# Patient Record
Sex: FEMALE | Race: WHITE | Employment: OTHER | URBAN - METROPOLITAN AREA
[De-identification: names, ages, dates, MRNs, and addresses within clinical notes are randomized per-mention and may not be internally consistent; named-entity substitution may affect disease eponyms.]

---

## 2020-07-16 ENCOUNTER — ANESTHESIA EVENT (OUTPATIENT)
Dept: SURGERY | Age: 70
DRG: 683 | End: 2020-07-16
Payer: MEDICARE

## 2020-07-16 ENCOUNTER — APPOINTMENT (OUTPATIENT)
Dept: ULTRASOUND IMAGING | Age: 70
DRG: 683 | End: 2020-07-16
Attending: INTERNAL MEDICINE
Payer: MEDICARE

## 2020-07-16 ENCOUNTER — APPOINTMENT (OUTPATIENT)
Dept: ULTRASOUND IMAGING | Age: 70
DRG: 683 | End: 2020-07-16
Attending: UROLOGY
Payer: MEDICARE

## 2020-07-16 ENCOUNTER — HOSPITAL ENCOUNTER (INPATIENT)
Age: 70
LOS: 7 days | Discharge: HOME | DRG: 683 | End: 2020-07-23
Attending: EMERGENCY MEDICINE | Admitting: INTERNAL MEDICINE
Payer: MEDICARE

## 2020-07-16 ENCOUNTER — ANESTHESIA (OUTPATIENT)
Dept: SURGERY | Age: 70
DRG: 683 | End: 2020-07-16
Payer: MEDICARE

## 2020-07-16 DIAGNOSIS — N17.9 ACUTE RENAL FAILURE, UNSPECIFIED ACUTE RENAL FAILURE TYPE (HCC): Primary | ICD-10-CM

## 2020-07-16 DIAGNOSIS — D64.9 ANEMIA, UNSPECIFIED TYPE: ICD-10-CM

## 2020-07-16 DIAGNOSIS — R33.9 URINARY RETENTION: ICD-10-CM

## 2020-07-16 PROBLEM — N13.9 LOWER OBSTRUCTIVE UROPATHY: Status: ACTIVE | Noted: 2020-07-16

## 2020-07-16 PROBLEM — E03.9 ACQUIRED HYPOTHYROIDISM: Status: ACTIVE | Noted: 2020-07-16

## 2020-07-16 LAB
ALBUMIN SERPL-MCNC: 3.8 G/DL (ref 3.2–4.6)
ALBUMIN/GLOB SERPL: 1 {RATIO} (ref 1.2–3.5)
ALP SERPL-CCNC: 73 U/L (ref 50–136)
ALT SERPL-CCNC: 17 U/L (ref 12–65)
ANION GAP SERPL CALC-SCNC: 17 MMOL/L (ref 7–16)
AST SERPL-CCNC: 15 U/L (ref 15–37)
ATRIAL RATE: 77 BPM
BASOPHILS # BLD: 0 K/UL (ref 0–0.2)
BASOPHILS NFR BLD: 1 % (ref 0–2)
BILIRUB SERPL-MCNC: 0.3 MG/DL (ref 0.2–1.1)
BUN SERPL-MCNC: 140 MG/DL (ref 8–23)
CALCIUM SERPL-MCNC: 8.9 MG/DL (ref 8.3–10.4)
CALCULATED P AXIS, ECG09: 74 DEGREES
CALCULATED R AXIS, ECG10: 41 DEGREES
CALCULATED T AXIS, ECG11: 78 DEGREES
CHLORIDE SERPL-SCNC: 105 MMOL/L (ref 98–107)
CO2 SERPL-SCNC: 13 MMOL/L (ref 21–32)
CREAT SERPL-MCNC: 17.4 MG/DL (ref 0.6–1)
DIAGNOSIS, 93000: NORMAL
DIFFERENTIAL METHOD BLD: ABNORMAL
EOSINOPHIL # BLD: 0.1 K/UL (ref 0–0.8)
EOSINOPHIL NFR BLD: 3 % (ref 0.5–7.8)
ERYTHROCYTE [DISTWIDTH] IN BLOOD BY AUTOMATED COUNT: 14.4 % (ref 11.9–14.6)
FERRITIN SERPL-MCNC: 118 NG/ML (ref 8–388)
FOLATE SERPL-MCNC: 19.3 NG/ML (ref 3.1–17.5)
GLOBULIN SER CALC-MCNC: 3.8 G/DL (ref 2.3–3.5)
GLUCOSE SERPL-MCNC: 110 MG/DL (ref 65–100)
HAPTOGLOB SERPL-MCNC: 51 MG/DL (ref 30–200)
HCT VFR BLD AUTO: 18.8 % (ref 35.8–46.3)
HGB BLD-MCNC: 10 G/DL (ref 11.7–15.4)
HGB BLD-MCNC: 6.3 G/DL (ref 11.7–15.4)
HGB RETIC QN AUTO: 34 PG (ref 29–35)
IMM GRANULOCYTES # BLD AUTO: 0 K/UL (ref 0–0.5)
IMM GRANULOCYTES NFR BLD AUTO: 1 % (ref 0–5)
IMM RETICS NFR: 11.2 % (ref 3–15.9)
IRON SATN MFR SERPL: 26 %
IRON SERPL-MCNC: 85 UG/DL (ref 35–150)
LYMPHOCYTES # BLD: 0.8 K/UL (ref 0.5–4.6)
LYMPHOCYTES NFR BLD: 18 % (ref 13–44)
MCH RBC QN AUTO: 29.7 PG (ref 26.1–32.9)
MCHC RBC AUTO-ENTMCNC: 33.5 G/DL (ref 31.4–35)
MCV RBC AUTO: 88.7 FL (ref 79.6–97.8)
MONOCYTES # BLD: 0.3 K/UL (ref 0.1–1.3)
MONOCYTES NFR BLD: 8 % (ref 4–12)
NEUTS SEG # BLD: 3.1 K/UL (ref 1.7–8.2)
NEUTS SEG NFR BLD: 70 % (ref 43–78)
NRBC # BLD: 0 K/UL (ref 0–0.2)
P-R INTERVAL, ECG05: 224 MS
PLATELET # BLD AUTO: 154 K/UL (ref 150–450)
PMV BLD AUTO: 9.7 FL (ref 9.4–12.3)
POTASSIUM SERPL-SCNC: 4.5 MMOL/L (ref 3.5–5.1)
PROT SERPL-MCNC: 7.6 G/DL (ref 6.3–8.2)
Q-T INTERVAL, ECG07: 404 MS
QRS DURATION, ECG06: 114 MS
QTC CALCULATION (BEZET), ECG08: 457 MS
RBC # BLD AUTO: 2.12 M/UL (ref 4.05–5.2)
RETICS # AUTO: 0.07 M/UL (ref 0.03–0.1)
RETICS/RBC NFR AUTO: 3.4 % (ref 0.3–2)
SODIUM SERPL-SCNC: 135 MMOL/L (ref 136–145)
TIBC SERPL-MCNC: 323 UG/DL (ref 250–450)
TRANSFERRIN SERPL-MCNC: 245 MG/DL (ref 202–364)
VENTRICULAR RATE, ECG03: 77 BPM
VIT B12 SERPL-MCNC: 955 PG/ML (ref 193–986)
WBC # BLD AUTO: 4.4 K/UL (ref 4.3–11.1)

## 2020-07-16 PROCEDURE — 82607 VITAMIN B-12: CPT

## 2020-07-16 PROCEDURE — 30233N1 TRANSFUSION OF NONAUTOLOGOUS RED BLOOD CELLS INTO PERIPHERAL VEIN, PERCUTANEOUS APPROACH: ICD-10-PCS | Performed by: EMERGENCY MEDICINE

## 2020-07-16 PROCEDURE — 87086 URINE CULTURE/COLONY COUNT: CPT

## 2020-07-16 PROCEDURE — 77030039425 HC BLD LARYNG TRULITE DISP TELE -A: Performed by: ANESTHESIOLOGY

## 2020-07-16 PROCEDURE — 85025 COMPLETE CBC W/AUTO DIFF WBC: CPT

## 2020-07-16 PROCEDURE — 74011250636 HC RX REV CODE- 250/636: Performed by: UROLOGY

## 2020-07-16 PROCEDURE — 85018 HEMOGLOBIN: CPT

## 2020-07-16 PROCEDURE — P9016 RBC LEUKOCYTES REDUCED: HCPCS

## 2020-07-16 PROCEDURE — 83540 ASSAY OF IRON: CPT

## 2020-07-16 PROCEDURE — 74011250636 HC RX REV CODE- 250/636: Performed by: ANESTHESIOLOGY

## 2020-07-16 PROCEDURE — 76060000032 HC ANESTHESIA 0.5 TO 1 HR: Performed by: UROLOGY

## 2020-07-16 PROCEDURE — 77010033678 HC OXYGEN DAILY

## 2020-07-16 PROCEDURE — 84466 ASSAY OF TRANSFERRIN: CPT

## 2020-07-16 PROCEDURE — 74011250637 HC RX REV CODE- 250/637: Performed by: INTERNAL MEDICINE

## 2020-07-16 PROCEDURE — 85046 RETICYTE/HGB CONCENTRATE: CPT

## 2020-07-16 PROCEDURE — 74011250636 HC RX REV CODE- 250/636: Performed by: NURSE ANESTHETIST, CERTIFIED REGISTERED

## 2020-07-16 PROCEDURE — 80053 COMPREHEN METABOLIC PANEL: CPT

## 2020-07-16 PROCEDURE — 77030005518 HC CATH URETH FOL 2W BARD -B: Performed by: UROLOGY

## 2020-07-16 PROCEDURE — 82746 ASSAY OF FOLIC ACID SERUM: CPT

## 2020-07-16 PROCEDURE — 76770 US EXAM ABDO BACK WALL COMP: CPT

## 2020-07-16 PROCEDURE — C1769 GUIDE WIRE: HCPCS | Performed by: UROLOGY

## 2020-07-16 PROCEDURE — C1894 INTRO/SHEATH, NON-LASER: HCPCS | Performed by: UROLOGY

## 2020-07-16 PROCEDURE — 77030040361 HC SLV COMPR DVT MDII -B: Performed by: UROLOGY

## 2020-07-16 PROCEDURE — 82728 ASSAY OF FERRITIN: CPT

## 2020-07-16 PROCEDURE — 77030019927 HC TBNG IRR CYSTO BAXT -A: Performed by: UROLOGY

## 2020-07-16 PROCEDURE — 74011000250 HC RX REV CODE- 250: Performed by: NURSE ANESTHETIST, CERTIFIED REGISTERED

## 2020-07-16 PROCEDURE — 76010000160 HC OR TIME 0.5 TO 1 HR INTENSV-TIER 1: Performed by: UROLOGY

## 2020-07-16 PROCEDURE — 77030018832 HC SOL IRR H20 ICUM -A: Performed by: UROLOGY

## 2020-07-16 PROCEDURE — 65270000029 HC RM PRIVATE

## 2020-07-16 PROCEDURE — 86900 BLOOD TYPING SEROLOGIC ABO: CPT

## 2020-07-16 PROCEDURE — 0T7D8ZZ DILATION OF URETHRA, VIA NATURAL OR ARTIFICIAL OPENING ENDOSCOPIC: ICD-10-PCS | Performed by: UROLOGY

## 2020-07-16 PROCEDURE — 96374 THER/PROPH/DIAG INJ IV PUSH: CPT

## 2020-07-16 PROCEDURE — 36415 COLL VENOUS BLD VENIPUNCTURE: CPT

## 2020-07-16 PROCEDURE — 94760 N-INVAS EAR/PLS OXIMETRY 1: CPT

## 2020-07-16 PROCEDURE — 99284 EMERGENCY DEPT VISIT MOD MDM: CPT

## 2020-07-16 PROCEDURE — 77030040922 HC BLNKT HYPOTHRM STRY -A: Performed by: ANESTHESIOLOGY

## 2020-07-16 PROCEDURE — 76210000063 HC OR PH I REC FIRST 0.5 HR: Performed by: UROLOGY

## 2020-07-16 PROCEDURE — 86923 COMPATIBILITY TEST ELECTRIC: CPT

## 2020-07-16 PROCEDURE — 36430 TRANSFUSION BLD/BLD COMPNT: CPT

## 2020-07-16 PROCEDURE — 77030037088 HC TUBE ENDOTRACH ORAL NSL COVD-A: Performed by: ANESTHESIOLOGY

## 2020-07-16 PROCEDURE — 83010 ASSAY OF HAPTOGLOBIN QUANT: CPT

## 2020-07-16 PROCEDURE — 74011250636 HC RX REV CODE- 250/636

## 2020-07-16 RX ORDER — METOPROLOL TARTRATE 50 MG/1
50 TABLET ORAL 2 TIMES DAILY
Status: DISCONTINUED | OUTPATIENT
Start: 2020-07-16 | End: 2020-07-23 | Stop reason: HOSPADM

## 2020-07-16 RX ORDER — PANTOPRAZOLE SODIUM 40 MG/1
40 TABLET, DELAYED RELEASE ORAL DAILY
COMMUNITY
End: 2020-08-19 | Stop reason: SDUPTHER

## 2020-07-16 RX ORDER — SODIUM CHLORIDE, SODIUM LACTATE, POTASSIUM CHLORIDE, CALCIUM CHLORIDE 600; 310; 30; 20 MG/100ML; MG/100ML; MG/100ML; MG/100ML
75 INJECTION, SOLUTION INTRAVENOUS CONTINUOUS
Status: DISCONTINUED | OUTPATIENT
Start: 2020-07-16 | End: 2020-07-16

## 2020-07-16 RX ORDER — CEFAZOLIN SODIUM/WATER 2 G/20 ML
2 SYRINGE (ML) INTRAVENOUS ONCE
Status: COMPLETED | OUTPATIENT
Start: 2020-07-16 | End: 2020-07-16

## 2020-07-16 RX ORDER — ROCURONIUM BROMIDE 10 MG/ML
INJECTION, SOLUTION INTRAVENOUS AS NEEDED
Status: DISCONTINUED | OUTPATIENT
Start: 2020-07-16 | End: 2020-07-16 | Stop reason: HOSPADM

## 2020-07-16 RX ORDER — DULOXETIN HYDROCHLORIDE 30 MG/1
CAPSULE, DELAYED RELEASE ORAL
COMMUNITY
Start: 2020-07-15 | End: 2020-08-13 | Stop reason: CLARIF

## 2020-07-16 RX ORDER — HYDROMORPHONE HYDROCHLORIDE 1 MG/ML
0.5 INJECTION, SOLUTION INTRAMUSCULAR; INTRAVENOUS; SUBCUTANEOUS
Status: DISCONTINUED | OUTPATIENT
Start: 2020-07-16 | End: 2020-07-23 | Stop reason: HOSPADM

## 2020-07-16 RX ORDER — LEVOTHYROXINE SODIUM 150 UG/1
150 TABLET ORAL DAILY
COMMUNITY
End: 2020-07-23

## 2020-07-16 RX ORDER — HYDRALAZINE HYDROCHLORIDE 25 MG/1
25 TABLET, FILM COATED ORAL
Status: DISCONTINUED | OUTPATIENT
Start: 2020-07-16 | End: 2020-07-23 | Stop reason: HOSPADM

## 2020-07-16 RX ORDER — EPHEDRINE SULFATE/0.9% NACL/PF 50 MG/5 ML
SYRINGE (ML) INTRAVENOUS AS NEEDED
Status: DISCONTINUED | OUTPATIENT
Start: 2020-07-16 | End: 2020-07-16 | Stop reason: HOSPADM

## 2020-07-16 RX ORDER — LORAZEPAM 2 MG/ML
INJECTION INTRAMUSCULAR
Status: COMPLETED
Start: 2020-07-16 | End: 2020-07-16

## 2020-07-16 RX ORDER — SODIUM CHLORIDE 0.9 % (FLUSH) 0.9 %
5-40 SYRINGE (ML) INJECTION EVERY 8 HOURS
Status: DISCONTINUED | OUTPATIENT
Start: 2020-07-16 | End: 2020-07-23 | Stop reason: HOSPADM

## 2020-07-16 RX ORDER — LISINOPRIL 40 MG/1
40 TABLET ORAL DAILY
COMMUNITY
End: 2020-07-23

## 2020-07-16 RX ORDER — BUPROPION HYDROCHLORIDE 300 MG/1
300 TABLET ORAL DAILY
COMMUNITY
End: 2020-08-13 | Stop reason: CLARIF

## 2020-07-16 RX ORDER — ACETAMINOPHEN 325 MG/1
650 TABLET ORAL
Status: DISCONTINUED | OUTPATIENT
Start: 2020-07-16 | End: 2020-07-23 | Stop reason: HOSPADM

## 2020-07-16 RX ORDER — LEVOTHYROXINE SODIUM 75 UG/1
150 TABLET ORAL
Status: DISCONTINUED | OUTPATIENT
Start: 2020-07-17 | End: 2020-07-23 | Stop reason: HOSPADM

## 2020-07-16 RX ORDER — HYDRALAZINE HYDROCHLORIDE 25 MG/1
25 TABLET, FILM COATED ORAL 3 TIMES DAILY
Status: DISCONTINUED | OUTPATIENT
Start: 2020-07-16 | End: 2020-07-23 | Stop reason: HOSPADM

## 2020-07-16 RX ORDER — SODIUM CHLORIDE 9 MG/ML
250 INJECTION, SOLUTION INTRAVENOUS AS NEEDED
Status: DISCONTINUED | OUTPATIENT
Start: 2020-07-16 | End: 2020-07-23 | Stop reason: HOSPADM

## 2020-07-16 RX ORDER — SUCCINYLCHOLINE CHLORIDE 20 MG/ML
INJECTION INTRAMUSCULAR; INTRAVENOUS AS NEEDED
Status: DISCONTINUED | OUTPATIENT
Start: 2020-07-16 | End: 2020-07-16 | Stop reason: HOSPADM

## 2020-07-16 RX ORDER — ONDANSETRON 2 MG/ML
INJECTION INTRAMUSCULAR; INTRAVENOUS AS NEEDED
Status: DISCONTINUED | OUTPATIENT
Start: 2020-07-16 | End: 2020-07-16 | Stop reason: HOSPADM

## 2020-07-16 RX ORDER — HEPARIN SODIUM 5000 [USP'U]/ML
5000 INJECTION, SOLUTION INTRAVENOUS; SUBCUTANEOUS EVERY 8 HOURS
Status: DISCONTINUED | OUTPATIENT
Start: 2020-07-17 | End: 2020-07-23 | Stop reason: HOSPADM

## 2020-07-16 RX ORDER — LIDOCAINE HYDROCHLORIDE 20 MG/ML
INJECTION, SOLUTION EPIDURAL; INFILTRATION; INTRACAUDAL; PERINEURAL AS NEEDED
Status: DISCONTINUED | OUTPATIENT
Start: 2020-07-16 | End: 2020-07-16 | Stop reason: HOSPADM

## 2020-07-16 RX ORDER — DEXAMETHASONE SODIUM PHOSPHATE 4 MG/ML
INJECTION, SOLUTION INTRA-ARTICULAR; INTRALESIONAL; INTRAMUSCULAR; INTRAVENOUS; SOFT TISSUE AS NEEDED
Status: DISCONTINUED | OUTPATIENT
Start: 2020-07-16 | End: 2020-07-16 | Stop reason: HOSPADM

## 2020-07-16 RX ORDER — LOSARTAN POTASSIUM 100 MG/1
TABLET ORAL
COMMUNITY
Start: 2020-07-15 | End: 2020-07-23

## 2020-07-16 RX ORDER — PROPOFOL 10 MG/ML
INJECTION, EMULSION INTRAVENOUS AS NEEDED
Status: DISCONTINUED | OUTPATIENT
Start: 2020-07-16 | End: 2020-07-16 | Stop reason: HOSPADM

## 2020-07-16 RX ORDER — FENTANYL CITRATE 50 UG/ML
INJECTION, SOLUTION INTRAMUSCULAR; INTRAVENOUS AS NEEDED
Status: DISCONTINUED | OUTPATIENT
Start: 2020-07-16 | End: 2020-07-16 | Stop reason: HOSPADM

## 2020-07-16 RX ORDER — SODIUM CHLORIDE 0.9 % (FLUSH) 0.9 %
5-40 SYRINGE (ML) INJECTION AS NEEDED
Status: DISCONTINUED | OUTPATIENT
Start: 2020-07-16 | End: 2020-07-23 | Stop reason: HOSPADM

## 2020-07-16 RX ORDER — LORAZEPAM 2 MG/ML
2 INJECTION INTRAMUSCULAR ONCE
Status: COMPLETED | OUTPATIENT
Start: 2020-07-16 | End: 2020-07-16

## 2020-07-16 RX ADMIN — LIDOCAINE HYDROCHLORIDE 60 MG: 20 INJECTION, SOLUTION EPIDURAL; INFILTRATION; INTRACAUDAL; PERINEURAL at 15:15

## 2020-07-16 RX ADMIN — DEXAMETHASONE SODIUM PHOSPHATE 4 MG: 4 INJECTION, SOLUTION INTRAMUSCULAR; INTRAVENOUS at 15:40

## 2020-07-16 RX ADMIN — Medication 10 ML: at 22:21

## 2020-07-16 RX ADMIN — ROCURONIUM BROMIDE 10 MG: 10 INJECTION, SOLUTION INTRAVENOUS at 15:15

## 2020-07-16 RX ADMIN — SUCCINYLCHOLINE CHLORIDE 100 MG: 20 INJECTION, SOLUTION INTRAMUSCULAR; INTRAVENOUS at 15:16

## 2020-07-16 RX ADMIN — Medication 10 ML: at 18:09

## 2020-07-16 RX ADMIN — Medication 10 MG: at 15:38

## 2020-07-16 RX ADMIN — SODIUM CHLORIDE, SODIUM LACTATE, POTASSIUM CHLORIDE, AND CALCIUM CHLORIDE: 600; 310; 30; 20 INJECTION, SOLUTION INTRAVENOUS at 15:07

## 2020-07-16 RX ADMIN — ONDANSETRON 4 MG: 2 INJECTION INTRAMUSCULAR; INTRAVENOUS at 15:40

## 2020-07-16 RX ADMIN — LORAZEPAM 2 MG: 2 INJECTION INTRAMUSCULAR; INTRAVENOUS at 13:09

## 2020-07-16 RX ADMIN — FENTANYL CITRATE 50 MCG: 50 INJECTION INTRAMUSCULAR; INTRAVENOUS at 15:15

## 2020-07-16 RX ADMIN — HYDRALAZINE HYDROCHLORIDE 25 MG: 25 TABLET, FILM COATED ORAL at 19:38

## 2020-07-16 RX ADMIN — METOPROLOL TARTRATE 50 MG: 50 TABLET, FILM COATED ORAL at 18:08

## 2020-07-16 RX ADMIN — PROPOFOL 150 MG: 10 INJECTION, EMULSION INTRAVENOUS at 15:15

## 2020-07-16 RX ADMIN — HYDRALAZINE HYDROCHLORIDE 25 MG: 25 TABLET, FILM COATED ORAL at 22:21

## 2020-07-16 RX ADMIN — LORAZEPAM 2 MG: 2 INJECTION INTRAMUSCULAR at 13:09

## 2020-07-16 RX ADMIN — Medication 2 G: at 15:20

## 2020-07-16 NOTE — BRIEF OP NOTE
Brief Postoperative Note    Patient: Enzo Greene  YOB: 1950  MRN: [id number]    Date of Procedure: 7/16/2020     Pre-Op Diagnosis: URINARY STRICTURE    Post-Op Diagnosis: Same as preoperative diagnosis.       Procedure(s):  CYSTOSCOPY, INSERTION OF CHAUDHRY CATHETER    Surgeon(s):  Kortney Salazar MD    Surgical Assistant: None    Anesthesia: General     Estimated Blood Loss (mL): Minimal    Complications: None    Specimens:   ID Type Source Tests Collected by Time Destination   1 : Urine Culture Urine Bladder CULTURE, URINE Kortney Salazar MD 7/16/2020 1536 Microbiology        Implants: * No implants in log *    Drains:   Orogastric Tube 07/16/20 (Active)       Findings: see op note    Electronically Signed by Kane Gonzalez MD on 7/16/2020 at 3:55 PM

## 2020-07-16 NOTE — PROGRESS NOTES
07/16/20 1705   Dual Skin Pressure Injury Assessment   Dual Skin Pressure Injury Assessment WDL   Second Care Provider (Based on [address] [place] [address]) [address], RN   pt has no noted skin breakdown on the sacrum or heels. Zamora in place.

## 2020-07-16 NOTE — H&P
Hospitalist H&P Note     Admit Date:  2020 10:47 AM   Name:  Lauren Gutierrez   Age:  81 y.o.  :  1950   MRN:  [id number]   PCP:  Fernanda Buchanan MD  Treatment Team: Attending Provider: Maria Teresa Wilson MD; Consulting Provider: Tiffany Land MD    HPI/Subjective:   Lauren Gutierrez is a 81 y.o. female with past medical history significant for hypertension, hypothyroidism, urethral stricture presents for generalized malaise, abdominal distention and pain. Patient reports she went to her PCP and got labs drawn. Labs came back abnormal and PCP recommended patient come to the ER for further evaluation. Upon ER arrival, patient found to have a creatinine of 17 and a distended bladder. Patient also found to have a hemoglobin of 6.3. Multiple attempts were made by urology to place Zamora catheter but patient has significant stricture that is cannot be passed. Patient to be taken to the OR for Zamora placement at this time. Patient denies any recent fevers, chills, cough, shortness of breath, chest pain. She has been having worsening nausea and several episodes of vomiting over the past week. She reports following social desisting measures and wearing a mask. Patient reports that she has had urinary problems for the past 35 years. 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 She does not drink alcohol. Patient to be admitted to hospital service for management of anemia, acute renal failure. 10 systems reviewed and negative except as noted in HPI.       Past Medical History:   Diagnosis Date    Arthritis     GERD (gastroesophageal reflux disease)     no meds    High cholesterol     Hypertension     Kidney disease     Liver disease     hep C, ~, treated interferon/ribo    Melanoma ([facility] [address]     RLE, ~     PUD (peptic ulcer disease)     Rheumatic fever     as a child    Thyroid disease     hypo Past Surgical History:   Procedure Laterality Date    HX APPENDECTOMY      HX CHOLECYSTECTOMY      HX ORTHOPAEDIC Left     KNEE COPE    NEUROLOGICAL PROCEDURE UNLISTED      CERVICAL AND LUMBAR SX      No Known Allergies   Social History     Tobacco Use    Smoking status: Current Every Day Smoker     Packs/day: 0.50     Years: 20.00     Pack years: 10.00    Smokeless tobacco: Never Used   Substance Use Topics    Alcohol use: No      Family History   Family history unknown: Yes   Problem Relation Age of Onset    Family history unknown: Yes    No Known Problems Mother     No Known Problems Father         There is no immunization history on file for this patient. PTA Medications:  Prior to Admission Medications   Prescriptions Last Dose Informant Patient Reported? Taking? ASPIRIN/CAFFEINE (ANACIN PO)   Yes No   Sig: Take  by mouth as needed. atorvastatin (LIPITOR) 20 mg tablet   Yes No   Sig: Take 20 mg by mouth every other day. cholecalciferol, VITAMIN D3, (VITAMIN D3) 5,000 unit tab tablet   Yes No   Sig: Take 5,000 Units by mouth daily. fentaNYL (DURAGESIC) 50 mcg/hr PATCH   Yes No   Si Patch by TransDERmal route every fourty-eight (48) hours. levothyroxine (SYNTHROID) 100 mcg tablet   Yes No   Sig: Take 150 mcg by mouth Daily (before breakfast). metoprolol tartrate (LOPRESSOR) 50 mg tablet   Yes No   Sig: Take 50 mg by mouth two (2) times a day.       Facility-Administered Medications: None       Objective:     Patient Vitals for the past 24 hrs:   Temp Pulse Resp BP SpO2   20 1410 98.1 °F (36.7 °C) 72 16 (!) 192/92 99 %   20 1405 98.1 °F (36.7 °C) 72 16 (!) 189/93 100 %   20 1400 98.1 °F (36.7 °C) 70 16 (!) 178/103 100 %   20 1355 98.2 °F (36.8 °C) 72 16 (!) 179/102 99 %   20 1348 98.2 °F (36.8 °C) 71 16 (!) 184/104 100 %   20 1332  73   95 %   20 1327    161/77 (!) 78 %   20 1131  85  (!) 171/91 97 %   20 1123  76  158/85 100 %   07/16/20 1042 98.2 °F (36.8 °C) (!) 59 16 (!) 171/92 99 %     Oxygen Therapy  O2 Sat (%): 99 % (07/16/20 1410)  Pulse via Oximetry: 73 beats per minute (07/16/20 1332)  O2 Device: Room air (07/16/20 1348)    Estimated body mass index is 23.72 kg/m² as calculated from the following:    Height as of this encounter: 5' 10\" (1.778 m). Weight as of this encounter: 75 kg (165 lb 5.5 oz). No intake or output data in the 24 hours ending 07/16/20 1421    *Note that automatically entered I/Os may not be accurate; dependent on patient compliance with collection and accurate  by assistants. Physical Exam:  General:    Pleasant white female, appears to be in mild distress. Nervous  HEENT: Conjunctival pallor noted, patient's face is very white and pale. Normocephalic, atraumatic, oromucosa is moist  CV:   RRR, no murmurs  Lungs:   Clear to auscultation bilaterally  Abdomen:   Soft but tender to palpation distended. Patient has distended globus left level of epigastric area filled with fluid. No guarding or rigidity noted  Extremities: No visible skeletal deformities  Skin:     Minor bruising around arms but no rash or jaundice  Neuro:  AO x4, CN II through XII grossly intact  Psych:  Calm, cooperative      Data Review:   Recent Results (from the past 24 hour(s))   CBC WITH AUTOMATED DIFF    Collection Time: 07/16/20 10:45 AM   Result Value Ref Range    WBC 4.4 4.3 - 11.1 K/uL    RBC 2.12 (L) 4.05 - 5.2 M/uL    HGB 6.3 (LL) 11.7 - 15.4 g/dL    HCT 18.8 (LL) 35.8 - 46.3 %    MCV 88.7 79.6 - 97.8 FL    MCH 29.7 26.1 - 32.9 PG    MCHC 33.5 31.4 - 35.0 g/dL    RDW 14.4 11.9 - 14.6 %    PLATELET 078 922 - 242 K/uL    MPV 9.7 9.4 - 12.3 FL    ABSOLUTE NRBC 0.00 0.0 - 0.2 K/uL    DF AUTOMATED      NEUTROPHILS 70 43 - 78 %    LYMPHOCYTES 18 13 - 44 %    MONOCYTES 8 4.0 - 12.0 %    EOSINOPHILS 3 0.5 - 7.8 %    BASOPHILS 1 0.0 - 2.0 %    IMMATURE GRANULOCYTES 1 0.0 - 5.0 %    ABS.  NEUTROPHILS 3.1 1.7 - 8.2 K/UL    ABS. LYMPHOCYTES 0.8 0.5 - 4.6 K/UL    ABS. MONOCYTES 0.3 0.1 - 1.3 K/UL    ABS. EOSINOPHILS 0.1 0.0 - 0.8 K/UL    ABS. BASOPHILS 0.0 0.0 - 0.2 K/UL    ABS. IMM. GRANS. 0.0 0.0 - 0.5 K/UL   METABOLIC PANEL, COMPREHENSIVE    Collection Time: 07/16/20 10:45 AM   Result Value Ref Range    Sodium 135 (L) 136 - 145 mmol/L    Potassium 4.5 3.5 - 5.1 mmol/L    Chloride 105 98 - 107 mmol/L    CO2 13 (L) 21 - 32 mmol/L    Anion gap 17 (H) 7 - 16 mmol/L    Glucose 110 (H) 65 - 100 mg/dL     (H) 8 - 23 MG/DL    Creatinine 17.40 (H) 0.6 - 1.0 MG/DL    GFR est AA 3 (L) >60 ml/min/1.73m2    GFR est non-AA 2 (L) >60 ml/min/1.73m2    Calcium 8.9 8.3 - 10.4 MG/DL    Bilirubin, total 0.3 0.2 - 1.1 MG/DL    ALT (SGPT) 17 12 - 65 U/L    AST (SGOT) 15 15 - 37 U/L    Alk.  phosphatase 73 50 - 136 U/L    Protein, total 7.6 6.3 - 8.2 g/dL    Albumin 3.8 3.2 - 4.6 g/dL    Globulin 3.8 (H) 2.3 - 3.5 g/dL    A-G Ratio 1.0 (L) 1.2 - 3.5     HAPTOGLOBIN    Collection Time: 07/16/20 10:45 AM   Result Value Ref Range    Haptoglobin PENDING mg/dL   FOLATE    Collection Time: 07/16/20 10:45 AM   Result Value Ref Range    Folate 19.3 (H) 3.1 - 17.5 ng/mL   VITAMIN B12    Collection Time: 07/16/20 10:45 AM   Result Value Ref Range    Vitamin B12 955 193 - 986 pg/mL   TRANSFERRIN    Collection Time: 07/16/20 10:45 AM   Result Value Ref Range    Transferrin 245 202 - 364 mg/dL   FERRITIN    Collection Time: 07/16/20 10:45 AM   Result Value Ref Range    Ferritin 118 8 - 388 NG/ML   RETICULOCYTE COUNT    Collection Time: 07/16/20 10:45 AM   Result Value Ref Range    Reticulocyte count 3.4 (H) 0.3 - 2.0 %    Absolute Retic Cnt. 0.0725 0.026 - 0.095 M/ul    Immature Retic Fraction 11.2 3.0 - 15.9 %    Retic Hgb Conc. 34 29 - 35 pg   EKG, 12 LEAD, INITIAL    Collection Time: 07/16/20 10:55 AM   Result Value Ref Range    Ventricular Rate 77 BPM    Atrial Rate 77 BPM    P-R Interval 224 ms    QRS Duration 114 ms    Q-T Interval 404 ms    QTC Calculation (Bezet) 457 ms    Calculated P Axis 74 degrees    Calculated R Axis 41 degrees    Calculated T Axis 78 degrees    Diagnosis       Sinus rhythm with 1st degree A-V block  Otherwise normal ECG  No previous ECGs available     TYPE + CROSSMATCH    Collection Time: 07/16/20 11:43 AM   Result Value Ref Range    Crossmatch Expiration 07/19/2020     ABO/Rh(D) Suresh Pyo POSITIVE     Antibody screen NEG     Unit number [id number]     Blood component type RC LR     Unit division 00     Status of unit ISSUED     Crossmatch result Compatible        All Micro Results     None          Current Facility-Administered Medications   Medication Dose Route Frequency    0.9% sodium chloride infusion 250 mL  250 mL IntraVENous PRN    acetaminophen (TYLENOL) tablet 650 mg  650 mg Oral Q4H PRN    HYDROmorphone (PF) (DILAUDID) injection 0.5 mg  0.5 mg IntraVENous Q6H PRN    ceFAZolin (ANCEF) 2 g/20 mL in sterile water IV syringe  2 g IntraVENous ONCE       Other Studies:  No results found. Assessment and Plan: Active Hospital Problems    Diagnosis Date Noted    Acute renal failure (ARF) ([facility] [address] 07/16/2020    Anemia 07/16/2020    Lower obstructive uropathy 07/16/2020    Acquired hypothyroidism 07/16/2020       Plan:    Acute renal failure secondary to obstructive uropathy from urethral stricture  Multiple attempts to place Zamora 1 accessible as well as dilation in the ER by urology team.  Patient has known urethral strictures and has had dilations in the past.  This is likely cause of her obstructive uropathy causing renal failure. Urology team to take patient to the OR for urethral dilation and Zamora placement  Follow-up urology recommendations as well as nephrology recommendations  Obtain ultrasound kidneys   Monitor electrolyte abnormalities given renal failure   Zamora placement and care  Follow-up UA    Symptomatic anemia, unclear etiology  May be due to patient's renal dysfunction.   Patient denies any dark stools and is currently not on any blood thinners. Patient does not take NSAIDs. Patient reports stool guaiac was negative in PCP office  Transfuse 1 unit   Maintain active type and screen   Follow-up iron studies, ferritin     Hypertension  Patient currently hypertensive but is uncomfortable and in pain. Will monitor for now and treat pain first.  Pain control PRN   Continue with metoprolol 50 mg XL daily    Hypothyroidism  Continue with Synthroid as per home dose    Fluids: Hold off on fluids for now  Electrolytes: Replete as needed  Nutrition: Renal diet  CODE STATUS: Full    DVT prophylaxis: Heparin subcu  Estimated LOS:  Greater than 2 midnights    Medical Decision Making:    Labs/Imaging reviewed. Additional information obtained from ED provider. Patient is high risk due to medical condition and comorbidities. In addition, requires monitoring due to receiving medications. Plan discussed with nursing staff. Plan discussed with patient/family. All questions/concerns were addressed. Pt/family agrees with the plan.      Signed:  Jan Pino MD

## 2020-07-16 NOTE — CONSULTS
Urology Consult    Subjective:     Date of Consultation:  July 16, 2020    Referring Physician: Pierre Sutherland MD    Reason for Consultation:  Inability to place marquez catheter    History of Present Illness:   Ms. Zuly Gaviria is a 81 y.o.  female with hx of urethral stricture disease and incomplete bladder emptying who is being seen for acute urinary retention and inability to place marquez catheter. She presented to ER per PCP advice based on abnormal blood work with a Cn of 15.5. In the ER she reports difficulty voiding and has long hx of this. She is only able to dribble urine. She said it has gotten progressively worse over the last 2 days. Abdomen distended. RNs attempted to place marquez but unable to locate urethra. We are consulted. Cn today is 17.4. Hx of urethral stricture, last dilations in 1/2016 and 12/2016. Unable to visualize the urethral meatus. Attempted to place 14F coude catheter by touch with no success. Dr. Pennie Bishop attempted placing 16F coude catheter with catheter guide followed by dilating her with VB sounds and these were both unsuccessful d/t urethral stenosis. Decision was made to take her to OR for cystoscopy under anesthesia with marquez placement and possible SP tube if unable to place marquez. Past Medical History:   Diagnosis Date    Arthritis     GERD (gastroesophageal reflux disease)     no meds    High cholesterol     Hypertension     Kidney disease     Liver disease     hep C, ~2006, treated interferon/ribo    Melanoma ([facility] [address]     RLE, ~ 2006    PUD (peptic ulcer disease)     Rheumatic fever     as a child    Thyroid disease     hypo      Past Surgical History:   Procedure Laterality Date    HX APPENDECTOMY      HX CHOLECYSTECTOMY      HX ORTHOPAEDIC Left     KNEE COPE    NEUROLOGICAL PROCEDURE UNLISTED      CERVICAL AND LUMBAR SX      Family History   Family history unknown: Yes   Problem Relation Age of Onset    Family history unknown:  Yes    No Known Problems Mother     No Known Problems Father       Social History     Tobacco Use    Smoking status: Current Every Day Smoker     Packs/day: 0.50     Years: 20.00     Pack years: 10.00    Smokeless tobacco: Never Used   Substance Use Topics    Alcohol use: No     No Known Allergies   Prior to Admission medications    Medication Sig Start Date End Date Taking? Authorizing Provider   metoprolol tartrate (LOPRESSOR) 50 mg tablet Take 50 mg by mouth two (2) times a day. Provider, Historical   atorvastatin (LIPITOR) 20 mg tablet Take 20 mg by mouth every other day. Provider, Historical   ASPIRIN/CAFFEINE (ANACIN PO) Take  by mouth as needed. Provider, Historical   cholecalciferol, VITAMIN D3, (VITAMIN D3) 5,000 unit tab tablet Take 5,000 Units by mouth daily. Provider, Historical   predniSONE (DELTASONE) 5 mg tablet Take 5 mg by mouth nightly. 16  Provider, Historical   fentaNYL (DURAGESIC) 50 mcg/hr PATCH 1 Patch by TransDERmal route every fourty-eight (48) hours. 10/4/16   Hi Rodriguez MD   levothyroxine (SYNTHROID) 100 mcg tablet Take 150 mcg by mouth Daily (before breakfast). OtherHi MD         Review of Systems:  A comprehensive review of systems was negative except for that written in the HPI. Objective:     Patient Vitals for the past 8 hrs:   BP Temp Pulse Resp SpO2 Height Weight   20 1348 (!) 184/104 98.2 °F (36.8 °C) 71 16 100 %     20 1332   73  95 %     20 1327 161/77    (!) 78 %     20 1131 (!) 171/91  85  97 %     20 1123 158/85  76  100 %     20 1042 (!) 171/92 98.2 °F (36.8 °C) (!) 59 16 99 % 5' 10\" (1.778 m) 165 lb 5.5 oz (75 kg)     Temp (24hrs), Av.2 °F (36.8 °C), Min:98.2 °F (36.8 °C), Max:98.2 °F (36.8 °C)      Intake and Output:   No intake/output data recorded.     Physical Exam:            General:    alert, cooperative, no distress                     Skin:  no rash or abnormalities HEENT:  PERRLA        Throat/Neck:  neck supple                     Lungs:  clear to auscultation bilaterally      Cardiovascular:  RRR, S1 S2             Abdomen[de-identified]  soft, non-tender             :  urethral meatus not visible on exam          Extremities:  peripheral pulses 2+ and symmetric       Assessment:     Principal Problem:    Acute renal failure (ARF) ([address] (7/16/2020)    Active Problems:    Anemia (7/16/2020)      Urinary retention. Urethral stenosis. Plan: To OR today for cystoscopy under anesthesia with marquez placement and possible SP tube if unable to place marquez. NPO. Verify consent has been obtained. Thank you for the opportunity to assist in the care of this patient. Exam showed stenotic vagina and hypospadiac meatus. Tried to pass 16 fr marquez with catheter guide, but it met resistance and wouldn't pass. Attempted to pass 12 fr sound carefully , but met resistance. Bladder is distended by palpation, dome is cephalad to umbilicus. Need to go to OR for cysto, urethral dilation and marquez placement. I have reviewed the above note and examined the patient. I agree with the exam, assessment and plan.     Esteban Arboleda MD

## 2020-07-16 NOTE — ED TRIAGE NOTES
Pt to triage with mask and gloves on. States she went to PCP yesterday and had labs drawn. Pt states she was called this am and told to come to ER because she has \"no blood and kidney problems. \" Pt states she feels very weak and tired all the time.

## 2020-07-16 NOTE — ED NOTES
TRANSFER - OUT REPORT:    Verbal report given to [facility] [place], RN(name) on Dandre Post  being transferred to Preop(unit) for routine progression of care       Report consisted of patients Situation, Background, Assessment and   Recommendations(SBAR). Information from the following report(s) ED Summary was reviewed with the receiving nurse. Lines:       Opportunity for questions and clarification was provided.       Patient transported with:   [employer]

## 2020-07-16 NOTE — ANESTHESIA POSTPROCEDURE EVALUATION
Procedure(s):  CYSTOSCOPY, INSERTION OF CHAUDHRY CATHETER. general    Anesthesia Post Evaluation      Multimodal analgesia: multimodal analgesia used between 6 hours prior to anesthesia start to PACU discharge  Patient location during evaluation: bedside  Patient participation: complete - patient participated  Level of consciousness: awake  Pain management: adequate  Airway patency: patent  Anesthetic complications: no  Cardiovascular status: acceptable and stable  Respiratory status: acceptable and room air  Hydration status: acceptable  Comments: Will transfuse 1 unit more, hgb 6.7 after preop 1 unit. Post anesthesia nausea and vomiting:  none      INITIAL Post-op Vital signs:   Vitals Value Taken Time   /89 7/16/2020  4:07 PM   Temp 36.6 °C (97.8 °F) 7/16/2020  4:03 PM   Pulse 75 7/16/2020  4:09 PM   Resp 15 7/16/2020  4:03 PM   SpO2 100 % 7/16/2020  4:09 PM   Vitals shown include unvalidated device data.

## 2020-07-16 NOTE — ED PROVIDER NOTES
Patient is a 26-year-old female presenting emerge department today after her family doctor told her she needed to come here because of abnormal blood work. She says that she has had trouble with her kidneys for a long time however review of her medical record shows that her creatinine is been in the 1.3 range. She was noted to have anemia and a creatinine of 15.5 yesterday in the office. Patient says that she has difficulty urinating and has had this trouble for the last 35 years. At one point in time she would do straight cath how ever because of her  anatomy it is difficult. The patient says she is had a lot of fullness in her bladder for a long time now and this is not a new thing. The patient says that she has not been having any fevers or chills. She does feel a little more weak than normal and is noted to be significantly pale. The patient does have dark black stools but says she takes Pepto-Bismol every day. She does have a history of previous GI bleed. The patient seems to be downplaying all of her chronic and acute symptoms. Past Medical History:   Diagnosis Date    Arthritis     GERD (gastroesophageal reflux disease)     no meds    High cholesterol     Hypertension     Kidney disease     Liver disease     hep C, ~2006, treated interferon/ribo    Melanoma ([facility] [address]     RLE, ~ 2006    PUD (peptic ulcer disease)     Rheumatic fever     as a child    Thyroid disease     hypo       Past Surgical History:   Procedure Laterality Date    HX APPENDECTOMY      HX CHOLECYSTECTOMY      HX ORTHOPAEDIC Left     KNEE COPE    NEUROLOGICAL PROCEDURE UNLISTED      CERVICAL AND LUMBAR SX         Family History:   Family history unknown: Yes   Problem Relation Age of Onset    Family history unknown:  Yes    No Known Problems Mother     No Known Problems Father        Social History     Socioeconomic History    Marital status:      Spouse name: Not on file    Number of children: Not on file    Years of education: Not on file    Highest education level: Not on file   Occupational History    Not on file   Social Needs    Financial resource strain: Not on file    Food insecurity     Worry: Not on file     Inability: Not on file    Transportation needs     Medical: Not on file     Non-medical: Not on file   Tobacco Use    Smoking status: Current Every Day Smoker     Packs/day: 0.50     Years: 20.00     Pack years: 10.00    Smokeless tobacco: Never Used   Substance and Sexual Activity    Alcohol use: No    Drug use: Not on file    Sexual activity: Not on file   Lifestyle    Physical activity     Days per week: Not on file     Minutes per session: Not on file    Stress: Not on file   Relationships    Social connections     Talks on phone: Not on file     Gets together: Not on file     Attends Synagogue service: Not on file     Active member of club or organization: Not on file     Attends meetings of clubs or organizations: Not on file     Relationship status: Not on file    Intimate partner violence     Fear of current or ex partner: Not on file     Emotionally abused: Not on file     Physically abused: Not on file     Forced sexual activity: Not on file   Other Topics Concern    Not on file   Social History Narrative    Not on file         ALLERGIES: Patient has no known allergies. Review of Systems   Genitourinary: Positive for difficulty urinating and urgency. Skin: Positive for pallor. All other systems reviewed and are negative. Vitals:    07/16/20 1042   BP: (!) 171/92   Pulse: (!) 59   Resp: 16   Temp: 98.2 °F (36.8 °C)   SpO2: 99%   Weight: 75 kg (165 lb 5.5 oz)   Height: 5' 10\" (1.778 m)            Physical Exam     GENERAL:The patient is well nourished, and well-hydrated. No acute distress  VITAL SIGNS: Heart rate, blood pressure, respiratory rate reviewed as recorded in  nurse's notes  EYES: Pupils reactive. Extraocular motion intact.  No conjunctival redness or drainage. EARS: No external masses or lesions. NOSE: No nasal drainage or epistaxis. MOUTH/THROAT: Pharynx clear; airway patent. NECK: Supple, no meningeal signs. Trachea midline. No masses or thyromegaly. LUNGS: Breath sounds clear and equal bilaterally no accessory muscle use  CARDIOVASCULAR: Regular rate and rhythm  ABDOMEN: Soft without tenderness. Palpable organomegaly with the patient's bladder palpable at the umbilicus. No peritoneal signs. No rigidity. RECTAL: Exam performed with female nursing staff present. The patient has a firm stool in the rectal vault which is black however noted to be fecal occult negative  EXTREMITIES: No clubbing or cyanosis. No joint swelling. Normal muscle tone. No  restricted range of motion appreciated. NEUROLOGIC: Sensation is grossly intact. Cranial nerve exam reveals face is  symmetrical, tongue is midline speech is clear. SKIN: No rash or petechiae. Good skin turgor palpated. Patient is pale  PSYCHIATRIC: Alert and oriented. Appropriate behavior and judgment. MDM  Number of Diagnoses or Management Options  Acute renal failure, unspecified acute renal failure type [facility]):    Anemia, unspecified type:   Urinary retention:   Diagnosis management comments: Abdominal wall pain, postobstructive uropathy, renal failure,    Constipation, fecal impaction, small bowel obstruction, partial small bowel obstruction,  Ileus    UTI, pyelonephritis, renal colic, ureteral stone     Peptic ulcer disease, esophagitis, GERD    Pancreatitis, pancreatic pseudocyst,    hepatic cirrhosis, GI bleed, esophageal varices, poisoning,    gallbladder disease, cholecystitis, diverticulitis, appendicitis, appendicitis with rupture,    ingestion of foreign material         Amount and/or Complexity of Data Reviewed  Clinical lab tests: reviewed and ordered  Tests in the radiology section of CPT®: ordered and reviewed  Tests in the medicine section of CPT®: ordered and reviewed  Decide to obtain previous medical records or to obtain history from someone other than the patient: yes      ED Course as of Jul 16 1237   Thu Jul 16, 2020   1124 Discussed with Dr. Marivel Guo with nephrology and they will see the patient secondary to her renal failure    [KH]   1215 Case was discussed with Yessica Austin PA with urology and she is going to review the patient's record and come and see her in the emergency department. [II]   3113 Was discussed with the hospitalist who    [KH]      ED Course User Index  [KH] Shun Ferrari DO       CRITICAL CARE (ASAP ONLY)  Performed by: Shun Ferrari DO  Authorized by: Shun Ferrari DO     Comments:      Critical care time: 80 minutes of critical care time was performed in the emergency department. This was separate from any other procedures listed during the patients emergency department course. The failure to initiate these interventions on an urgent basis would likely have resulted in sudden, clinically significant or life-threatening deterioration in the patients condition.

## 2020-07-16 NOTE — PERIOP NOTES
TRANSFER - OUT REPORT:    Verbal report given to FARSHAD Funez on [facility]  being transferred to [id number] for routine post - op       Report consisted of patients Situation, Background, Assessment and   Recommendations(SBAR). Information from the following report(s) OR Summary, Procedure Summary, Intake/Output and MAR was reviewed with the receiving nurse. Lines:   Peripheral IV 07/16/20 Left Antecubital (Active)        Opportunity for questions and clarification was provided. Patient transported with:   O2 @ 2 liters    VTE prophylaxis orders have been written for [facility]. Patient and family given floor number and nurses name. Family updated re: pt status after security code verified.

## 2020-07-16 NOTE — CONSULTS
[facility] Nephrology Consultation    Admission Date:  7/16/2020    Admission Diagnosis:  Acute renal failure (ARF) ([facility] [address] [N17.9]    We were asked by the ED to evaluate ELIJAH    History of Present Illness:  Pt is a 82 yo WF, last known Cr was about 1.3 in Aug '19 based on labs faxed over by PCP office. She was recently seen in PCP office yesterday and sent to ED after labs revealed a Cr > 15. On repeat here, Cr is 17.4. She has h/o urethral stricture, last saw Dr. Titi Monk in 2017. She reports difficulty urinating, abdominal pain due to distention. ED unable to place Zamora and Urology consulted. Past Medical History:   Diagnosis Date    Arthritis     GERD (gastroesophageal reflux disease)     no meds    High cholesterol     Hypertension     Kidney disease     Liver disease     hep C, ~2006, treated interferon/ribo    Melanoma ([facility] [address]     RLE, ~ 2006    PUD (peptic ulcer disease)     Rheumatic fever     as a child    Thyroid disease     hypo      Past Surgical History:   Procedure Laterality Date    HX APPENDECTOMY      HX CHOLECYSTECTOMY      HX ORTHOPAEDIC Left     KNEE COPE    NEUROLOGICAL PROCEDURE UNLISTED      CERVICAL AND LUMBAR SX      Current Facility-Administered Medications   Medication Dose Route Frequency    0.9% sodium chloride infusion 250 mL  250 mL IntraVENous PRN    acetaminophen (TYLENOL) tablet 650 mg  650 mg Oral Q4H PRN    HYDROmorphone (PF) (DILAUDID) injection 0.5 mg  0.5 mg IntraVENous Q6H PRN     Current Outpatient Medications   Medication Sig    metoprolol tartrate (LOPRESSOR) 50 mg tablet Take 50 mg by mouth two (2) times a day.  atorvastatin (LIPITOR) 20 mg tablet Take 20 mg by mouth every other day.  ASPIRIN/CAFFEINE (ANACIN PO) Take  by mouth as needed.  cholecalciferol, VITAMIN D3, (VITAMIN D3) 5,000 unit tab tablet Take 5,000 Units by mouth daily.  fentaNYL (DURAGESIC) 50 mcg/hr PATCH 1 Patch by TransDERmal route every fourty-eight (48) hours.     levothyroxine (SYNTHROID) 100 mcg tablet Take 150 mcg by mouth Daily (before breakfast). No Known Allergies   Social History     Tobacco Use    Smoking status: Current Every Day Smoker     Packs/day: 0.50     Years: 20.00     Pack years: 10.00    Smokeless tobacco: Never Used   Substance Use Topics    Alcohol use: No      Family History   Family history unknown: Yes   Problem Relation Age of Onset    Family history unknown:  Yes    No Known Problems Mother     No Known Problems Father         Review of Systems:  Gen - no fever, appetite unchanged  CV - no chest pain, no palpitation  Lung - no shortness of breath, no cough  Abd - + tenderness, no nausea/vomiting, no diarrhea  Ext - no edema  Musculoskeletal - no joint pain  Neurologic - no headaches, no dizziness  Skin - no rashes, no purpura  Genitourinary - low UOP, dribbles    Objective:  Vitals:    07/16/20 1123 07/16/20 1131 07/16/20 1327 07/16/20 1332   BP: 158/85 (!) 171/91 161/77    Pulse: 76 85  73   Resp:       Temp:       SpO2: 100% 97% (!) 78% 95%   Weight:       Height:         No intake or output data in the 24 hours ending 07/16/20 1345  Wt Readings from Last 3 Encounters:   07/16/20 75 kg (165 lb 5.5 oz)   01/18/17 63.2 kg (139 lb 6.4 oz)   10/10/16 62.2 kg (137 lb 2 oz)       GEN - in no distress, alert and oriented  Neck - no JVD  CV - regular, no murmur, no rub  Lung - clear bilaterally, lungs expand symmetrically  Chest wall - normal appearance  Abd - soft, nontender, bowel sounds present  Ext - no edema  Neurologic - nonfocal  Genitourinary - bladder very distended  Skin - no rashes, no purpura      Data Review:     Recent Labs     07/16/20  1045   WBC 4.4   HGB 6.3*   HCT 18.8*           Recent Labs     07/16/20  1045   *   K 4.5      CO2 13*   *   CREA 17.40*   CA 8.9   *         Assessment:     Principal Problem:    Acute renal failure (ARF) ([facility]) (7/16/2020)    Active Problems:    Anemia (7/16/2020)        Plan:     1. ELIJAH - last known Cr about 1.3 in Aug '19 from labs faxed from PCP office  - Admission Cr 17.4, likely obstructive nephropathy  - Pt going to [facility] for cystoscopy - hopefully to relieve obstruction  2.   H/o urethral stricture - Presumed obstruction, will be confirmed on renal US but ED unable to place [facility]ora with very distended bladder on exam    Unclear whether patient will need dialysis, will follow-up on labs tomorrow

## 2020-07-16 NOTE — ANESTHESIA PREPROCEDURE EVALUATION
Relevant Problems   No relevant active problems       Anesthetic History   No history of anesthetic complications            Review of Systems / Medical History  Patient summary reviewed and pertinent labs reviewed    Pulmonary  Within defined limits                 Neuro/Psych   Within defined limits           Cardiovascular    Hypertension: well controlled          Hyperlipidemia    Exercise tolerance: >4 METS     GI/Hepatic/Renal     GERD  Hepatitis: type C  Renal disease: ARF  PUD     Endo/Other      Hypothyroidism  Arthritis, cancer and anemia     Other Findings   Comments: Melanoma  hgb 6.5--receiving blood           Physical Exam    Airway  Mallampati: II  TM Distance: 4 - 6 cm  Neck ROM: normal range of motion   Mouth opening: Normal     Cardiovascular  Regular rate and rhythm,  S1 and S2 normal,  no murmur, click, rub, or gallop  Rhythm: regular  Rate: normal         Dental  No notable dental hx       Pulmonary  Breath sounds clear to auscultation               Abdominal  Abdominal exam normal       Other Findings            Anesthetic Plan    ASA: 2, emergent  Anesthesia type: general          Induction: Intravenous and RSI  Anesthetic plan and risks discussed with: Patient

## 2020-07-17 LAB
ABO + RH BLD: NORMAL
ALBUMIN SERPL-MCNC: 3.2 G/DL (ref 3.2–4.6)
ALBUMIN/GLOB SERPL: 0.9 {RATIO} (ref 1.2–3.5)
ALP SERPL-CCNC: 69 U/L (ref 50–136)
ALT SERPL-CCNC: 13 U/L (ref 12–65)
ANION GAP SERPL CALC-SCNC: 19 MMOL/L (ref 7–16)
AST SERPL-CCNC: 15 U/L (ref 15–37)
BILIRUB SERPL-MCNC: 0.3 MG/DL (ref 0.2–1.1)
BLD PROD TYP BPU: NORMAL
BLD PROD TYP BPU: NORMAL
BLOOD GROUP ANTIBODIES SERPL: NORMAL
BPU ID: NORMAL
BPU ID: NORMAL
BUN SERPL-MCNC: 133 MG/DL (ref 8–23)
CALCIUM SERPL-MCNC: 8.3 MG/DL (ref 8.3–10.4)
CHLORIDE SERPL-SCNC: 107 MMOL/L (ref 98–107)
CO2 SERPL-SCNC: 12 MMOL/L (ref 21–32)
CREAT SERPL-MCNC: 16.9 MG/DL (ref 0.6–1)
CROSSMATCH RESULT,%XM: NORMAL
CROSSMATCH RESULT,%XM: NORMAL
ERYTHROCYTE [DISTWIDTH] IN BLOOD BY AUTOMATED COUNT: 14.4 % (ref 11.9–14.6)
GLOBULIN SER CALC-MCNC: 3.5 G/DL (ref 2.3–3.5)
GLUCOSE SERPL-MCNC: 98 MG/DL (ref 65–100)
HCT VFR BLD AUTO: 29.9 % (ref 35.8–46.3)
HGB BLD-MCNC: 9.7 G/DL (ref 11.7–15.4)
MCH RBC QN AUTO: 29.2 PG (ref 26.1–32.9)
MCHC RBC AUTO-ENTMCNC: 32.4 G/DL (ref 31.4–35)
MCV RBC AUTO: 90.1 FL (ref 79.6–97.8)
NRBC # BLD: 0 K/UL (ref 0–0.2)
PLATELET # BLD AUTO: 161 K/UL (ref 150–450)
PMV BLD AUTO: 10 FL (ref 9.4–12.3)
POTASSIUM SERPL-SCNC: 4.4 MMOL/L (ref 3.5–5.1)
PROT SERPL-MCNC: 6.7 G/DL (ref 6.3–8.2)
RBC # BLD AUTO: 3.32 M/UL (ref 4.05–5.2)
SODIUM SERPL-SCNC: 138 MMOL/L (ref 136–145)
SPECIMEN EXP DATE BLD: NORMAL
STATUS OF UNIT,%ST: NORMAL
STATUS OF UNIT,%ST: NORMAL
UNIT DIVISION, %UDIV: 0
UNIT DIVISION, %UDIV: 0
WBC # BLD AUTO: 5.9 K/UL (ref 4.3–11.1)

## 2020-07-17 PROCEDURE — 36415 COLL VENOUS BLD VENIPUNCTURE: CPT

## 2020-07-17 PROCEDURE — 74011250636 HC RX REV CODE- 250/636: Performed by: INTERNAL MEDICINE

## 2020-07-17 PROCEDURE — 65270000029 HC RM PRIVATE

## 2020-07-17 PROCEDURE — 74011250636 HC RX REV CODE- 250/636: Performed by: HOSPITALIST

## 2020-07-17 PROCEDURE — 74011250637 HC RX REV CODE- 250/637: Performed by: INTERNAL MEDICINE

## 2020-07-17 PROCEDURE — 85027 COMPLETE CBC AUTOMATED: CPT

## 2020-07-17 PROCEDURE — 80053 COMPREHEN METABOLIC PANEL: CPT

## 2020-07-17 RX ORDER — SODIUM BICARBONATE 650 MG/1
1300 TABLET ORAL 3 TIMES DAILY
Status: DISCONTINUED | OUTPATIENT
Start: 2020-07-17 | End: 2020-07-23 | Stop reason: HOSPADM

## 2020-07-17 RX ORDER — SODIUM CHLORIDE 9 MG/ML
125 INJECTION, SOLUTION INTRAVENOUS CONTINUOUS
Status: DISCONTINUED | OUTPATIENT
Start: 2020-07-17 | End: 2020-07-17

## 2020-07-17 RX ORDER — ONDANSETRON 2 MG/ML
4 INJECTION INTRAMUSCULAR; INTRAVENOUS
Status: DISCONTINUED | OUTPATIENT
Start: 2020-07-17 | End: 2020-07-17

## 2020-07-17 RX ORDER — SODIUM CHLORIDE, SODIUM LACTATE, POTASSIUM CHLORIDE, CALCIUM CHLORIDE 600; 310; 30; 20 MG/100ML; MG/100ML; MG/100ML; MG/100ML
75 INJECTION, SOLUTION INTRAVENOUS CONTINUOUS
Status: DISCONTINUED | OUTPATIENT
Start: 2020-07-17 | End: 2020-07-20

## 2020-07-17 RX ORDER — ONDANSETRON 2 MG/ML
8 INJECTION INTRAMUSCULAR; INTRAVENOUS
Status: DISCONTINUED | OUTPATIENT
Start: 2020-07-17 | End: 2020-07-23 | Stop reason: HOSPADM

## 2020-07-17 RX ADMIN — Medication 10 ML: at 13:48

## 2020-07-17 RX ADMIN — Medication 10 ML: at 05:31

## 2020-07-17 RX ADMIN — HEPARIN SODIUM 5000 UNITS: 5000 INJECTION INTRAVENOUS; SUBCUTANEOUS at 22:34

## 2020-07-17 RX ADMIN — LEVOTHYROXINE SODIUM 150 MCG: 0.07 TABLET ORAL at 05:33

## 2020-07-17 RX ADMIN — SODIUM CHLORIDE 125 ML/HR: 9 INJECTION, SOLUTION INTRAVENOUS at 09:25

## 2020-07-17 RX ADMIN — SODIUM BICARBONATE 650 MG TABLET 1300 MG: at 16:57

## 2020-07-17 RX ADMIN — HEPARIN SODIUM 5000 UNITS: 5000 INJECTION INTRAVENOUS; SUBCUTANEOUS at 05:30

## 2020-07-17 RX ADMIN — SODIUM CHLORIDE, SODIUM LACTATE, POTASSIUM CHLORIDE, AND CALCIUM CHLORIDE 125 ML/HR: 600; 310; 30; 20 INJECTION, SOLUTION INTRAVENOUS at 19:51

## 2020-07-17 RX ADMIN — METOPROLOL TARTRATE 50 MG: 50 TABLET, FILM COATED ORAL at 17:00

## 2020-07-17 RX ADMIN — HYDRALAZINE HYDROCHLORIDE 25 MG: 25 TABLET, FILM COATED ORAL at 09:07

## 2020-07-17 RX ADMIN — HYDRALAZINE HYDROCHLORIDE 25 MG: 25 TABLET, FILM COATED ORAL at 16:57

## 2020-07-17 RX ADMIN — HYDRALAZINE HYDROCHLORIDE 25 MG: 25 TABLET, FILM COATED ORAL at 22:34

## 2020-07-17 RX ADMIN — ONDANSETRON 4 MG: 2 INJECTION INTRAMUSCULAR; INTRAVENOUS at 09:23

## 2020-07-17 RX ADMIN — SODIUM BICARBONATE 650 MG TABLET 1300 MG: at 22:34

## 2020-07-17 RX ADMIN — SODIUM CHLORIDE, SODIUM LACTATE, POTASSIUM CHLORIDE, AND CALCIUM CHLORIDE 125 ML/HR: 600; 310; 30; 20 INJECTION, SOLUTION INTRAVENOUS at 10:53

## 2020-07-17 RX ADMIN — HEPARIN SODIUM 5000 UNITS: 5000 INJECTION INTRAVENOUS; SUBCUTANEOUS at 13:54

## 2020-07-17 RX ADMIN — METOPROLOL TARTRATE 50 MG: 50 TABLET, FILM COATED ORAL at 09:07

## 2020-07-17 NOTE — PROGRESS NOTES
Hospitalist Progress Note    Admission Date: 2020 10:47 AM  Reason for Admission/Hospital Course: Acute renal failure (ARF) (HCC) [N17.9]      24 Hour Events:  Pt seen and examined at bedside this morning. Patient reports that she is feeling better but somewhat still nauseous. She requesting something for nausea. She denies any pain, fever, chills, shortness of breath. She still having abdominal tenderness as well. All questions answered at bedside. ROS:  10 point review of systems is otherwise negative with the exception of the elements mentioned above.      No Known Allergies    OBJECTIVE:  Patient Vitals for the past 8 hrs:   BP Temp Pulse Resp SpO2   20 0737 (!) 148/93 98.2 °F (36.8 °C) 68 15 100 %   20 0437 125/71 98.1 °F (36.7 °C) 67 18 100 %     Temp (24hrs), Av °F (36.7 °C), Min:97.5 °F (36.4 °C), Max:98.2 °F (36.8 °C)     07 -  1900  In: 120 [P.O.:120]  Out: -     PHYSICAL EXAM:  General:  Pleasant white female, no acute distress, resting comfortably in bed    HEENT: Normocephalic, atraumatic, conjunctival pallor still present  CV:   RRR, no murmurs  Lungs:   Clear to auscultation bilaterally  Abdomen:   Tender to palpation of the lower quadrants but significantly improved in suprapubic tenderness and swelling  MSK:   No skeletal deformity seen  Skin:   No jaundice or rash    Neuro:  CN II through XII grossly intact   Psych: Calm, cooperative, AO x4       Labs:      Recent Labs     20  0630 20  2138 20  1045   WBC 5.9  --  4.4   RBC 3.32*  --  2.12*   HGB 9.7* 10.0* 6.3*   HCT 29.9*  --  18.8*   MCV 90.1  --  88.7   MCH 29.2  --  29.7   MCHC 32.4  --  33.5   RDW 14.4  --  14.4     --  154   GRANS  --   --  70   LYMPH  --   --  18   MONOS  --   --  8   EOS  --   --  3   BASOS  --   --  1   IG  --   --  1   DF  --   --  AUTOMATED   ANEU  --   --  3.1   ABL  --   --  0.8   ABM  --   --  0.3   JAY  --   --  0.1   ABB  --   --  0.0   AIG  --   -- 0.0        Recent Labs     07/17/20  0630 07/16/20  1045    135*   K 4.4 4.5    105   CO2 12* 13*   AGAP 19* 17*   GLU 98 110*   * 140*   CREA 16.90* 17.40*   GFRAA 3* 3*   GFRNA 2* 2*   CA 8.3 8.9   AP 69 73   TP 6.7 7.6   ALB 3.2 3.8   GLOB 3.5 3.8*   AGRAT 0.9* 1.0*       Recent Results (from the past 24 hour(s))   CBC WITH AUTOMATED DIFF    Collection Time: 07/16/20 10:45 AM   Result Value Ref Range    WBC 4.4 4.3 - 11.1 K/uL    RBC 2.12 (L) 4.05 - 5.2 M/uL    HGB 6.3 (LL) 11.7 - 15.4 g/dL    HCT 18.8 (LL) 35.8 - 46.3 %    MCV 88.7 79.6 - 97.8 FL    MCH 29.7 26.1 - 32.9 PG    MCHC 33.5 31.4 - 35.0 g/dL    RDW 14.4 11.9 - 14.6 %    PLATELET 695 807 - 375 K/uL    MPV 9.7 9.4 - 12.3 FL    ABSOLUTE NRBC 0.00 0.0 - 0.2 K/uL    DF AUTOMATED      NEUTROPHILS 70 43 - 78 %    LYMPHOCYTES 18 13 - 44 %    MONOCYTES 8 4.0 - 12.0 %    EOSINOPHILS 3 0.5 - 7.8 %    BASOPHILS 1 0.0 - 2.0 %    IMMATURE GRANULOCYTES 1 0.0 - 5.0 %    ABS. NEUTROPHILS 3.1 1.7 - 8.2 K/UL    ABS. LYMPHOCYTES 0.8 0.5 - 4.6 K/UL    ABS. MONOCYTES 0.3 0.1 - 1.3 K/UL    ABS. EOSINOPHILS 0.1 0.0 - 0.8 K/UL    ABS. BASOPHILS 0.0 0.0 - 0.2 K/UL    ABS. IMM. GRANS. 0.0 0.0 - 0.5 K/UL   METABOLIC PANEL, COMPREHENSIVE    Collection Time: 07/16/20 10:45 AM   Result Value Ref Range    Sodium 135 (L) 136 - 145 mmol/L    Potassium 4.5 3.5 - 5.1 mmol/L    Chloride 105 98 - 107 mmol/L    CO2 13 (L) 21 - 32 mmol/L    Anion gap 17 (H) 7 - 16 mmol/L    Glucose 110 (H) 65 - 100 mg/dL     (H) 8 - 23 MG/DL    Creatinine 17.40 (H) 0.6 - 1.0 MG/DL    GFR est AA 3 (L) >60 ml/min/1.73m2    GFR est non-AA 2 (L) >60 ml/min/1.73m2    Calcium 8.9 8.3 - 10.4 MG/DL    Bilirubin, total 0.3 0.2 - 1.1 MG/DL    ALT (SGPT) 17 12 - 65 U/L    AST (SGOT) 15 15 - 37 U/L    Alk.  phosphatase 73 50 - 136 U/L    Protein, total 7.6 6.3 - 8.2 g/dL    Albumin 3.8 3.2 - 4.6 g/dL    Globulin 3.8 (H) 2.3 - 3.5 g/dL    A-G Ratio 1.0 (L) 1.2 - 3.5     HAPTOGLOBIN Collection Time: 07/16/20 10:45 AM   Result Value Ref Range    Haptoglobin 51 30 - 200 mg/dL   FOLATE    Collection Time: 07/16/20 10:45 AM   Result Value Ref Range    Folate 19.3 (H) 3.1 - 17.5 ng/mL   VITAMIN B12    Collection Time: 07/16/20 10:45 AM   Result Value Ref Range    Vitamin B12 955 193 - 986 pg/mL   TRANSFERRIN    Collection Time: 07/16/20 10:45 AM   Result Value Ref Range    Transferrin 245 202 - 364 mg/dL   FERRITIN    Collection Time: 07/16/20 10:45 AM   Result Value Ref Range    Ferritin 118 8 - 388 NG/ML   RETICULOCYTE COUNT    Collection Time: 07/16/20 10:45 AM   Result Value Ref Range    Reticulocyte count 3.4 (H) 0.3 - 2.0 %    Absolute Retic Cnt. 0.0725 0.026 - 0.095 M/ul    Immature Retic Fraction 11.2 3.0 - 15.9 %    Retic Hgb Conc. 34 29 - 35 pg   TRANSFERRIN SATURATION    Collection Time: 07/16/20 10:45 AM   Result Value Ref Range    Iron 85 35 - 150 ug/dL    TIBC 323 250 - 450 ug/dL    Transferrin Saturation 26 >20 %   EKG, 12 LEAD, INITIAL    Collection Time: 07/16/20 10:55 AM   Result Value Ref Range    Ventricular Rate 77 BPM    Atrial Rate 77 BPM    P-R Interval 224 ms    QRS Duration 114 ms    Q-T Interval 404 ms    QTC Calculation (Bezet) 457 ms    Calculated P Axis 74 degrees    Calculated R Axis 41 degrees    Calculated T Axis 78 degrees    Diagnosis       Sinus rhythm with 1st degree A-V block  Otherwise normal ECG  No previous ECGs available  Confirmed by RAHUL CERON (), Guillermina Garrett ([id number]) on 7/16/2020 4:34:04 PM     TYPE + CROSSMATCH    Collection Time: 07/16/20 11:43 AM   Result Value Ref Range    Crossmatch Expiration 07/19/2020     ABO/Rh(D) O POSITIVE     Antibody screen NEG     Unit number [id number]     Blood component type  LR     Unit division 00     Status of unit TRANSFUSED     Crossmatch result Compatible     Unit number [id number]     Blood component type  LR     Unit division 00     Status of unit TRANSFUSED     Crossmatch result Compatible    CULTURE, URINE    Collection Time: 07/16/20  3:36 PM    Specimen: Bladder   Result Value Ref Range    Special Requests: NO SPECIAL REQUESTS      Culture result:        NO GROWTH AFTER SHORT PERIOD OF INCUBATION. FURTHER RESULTS TO FOLLOW AFTER OVERNIGHT INCUBATION. HEMOGLOBIN    Collection Time: 07/16/20  9:38 PM   Result Value Ref Range    HGB 10.0 (L) 11.7 - 41.0 g/dL   METABOLIC PANEL, COMPREHENSIVE    Collection Time: 07/17/20  6:30 AM   Result Value Ref Range    Sodium 138 136 - 145 mmol/L    Potassium 4.4 3.5 - 5.1 mmol/L    Chloride 107 98 - 107 mmol/L    CO2 12 (L) 21 - 32 mmol/L    Anion gap 19 (H) 7 - 16 mmol/L    Glucose 98 65 - 100 mg/dL     (H) 8 - 23 MG/DL    Creatinine 16.90 (H) 0.6 - 1.0 MG/DL    GFR est AA 3 (L) >60 ml/min/1.73m2    GFR est non-AA 2 (L) >60 ml/min/1.73m2    Calcium 8.3 8.3 - 10.4 MG/DL    Bilirubin, total 0.3 0.2 - 1.1 MG/DL    ALT (SGPT) 13 12 - 65 U/L    AST (SGOT) 15 15 - 37 U/L    Alk.  phosphatase 69 50 - 136 U/L    Protein, total 6.7 6.3 - 8.2 g/dL    Albumin 3.2 3.2 - 4.6 g/dL    Globulin 3.5 2.3 - 3.5 g/dL    A-G Ratio 0.9 (L) 1.2 - 3.5     CBC W/O DIFF    Collection Time: 07/17/20  6:30 AM   Result Value Ref Range    WBC 5.9 4.3 - 11.1 K/uL    RBC 3.32 (L) 4.05 - 5.2 M/uL    HGB 9.7 (L) 11.7 - 15.4 g/dL    HCT 29.9 (L) 35.8 - 46.3 %    MCV 90.1 79.6 - 97.8 FL    MCH 29.2 26.1 - 32.9 PG    MCHC 32.4 31.4 - 35.0 g/dL    RDW 14.4 11.9 - 14.6 %    PLATELET 224 132 - 418 K/uL    MPV 10.0 9.4 - 12.3 FL    ABSOLUTE NRBC 0.00 0.0 - 0.2 K/uL       Current Facility-Administered Medications   Medication Dose Route Frequency    ondansetron (ZOFRAN) injection 4 mg  4 mg IntraVENous Q4H PRN    0.9% sodium chloride infusion  125 mL/hr IntraVENous CONTINUOUS    ondansetron (ZOFRAN) injection 8 mg  8 mg IntraVENous Q4H PRN    0.9% sodium chloride infusion 250 mL  250 mL IntraVENous PRN    levothyroxine (SYNTHROID) tablet 150 mcg  150 mcg Oral ACB    metoprolol tartrate (LOPRESSOR) tablet 50 mg  50 mg Oral BID    sodium chloride (NS) flush 5-40 mL  5-40 mL IntraVENous Q8H    sodium chloride (NS) flush 5-40 mL  5-40 mL IntraVENous PRN    heparin (porcine) injection 5,000 Units  5,000 Units SubCUTAneous Q8H    acetaminophen (TYLENOL) tablet 650 mg  650 mg Oral Q4H PRN    HYDROmorphone (PF) (DILAUDID) injection 0.5 mg  0.5 mg IntraVENous Q6H PRN    0.9% sodium chloride infusion 250 mL  250 mL IntraVENous PRN    hydrALAZINE (APRESOLINE) tablet 25 mg  25 mg Oral TID    hydrALAZINE (APRESOLINE) tablet 25 mg  25 mg Oral TID PRN          Imaging:    Us Retroperitoneum Comp    Result Date: 7/16/2020  EXAM: Ultrasound of the kidneys. INDICATION: Acute renal failure. COMPARISON: Prior CT abdomen on January 12, 2016. TECHNIQUE: A standard protocol kidney ultrasound was performed. FINDINGS: The right kidney measures 10.8 x 5.8 cm and the left kidney measures 11.6 x 5.8 cm. There is moderate bilateral hydronephrosis, not significantly changed from the prior CT scan, allowing for differences in technique. There are also solitary bilateral simple cysts, measuring 3.7 x 4.2 x 4.0 cm on the right and 1.2 x 0.6 x 0.7 cm on the left. No shadowing kidney stone is identified. The urinary bladder is decompressed around a Zamora catheter. IMPRESSION: 1. Moderate bilateral hydronephrosis, not significantly changed from the prior CT abdomen January, 2016. 2. Solitary bilateral kidney simple cysts. 3. Zamora catheter in the urinary bladder.           ASSESSMENT:    Problem List  Date Reviewed: 1/18/2017          Codes Class Noted    * (Principal) Acute renal failure (ARF) ([facility] ICD-10-CM: N17.9  ICD-9-CM: 584.9  7/16/2020        Anemia ICD-10-CM: D64.9  ICD-9-CM: 285.9  7/16/2020        Lower obstructive uropathy ICD-10-CM: N13.9  ICD-9-CM: 599.60  7/16/2020        Acquired hypothyroidism ICD-10-CM: E03.9  ICD-9-CM: 244.9  7/16/2020                PLAN:    Acute renal failure secondary to obstructive uropathy from urethral stricture s/p dilation in OR  Patient underwent successful dilation in the OR. Currently has Zamora in place which is draining red urine. Labs this morning show minimal improvement of BUN and creatinine. Ultrasound kidneys reveal some moderate hydronephrosis.  Follow-up urology recommendations as well as nephrology recommendations   Monitor electrolyte abnormalities given renal failure   Zamora placement and care   We will confer with nephrology if hemodialysis indicated    Nausea, vomiting  Patient likely symptomatic from uremia. Will offer Zofran for symptomatic relief     Anemia on labs, likely lab error  Patient was transfused 1 unit yesterday. Hemoglobin went up to 10, repeat this morning is in the nines. Likely initial reading was false. Iron panel appears to be normal.  Patient's lethargy and fatigue likely due to renal failure. Hypertension  Patient currently hypertensive but is uncomfortable and in pain. Will monitor for now and treat pain first.  Patient still noted to be hypertensive. She was on an ACE inhibitor and arm as outpatient.   Will transition to try hydralazine 3 times daily  Pain control PRN   Continue with metoprolol 50 mg XL daily   Hydralazine 25 mg 3 times daily, PRN hydralazine for systolic BP greater than 524     Hypothyroidism  Continue with Synthroid as per home dose     Fluids:  Start normal saline at 125 cc/h   electrolytes: Replete as needed  Nutrition: Renal diet  CODE STATUS: Full     DVT prophylaxis: Heparin subcu  Estimated LOS:  Greater than 2 midnights

## 2020-07-17 NOTE — OP NOTES
[address] [place] [address]  OPERATIVE REPORT    Name:  Patrick Medel  MR#:  [id number]  :  1950  ACCOUNT #:  [de-identified]  DATE OF SERVICE:  2020    PREOPERATIVE DIAGNOSES:  1. Urethral stricture. 2.  Urinary retention. POSTOPERATIVE DIAGNOSES:  1. Urethral stricture. 2.  Urinary retention. PROCEDURE PERFORMED:  Cystoscopy, dilation of urethral stricture with Amplatz dilators and complicated Zamora catheterization. SURGEON:  Gildardo Franklin MD    ASSISTANT:  None. ANESTHESIA:  General.    COMPLICATIONS:  None. SPECIMENS REMOVED:  Urine culture. IMPLANTS:  None. ESTIMATED BLOOD LOSS:  None. INDICATION:  The patient has a history of recurrent urethral stricture, comes in with urinary retention and a distended bladder with elevated creatinine of 17. Not able to place Zamora catheter in the ER. FINDINGS:  Tight urethral stricture of the mid to distal urethra with an anteriorly placed urethral meatus and a massively distended bladder. DESCRIPTION OF PROCEDURE:  The patient was given general anesthetic, placed in the dorsal lithotomy position. She was prepped and draped in sterile fashion. Examination of the abdomen shows an obviously distended bladder, extending cephalad to the umbilicus. Vaginal exam shows an atrophic vagina status post hysterectomy. I do not palpate any anterior vaginal wall masses. I was not able to initially appreciate the urethral meatus. A 17-[place]wana cystoscope was passed using 30-degree lens and video camera. The what appeared to be the meatus was visualized. I could not pass the scope. I passed a 0.03 floppy-tip guidewire through the side port of the scope. When I positioned the scope in an exaggerated anterior position, I was able to pass the wire into what appeared to be the bladder.   The bladder could be appreciated fluoroscopically by appreciating a large density corresponding to the distended bladder which extended cephalad to the umbilicus. The wire was seen coiled within this. I then used Amplatz dilators and dilated the urethra over the wire from 8-[place] up to 24-[place]. I used the 10-[place] catheter to pass the Amplatz dilators over this which was passed over the wire. At this point, I removed the Amplatz dilators leaving the wire in place. The 17-[place] scope was passed. The urethra was well dilated. There were no masses present. The bladder was very dilated. It showed 3+ trabeculation. There were no stones or tumors noted. The ureteral orifices were noted to be in normal position. The wire was noted to be within the bladder. There was no bladder trauma. I then drained the bladder via the scope. Some of the urine was sent for culture although it appeared to be clear. At this point, a 20-[place] catheter was passed over the wire and the balloon was inflated with 10 mL of water and was left indwelling. She will be transferred back to the floor.       MD BRIA [id number]  D:  07/16/2020 16:06  T:  07/17/2020 2:55  JOB #:  [id number]

## 2020-07-17 NOTE — PROGRESS NOTES
Problem: Falls - Risk of  Goal: *Absence of Falls  Description: Document [facility] Fall Risk and appropriate interventions in the flowsheet.   Outcome: Progressing Towards Goal  Note: Fall Risk Interventions:  Mobility Interventions: Assess mobility with egress test         Medication Interventions: Assess postural VS orthostatic hypotension    Elimination Interventions: Call light in reach

## 2020-07-17 NOTE — PROGRESS NOTES
CAT NEPHROLOGY PROGRESS NOTE    Follow up for: ELIJAH    Subjective:   Patient resting in bed. No complaints this morning. Zamora in place.     ROS:  Gen - no fever, no chills, appetite okay  CV - no chest pain, no orthopnea  Lung - no shortness of breath, no cough  Abd - no tenderness, no nausea, no vomiting  Ext - no edema    Objective:   Exam:  Vitals:    07/16/20 1934 07/16/20 2356 07/17/20 0437 07/17/20 0737   BP: (!) 176/99 147/88 125/71 (!) 148/93   Pulse: 60 66 67 68   Resp: 18 18 18 15   Temp: 97.5 °F (36.4 °C) 97.7 °F (36.5 °C) 98.1 °F (36.7 °C) 98.2 °F (36.8 °C)   SpO2: 94% 94% 100% 100%   Weight:       Height:             Intake/Output Summary (Last 24 hours) at 7/17/2020 1038  Last data filed at 7/17/2020 0836  Gross per 24 hour   Intake 540 ml   Output 2130 ml   Net -1590 ml       Current Facility-Administered Medications   Medication Dose Route Frequency    0.9% sodium chloride infusion  125 mL/hr IntraVENous CONTINUOUS    ondansetron (ZOFRAN) injection 8 mg  8 mg IntraVENous Q4H PRN    0.9% sodium chloride infusion 250 mL  250 mL IntraVENous PRN    levothyroxine (SYNTHROID) tablet 150 mcg  150 mcg Oral ACB    metoprolol tartrate (LOPRESSOR) tablet 50 mg  50 mg Oral BID    sodium chloride (NS) flush 5-40 mL  5-40 mL IntraVENous Q8H    sodium chloride (NS) flush 5-40 mL  5-40 mL IntraVENous PRN    heparin (porcine) injection 5,000 Units  5,000 Units SubCUTAneous Q8H    acetaminophen (TYLENOL) tablet 650 mg  650 mg Oral Q4H PRN    HYDROmorphone (PF) (DILAUDID) injection 0.5 mg  0.5 mg IntraVENous Q6H PRN    0.9% sodium chloride infusion 250 mL  250 mL IntraVENous PRN    hydrALAZINE (APRESOLINE) tablet 25 mg  25 mg Oral TID    hydrALAZINE (APRESOLINE) tablet 25 mg  25 mg Oral TID PRN       EXAM  GEN - Alert, oriented, in no distress  HEENT - NC/AT, non icteric sclera  CV - no audible murmur, regular rate  Lung - equal chest rise, no audible wheezing  Abd - non distended  Ext - no edema, no cyanosis  Access: none    Recent Labs     07/17/20  0630 07/16/20  2138 07/16/20  1045   WBC 5.9  --  4.4   HGB 9.7* 10.0* 6.3*   HCT 29.9*  --  18.8*     --  154        Recent Labs     07/17/20  0630 07/16/20  1045    135*   K 4.4 4.5    105   CO2 12* 13*   * 140*   CREA 16.90* 17.40*   CA 8.3 8.9   GLU 98 110*       Assessment and Plan:   ELIJAH 2/2 obstructive uropathy  - last known Cr about 1.3 in Aug '19 from labs faxed from PCP office  - s/p dilation in OR on 7/16. Renal function with slight improvement. Expect further improvement of renal indices. No indication for HD at this time. Metabolic acidosis - start sodium bicarbonate PO and switch to LR infusion. Ordered. Anemia - will follow.     Jose Jaramillo MD  [facility]

## 2020-07-17 NOTE — PROGRESS NOTES
Admit Date: 7/16/2020    Subjective:     Sara Fine is awake, no complaints. Zamora catheter in place. Urine clear/red with no clots with 2125 ml OP. Abdomen soft. Objective:     Patient Vitals for the past 8 hrs:   BP Temp Pulse Resp SpO2   07/17/20 0737 (!) 148/93 98.2 °F (36.8 °C) 68 15 100 %   07/17/20 0437 125/71 98.1 °F (36.7 °C) 67 18 100 %     07/17 0701 - 07/17 1900  In: 120 [P.O.:120]  Out: -   07/15 1901 - 07/17 0700  In: 420 [I.V.:300]  Out: 2130 [Urine:2125]    Physical Exam:  GENERAL: alert, cooperative, no distress  LUNG: clear to auscultation bilaterally  HEART: regular rate and rhythm, S1, S2  ABDOMEN: soft, non-tender  NEUROLOGIC: AOx3     Data Review   Recent Results (from the past 24 hour(s))   CBC WITH AUTOMATED DIFF    Collection Time: 07/16/20 10:45 AM   Result Value Ref Range    WBC 4.4 4.3 - 11.1 K/uL    RBC 2.12 (L) 4.05 - 5.2 M/uL    HGB 6.3 (LL) 11.7 - 15.4 g/dL    HCT 18.8 (LL) 35.8 - 46.3 %    MCV 88.7 79.6 - 97.8 FL    MCH 29.7 26.1 - 32.9 PG    MCHC 33.5 31.4 - 35.0 g/dL    RDW 14.4 11.9 - 14.6 %    PLATELET 575 454 - 903 K/uL    MPV 9.7 9.4 - 12.3 FL    ABSOLUTE NRBC 0.00 0.0 - 0.2 K/uL    DF AUTOMATED      NEUTROPHILS 70 43 - 78 %    LYMPHOCYTES 18 13 - 44 %    MONOCYTES 8 4.0 - 12.0 %    EOSINOPHILS 3 0.5 - 7.8 %    BASOPHILS 1 0.0 - 2.0 %    IMMATURE GRANULOCYTES 1 0.0 - 5.0 %    ABS. NEUTROPHILS 3.1 1.7 - 8.2 K/UL    ABS. LYMPHOCYTES 0.8 0.5 - 4.6 K/UL    ABS. MONOCYTES 0.3 0.1 - 1.3 K/UL    ABS. EOSINOPHILS 0.1 0.0 - 0.8 K/UL    ABS. BASOPHILS 0.0 0.0 - 0.2 K/UL    ABS. IMM.  GRANS. 0.0 0.0 - 0.5 K/UL   METABOLIC PANEL, COMPREHENSIVE    Collection Time: 07/16/20 10:45 AM   Result Value Ref Range    Sodium 135 (L) 136 - 145 mmol/L    Potassium 4.5 3.5 - 5.1 mmol/L    Chloride 105 98 - 107 mmol/L    CO2 13 (L) 21 - 32 mmol/L    Anion gap 17 (H) 7 - 16 mmol/L    Glucose 110 (H) 65 - 100 mg/dL     (H) 8 - 23 MG/DL    Creatinine 17.40 (H) 0.6 - 1.0 MG/DL    GFR est AA 3 (L) >60 ml/min/1.73m2    GFR est non-AA 2 (L) >60 ml/min/1.73m2    Calcium 8.9 8.3 - 10.4 MG/DL    Bilirubin, total 0.3 0.2 - 1.1 MG/DL    ALT (SGPT) 17 12 - 65 U/L    AST (SGOT) 15 15 - 37 U/L    Alk.  phosphatase 73 50 - 136 U/L    Protein, total 7.6 6.3 - 8.2 g/dL    Albumin 3.8 3.2 - 4.6 g/dL    Globulin 3.8 (H) 2.3 - 3.5 g/dL    A-G Ratio 1.0 (L) 1.2 - 3.5     HAPTOGLOBIN    Collection Time: 07/16/20 10:45 AM   Result Value Ref Range    Haptoglobin 51 30 - 200 mg/dL   FOLATE    Collection Time: 07/16/20 10:45 AM   Result Value Ref Range    Folate 19.3 (H) 3.1 - 17.5 ng/mL   VITAMIN B12    Collection Time: 07/16/20 10:45 AM   Result Value Ref Range    Vitamin B12 955 193 - 986 pg/mL   TRANSFERRIN    Collection Time: 07/16/20 10:45 AM   Result Value Ref Range    Transferrin 245 202 - 364 mg/dL   FERRITIN    Collection Time: 07/16/20 10:45 AM   Result Value Ref Range    Ferritin 118 8 - 388 NG/ML   RETICULOCYTE COUNT    Collection Time: 07/16/20 10:45 AM   Result Value Ref Range    Reticulocyte count 3.4 (H) 0.3 - 2.0 %    Absolute Retic Cnt. 0.0725 0.026 - 0.095 M/ul    Immature Retic Fraction 11.2 3.0 - 15.9 %    Retic Hgb Conc. 34 29 - 35 pg   TRANSFERRIN SATURATION    Collection Time: 07/16/20 10:45 AM   Result Value Ref Range    Iron 85 35 - 150 ug/dL    TIBC 323 250 - 450 ug/dL    Transferrin Saturation 26 >20 %   EKG, 12 LEAD, INITIAL    Collection Time: 07/16/20 10:55 AM   Result Value Ref Range    Ventricular Rate 77 BPM    Atrial Rate 77 BPM    P-R Interval 224 ms    QRS Duration 114 ms    Q-T Interval 404 ms    QTC Calculation (Bezet) 457 ms    Calculated P Axis 74 degrees    Calculated R Axis 41 degrees    Calculated T Axis 78 degrees    Diagnosis       Sinus rhythm with 1st degree A-V block  Otherwise normal ECG  No previous ECGs available  Confirmed by RAHUL CERON (), ANT REES ([id number]) on 7/16/2020 4:34:04 PM     TYPE + CROSSMATCH    Collection Time: 07/16/20 11:43 AM   Result Value Ref Range    Crossmatch Expiration 07/19/2020     ABO/Rh(D) O POSITIVE     Antibody screen NEG     Unit number [id number]     Blood component type [facility]     Unit division 00     Status of unit TRANSFUSED     Crossmatch result Compatible     Unit number [id number]     Blood component type [facility]     Unit division 00     Status of unit TRANSFUSED     Crossmatch result Compatible    CULTURE, URINE    Collection Time: 07/16/20  3:36 PM    Specimen: Bladder   Result Value Ref Range    Special Requests: NO SPECIAL REQUESTS      Culture result:        NO GROWTH AFTER SHORT PERIOD OF INCUBATION. FURTHER RESULTS TO FOLLOW AFTER OVERNIGHT INCUBATION. HEMOGLOBIN    Collection Time: 07/16/20  9:38 PM   Result Value Ref Range    HGB 10.0 (L) 11.7 - 75.1 g/dL   METABOLIC PANEL, COMPREHENSIVE    Collection Time: 07/17/20  6:30 AM   Result Value Ref Range    Sodium 138 136 - 145 mmol/L    Potassium 4.4 3.5 - 5.1 mmol/L    Chloride 107 98 - 107 mmol/L    CO2 12 (L) 21 - 32 mmol/L    Anion gap 19 (H) 7 - 16 mmol/L    Glucose 98 65 - 100 mg/dL     (H) 8 - 23 MG/DL    Creatinine 16.90 (H) 0.6 - 1.0 MG/DL    GFR est AA 3 (L) >60 ml/min/1.73m2    GFR est non-AA 2 (L) >60 ml/min/1.73m2    Calcium 8.3 8.3 - 10.4 MG/DL    Bilirubin, total 0.3 0.2 - 1.1 MG/DL    ALT (SGPT) 13 12 - 65 U/L    AST (SGOT) 15 15 - 37 U/L    Alk.  phosphatase 69 50 - 136 U/L    Protein, total 6.7 6.3 - 8.2 g/dL    Albumin 3.2 3.2 - 4.6 g/dL    Globulin 3.5 2.3 - 3.5 g/dL    A-G Ratio 0.9 (L) 1.2 - 3.5     CBC W/O DIFF    Collection Time: 07/17/20  6:30 AM   Result Value Ref Range    WBC 5.9 4.3 - 11.1 K/uL    RBC 3.32 (L) 4.05 - 5.2 M/uL    HGB 9.7 (L) 11.7 - 15.4 g/dL    HCT 29.9 (L) 35.8 - 46.3 %    MCV 90.1 79.6 - 97.8 FL    MCH 29.2 26.1 - 32.9 PG    MCHC 32.4 31.4 - 35.0 g/dL    RDW 14.4 11.9 - 14.6 %    PLATELET 400 966 - 688 K/uL    MPV 10.0 9.4 - 12.3 FL    ABSOLUTE NRBC 0.00 0.0 - 0.2 K/uL       Assessment:     Principal Problem:    Acute renal failure (ARF) ([facility] (7/16/2020)    Active Problems:    Anemia (7/16/2020)      Lower obstructive uropathy (7/16/2020)      Acquired hypothyroidism (7/16/2020)      POD 1:    POSTOPERATIVE DIAGNOSES:  1. Urethral stricture. 2.  Urinary retention.     PROCEDURE PERFORMED:  Cystoscopy, dilation of urethral stricture with Amplatz dilators and complicated Marquez catheterization. Marquez OP- 2125 ml    Cn 16.90 (from 17.40)    Renal US reports moderate bilateral hydronephrosis. Plan:     Continue marquez catheter. Cn down slightly today. Would expect it to continue to improve. Follow labs.       Rod Courser, NP  [facility]

## 2020-07-17 NOTE — PROGRESS NOTES
Hourly rounding was performed on pt. No complaints of pain. Bed is low, locked, call bell within reach. 1 unit PRBC finished transfusing at beginning of this shift. Followup HGB was 10.0. C/o some nausea, but later stated it had gotten better. All needs met this shift. Will continue to monitor until next RN comes on.

## 2020-07-17 NOTE — PROGRESS NOTES
CM met with patient to complete assessment. Patient presented alert and oriented. Demographic information confirmed. The patient lives with her spouse in a one level home with 2 steps at the entrance. Patient states she is typically independent with completing ADL's, however, lately she has felt  fatigue. Patient obtains her medications from [employer] in [place]. Patient voiced no challenges obtaining medications in the community. Discharge planning: PT/OT have not been consulted. Patient has no history of HH services or STR. Patient states [facility] services are not needed at this time. No needs voiced. Please consult or notify CM if any needs shall arise. Patient anticipates to discharge home when medically stable. CM continues to follow. Care Management Interventions  PCP Verified by CM: Yes  Mode of Transport at Discharge: Other (see comment)(TBD: based upon need. )  Transition of Care Consult (CM Consult): Discharge Planning  Discharge Durable Medical Equipment: No(Patient confirmed DME, such as a rollator walker. )  Physical Therapy Consult: No  Occupational Therapy Consult: No  Speech Therapy Consult: No  Current Support Network: Lives with Spouse, Own Home(Patient lives with her spouse Keisha Man [phone]. )  Confirm Follow Up Transport: Self  The Plan for Transition of Care is Related to the Following Treatment Goals : Patient to obtain care to become medically stable and to return home with a safe transition.     Resource Information Provided?: No  Discharge Location  Discharge Placement: Home

## 2020-07-17 NOTE — PROGRESS NOTES
END OF SHIFT NOTE:    INTAKE/OUTPUT  07/16 0701 - 07/17 0700  In: 420 [I.V.:300]  Out: 2130 [Urine:2125]  Voiding: NO  Catheter: YES  Color: red to monik  Drain:              DIET  cardiac regular    Flatus: Patient does have flatus present. Stool:  0 occurrences. Characteristics:       Ambulating  Yes    Emesis: 0 occurrences.     Characteristics:          VITAL SIGNS  Patient Vitals for the past 12 hrs:   Temp Pulse Resp BP SpO2   07/17/20 1431 98.2 °F (36.8 °C) 66 16 (!) 150/97 96 %   07/17/20 1138 98.5 °F (36.9 °C) 64 15 135/77 96 %   07/17/20 0737 98.2 °F (36.8 °C) 68 15 (!) 148/93 100 %       Pain Assessment  Pain Intensity 1: 0 (07/17/20 1500)        Patient Stated Pain Goal: 0            David Kingston RN

## 2020-07-18 LAB
ALBUMIN SERPL-MCNC: 3 G/DL (ref 3.2–4.6)
ALBUMIN/GLOB SERPL: 0.9 {RATIO} (ref 1.2–3.5)
ALP SERPL-CCNC: 64 U/L (ref 50–136)
ALT SERPL-CCNC: 8 U/L (ref 12–65)
ANION GAP SERPL CALC-SCNC: 17 MMOL/L (ref 7–16)
AST SERPL-CCNC: 12 U/L (ref 15–37)
BILIRUB SERPL-MCNC: 0.3 MG/DL (ref 0.2–1.1)
BUN SERPL-MCNC: 119 MG/DL (ref 8–23)
CALCIUM SERPL-MCNC: 7.8 MG/DL (ref 8.3–10.4)
CHLORIDE SERPL-SCNC: 108 MMOL/L (ref 98–107)
CO2 SERPL-SCNC: 14 MMOL/L (ref 21–32)
CREAT SERPL-MCNC: 15 MG/DL (ref 0.6–1)
ERYTHROCYTE [DISTWIDTH] IN BLOOD BY AUTOMATED COUNT: 14.5 % (ref 11.9–14.6)
FERRITIN SERPL-MCNC: 136 NG/ML (ref 8–388)
GLOBULIN SER CALC-MCNC: 3.2 G/DL (ref 2.3–3.5)
GLUCOSE SERPL-MCNC: 87 MG/DL (ref 65–100)
HCT VFR BLD AUTO: 24.9 % (ref 35.8–46.3)
HGB BLD-MCNC: 8.5 G/DL (ref 11.7–15.4)
IRON SATN MFR SERPL: 28 %
IRON SERPL-MCNC: 69 UG/DL (ref 35–150)
MCH RBC QN AUTO: 29.6 PG (ref 26.1–32.9)
MCHC RBC AUTO-ENTMCNC: 34.1 G/DL (ref 31.4–35)
MCV RBC AUTO: 86.8 FL (ref 79.6–97.8)
NRBC # BLD: 0 K/UL (ref 0–0.2)
PLATELET # BLD AUTO: 141 K/UL (ref 150–450)
PMV BLD AUTO: 10 FL (ref 9.4–12.3)
POTASSIUM SERPL-SCNC: 3.4 MMOL/L (ref 3.5–5.1)
PROT SERPL-MCNC: 6.2 G/DL (ref 6.3–8.2)
RBC # BLD AUTO: 2.87 M/UL (ref 4.05–5.2)
SODIUM SERPL-SCNC: 139 MMOL/L (ref 136–145)
TIBC SERPL-MCNC: 249 UG/DL (ref 250–450)
WBC # BLD AUTO: 5.4 K/UL (ref 4.3–11.1)

## 2020-07-18 PROCEDURE — 36415 COLL VENOUS BLD VENIPUNCTURE: CPT

## 2020-07-18 PROCEDURE — 74011250637 HC RX REV CODE- 250/637: Performed by: INTERNAL MEDICINE

## 2020-07-18 PROCEDURE — 82728 ASSAY OF FERRITIN: CPT

## 2020-07-18 PROCEDURE — 83540 ASSAY OF IRON: CPT

## 2020-07-18 PROCEDURE — 65270000029 HC RM PRIVATE

## 2020-07-18 PROCEDURE — 80053 COMPREHEN METABOLIC PANEL: CPT

## 2020-07-18 PROCEDURE — 85027 COMPLETE CBC AUTOMATED: CPT

## 2020-07-18 PROCEDURE — 74011250636 HC RX REV CODE- 250/636: Performed by: INTERNAL MEDICINE

## 2020-07-18 RX ORDER — HYDROCORTISONE 1 %
CREAM (GRAM) TOPICAL 3 TIMES DAILY
Status: DISCONTINUED | OUTPATIENT
Start: 2020-07-18 | End: 2020-07-23 | Stop reason: HOSPADM

## 2020-07-18 RX ADMIN — METOPROLOL TARTRATE 50 MG: 50 TABLET, FILM COATED ORAL at 18:00

## 2020-07-18 RX ADMIN — SODIUM CHLORIDE, SODIUM LACTATE, POTASSIUM CHLORIDE, AND CALCIUM CHLORIDE 125 ML/HR: 600; 310; 30; 20 INJECTION, SOLUTION INTRAVENOUS at 13:42

## 2020-07-18 RX ADMIN — METOPROLOL TARTRATE 50 MG: 50 TABLET, FILM COATED ORAL at 09:26

## 2020-07-18 RX ADMIN — SODIUM BICARBONATE 650 MG TABLET 1300 MG: at 17:59

## 2020-07-18 RX ADMIN — HEPARIN SODIUM 5000 UNITS: 5000 INJECTION INTRAVENOUS; SUBCUTANEOUS at 05:36

## 2020-07-18 RX ADMIN — HYDRALAZINE HYDROCHLORIDE 25 MG: 25 TABLET, FILM COATED ORAL at 18:00

## 2020-07-18 RX ADMIN — HYDRALAZINE HYDROCHLORIDE 25 MG: 25 TABLET, FILM COATED ORAL at 09:26

## 2020-07-18 RX ADMIN — Medication 10 ML: at 14:00

## 2020-07-18 RX ADMIN — HEPARIN SODIUM 5000 UNITS: 5000 INJECTION INTRAVENOUS; SUBCUTANEOUS at 21:28

## 2020-07-18 RX ADMIN — LEVOTHYROXINE SODIUM 150 MCG: 0.07 TABLET ORAL at 05:35

## 2020-07-18 RX ADMIN — ANTI-PRURITIC: 1 CREAM TOPICAL at 19:42

## 2020-07-18 RX ADMIN — SODIUM BICARBONATE 650 MG TABLET 1300 MG: at 09:26

## 2020-07-18 RX ADMIN — SODIUM CHLORIDE, SODIUM LACTATE, POTASSIUM CHLORIDE, AND CALCIUM CHLORIDE 125 ML/HR: 600; 310; 30; 20 INJECTION, SOLUTION INTRAVENOUS at 21:29

## 2020-07-18 RX ADMIN — ANTI-PRURITIC: 1 CREAM TOPICAL at 21:33

## 2020-07-18 RX ADMIN — HYDRALAZINE HYDROCHLORIDE 25 MG: 25 TABLET, FILM COATED ORAL at 21:28

## 2020-07-18 RX ADMIN — SODIUM CHLORIDE, SODIUM LACTATE, POTASSIUM CHLORIDE, AND CALCIUM CHLORIDE 125 ML/HR: 600; 310; 30; 20 INJECTION, SOLUTION INTRAVENOUS at 05:09

## 2020-07-18 RX ADMIN — SODIUM BICARBONATE 650 MG TABLET 1300 MG: at 21:28

## 2020-07-18 NOTE — PROGRESS NOTES
She just needs to keep marquez for now. Post obstructive diuresis and need to keep up with fluid balance or she may get hypotensive. Creatinine today = 15.00,  Urine culture no growth    Female urethral stricture and acute on chronic renal failure.     Keep marquez for now    Yumiko Hernandez MD

## 2020-07-18 NOTE — PROGRESS NOTES
CAT NEPHROLOGY PROGRESS NOTE    Follow up for: ELIJAH    Subjective:   Patient resting in bed. No complaints this morning. Zamora in place.     ROS:  Gen - no fever, no chills, appetite okay  CV - no chest pain, no orthopnea  Lung - no shortness of breath, no cough  Abd - no tenderness, no nausea, no vomiting  Ext - no edema    Objective:   Exam:  Vitals:    07/17/20 2348 07/18/20 0434 07/18/20 0801 07/18/20 1153   BP: 118/87 139/79 (!) 142/93 144/88   Pulse: 68 67 72 64   Resp: 18 18 18 17   Temp: 98.3 °F (36.8 °C) 98.3 °F (36.8 °C) 98.1 °F (36.7 °C) 98.2 °F (36.8 °C)   SpO2: 98% 98% 99% 98%   Weight:       Height:             Intake/Output Summary (Last 24 hours) at 7/18/2020 1202  Last data filed at 7/18/2020 0929  Gross per 24 hour   Intake 1134 ml   Output 3351 ml   Net -2217 ml       Current Facility-Administered Medications   Medication Dose Route Frequency    ondansetron (ZOFRAN) injection 8 mg  8 mg IntraVENous Q4H PRN    lactated Ringers infusion  125 mL/hr IntraVENous CONTINUOUS    sodium bicarbonate tablet 1,300 mg  1,300 mg Oral TID    0.9% sodium chloride infusion 250 mL  250 mL IntraVENous PRN    levothyroxine (SYNTHROID) tablet 150 mcg  150 mcg Oral ACB    metoprolol tartrate (LOPRESSOR) tablet 50 mg  50 mg Oral BID    sodium chloride (NS) flush 5-40 mL  5-40 mL IntraVENous Q8H    sodium chloride (NS) flush 5-40 mL  5-40 mL IntraVENous PRN    heparin (porcine) injection 5,000 Units  5,000 Units SubCUTAneous Q8H    acetaminophen (TYLENOL) tablet 650 mg  650 mg Oral Q4H PRN    HYDROmorphone (PF) (DILAUDID) injection 0.5 mg  0.5 mg IntraVENous Q6H PRN    0.9% sodium chloride infusion 250 mL  250 mL IntraVENous PRN    hydrALAZINE (APRESOLINE) tablet 25 mg  25 mg Oral TID    hydrALAZINE (APRESOLINE) tablet 25 mg  25 mg Oral TID PRN       EXAM  GEN - Alert, oriented, in no distress  HEENT - NC/AT, non icteric sclera  CV - no audible murmur, regular rate  Lung - equal chest rise, no audible wheezing  Abd - non distended  Ext - no edema, no cyanosis  Access: none    Recent Labs     07/18/20  0613 07/17/20  0630 07/16/20  2138 07/16/20  1045   WBC 5.4 5.9  --  4.4   HGB 8.5* 9.7* 10.0* 6.3*   HCT 24.9* 29.9*  --  18.8*   * 161  --  154        Recent Labs     07/18/20  0613 07/17/20  0630 07/16/20  1045    138 135*   K 3.4* 4.4 4.5   * 107 105   CO2 14* 12* 13*   * 133* 140*   CREA 15.00* 16.90* 17.40*   CA 7.8* 8.3 8.9   GLU 87 98 110*       Assessment and Plan:   ELIJAH 2/2 obstructive uropathy  - last known Cr about 1.3 in Aug '19 from labs faxed from PCP office  - s/p dilation in OR on 7/16. Renal function with continued slight improvement. Expect further improvement of renal indices. No indication for HD at this time. Metabolic acidosis - start sodium bicarbonate PO and continue LR infusion. Postobstructive diuresis. Anemia - will follow. Check iron stores.      Selena Rod MD  [facility]

## 2020-07-18 NOTE — PROGRESS NOTES
Hospitalist Progress Note    Admission Date: 2020 10:47 AM  Reason for Admission/Hospital Course: Acute renal failure (ARF) (HCC) [N17.9]      24 Hour Events:  Pt seen and examined at bedside this morning. Patient states that she is feeling better and nausea is improved. She also feels that her skin has cleared up. Currently complaining of rash on her chest and asking for hydrocortisone cream.  Otherwise, patient reports no abdominal pain, nausea, vomiting, fevers, chills. ROS:  10 point review of systems is otherwise negative with the exception of the elements mentioned above.      No Known Allergies    OBJECTIVE:  Patient Vitals for the past 8 hrs:   BP Temp Pulse Resp SpO2   20 0801 (!) 142/93 98.1 °F (36.7 °C) 72 18 99 %   20 0434 139/79 98.3 °F (36.8 °C) 67 18 98 %     Temp (24hrs), Av.2 °F (36.8 °C), Min:98 °F (36.7 °C), Max:98.5 °F (36.9 °C)     07 -  1900  In: 360 [P.O.:360]  Out: 1     PHYSICAL EXAM:  General:  Pleasant white female, no acute distress, resting comfortably in bed   HEENT: Normocephalic, atraumatic, conjunctival pallor still present  CV:   RRR, no murmurs  Lungs:   Clear to auscultation bilaterally  Abdomen:   Mild tenderness to palpation of lower quadrants, Zamora catheter draining pink urine   MSK:   No skeletal deformity seen  Skin:   No jaundice or rash    Neuro:  CN II through XII grossly intact   Psych: Calm, cooperative, AO x4       Labs:      Recent Labs     20  0613 20  0630 20  2138 20  1045   WBC 5.4 5.9  --  4.4   RBC 2.87* 3.32*  --  2.12*   HGB 8.5* 9.7* 10.0* 6.3*   HCT 24.9* 29.9*  --  18.8*   MCV 86.8 90.1  --  88.7   MCH 29.6 29.2  --  29.7   MCHC 34.1 32.4  --  33.5   RDW 14.5 14.4  --  14.4   * 161  --  154   GRANS  --   --   --  70   LYMPH  --   --   --  18   MONOS  --   --   --  8   EOS  --   --   --  3   BASOS  --   --   --  1   IG  --   --   --  1   DF  --   --   --  AUTOMATED   ANEU  --   --   --  3.1 ABL  --   --   --  0.8   ABM  --   --   --  0.3   JAY  --   --   --  0.1   ABB  --   --   --  0.0   AIG  --   --   --  0.0        Recent Labs     07/18/20  0613 07/17/20  0630 07/16/20  1045    138 135*   K 3.4* 4.4 4.5   * 107 105   CO2 14* 12* 13*   AGAP 17* 19* 17*   GLU 87 98 110*   * 133* 140*   CREA 15.00* 16.90* 17.40*   GFRAA 3* 3* 3*   GFRNA 3* 2* 2*   CA 7.8* 8.3 8.9   AP 64 69 73   TP 6.2* 6.7 7.6   ALB 3.0* 3.2 3.8   GLOB 3.2 3.5 3.8*   AGRAT 0.9* 0.9* 1.0*       Recent Results (from the past 24 hour(s))   METABOLIC PANEL, COMPREHENSIVE    Collection Time: 07/18/20  6:13 AM   Result Value Ref Range    Sodium 139 136 - 145 mmol/L    Potassium 3.4 (L) 3.5 - 5.1 mmol/L    Chloride 108 (H) 98 - 107 mmol/L    CO2 14 (L) 21 - 32 mmol/L    Anion gap 17 (H) 7 - 16 mmol/L    Glucose 87 65 - 100 mg/dL     (H) 8 - 23 MG/DL    Creatinine 15.00 (H) 0.6 - 1.0 MG/DL    GFR est AA 3 (L) >60 ml/min/1.73m2    GFR est non-AA 3 (L) >60 ml/min/1.73m2    Calcium 7.8 (L) 8.3 - 10.4 MG/DL    Bilirubin, total 0.3 0.2 - 1.1 MG/DL    ALT (SGPT) 8 (L) 12 - 65 U/L    AST (SGOT) 12 (L) 15 - 37 U/L    Alk.  phosphatase 64 50 - 136 U/L    Protein, total 6.2 (L) 6.3 - 8.2 g/dL    Albumin 3.0 (L) 3.2 - 4.6 g/dL    Globulin 3.2 2.3 - 3.5 g/dL    A-G Ratio 0.9 (L) 1.2 - 3.5     CBC W/O DIFF    Collection Time: 07/18/20  6:13 AM   Result Value Ref Range    WBC 5.4 4.3 - 11.1 K/uL    RBC 2.87 (L) 4.05 - 5.2 M/uL    HGB 8.5 (L) 11.7 - 15.4 g/dL    HCT 24.9 (L) 35.8 - 46.3 %    MCV 86.8 79.6 - 97.8 FL    MCH 29.6 26.1 - 32.9 PG    MCHC 34.1 31.4 - 35.0 g/dL    RDW 14.5 11.9 - 14.6 %    PLATELET 124 (L) 057 - 450 K/uL    MPV 10.0 9.4 - 12.3 FL    ABSOLUTE NRBC 0.00 0.0 - 0.2 K/uL       Current Facility-Administered Medications   Medication Dose Route Frequency    ondansetron (ZOFRAN) injection 8 mg  8 mg IntraVENous Q4H PRN    lactated Ringers infusion  125 mL/hr IntraVENous CONTINUOUS    sodium bicarbonate tablet 1,300 mg  1,300 mg Oral TID    0.9% sodium chloride infusion 250 mL  250 mL IntraVENous PRN    levothyroxine (SYNTHROID) tablet 150 mcg  150 mcg Oral ACB    metoprolol tartrate (LOPRESSOR) tablet 50 mg  50 mg Oral BID    sodium chloride (NS) flush 5-40 mL  5-40 mL IntraVENous Q8H    sodium chloride (NS) flush 5-40 mL  5-40 mL IntraVENous PRN    heparin (porcine) injection 5,000 Units  5,000 Units SubCUTAneous Q8H    acetaminophen (TYLENOL) tablet 650 mg  650 mg Oral Q4H PRN    HYDROmorphone (PF) (DILAUDID) injection 0.5 mg  0.5 mg IntraVENous Q6H PRN    0.9% sodium chloride infusion 250 mL  250 mL IntraVENous PRN    hydrALAZINE (APRESOLINE) tablet 25 mg  25 mg Oral TID    hydrALAZINE (APRESOLINE) tablet 25 mg  25 mg Oral TID PRN          Imaging:    Us Retroperitoneum Comp    Result Date: 7/16/2020  EXAM: Ultrasound of the kidneys. INDICATION: Acute renal failure. COMPARISON: Prior CT abdomen on January 12, 2016. TECHNIQUE: A standard protocol kidney ultrasound was performed. FINDINGS: The right kidney measures 10.8 x 5.8 cm and the left kidney measures 11.6 x 5.8 cm. There is moderate bilateral hydronephrosis, not significantly changed from the prior CT scan, allowing for differences in technique. There are also solitary bilateral simple cysts, measuring 3.7 x 4.2 x 4.0 cm on the right and 1.2 x 0.6 x 0.7 cm on the left. No shadowing kidney stone is identified. The urinary bladder is decompressed around a Zamora catheter. IMPRESSION: 1. Moderate bilateral hydronephrosis, not significantly changed from the prior CT abdomen January, 2016. 2. Solitary bilateral kidney simple cysts. 3. Zamora catheter in the urinary bladder.           ASSESSMENT:    Problem List  Date Reviewed: 1/18/2017          Codes Class Noted    * (Principal) [facility] ICD-10-CM: N17.9  ICD-9-CM: 584.9  7/16/2020        Anemia ICD-10-CM: D64.9  ICD-9-CM: 285.9  7/16/2020        Lower obstructive uropathy ICD-10-CM: N13.9  ICD-9-CM: 599.60  7/16/2020        Acquired hypothyroidism ICD-10-CM: E03.9  ICD-9-CM: 244.9  7/16/2020                PLAN:    Acute renal failure secondary to obstructive uropathy from urethral stricture s/p dilation in OR  Patient underwent successful dilation in the OR. Currently has Zamora in place which is draining pink urine. Labs this morning show minimal improvement of BUN and creatinine. Ultrasound kidneys reveal some moderate hydronephrosis.  Follow-up urology recommendations as well as nephrology recommendations   Monitor electrolyte abnormalities given renal failure   Zamora placement and care   We will confer with nephrology if hemodialysis indicated    Metabolic acidosis secondary to acute renal failure  Bicarb tablets started yesterday. Patient currently on lactated Ringer's as per nephrology. We will continue to monitor    Nausea, vomiting -improved  Patient likely symptomatic from uremia. Will offer Zofran for symptomatic relief     Anemia on labs, likely lab error  Hemoglobin has been very labile. Unclear if this is gross lab error. We will continue to monitor hemoglobin for now. Hypertension  BP better controlled. She was on an ACE inhibitor and arb as outpatient.   Will transition to try hydralazine 3 times daily  Pain control PRN   Continue with metoprolol 50 mg XL daily   Hydralazine 25 mg 3 times daily, PRN hydralazine for systolic BP greater than 653     Hypothyroidism  Continue with Synthroid as per home dose     Fluids:  Lactated Ringer's as per nephro  electrolytes: Replete as needed  Nutrition: Renal diet  CODE STATUS: Full     DVT prophylaxis: Heparin subcu  Estimated LOS:  Greater than 2 midnights

## 2020-07-18 NOTE — PROGRESS NOTES
Problem: Falls - Risk of  Goal: *Absence of Falls  Description: Document [facility] Fall Risk and appropriate interventions in the flowsheet.   7/18/2020 1841 by Arash Jacob RN  Outcome: Progressing Towards Goal  Note: Fall Risk Interventions:  Mobility Interventions: Assess mobility with egress test         Medication Interventions: Assess postural VS orthostatic hypotension, Evaluate medications/consider consulting pharmacy    Elimination Interventions: Call light in reach           7/18/2020 1011 by Arash Jacob RN  Outcome: Progressing Towards Goal  Note: Fall Risk Interventions:  Mobility Interventions: Assess mobility with egress test         Medication Interventions: Assess postural VS orthostatic hypotension, Evaluate medications/consider consulting pharmacy    Elimination Interventions: Call light in reach              Problem: Patient Education: Go to Patient Education Activity  Goal: Patient/Family Education  7/18/2020 1841 by Arash Jacob RN  Outcome: Progressing Towards Goal  7/18/2020 1011 by Arash Jacob RN  Outcome: Progressing Towards Goal     Problem: Infection - Risk of, Urinary Catheter-Associated Urinary Tract Infection  Goal: *Absence of infection signs and symptoms  7/18/2020 1841 by Arash Jacob RN  Outcome: Progressing Towards Goal  7/18/2020 1011 by Arash Jacob RN  Outcome: Progressing Towards Goal     Problem: Patient Education: Go to Patient Education Activity  Goal: Patient/Family Education  7/18/2020 1841 by Arash Jacob RN  Outcome: Progressing Towards Goal  7/18/2020 1011 by Arash Jacob RN  Outcome: Progressing Towards Goal

## 2020-07-18 NOTE — PROGRESS NOTES
Problem: Falls - Risk of  Goal: *Absence of Falls  Description: Document [facility] Fall Risk and appropriate interventions in the flowsheet.   Outcome: Progressing Towards Goal  Note: Fall Risk Interventions:  Mobility Interventions: Assess mobility with egress test         Medication Interventions: Assess postural VS orthostatic hypotension    Elimination Interventions: Call light in reach              Problem: Infection - Risk of, Urinary Catheter-Associated Urinary Tract Infection  Goal: *Absence of infection signs and symptoms  Outcome: Progressing Towards Goal

## 2020-07-18 NOTE — PROGRESS NOTES
Problem: Falls - Risk of  Goal: *Absence of Falls  Description: Document [facility] Fall Risk and appropriate interventions in the flowsheet.   Outcome: Progressing Towards Goal  Note: Fall Risk Interventions:  Mobility Interventions: Assess mobility with egress test         Medication Interventions: Assess postural VS orthostatic hypotension, Evaluate medications/consider consulting pharmacy    Elimination Interventions: Call light in reach              Problem: Patient Education: Go to Patient Education Activity  Goal: Patient/Family Education  Outcome: Progressing Towards Goal     Problem: Infection - Risk of, Urinary Catheter-Associated Urinary Tract Infection  Goal: *Absence of infection signs and symptoms  Outcome: Progressing Towards Goal     Problem: Patient Education: Go to Patient Education Activity  Goal: Patient/Family Education  Outcome: Progressing Towards Goal

## 2020-07-19 LAB
ALBUMIN SERPL-MCNC: 3 G/DL (ref 3.2–4.6)
ALBUMIN/GLOB SERPL: 0.9 {RATIO} (ref 1.2–3.5)
ALP SERPL-CCNC: 73 U/L (ref 50–136)
ALT SERPL-CCNC: 7 U/L (ref 12–65)
ANION GAP SERPL CALC-SCNC: 14 MMOL/L (ref 7–16)
AST SERPL-CCNC: 11 U/L (ref 15–37)
BACTERIA SPEC CULT: NORMAL
BILIRUB SERPL-MCNC: 0.2 MG/DL (ref 0.2–1.1)
BUN SERPL-MCNC: 99 MG/DL (ref 8–23)
CALCIUM SERPL-MCNC: 7.7 MG/DL (ref 8.3–10.4)
CHLORIDE SERPL-SCNC: 104 MMOL/L (ref 98–107)
CO2 SERPL-SCNC: 19 MMOL/L (ref 21–32)
CREAT SERPL-MCNC: 12.5 MG/DL (ref 0.6–1)
ERYTHROCYTE [DISTWIDTH] IN BLOOD BY AUTOMATED COUNT: 14.4 % (ref 11.9–14.6)
GLOBULIN SER CALC-MCNC: 3.4 G/DL (ref 2.3–3.5)
GLUCOSE SERPL-MCNC: 102 MG/DL (ref 65–100)
HCT VFR BLD AUTO: 24.6 % (ref 35.8–46.3)
HGB BLD-MCNC: 8 G/DL (ref 11.7–15.4)
MAGNESIUM SERPL-MCNC: 1.8 MG/DL (ref 1.8–2.4)
MCH RBC QN AUTO: 28.9 PG (ref 26.1–32.9)
MCHC RBC AUTO-ENTMCNC: 32.5 G/DL (ref 31.4–35)
MCV RBC AUTO: 88.8 FL (ref 79.6–97.8)
NRBC # BLD: 0 K/UL (ref 0–0.2)
PLATELET # BLD AUTO: 156 K/UL (ref 150–450)
PMV BLD AUTO: 10.3 FL (ref 9.4–12.3)
POTASSIUM SERPL-SCNC: 3 MMOL/L (ref 3.5–5.1)
PROT SERPL-MCNC: 6.4 G/DL (ref 6.3–8.2)
RBC # BLD AUTO: 2.77 M/UL (ref 4.05–5.2)
SERVICE CMNT-IMP: NORMAL
SODIUM SERPL-SCNC: 137 MMOL/L (ref 136–145)
WBC # BLD AUTO: 5.9 K/UL (ref 4.3–11.1)

## 2020-07-19 PROCEDURE — 80053 COMPREHEN METABOLIC PANEL: CPT

## 2020-07-19 PROCEDURE — 65270000029 HC RM PRIVATE

## 2020-07-19 PROCEDURE — 74011250637 HC RX REV CODE- 250/637: Performed by: INTERNAL MEDICINE

## 2020-07-19 PROCEDURE — 74011250636 HC RX REV CODE- 250/636: Performed by: INTERNAL MEDICINE

## 2020-07-19 PROCEDURE — 83735 ASSAY OF MAGNESIUM: CPT

## 2020-07-19 PROCEDURE — 85027 COMPLETE CBC AUTOMATED: CPT

## 2020-07-19 PROCEDURE — 36415 COLL VENOUS BLD VENIPUNCTURE: CPT

## 2020-07-19 PROCEDURE — 74011000258 HC RX REV CODE- 258: Performed by: INTERNAL MEDICINE

## 2020-07-19 RX ORDER — POTASSIUM CHLORIDE 20 MEQ/1
40 TABLET, EXTENDED RELEASE ORAL 2 TIMES DAILY
Status: COMPLETED | OUTPATIENT
Start: 2020-07-19 | End: 2020-07-19

## 2020-07-19 RX ORDER — SODIUM FERRIC GLUCONATE COMPLEX IN SUCROSE 12.5 MG/ML
125 INJECTION INTRAVENOUS DAILY
Status: DISCONTINUED | OUTPATIENT
Start: 2020-07-19 | End: 2020-07-19 | Stop reason: SDUPTHER

## 2020-07-19 RX ADMIN — HEPARIN SODIUM 5000 UNITS: 5000 INJECTION INTRAVENOUS; SUBCUTANEOUS at 05:02

## 2020-07-19 RX ADMIN — Medication 10 ML: at 21:16

## 2020-07-19 RX ADMIN — POTASSIUM CHLORIDE 40 MEQ: 20 TABLET, EXTENDED RELEASE ORAL at 09:04

## 2020-07-19 RX ADMIN — ANTI-PRURITIC: 1 CREAM TOPICAL at 09:00

## 2020-07-19 RX ADMIN — LEVOTHYROXINE SODIUM 150 MCG: 0.07 TABLET ORAL at 05:02

## 2020-07-19 RX ADMIN — HYDRALAZINE HYDROCHLORIDE 25 MG: 25 TABLET, FILM COATED ORAL at 21:15

## 2020-07-19 RX ADMIN — POTASSIUM CHLORIDE 40 MEQ: 20 TABLET, EXTENDED RELEASE ORAL at 18:08

## 2020-07-19 RX ADMIN — SODIUM CHLORIDE 125 MG: 900 INJECTION, SOLUTION INTRAVENOUS at 13:50

## 2020-07-19 RX ADMIN — METOPROLOL TARTRATE 50 MG: 50 TABLET, FILM COATED ORAL at 18:08

## 2020-07-19 RX ADMIN — ANTI-PRURITIC: 1 CREAM TOPICAL at 16:00

## 2020-07-19 RX ADMIN — SODIUM BICARBONATE 650 MG TABLET 1300 MG: at 09:00

## 2020-07-19 RX ADMIN — SODIUM BICARBONATE 650 MG TABLET 1300 MG: at 21:15

## 2020-07-19 RX ADMIN — HYDRALAZINE HYDROCHLORIDE 25 MG: 25 TABLET, FILM COATED ORAL at 18:09

## 2020-07-19 RX ADMIN — HYDRALAZINE HYDROCHLORIDE 25 MG: 25 TABLET, FILM COATED ORAL at 09:04

## 2020-07-19 RX ADMIN — SODIUM BICARBONATE 650 MG TABLET 1300 MG: at 18:08

## 2020-07-19 RX ADMIN — Medication 10 ML: at 13:54

## 2020-07-19 RX ADMIN — SODIUM CHLORIDE, SODIUM LACTATE, POTASSIUM CHLORIDE, AND CALCIUM CHLORIDE 125 ML/HR: 600; 310; 30; 20 INJECTION, SOLUTION INTRAVENOUS at 07:07

## 2020-07-19 RX ADMIN — ANTI-PRURITIC: 1 CREAM TOPICAL at 21:15

## 2020-07-19 RX ADMIN — METOPROLOL TARTRATE 50 MG: 50 TABLET, FILM COATED ORAL at 09:04

## 2020-07-19 NOTE — PROGRESS NOTES
Hospitalist Progress Note    Admission Date: 2020 10:47 AM  Reason for Admission/Hospital Course: Acute renal failure (ARF) (HCC) [N17.9]      24 Hour Events:  Pt seen and examined at bedside this morning. Nausea and vomiting have much improved. Still has a rash this morning. Patient states that she is eating well and drinking plenty of fluids. Urine has cleared up overnight. Patient has no other complaints at this time. Patient has a very positive outlook at this time    ROS:  10 point review of systems is otherwise negative with the exception of the elements mentioned above.      No Known Allergies    OBJECTIVE:  Patient Vitals for the past 8 hrs:   BP Temp Pulse Resp SpO2   20 0757 (!) 155/95 98 °F (36.7 °C) 84 16 99 %   20 0420 136/85 98.5 °F (36.9 °C) 86 16 95 %     Temp (24hrs), Av.1 °F (36.7 °C), Min:97.6 °F (36.4 °C), Max:98.5 °F (36.9 °C)    701 -  1900  In: 240 [P.O.:240]  Out: 1000 [Urine:1000]    PHYSICAL EXAM:  General:  Pleasant white female, no acute distress, resting comfortably in bed   HEENT: Normocephalic, atraumatic, conjunctival pallor still present  CV:   RRR, no murmurs  Lungs:   Clear to auscultation bilaterally  Abdomen:   Mild tenderness to palpation of lower quadrants, Zamora catheter draining clear urine   MSK:   No skeletal deformity seen  Skin:   No jaundice or rash    Neuro:  CN II through XII grossly intact   Psych: Calm, cooperative, AO x4       Labs:      Recent Labs     20  0602 20  0613 20  0630  20  1045   WBC 5.9 5.4 5.9  --  4.4   RBC 2.77* 2.87* 3.32*  --  2.12*   HGB 8.0* 8.5* 9.7*   < > 6.3*   HCT 24.6* 24.9* 29.9*  --  18.8*   MCV 88.8 86.8 90.1  --  88.7   MCH 28.9 29.6 29.2  --  29.7   MCHC 32.5 34.1 32.4  --  33.5   RDW 14.4 14.5 14.4  --  14.4    141* 161  --  154   GRANS  --   --   --   --  70   LYMPH  --   --   --   --  18   MONOS  --   --   --   --  8   EOS  --   --   --   --  3   BASOS  --   --   -- --  1   IG  --   --   --   --  1   DF  --   --   --   --  AUTOMATED   ANEU  --   --   --   --  3.1   ABL  --   --   --   --  0.8   ABM  --   --   --   --  0.3   JAY  --   --   --   --  0.1   ABB  --   --   --   --  0.0   AIG  --   --   --   --  0.0    < > = values in this interval not displayed. Recent Labs     07/19/20  0602 07/18/20  0613 07/17/20  0630    139 138   K 3.0* 3.4* 4.4    108* 107   CO2 19* 14* 12*   AGAP 14 17* 19*   * 87 98   BUN 99* 119* 133*   CREA 12.50* 15.00* 16.90*   GFRAA 4* 3* 3*   GFRNA 3* 3* 2*   CA 7.7* 7.8* 8.3   AP 73 64 69   TP 6.4 6.2* 6.7   ALB 3.0* 3.0* 3.2   GLOB 3.4 3.2 3.5   AGRAT 0.9* 0.9* 0.9*       Recent Results (from the past 24 hour(s))   METABOLIC PANEL, COMPREHENSIVE    Collection Time: 07/19/20  6:02 AM   Result Value Ref Range    Sodium 137 136 - 145 mmol/L    Potassium 3.0 (L) 3.5 - 5.1 mmol/L    Chloride 104 98 - 107 mmol/L    CO2 19 (L) 21 - 32 mmol/L    Anion gap 14 7 - 16 mmol/L    Glucose 102 (H) 65 - 100 mg/dL    BUN 99 (H) 8 - 23 MG/DL    Creatinine 12.50 (H) 0.6 - 1.0 MG/DL    GFR est AA 4 (L) >60 ml/min/1.73m2    GFR est non-AA 3 (L) >60 ml/min/1.73m2    Calcium 7.7 (L) 8.3 - 10.4 MG/DL    Bilirubin, total 0.2 0.2 - 1.1 MG/DL    ALT (SGPT) 7 (L) 12 - 65 U/L    AST (SGOT) 11 (L) 15 - 37 U/L    Alk.  phosphatase 73 50 - 136 U/L    Protein, total 6.4 6.3 - 8.2 g/dL    Albumin 3.0 (L) 3.2 - 4.6 g/dL    Globulin 3.4 2.3 - 3.5 g/dL    A-G Ratio 0.9 (L) 1.2 - 3.5     CBC W/O DIFF    Collection Time: 07/19/20  6:02 AM   Result Value Ref Range    WBC 5.9 4.3 - 11.1 K/uL    RBC 2.77 (L) 4.05 - 5.2 M/uL    HGB 8.0 (L) 11.7 - 15.4 g/dL    HCT 24.6 (L) 35.8 - 46.3 %    MCV 88.8 79.6 - 97.8 FL    MCH 28.9 26.1 - 32.9 PG    MCHC 32.5 31.4 - 35.0 g/dL    RDW 14.4 11.9 - 14.6 %    PLATELET 558 480 - 196 K/uL    MPV 10.3 9.4 - 12.3 FL    ABSOLUTE NRBC 0.00 0.0 - 0.2 K/uL       Current Facility-Administered Medications   Medication Dose Route Frequency  potassium chloride (K-DUR, KLOR-CON) SR tablet 40 mEq  40 mEq Oral BID    hydrocortisone (CORTAID) 1 % cream   Topical TID    ondansetron (ZOFRAN) injection 8 mg  8 mg IntraVENous Q4H PRN    lactated Ringers infusion  125 mL/hr IntraVENous CONTINUOUS    sodium bicarbonate tablet 1,300 mg  1,300 mg Oral TID    0.9% sodium chloride infusion 250 mL  250 mL IntraVENous PRN    levothyroxine (SYNTHROID) tablet 150 mcg  150 mcg Oral ACB    metoprolol tartrate (LOPRESSOR) tablet 50 mg  50 mg Oral BID    sodium chloride (NS) flush 5-40 mL  5-40 mL IntraVENous Q8H    sodium chloride (NS) flush 5-40 mL  5-40 mL IntraVENous PRN    heparin (porcine) injection 5,000 Units  5,000 Units SubCUTAneous Q8H    acetaminophen (TYLENOL) tablet 650 mg  650 mg Oral Q4H PRN    HYDROmorphone (PF) (DILAUDID) injection 0.5 mg  0.5 mg IntraVENous Q6H PRN    0.9% sodium chloride infusion 250 mL  250 mL IntraVENous PRN    hydrALAZINE (APRESOLINE) tablet 25 mg  25 mg Oral TID    hydrALAZINE (APRESOLINE) tablet 25 mg  25 mg Oral TID PRN          Imaging:    Us Retroperitoneum Comp    Result Date: 7/16/2020  EXAM: Ultrasound of the kidneys. INDICATION: Acute renal failure. COMPARISON: Prior CT abdomen on January 12, 2016. TECHNIQUE: A standard protocol kidney ultrasound was performed. FINDINGS: The right kidney measures 10.8 x 5.8 cm and the left kidney measures 11.6 x 5.8 cm. There is moderate bilateral hydronephrosis, not significantly changed from the prior CT scan, allowing for differences in technique. There are also solitary bilateral simple cysts, measuring 3.7 x 4.2 x 4.0 cm on the right and 1.2 x 0.6 x 0.7 cm on the left. No shadowing kidney stone is identified. The urinary bladder is decompressed around a Zamora catheter. IMPRESSION: 1. Moderate bilateral hydronephrosis, not significantly changed from the prior CT abdomen January, 2016. 2. Solitary bilateral kidney simple cysts.  3. Zamora catheter in the urinary bladder. ASSESSMENT:    Problem List  Date Reviewed: 1/18/2017          Codes Class Noted    * (Principal) Acute renal failure (ARF) ([facility] [address] ICD-10-CM: N17.9  ICD-9-CM: 584.9  7/16/2020        Anemia ICD-10-CM: D64.9  ICD-9-CM: 285.9  7/16/2020        Lower obstructive uropathy ICD-10-CM: N13.9  ICD-9-CM: 599.60  7/16/2020        Acquired hypothyroidism ICD-10-CM: E03.9  ICD-9-CM: 244.9  7/16/2020                PLAN:    Acute renal failure secondary to obstructive uropathy from urethral stricture s/p dilation in OR  Patient underwent successful dilation in the OR. Zamora is now draining clear urine. Patient putting out approximately 3+ liters of urine yesterday. We will continue with lactated Ringer's to match urine output to ensure patient does not become hypotensive  Creatinine is 12.5 today which is a significant improvement. Continue to trend   Follow-up urology recommendations as well as nephrology recommendations   Monitor electrolyte abnormalities given renal failure   Zamora placement and care   Continue with lactated Ringer's to match urine output    Metabolic acidosis secondary to acute renal failure -improving  Bicarb tablets started yesterday. Patient currently on lactated Ringer's as per nephrology. We will continue to monitor    Nausea, vomiting -resolved   patient likely symptomatic from uremia. Will offer Zofran for symptomatic relief     Anemia on labs, likely lab error  Hemoglobin has been very labile. Unclear if this is gross lab error. We will continue to monitor hemoglobin for now. Hypertension  BP better controlled. She was on an ACE inhibitor and arb as outpatient.   Will transition to try hydralazine 3 times daily  Pain control PRN   Continue with metoprolol 50 mg XL daily   Hydralazine 25 mg 3 times daily, PRN hydralazine for systolic BP greater than 368     Hypothyroidism  Continue with Synthroid as per home dose     Fluids:  Lactated Ringer's as per nephro  electrolytes: Replete as needed  Nutrition: Renal diet  CODE STATUS: Full     DVT prophylaxis: Heparin subcu  Estimated LOS:  Greater than 2 midnights    Anticipate discharge in the next 48 hours as long as patient continues to clinically improve.   Will need to continue to watch urine output and ensure patient not hypotensive or has gross electrolyte abnormalities

## 2020-07-19 NOTE — PROGRESS NOTES
CAT NEPHROLOGY PROGRESS NOTE    Follow up for: ELIJAH    Subjective:   Patient resting in bed. No complaints this morning. Zamora in place.     ROS:  Gen - no fever, no chills, appetite okay  CV - no chest pain, no orthopnea  Lung - no shortness of breath, no cough  Abd - no tenderness, no nausea, no vomiting  Ext - no edema    Objective:   Exam:  Vitals:    07/18/20 2325 07/19/20 0420 07/19/20 0757 07/19/20 1149   BP: (!) 146/96 136/85 (!) 155/95 (!) 162/102   Pulse: 80 86 84 76   Resp: 16 16 16 16   Temp: 98.1 °F (36.7 °C) 98.5 °F (36.9 °C) 98 °F (36.7 °C) 98.4 °F (36.9 °C)   SpO2: 97% 95% 99% 98%   Weight:       Height:             Intake/Output Summary (Last 24 hours) at 7/19/2020 1155  Last data filed at 7/19/2020 1153  Gross per 24 hour   Intake 240 ml   Output 5300 ml   Net -5060 ml       Current Facility-Administered Medications   Medication Dose Route Frequency    potassium chloride (K-DUR, KLOR-CON) SR tablet 40 mEq  40 mEq Oral BID    hydrocortisone (CORTAID) 1 % cream   Topical TID    ondansetron (ZOFRAN) injection 8 mg  8 mg IntraVENous Q4H PRN    lactated Ringers infusion  125 mL/hr IntraVENous CONTINUOUS    sodium bicarbonate tablet 1,300 mg  1,300 mg Oral TID    0.9% sodium chloride infusion 250 mL  250 mL IntraVENous PRN    levothyroxine (SYNTHROID) tablet 150 mcg  150 mcg Oral ACB    metoprolol tartrate (LOPRESSOR) tablet 50 mg  50 mg Oral BID    sodium chloride (NS) flush 5-40 mL  5-40 mL IntraVENous Q8H    sodium chloride (NS) flush 5-40 mL  5-40 mL IntraVENous PRN    heparin (porcine) injection 5,000 Units  5,000 Units SubCUTAneous Q8H    acetaminophen (TYLENOL) tablet 650 mg  650 mg Oral Q4H PRN    HYDROmorphone (PF) (DILAUDID) injection 0.5 mg  0.5 mg IntraVENous Q6H PRN    0.9% sodium chloride infusion 250 mL  250 mL IntraVENous PRN    hydrALAZINE (APRESOLINE) tablet 25 mg  25 mg Oral TID    hydrALAZINE (APRESOLINE) tablet 25 mg  25 mg Oral TID PRN       EXAM  GEN - Alert, oriented, in no distress  HEENT - NC/AT, non icteric sclera  CV - no audible murmur, regular rate  Lung - equal chest rise, no audible wheezing  Abd - non distended  Ext - no edema, no cyanosis  Access: none    Recent Labs     07/19/20  0602 07/18/20  0613 07/17/20  0630   WBC 5.9 5.4 5.9   HGB 8.0* 8.5* 9.7*   HCT 24.6* 24.9* 29.9*    141* 161        Recent Labs     07/19/20  0602 07/18/20  0613 07/17/20  0630    139 138   K 3.0* 3.4* 4.4    108* 107   CO2 19* 14* 12*   BUN 99* 119* 133*   CREA 12.50* 15.00* 16.90*   CA 7.7* 7.8* 8.3   * 87 98   MG 1.8  --   --        Assessment and Plan:   ELIJAH 2/2 obstructive uropathy  - last known Cr about 1.3 in Aug '19 from labs faxed from PCP office  - s/p dilation in OR on 7/16.  - Continued improvement in renal function. She will likely have some residual dysfunction. Metabolic acidosis - Continue PO bicarb and LR. Postobstructive diuresis. Reduce IVF rate to 75. Anemia - will follow. Slightly iron deficient. Will give a couple of doses of IV  iron.       Zeb Martin MD  [facility]

## 2020-07-19 NOTE — PROGRESS NOTES
Problem: Falls - Risk of  Goal: *Absence of Falls  Description: Document Levy Parada Fall Risk and appropriate interventions in the flowsheet.   Outcome: Progressing Towards Goal  Note: Fall Risk Interventions:  Mobility Interventions: Assess mobility with egress test         Medication Interventions: Assess postural VS orthostatic hypotension    Elimination Interventions: Call light in reach              Problem: Infection - Risk of, Urinary Catheter-Associated Urinary Tract Infection  Goal: *Absence of infection signs and symptoms  Outcome: Progressing Towards Goal     Problem: Patient Education: Go to Patient Education Activity  Goal: Patient/Family Education  Outcome: Progressing Towards Goal

## 2020-07-19 NOTE — PROGRESS NOTES
Comprehensive Nutrition Assessment    Type and Reason for Visit: Initial(MST; 2-13lb wt loss; eating poorly)    Nutrition Assessment:  Pt with a medical history of HTN, hypothyroid, urethral strictures presented with general malaise, abdominal distension and pain. PCP recommended pt come to ED followng recent lab results. Admitted with acute renal failure and anemia. Pt is s/p dilation for urethral stricture and per progress note N/V are improved and pt eating well. Weight record in EMR indicate a weight of 155lb 3.2 oz on 1/3/20. Discussed weight loss and po intake with pt. Pt reports a UBW of 150-160lb in recent years. States she has a very good appetite when she is not sick- reports high intake of fruits, vegetables, and plant-based proteins. Eats small amounts of meat. States she began to eat less with present symptoms and relied on lentil soup until \"she finally came in.\" States that she is now eating 100% of meals and has no complaints. Advised pt to rely on nutriton supplement drinks as well if she has a poor appetite whenever she is sick until she can get in to a medical provider. Pertinent Labs: BUN 99  Creat 12.5 improving, Glucose 102     Pertinent Medications: LR @ 125ml/hr, Synthroid. Estimated Daily Nutrient Needs:  Energy (kcal):  0538-4248  Protein (g):  55-67(0.8-1 gm/kg)         Current Nutrition Therapies:   DIET CARDIAC Regular    Anthropometric Measures:  · Height:  5' 10\" (177.8 cm)  · Current Body Wt:  75 kg (165 lb 5.5 oz)(source not indicated)   · BMI: 23.72  · BMI Categories:  Normal weight (BMI 18.5-24. 9)       Nutrition Diagnosis:   No nutritional diagnosis at this time.     Nutrition Interventions:   Food and/or Nutrient Delivery: Continue current diet     Goals:  Pt will consume 75-90% of EER/EPR while admitted       Nutrition Monitoring and Evaluation:   Food/Nutrient Intake Outcomes: Food and nutrient intake    Discharge Planning:    No discharge needs at this time Electronically signed by Geetha Hall, [address] on 7/19/2020 at 3:28 PM  [phone]

## 2020-07-19 NOTE — CONSULTS
Afebrile and BP stable  Urine output still high. She looks like she feels good. Creatinine = 12.5 today    Continue marquez    May need SP tube long term. Discussed with her briefly today.     Antwon CERON

## 2020-07-20 LAB
ALBUMIN SERPL-MCNC: 2.9 G/DL (ref 3.2–4.6)
ALBUMIN/GLOB SERPL: 0.9 {RATIO} (ref 1.2–3.5)
ALP SERPL-CCNC: 61 U/L (ref 50–136)
ALT SERPL-CCNC: 6 U/L (ref 12–65)
ANION GAP SERPL CALC-SCNC: 11 MMOL/L (ref 7–16)
AST SERPL-CCNC: 10 U/L (ref 15–37)
BILIRUB SERPL-MCNC: 0.3 MG/DL (ref 0.2–1.1)
BUN SERPL-MCNC: 88 MG/DL (ref 8–23)
CALCIUM SERPL-MCNC: 8.5 MG/DL (ref 8.3–10.4)
CHLORIDE SERPL-SCNC: 111 MMOL/L (ref 98–107)
CO2 SERPL-SCNC: 20 MMOL/L (ref 21–32)
CREAT SERPL-MCNC: 11 MG/DL (ref 0.6–1)
ERYTHROCYTE [DISTWIDTH] IN BLOOD BY AUTOMATED COUNT: 14 % (ref 11.9–14.6)
GLOBULIN SER CALC-MCNC: 3.3 G/DL (ref 2.3–3.5)
GLUCOSE SERPL-MCNC: 90 MG/DL (ref 65–100)
HCT VFR BLD AUTO: 24.9 % (ref 35.8–46.3)
HGB BLD-MCNC: 8.7 G/DL (ref 11.7–15.4)
MCH RBC QN AUTO: 30.5 PG (ref 26.1–32.9)
MCHC RBC AUTO-ENTMCNC: 34.9 G/DL (ref 31.4–35)
MCV RBC AUTO: 87.4 FL (ref 79.6–97.8)
NRBC # BLD: 0 K/UL (ref 0–0.2)
PLATELET # BLD AUTO: 156 K/UL (ref 150–450)
PMV BLD AUTO: 10.4 FL (ref 9.4–12.3)
POTASSIUM SERPL-SCNC: 3.8 MMOL/L (ref 3.5–5.1)
PROT SERPL-MCNC: 6.2 G/DL (ref 6.3–8.2)
RBC # BLD AUTO: 2.85 M/UL (ref 4.05–5.2)
SODIUM SERPL-SCNC: 142 MMOL/L (ref 136–145)
WBC # BLD AUTO: 5.3 K/UL (ref 4.3–11.1)

## 2020-07-20 PROCEDURE — 74011250637 HC RX REV CODE- 250/637: Performed by: INTERNAL MEDICINE

## 2020-07-20 PROCEDURE — 65270000029 HC RM PRIVATE

## 2020-07-20 PROCEDURE — 74011250636 HC RX REV CODE- 250/636: Performed by: INTERNAL MEDICINE

## 2020-07-20 PROCEDURE — 74011000258 HC RX REV CODE- 258: Performed by: INTERNAL MEDICINE

## 2020-07-20 PROCEDURE — 36415 COLL VENOUS BLD VENIPUNCTURE: CPT

## 2020-07-20 PROCEDURE — 80053 COMPREHEN METABOLIC PANEL: CPT

## 2020-07-20 PROCEDURE — 85027 COMPLETE CBC AUTOMATED: CPT

## 2020-07-20 RX ADMIN — Medication 10 ML: at 13:08

## 2020-07-20 RX ADMIN — Medication 10 ML: at 05:14

## 2020-07-20 RX ADMIN — SODIUM BICARBONATE 650 MG TABLET 1300 MG: at 17:38

## 2020-07-20 RX ADMIN — SODIUM BICARBONATE 650 MG TABLET 1300 MG: at 08:48

## 2020-07-20 RX ADMIN — HYDRALAZINE HYDROCHLORIDE 25 MG: 25 TABLET, FILM COATED ORAL at 08:46

## 2020-07-20 RX ADMIN — LEVOTHYROXINE SODIUM 150 MCG: 0.07 TABLET ORAL at 05:13

## 2020-07-20 RX ADMIN — METOPROLOL TARTRATE 50 MG: 50 TABLET, FILM COATED ORAL at 08:46

## 2020-07-20 RX ADMIN — SODIUM CHLORIDE 125 MG: 900 INJECTION, SOLUTION INTRAVENOUS at 09:10

## 2020-07-20 RX ADMIN — Medication 10 ML: at 21:14

## 2020-07-20 RX ADMIN — ANTI-PRURITIC: 1 CREAM TOPICAL at 08:47

## 2020-07-20 RX ADMIN — HYDRALAZINE HYDROCHLORIDE 25 MG: 25 TABLET, FILM COATED ORAL at 17:41

## 2020-07-20 RX ADMIN — HYDRALAZINE HYDROCHLORIDE 25 MG: 25 TABLET, FILM COATED ORAL at 21:13

## 2020-07-20 RX ADMIN — METOPROLOL TARTRATE 50 MG: 50 TABLET, FILM COATED ORAL at 17:38

## 2020-07-20 RX ADMIN — ANTI-PRURITIC: 1 CREAM TOPICAL at 22:00

## 2020-07-20 RX ADMIN — SODIUM BICARBONATE 650 MG TABLET 1300 MG: at 21:13

## 2020-07-20 NOTE — PROGRESS NOTES
Problem: Falls - Risk of  Goal: *Absence of Falls  Description: Document Valencia De Leon Fall Risk and appropriate interventions in the flowsheet.   Outcome: Progressing Towards Goal  Note: Fall Risk Interventions:  Mobility Interventions: Patient to call before getting OOB         Medication Interventions: Evaluate medications/consider consulting pharmacy, Patient to call before getting OOB, Teach patient to arise slowly    Elimination Interventions: Call light in reach, Patient to call for help with toileting needs, Stay With Me (per policy), Toilet paper/wipes in reach, Toileting schedule/hourly rounds              Problem: Patient Education: Go to Patient Education Activity  Goal: Patient/Family Education  Outcome: Progressing Towards Goal     Problem: Infection - Risk of, Urinary Catheter-Associated Urinary Tract Infection  Goal: *Absence of infection signs and symptoms  Outcome: Progressing Towards Goal     Problem: Patient Education: Go to Patient Education Activity  Goal: Patient/Family Education  Outcome: Progressing Towards Goal     Problem: Nutrition Deficit  Goal: *Optimize nutritional status  Outcome: Progressing Towards Goal

## 2020-07-20 NOTE — PROGRESS NOTES
CAT NEPHROLOGY PROGRESS NOTE    Follow up for: ELIJAH    Subjective:   Patient sitting up in bed eating lunch. No complaints. . Zamora in place.     ROS:  Gen - no fever, no chills, appetite okay  CV - no chest pain, no orthopnea  Lung - no shortness of breath, no cough  Abd - no tenderness, no nausea, no vomiting  Ext - no edema    Objective:   Exam:  Vitals:    07/19/20 2020 07/19/20 2115 07/20/20 0804 07/20/20 1104   BP: (!) 147/102 (!) 159/96 (!) 174/99 (!) 149/91   Pulse: 75 74 82 70   Resp: 18  18 16   Temp: 98.5 °F (36.9 °C)  98.2 °F (36.8 °C) 98 °F (36.7 °C)   SpO2: 98%  96% 98%   Weight:       Height:             Intake/Output Summary (Last 24 hours) at 7/20/2020 1245  Last data filed at 7/20/2020 0804  Gross per 24 hour   Intake 600 ml   Output 2600 ml   Net -2000 ml       Current Facility-Administered Medications   Medication Dose Route Frequency    ferric gluconate (FERRLECIT) 125 mg in 0.9% sodium chloride 100 mL IVPB  125 mg IntraVENous DAILY    hydrocortisone (CORTAID) 1 % cream   Topical TID    ondansetron (ZOFRAN) injection 8 mg  8 mg IntraVENous Q4H PRN    lactated Ringers infusion  75 mL/hr IntraVENous CONTINUOUS    sodium bicarbonate tablet 1,300 mg  1,300 mg Oral TID    0.9% sodium chloride infusion 250 mL  250 mL IntraVENous PRN    levothyroxine (SYNTHROID) tablet 150 mcg  150 mcg Oral ACB    metoprolol tartrate (LOPRESSOR) tablet 50 mg  50 mg Oral BID    sodium chloride (NS) flush 5-40 mL  5-40 mL IntraVENous Q8H    sodium chloride (NS) flush 5-40 mL  5-40 mL IntraVENous PRN    heparin (porcine) injection 5,000 Units  5,000 Units SubCUTAneous Q8H    acetaminophen (TYLENOL) tablet 650 mg  650 mg Oral Q4H PRN    HYDROmorphone (PF) (DILAUDID) injection 0.5 mg  0.5 mg IntraVENous Q6H PRN    0.9% sodium chloride infusion 250 mL  250 mL IntraVENous PRN    hydrALAZINE (APRESOLINE) tablet 25 mg  25 mg Oral TID    hydrALAZINE (APRESOLINE) tablet 25 mg  25 mg Oral TID PRN       EXAM  GEN - Alert, oriented, in no distress  HEENT - NC/AT, non icteric sclera  CV - no audible murmur, regular rate  Lung - equal chest rise, no audible wheezing  Abd - non distended  Ext - no edema, no cyanosis      Recent Labs     07/20/20  0533 07/19/20  0602 07/18/20  0613   WBC 5.3 5.9 5.4   HGB 8.7* 8.0* 8.5*   HCT 24.9* 24.6* 24.9*    156 141*        Recent Labs     07/20/20  0533 07/19/20  0602 07/18/20  0613    137 139   K 3.8 3.0* 3.4*   * 104 108*   CO2 20* 19* 14*   BUN 88* 99* 119*   CREA 11.00* 12.50* 15.00*   CA 8.5 7.7* 7.8*   GLU 90 102* 87   MG  --  1.8  --        Assessment and Plan:   ELIJAH 2/2 obstructive uropathy  - last known Cr about 1.3 in Aug '19 from labs faxed from PCP office  - s/p dilation in OR on 7/16.  - stop IV fluids today and continue to monitor  - Continued improvement in renal function. She will likely have some residual dysfunction. Metabolic acidosis - Continue PO bicarb. Postobstructive diuresis. Reduce IVF rate to 75. Anemia - will follow. Slightly iron deficient. Will give a couple of doses of IV  iron.       Audrey Portillo MD  [facility]

## 2020-07-20 NOTE — PROGRESS NOTES
Hospitalist Progress Note    Admission Date: 2020 10:47 AM  Reason for Admission/Hospital Course: Acute renal failure (ARF) (HCC) [N17.9]      24 Hour Events:  Pt seen and examined at bedside this morning. Patient is rash is improved she reports that she is feeling much better today. She did feel slightly dehydrated she had copious amounts of urine output noted yesterday. Complains about not enough fresh fruit in her diet but otherwise feeling better and well. All questions regarding her condition were answered at bedside. ROS:  10 point review of systems is otherwise negative with the exception of the elements mentioned above.      No Known Allergies    OBJECTIVE:  Patient Vitals for the past 8 hrs:   BP Temp Pulse Resp SpO2   20 0804 (!) 174/99 98.2 °F (36.8 °C) 82 18 96 %     Temp (24hrs), Av.4 °F (36.9 °C), Min:98.2 °F (36.8 °C), Max:98.5 °F (36.9 °C)     07 -  1900  In: 360 [P.O.:360]  Out: -     PHYSICAL EXAM:  General:  Pleasant white female, no acute distress, resting comfortably in bed   HEENT: Normocephalic, atraumatic, conjunctival pallor still present  CV:   RRR, no murmurs  Lungs:   Clear to auscultation bilaterally  Abdomen:   Mild tenderness to palpation of lower quadrants, Zamora catheter draining clear urine   MSK:   No skeletal deformity seen  Skin:   No jaundice or rash    Neuro:  CN II through XII grossly intact   Psych: Calm, cooperative, AO x4       Labs:      Recent Labs     20  0620  0613   WBC 5.3 5.9 5.4   RBC 2.85* 2.77* 2.87*   HGB 8.7* 8.0* 8.5*   HCT 24.9* 24.6* 24.9*   MCV 87.4 88.8 86.8   MCH 30.5 28.9 29.6   MCHC 34.9 32.5 34.1   RDW 14.0 14.4 14.5    156 141*        Recent Labs     20  0533 20  0602 20  0613    137 139   K 3.8 3.0* 3.4*   * 104 108*   CO2 20* 19* 14*   AGAP 11 14 17*   GLU 90 102* 87   BUN 88* 99* 119*   CREA 11.00* 12.50* 15.00*   GFRAA 4* 4* 3*   GFRNA 4* 3* 3*   CA 8.5 7.7* 7.8*   AP 61 73 64   TP 6.2* 6.4 6.2*   ALB 2.9* 3.0* 3.0*   GLOB 3.3 3.4 3.2   AGRAT 0.9* 0.9* 0.9*   MG  --  1.8  --        Recent Results (from the past 24 hour(s))   METABOLIC PANEL, COMPREHENSIVE    Collection Time: 07/20/20  5:33 AM   Result Value Ref Range    Sodium 142 136 - 145 mmol/L    Potassium 3.8 3.5 - 5.1 mmol/L    Chloride 111 (H) 98 - 107 mmol/L    CO2 20 (L) 21 - 32 mmol/L    Anion gap 11 7 - 16 mmol/L    Glucose 90 65 - 100 mg/dL    BUN 88 (H) 8 - 23 MG/DL    Creatinine 11.00 (H) 0.6 - 1.0 MG/DL    GFR est AA 4 (L) >60 ml/min/1.73m2    GFR est non-AA 4 (L) >60 ml/min/1.73m2    Calcium 8.5 8.3 - 10.4 MG/DL    Bilirubin, total 0.3 0.2 - 1.1 MG/DL    ALT (SGPT) 6 (L) 12 - 65 U/L    AST (SGOT) 10 (L) 15 - 37 U/L    Alk.  phosphatase 61 50 - 136 U/L    Protein, total 6.2 (L) 6.3 - 8.2 g/dL    Albumin 2.9 (L) 3.2 - 4.6 g/dL    Globulin 3.3 2.3 - 3.5 g/dL    A-G Ratio 0.9 (L) 1.2 - 3.5     CBC W/O DIFF    Collection Time: 07/20/20  5:33 AM   Result Value Ref Range    WBC 5.3 4.3 - 11.1 K/uL    RBC 2.85 (L) 4.05 - 5.2 M/uL    HGB 8.7 (L) 11.7 - 15.4 g/dL    HCT 24.9 (L) 35.8 - 46.3 %    MCV 87.4 79.6 - 97.8 FL    MCH 30.5 26.1 - 32.9 PG    MCHC 34.9 31.4 - 35.0 g/dL    RDW 14.0 11.9 - 14.6 %    PLATELET 315 839 - 275 K/uL    MPV 10.4 9.4 - 12.3 FL    ABSOLUTE NRBC 0.00 0.0 - 0.2 K/uL       Current Facility-Administered Medications   Medication Dose Route Frequency    ferric gluconate (FERRLECIT) 125 mg in 0.9% sodium chloride 100 mL IVPB  125 mg IntraVENous DAILY    hydrocortisone (CORTAID) 1 % cream   Topical TID    ondansetron (ZOFRAN) injection 8 mg  8 mg IntraVENous Q4H PRN    lactated Ringers infusion  75 mL/hr IntraVENous CONTINUOUS    sodium bicarbonate tablet 1,300 mg  1,300 mg Oral TID    0.9% sodium chloride infusion 250 mL  250 mL IntraVENous PRN    levothyroxine (SYNTHROID) tablet 150 mcg  150 mcg Oral ACB    metoprolol tartrate (LOPRESSOR) tablet 50 mg  50 mg Oral BID    sodium chloride (NS) flush 5-40 mL  5-40 mL IntraVENous Q8H    sodium chloride (NS) flush 5-40 mL  5-40 mL IntraVENous PRN    heparin (porcine) injection 5,000 Units  5,000 Units SubCUTAneous Q8H    acetaminophen (TYLENOL) tablet 650 mg  650 mg Oral Q4H PRN    HYDROmorphone (PF) (DILAUDID) injection 0.5 mg  0.5 mg IntraVENous Q6H PRN    0.9% sodium chloride infusion 250 mL  250 mL IntraVENous PRN    hydrALAZINE (APRESOLINE) tablet 25 mg  25 mg Oral TID    hydrALAZINE (APRESOLINE) tablet 25 mg  25 mg Oral TID PRN          Imaging:    Us Retroperitoneum Comp    Result Date: 7/16/2020  EXAM: Ultrasound of the kidneys. INDICATION: Acute renal failure. COMPARISON: Prior CT abdomen on January 12, 2016. TECHNIQUE: A standard protocol kidney ultrasound was performed. FINDINGS: The right kidney measures 10.8 x 5.8 cm and the left kidney measures 11.6 x 5.8 cm. There is moderate bilateral hydronephrosis, not significantly changed from the prior CT scan, allowing for differences in technique. There are also solitary bilateral simple cysts, measuring 3.7 x 4.2 x 4.0 cm on the right and 1.2 x 0.6 x 0.7 cm on the left. No shadowing kidney stone is identified. The urinary bladder is decompressed around a Zamora catheter. IMPRESSION: 1. Moderate bilateral hydronephrosis, not significantly changed from the prior CT abdomen January, 2016. 2. Solitary bilateral kidney simple cysts. 3. Zamora catheter in the urinary bladder.           ASSESSMENT:    Problem List  Date Reviewed: 1/18/2017          Codes Class Noted    * (Principal) [facility] ICD-10-CM: N17.9  ICD-9-CM: 584.9  7/16/2020        Anemia ICD-10-CM: D64.9  ICD-9-CM: 285.9  7/16/2020        Lower obstructive uropathy ICD-10-CM: N13.9  ICD-9-CM: 599.60  7/16/2020        Acquired hypothyroidism ICD-10-CM: E03.9  ICD-9-CM: 244.9  7/16/2020                PLAN:    Acute renal failure secondary to obstructive uropathy from urethral stricture s/p dilation in OR  Patient underwent successful dilation in the OR. Zamora is now draining clear urine. Documented the patient was -4 L yesterday however did not take into account patient's IV fluids that were running in. Creatinine continues to improve albeit slowly. We will follow-up with nephro.  Follow-up urology recommendations as well as nephrology recommendations   Monitor electrolyte abnormalities given renal failure   Zamora placement and care   Continue with lactated Ringer's to match urine output    Metabolic acidosis secondary to acute renal failure -improving  Bicarb tablets started yesterday. Patient currently on lactated Ringer's as per nephrology. We will continue to monitor    Nausea, vomiting -resolved   patient likely symptomatic from uremia. Will offer Zofran for symptomatic relief     Anemia on labs, likely lab error  Hemoglobin seems to stabilize between 8 and 9. We will continue to monitor    Hypertension  BP better controlled when on medication. Hypertensive this morning. Pain control PRN   Continue with metoprolol 50 mg XL daily   Hydralazine 25 mg 3 times daily, PRN hydralazine for systolic BP greater than 780     Hypothyroidism  Continue with Synthroid as per home dose     Fluids:  Lactated Ringer's as per nephro  electrolytes: Replete as needed  Nutrition: Renal diet  CODE STATUS: Full     DVT prophylaxis: Heparin subcu  Estimated LOS:  Greater than 2 midnights    Anticipate discharge in the next 48 hours as long as patient continues to clinically improve.   Will need to continue to watch urine output and ensure patient not hypotensive or has gross electrolyte abnormalities

## 2020-07-20 NOTE — PROGRESS NOTES
Patient ambulating in hallway this shift. No complaints. IV fluids stopped per order. Hourly rounds performed this shift. Bed lowered and locked, side rails x2, and call light in reach. All patient needs are met at this time. Bedside shift report will be given to oncoming nurse.

## 2020-07-21 ENCOUNTER — APPOINTMENT (OUTPATIENT)
Dept: GENERAL RADIOLOGY | Age: 70
DRG: 683 | End: 2020-07-21
Attending: INTERNAL MEDICINE
Payer: MEDICARE

## 2020-07-21 LAB
ALBUMIN SERPL-MCNC: 3.1 G/DL (ref 3.2–4.6)
ALBUMIN/GLOB SERPL: 0.8 {RATIO} (ref 1.2–3.5)
ALP SERPL-CCNC: 70 U/L (ref 50–136)
ALT SERPL-CCNC: 8 U/L (ref 12–65)
ANION GAP SERPL CALC-SCNC: 13 MMOL/L (ref 7–16)
APPEARANCE UR: CLEAR
AST SERPL-CCNC: 12 U/L (ref 15–37)
BILIRUB SERPL-MCNC: 0.3 MG/DL (ref 0.2–1.1)
BILIRUB UR QL: NEGATIVE
BUN SERPL-MCNC: 82 MG/DL (ref 8–23)
CALCIUM SERPL-MCNC: 8.1 MG/DL (ref 8.3–10.4)
CHLORIDE SERPL-SCNC: 105 MMOL/L (ref 98–107)
CO2 SERPL-SCNC: 21 MMOL/L (ref 21–32)
COLOR UR: YELLOW
CREAT SERPL-MCNC: 9.9 MG/DL (ref 0.6–1)
ERYTHROCYTE [DISTWIDTH] IN BLOOD BY AUTOMATED COUNT: 14 % (ref 11.9–14.6)
GLOBULIN SER CALC-MCNC: 3.7 G/DL (ref 2.3–3.5)
GLUCOSE SERPL-MCNC: 100 MG/DL (ref 65–100)
GLUCOSE UR STRIP.AUTO-MCNC: 250 MG/DL
HCT VFR BLD AUTO: 26.7 % (ref 35.8–46.3)
HGB BLD-MCNC: 9 G/DL (ref 11.7–15.4)
HGB UR QL STRIP: ABNORMAL
KETONES UR QL STRIP.AUTO: NEGATIVE MG/DL
LEUKOCYTE ESTERASE UR QL STRIP.AUTO: NEGATIVE
MCH RBC QN AUTO: 30.3 PG (ref 26.1–32.9)
MCHC RBC AUTO-ENTMCNC: 33.7 G/DL (ref 31.4–35)
MCV RBC AUTO: 89.9 FL (ref 79.6–97.8)
NITRITE UR QL STRIP.AUTO: NEGATIVE
NRBC # BLD: 0 K/UL (ref 0–0.2)
PH UR STRIP: 7 [PH] (ref 5–9)
PLATELET # BLD AUTO: 171 K/UL (ref 150–450)
PMV BLD AUTO: 10.1 FL (ref 9.4–12.3)
POTASSIUM SERPL-SCNC: 3.4 MMOL/L (ref 3.5–5.1)
PROCALCITONIN SERPL-MCNC: 0.32 NG/ML
PROT SERPL-MCNC: 6.8 G/DL (ref 6.3–8.2)
PROT UR STRIP-MCNC: 100 MG/DL
RBC # BLD AUTO: 2.97 M/UL (ref 4.05–5.2)
SODIUM SERPL-SCNC: 139 MMOL/L (ref 136–145)
SP GR UR REFRACTOMETRY: 1.01 (ref 1–1.02)
UROBILINOGEN UR QL STRIP.AUTO: 0.2 EU/DL (ref 0.2–1)
WBC # BLD AUTO: 8.6 K/UL (ref 4.3–11.1)

## 2020-07-21 PROCEDURE — 87040 BLOOD CULTURE FOR BACTERIA: CPT

## 2020-07-21 PROCEDURE — 65270000029 HC RM PRIVATE

## 2020-07-21 PROCEDURE — 74011000258 HC RX REV CODE- 258: Performed by: INTERNAL MEDICINE

## 2020-07-21 PROCEDURE — 36415 COLL VENOUS BLD VENIPUNCTURE: CPT

## 2020-07-21 PROCEDURE — 80053 COMPREHEN METABOLIC PANEL: CPT

## 2020-07-21 PROCEDURE — 74011250637 HC RX REV CODE- 250/637: Performed by: INTERNAL MEDICINE

## 2020-07-21 PROCEDURE — 85027 COMPLETE CBC AUTOMATED: CPT

## 2020-07-21 PROCEDURE — 84145 PROCALCITONIN (PCT): CPT

## 2020-07-21 PROCEDURE — 71046 X-RAY EXAM CHEST 2 VIEWS: CPT

## 2020-07-21 PROCEDURE — 81001 URINALYSIS AUTO W/SCOPE: CPT

## 2020-07-21 PROCEDURE — 74011250637 HC RX REV CODE- 250/637: Performed by: UROLOGY

## 2020-07-21 PROCEDURE — 74011250636 HC RX REV CODE- 250/636: Performed by: INTERNAL MEDICINE

## 2020-07-21 RX ORDER — SODIUM CHLORIDE, SODIUM LACTATE, POTASSIUM CHLORIDE, CALCIUM CHLORIDE 600; 310; 30; 20 MG/100ML; MG/100ML; MG/100ML; MG/100ML
75 INJECTION, SOLUTION INTRAVENOUS CONTINUOUS
Status: DISCONTINUED | OUTPATIENT
Start: 2020-07-21 | End: 2020-07-23 | Stop reason: HOSPADM

## 2020-07-21 RX ADMIN — SODIUM CHLORIDE 125 MG: 900 INJECTION, SOLUTION INTRAVENOUS at 09:25

## 2020-07-21 RX ADMIN — SODIUM BICARBONATE 650 MG TABLET 1300 MG: at 08:03

## 2020-07-21 RX ADMIN — ACETAMINOPHEN 650 MG: 325 TABLET, FILM COATED ORAL at 08:03

## 2020-07-21 RX ADMIN — HYDRALAZINE HYDROCHLORIDE 25 MG: 25 TABLET, FILM COATED ORAL at 15:36

## 2020-07-21 RX ADMIN — Medication 10 ML: at 05:15

## 2020-07-21 RX ADMIN — METOPROLOL TARTRATE 50 MG: 50 TABLET, FILM COATED ORAL at 17:18

## 2020-07-21 RX ADMIN — SODIUM CHLORIDE, SODIUM LACTATE, POTASSIUM CHLORIDE, AND CALCIUM CHLORIDE 75 ML/HR: 600; 310; 30; 20 INJECTION, SOLUTION INTRAVENOUS at 12:30

## 2020-07-21 RX ADMIN — HEPARIN SODIUM 5000 UNITS: 5000 INJECTION INTRAVENOUS; SUBCUTANEOUS at 15:00

## 2020-07-21 RX ADMIN — METOPROLOL TARTRATE 50 MG: 50 TABLET, FILM COATED ORAL at 08:03

## 2020-07-21 RX ADMIN — Medication 10 ML: at 21:49

## 2020-07-21 RX ADMIN — HYDRALAZINE HYDROCHLORIDE 25 MG: 25 TABLET, FILM COATED ORAL at 08:03

## 2020-07-21 RX ADMIN — Medication 10 ML: at 14:20

## 2020-07-21 RX ADMIN — ACETAMINOPHEN 650 MG: 325 TABLET, FILM COATED ORAL at 15:36

## 2020-07-21 RX ADMIN — HYDRALAZINE HYDROCHLORIDE 25 MG: 25 TABLET, FILM COATED ORAL at 21:48

## 2020-07-21 RX ADMIN — LEVOTHYROXINE SODIUM 150 MCG: 0.07 TABLET ORAL at 05:15

## 2020-07-21 RX ADMIN — SODIUM BICARBONATE 650 MG TABLET 1300 MG: at 15:36

## 2020-07-21 NOTE — PROGRESS NOTES
Patient had low grade fevers on and off today. Responding to tylenol. Patient verbalized feeling very tired, more than normal. C/o abdominal cramping with 1 episode of diarrhea today. Dr. Kim Laughlin was notified. Zamora catheter care and teaching completed today. Patient verbalized understanding. Hourly rounds performed this shift. Bed lowered and locked, side rails x2, and call light in reach. All patient needs are met at this time. Bedside shift report given to oncoming nurse.

## 2020-07-21 NOTE — PROGRESS NOTES
Chart screened by  for discharge planning. No needs identified at this time. Please consult or notify  if any new issues arise. CM continues to follow the patient during this hospitalization.

## 2020-07-21 NOTE — PROGRESS NOTES
Pt ambulating in halls at beginning of shift. Expressed feeling much better than when she came in. Slept 6+ hours. Urine output remains good, clear and yellow. Hourly rounds performed through shift, pt denies needs at this time. Bed in low position and call light/ personal items within reach. Will continue to monitor and report to day shift nurse.

## 2020-07-21 NOTE — PROGRESS NOTES
RN informed me patient had 2nd fever today. UA was negative earlier for infection. Will order CXR and blood cultures. Procalcitonin also ordered to be added on. Pt reports having diarrhea multiple times over the past 24 hours. Will monitor for now and await septic workup prior to initiating abx given normal white count and lack of symptoms. Will continue to follow closely.

## 2020-07-21 NOTE — PROGRESS NOTES
CAT NEPHROLOGY PROGRESS NOTE    Follow up for: ELIJAH    Subjective:   Patient sitting up in bed eating lunch. No complaints. Zamora in place.     ROS:  Gen - no fever, no chills, appetite okay  CV - no chest pain, no orthopnea  Lung - no shortness of breath, no cough  Abd - no tenderness, no nausea, no vomiting  Ext - no edema    Objective:   Exam:  Vitals:    07/21/20 0055 07/21/20 0538 07/21/20 0754 07/21/20 0924   BP: 164/84 143/88 147/81    Pulse: 78 76 90    Resp: 16 18 20    Temp: 99.1 °F (37.3 °C) 98.8 °F (37.1 °C) (!) 101 °F (38.3 °C) 99.4 °F (37.4 °C)   SpO2: 96% 94% 97%    Weight:       Height:             Intake/Output Summary (Last 24 hours) at 7/21/2020 1119  Last data filed at 7/21/2020 3056  Gross per 24 hour   Intake 2960 ml   Output 6700 ml   Net -3740 ml       Current Facility-Administered Medications   Medication Dose Route Frequency    ferric gluconate (FERRLECIT) 125 mg in 0.9% sodium chloride 100 mL IVPB  125 mg IntraVENous DAILY    hydrocortisone (CORTAID) 1 % cream   Topical TID    ondansetron (ZOFRAN) injection 8 mg  8 mg IntraVENous Q4H PRN    sodium bicarbonate tablet 1,300 mg  1,300 mg Oral TID    0.9% sodium chloride infusion 250 mL  250 mL IntraVENous PRN    levothyroxine (SYNTHROID) tablet 150 mcg  150 mcg Oral ACB    metoprolol tartrate (LOPRESSOR) tablet 50 mg  50 mg Oral BID    sodium chloride (NS) flush 5-40 mL  5-40 mL IntraVENous Q8H    sodium chloride (NS) flush 5-40 mL  5-40 mL IntraVENous PRN    heparin (porcine) injection 5,000 Units  5,000 Units SubCUTAneous Q8H    acetaminophen (TYLENOL) tablet 650 mg  650 mg Oral Q4H PRN    HYDROmorphone (PF) (DILAUDID) injection 0.5 mg  0.5 mg IntraVENous Q6H PRN    0.9% sodium chloride infusion 250 mL  250 mL IntraVENous PRN    hydrALAZINE (APRESOLINE) tablet 25 mg  25 mg Oral TID    hydrALAZINE (APRESOLINE) tablet 25 mg  25 mg Oral TID PRN       EXAM  GEN - Alert, oriented, in no distress  HEENT - NC/AT, non icteric sclera  CV - no audible murmur, regular rate  Lung - equal chest rise, no audible wheezing  Abd - non distended  Ext - no edema, no cyanosis      Recent Labs     07/21/20  0643 07/20/20  0533 07/19/20  0602   WBC 8.6 5.3 5.9   HGB 9.0* 8.7* 8.0*   HCT 26.7* 24.9* 24.6*    156 156        Recent Labs     07/21/20  0643 07/20/20  0533 07/19/20  0602    142 137   K 3.4* 3.8 3.0*    111* 104   CO2 21 20* 19*   BUN 82* 88* 99*   CREA 9.90* 11.00* 12.50*   CA 8.1* 8.5 7.7*    90 102*   MG  --   --  1.8       Assessment and Plan:   ELIJAH 2/2 obstructive uropathy  - last known Cr about 1.3 in Aug '19 from labs faxed from PCP office  - s/p dilation in OR on 7/16.  - still with significant renal dysfunction. 7 liters of urine out and now with fever. UA not grossly infected. With all of there above, will restart fluids.  - Continued improvement in renal function. She will likely have some residual dysfunction. Metabolic acidosis - Continue PO bicarb. Postobstructive diuresis. Reduce IVF rate to 75.     Anemia - on iron    Jahaira Campo MD  [facility]

## 2020-07-21 NOTE — PROGRESS NOTES
Hospitalist Progress Note    Admission Date: 2020 10:47 AM  Reason for Admission/Hospital Course: Acute renal failure (ARF) (HCC) [N17.9]      24 Hour Events:  Pt seen and examined at bedside this morning. As per nursing staff, patient had fever overnight. Patient did not feel like she was hot at that time. Currently denies any chills, shortness of breath, chest pain, nausea, vomiting. Still has some minimal abdominal tenderness but otherwise has been walking the hallways. No other complaints at this time. ROS:  10 point review of systems is otherwise negative with the exception of the elements mentioned above. No Known Allergies    OBJECTIVE:  Patient Vitals for the past 8 hrs:   BP Temp Pulse Resp SpO2   20 0754 147/81 (!) 101 °F (38.3 °C) 90 20 97 %   20 0538 143/88 98.8 °F (37.1 °C) 76 18 94 %     Temp (24hrs), Av.8 °F (37.1 °C), Min:97.7 °F (36.5 °C), Max:101 °F (38.3 °C)    No intake/output data recorded.     PHYSICAL EXAM:  General:  Pleasant white female, no acute distress, resting comfortably in bed   HEENT: Normocephalic, atraumatic, conjunctival pallor still present  CV:   RRR, no murmurs  Lungs:   Clear to auscultation bilaterally  Abdomen:   Mild tenderness to palpation of lower quadrants, Zamora catheter draining clear urine   MSK:   No skeletal deformity seen  Skin:   No jaundice or rash    Neuro:  CN II through XII grossly intact   Psych: Calm, cooperative, AO x4       Labs:      Recent Labs     20  0643 20  0533 20  0602   WBC 8.6 5.3 5.9   RBC 2.97* 2.85* 2.77*   HGB 9.0* 8.7* 8.0*   HCT 26.7* 24.9* 24.6*   MCV 89.9 87.4 88.8   MCH 30.3 30.5 28.9   MCHC 33.7 34.9 32.5   RDW 14.0 14.0 14.4    156 156        Recent Labs     20  0643 20  0533 20  0602    142 137   K 3.4* 3.8 3.0*    111* 104   CO2 21 20* 19*   AGAP 13 11 14    90 102*   BUN 82* 88* 99*   CREA 9.90* 11.00* 12.50*   GFRAA 5* 4* 4*   GFRNA 4* 4* 3*   CA 8.1* 8.5 7.7*   AP 70 61 73   TP 6.8 6.2* 6.4   ALB 3.1* 2.9* 3.0*   GLOB 3.7* 3.3 3.4   AGRAT 0.8* 0.9* 0.9*   MG  --   --  1.8       Recent Results (from the past 24 hour(s))   CBC W/O DIFF    Collection Time: 07/21/20  6:43 AM   Result Value Ref Range    WBC 8.6 4.3 - 11.1 K/uL    RBC 2.97 (L) 4.05 - 5.2 M/uL    HGB 9.0 (L) 11.7 - 15.4 g/dL    HCT 26.7 (L) 35.8 - 46.3 %    MCV 89.9 79.6 - 97.8 FL    MCH 30.3 26.1 - 32.9 PG    MCHC 33.7 31.4 - 35.0 g/dL    RDW 14.0 11.9 - 14.6 %    PLATELET 365 333 - 065 K/uL    MPV 10.1 9.4 - 12.3 FL    ABSOLUTE NRBC 0.00 0.0 - 0.2 K/uL   METABOLIC PANEL, COMPREHENSIVE    Collection Time: 07/21/20  6:43 AM   Result Value Ref Range    Sodium 139 136 - 145 mmol/L    Potassium 3.4 (L) 3.5 - 5.1 mmol/L    Chloride 105 98 - 107 mmol/L    CO2 21 21 - 32 mmol/L    Anion gap 13 7 - 16 mmol/L    Glucose 100 65 - 100 mg/dL    BUN 82 (H) 8 - 23 MG/DL    Creatinine 9.90 (H) 0.6 - 1.0 MG/DL    GFR est AA 5 (L) >60 ml/min/1.73m2    GFR est non-AA 4 (L) >60 ml/min/1.73m2    Calcium 8.1 (L) 8.3 - 10.4 MG/DL    Bilirubin, total 0.3 0.2 - 1.1 MG/DL    ALT (SGPT) 8 (L) 12 - 65 U/L    AST (SGOT) 12 (L) 15 - 37 U/L    Alk.  phosphatase 70 50 - 136 U/L    Protein, total 6.8 6.3 - 8.2 g/dL    Albumin 3.1 (L) 3.2 - 4.6 g/dL    Globulin 3.7 (H) 2.3 - 3.5 g/dL    A-G Ratio 0.8 (L) 1.2 - 3.5         Current Facility-Administered Medications   Medication Dose Route Frequency    ferric gluconate (FERRLECIT) 125 mg in 0.9% sodium chloride 100 mL IVPB  125 mg IntraVENous DAILY    hydrocortisone (CORTAID) 1 % cream   Topical TID    ondansetron (ZOFRAN) injection 8 mg  8 mg IntraVENous Q4H PRN    sodium bicarbonate tablet 1,300 mg  1,300 mg Oral TID    0.9% sodium chloride infusion 250 mL  250 mL IntraVENous PRN    levothyroxine (SYNTHROID) tablet 150 mcg  150 mcg Oral ACB    metoprolol tartrate (LOPRESSOR) tablet 50 mg  50 mg Oral BID    sodium chloride (NS) flush 5-40 mL  5-40 mL IntraVENous Q8H  sodium chloride (NS) flush 5-40 mL  5-40 mL IntraVENous PRN    heparin (porcine) injection 5,000 Units  5,000 Units SubCUTAneous Q8H    acetaminophen (TYLENOL) tablet 650 mg  650 mg Oral Q4H PRN    HYDROmorphone (PF) (DILAUDID) injection 0.5 mg  0.5 mg IntraVENous Q6H PRN    0.9% sodium chloride infusion 250 mL  250 mL IntraVENous PRN    hydrALAZINE (APRESOLINE) tablet 25 mg  25 mg Oral TID    hydrALAZINE (APRESOLINE) tablet 25 mg  25 mg Oral TID PRN          Imaging:    Us Retroperitoneum Comp    Result Date: 7/16/2020  EXAM: Ultrasound of the kidneys. INDICATION: Acute renal failure. COMPARISON: Prior CT abdomen on January 12, 2016. TECHNIQUE: A standard protocol kidney ultrasound was performed. FINDINGS: The right kidney measures 10.8 x 5.8 cm and the left kidney measures 11.6 x 5.8 cm. There is moderate bilateral hydronephrosis, not significantly changed from the prior CT scan, allowing for differences in technique. There are also solitary bilateral simple cysts, measuring 3.7 x 4.2 x 4.0 cm on the right and 1.2 x 0.6 x 0.7 cm on the left. No shadowing kidney stone is identified. The urinary bladder is decompressed around a Zamora catheter. IMPRESSION: 1. Moderate bilateral hydronephrosis, not significantly changed from the prior CT abdomen January, 2016. 2. Solitary bilateral kidney simple cysts. 3. Zamora catheter in the urinary bladder. ASSESSMENT:    Problem List  Date Reviewed: 1/18/2017          Codes Class Noted    * (Principal) [facility] ICD-10-CM: N17.9  ICD-9-CM: 584.9  7/16/2020        Anemia ICD-10-CM: D64.9  ICD-9-CM: 285.9  7/16/2020        Lower obstructive uropathy ICD-10-CM: N13.9  ICD-9-CM: 599.60  7/16/2020        Acquired hypothyroidism ICD-10-CM: E03.9  ICD-9-CM: 244.9  7/16/2020                PLAN:    Fever  Lung sounds are clear and patient breathing well on room air. No cough.   Currently has Zamora in place and concern for possible catheter associated UTI. Will obtain UA for now. Follow-up UA, Tylenol PRN    Acute renal failure secondary to obstructive uropathy from urethral stricture s/p dilation in OR  Patient underwent successful dilation in the OR. Zamora is now draining clear urine. Creatinine is 9 this morning. Patient still net negative, postobstructive diuresis. as per nephro, holding IV fluids   Follow-up urology recommendations as well as nephrology recommendations   Monitor electrolyte abnormalities given renal failure   Zamora placement and care   DC IVF    Metabolic acidosis secondary to acute renal failure -improving  Metabolic acidosis almost resolved. Continue p.o. bicarb as per nephro for now    Nausea, vomiting -resolved   patient likely symptomatic from uremia. Will offer Zofran for symptomatic relief     Anemia on labs, likely lab error  Hemoglobin seems to stabilize between 8 and 9. We will continue to monitor    Hypertension  BP better controlled when on medication. Hypertensive this morning. Pain control PRN   Continue with metoprolol 50 mg XL daily   Hydralazine 25 mg 3 times daily, PRN hydralazine for systolic BP greater than 834     Hypothyroidism  Continue with Synthroid as per home dose     Fluids:  Lactated Ringer's as per nephro  electrolytes: Replete as needed  Nutrition: Renal diet  CODE STATUS: Full     DVT prophylaxis: Heparin subcu  Estimated LOS:  Greater than 2 midnights    Anticipate discharge in the next 48 hours as long as patient continues to clinically improve.   Will need to continue to watch urine output and ensure patient not hypotensive or has gross electrolyte abnormalities

## 2020-07-22 LAB
ALBUMIN SERPL-MCNC: 2.8 G/DL (ref 3.2–4.6)
ALBUMIN/GLOB SERPL: 0.8 {RATIO} (ref 1.2–3.5)
ALP SERPL-CCNC: 64 U/L (ref 50–136)
ALT SERPL-CCNC: 7 U/L (ref 12–65)
ANION GAP SERPL CALC-SCNC: 12 MMOL/L (ref 7–16)
AST SERPL-CCNC: 9 U/L (ref 15–37)
BILIRUB SERPL-MCNC: 0.3 MG/DL (ref 0.2–1.1)
BUN SERPL-MCNC: 82 MG/DL (ref 8–23)
CALCIUM SERPL-MCNC: 8.1 MG/DL (ref 8.3–10.4)
CHLORIDE SERPL-SCNC: 109 MMOL/L (ref 98–107)
CO2 SERPL-SCNC: 20 MMOL/L (ref 21–32)
CREAT SERPL-MCNC: 9.35 MG/DL (ref 0.6–1)
ERYTHROCYTE [DISTWIDTH] IN BLOOD BY AUTOMATED COUNT: 14.1 % (ref 11.9–14.6)
GLOBULIN SER CALC-MCNC: 3.7 G/DL (ref 2.3–3.5)
GLUCOSE SERPL-MCNC: 92 MG/DL (ref 65–100)
HCT VFR BLD AUTO: 25.3 % (ref 35.8–46.3)
HGB BLD-MCNC: 8 G/DL (ref 11.7–15.4)
MCH RBC QN AUTO: 29 PG (ref 26.1–32.9)
MCHC RBC AUTO-ENTMCNC: 31.6 G/DL (ref 31.4–35)
MCV RBC AUTO: 91.7 FL (ref 79.6–97.8)
NRBC # BLD: 0 K/UL (ref 0–0.2)
PLATELET # BLD AUTO: 194 K/UL (ref 150–450)
PMV BLD AUTO: 10 FL (ref 9.4–12.3)
POTASSIUM SERPL-SCNC: 3.8 MMOL/L (ref 3.5–5.1)
PROT SERPL-MCNC: 6.5 G/DL (ref 6.3–8.2)
RBC # BLD AUTO: 2.76 M/UL (ref 4.05–5.2)
SODIUM SERPL-SCNC: 141 MMOL/L (ref 136–145)
WBC # BLD AUTO: 10.5 K/UL (ref 4.3–11.1)

## 2020-07-22 PROCEDURE — 80053 COMPREHEN METABOLIC PANEL: CPT

## 2020-07-22 PROCEDURE — 74011000258 HC RX REV CODE- 258: Performed by: INTERNAL MEDICINE

## 2020-07-22 PROCEDURE — 74011250637 HC RX REV CODE- 250/637: Performed by: INTERNAL MEDICINE

## 2020-07-22 PROCEDURE — 74011250636 HC RX REV CODE- 250/636: Performed by: INTERNAL MEDICINE

## 2020-07-22 PROCEDURE — 65270000029 HC RM PRIVATE

## 2020-07-22 PROCEDURE — 74011250637 HC RX REV CODE- 250/637: Performed by: UROLOGY

## 2020-07-22 PROCEDURE — 85027 COMPLETE CBC AUTOMATED: CPT

## 2020-07-22 PROCEDURE — 36415 COLL VENOUS BLD VENIPUNCTURE: CPT

## 2020-07-22 RX ADMIN — LEVOTHYROXINE SODIUM 150 MCG: 0.07 TABLET ORAL at 05:13

## 2020-07-22 RX ADMIN — Medication 10 ML: at 22:26

## 2020-07-22 RX ADMIN — HYDRALAZINE HYDROCHLORIDE 25 MG: 25 TABLET, FILM COATED ORAL at 09:24

## 2020-07-22 RX ADMIN — METOPROLOL TARTRATE 50 MG: 50 TABLET, FILM COATED ORAL at 09:23

## 2020-07-22 RX ADMIN — SODIUM CHLORIDE 125 MG: 900 INJECTION, SOLUTION INTRAVENOUS at 09:22

## 2020-07-22 RX ADMIN — HYDRALAZINE HYDROCHLORIDE 25 MG: 25 TABLET, FILM COATED ORAL at 17:36

## 2020-07-22 RX ADMIN — Medication 10 ML: at 05:13

## 2020-07-22 RX ADMIN — SODIUM CHLORIDE, SODIUM LACTATE, POTASSIUM CHLORIDE, AND CALCIUM CHLORIDE 75 ML/HR: 600; 310; 30; 20 INJECTION, SOLUTION INTRAVENOUS at 21:56

## 2020-07-22 RX ADMIN — SODIUM BICARBONATE 650 MG TABLET 1300 MG: at 21:57

## 2020-07-22 RX ADMIN — METOPROLOL TARTRATE 50 MG: 50 TABLET, FILM COATED ORAL at 17:36

## 2020-07-22 RX ADMIN — SODIUM CHLORIDE, SODIUM LACTATE, POTASSIUM CHLORIDE, AND CALCIUM CHLORIDE 75 ML/HR: 600; 310; 30; 20 INJECTION, SOLUTION INTRAVENOUS at 08:20

## 2020-07-22 RX ADMIN — ACETAMINOPHEN 650 MG: 325 TABLET, FILM COATED ORAL at 21:57

## 2020-07-22 RX ADMIN — HYDRALAZINE HYDROCHLORIDE 25 MG: 25 TABLET, FILM COATED ORAL at 21:58

## 2020-07-22 RX ADMIN — Medication 5 ML: at 15:00

## 2020-07-22 RX ADMIN — Medication 1 EACH: at 02:15

## 2020-07-22 NOTE — PROGRESS NOTES
Hospitalist Progress Note    Admission Date: 2020 10:47 AM  Reason for Admission/Hospital Course: Acute renal failure (ARF) (HCC) [N17.9]      24 Hour Events:  No acute events noted overnight. Patient reports that she was afebrile and has been feeling well. She said yesterday was just an off day for her. Denies any worsening fevers, chills, shortness of breath, chest pain, abdominal pain. Patient still having some loose stools but thinks that it is due to the food. ROS:  10 point review of systems is otherwise negative with the exception of the elements mentioned above. No Known Allergies    OBJECTIVE:  Patient Vitals for the past 8 hrs:   BP Temp Pulse Resp SpO2   20 0721 125/68 98.4 °F (36.9 °C) 86 18 96 %   20 0409 142/74 98.5 °F (36.9 °C) 74 18 98 %     Temp (24hrs), Av.9 °F (37.2 °C), Min:98 °F (36.7 °C), Max:100.8 °F (38.2 °C)    No intake/output data recorded.     PHYSICAL EXAM:  General:  Pleasant white female, no acute distress, resting comfortably in bed   HEENT: Normocephalic, atraumatic, conjunctival pallor still present  CV:   RRR, no murmurs  Lungs:   Clear to auscultation bilaterally  Abdomen:   Mild tenderness to palpation of lower quadrants, Zamora catheter draining clear urine   MSK:   No skeletal deformity seen  Skin:   No jaundice or rash    Neuro:  CN II through XII grossly intact   Psych: Calm, cooperative, AO x4       Labs:      Recent Labs     20  0546 20  0643 20  0533   WBC 10.5 8.6 5.3   RBC 2.76* 2.97* 2.85*   HGB 8.0* 9.0* 8.7*   HCT 25.3* 26.7* 24.9*   MCV 91.7 89.9 87.4   MCH 29.0 30.3 30.5   MCHC 31.6 33.7 34.9   RDW 14.1 14.0 14.0    171 156        Recent Labs     20  0546 20  0643 20  0533    139 142   K 3.8 3.4* 3.8   * 105 111*   CO2 20* 21 20*   AGAP 12 13 11   GLU 92 100 90   BUN 82* 82* 88*   CREA 9.35* 9.90* 11.00*   GFRAA 5* 5* 4*   GFRNA 4* 4* 4*   CA 8.1* 8.1* 8.5   AP 64 70 61   TP 6.5 6.8 6.2*   ALB 2.8* 3.1* 2.9*   GLOB 3.7* 3.7* 3.3   AGRAT 0.8* 0.8* 0.9*       Recent Results (from the past 24 hour(s))   URINALYSIS W/ RFLX MICROSCOPIC    Collection Time: 07/21/20  9:39 AM   Result Value Ref Range    Color YELLOW      Appearance CLEAR      Specific gravity 1.011 1.001 - 1.023      pH (UA) 7.0 5.0 - 9.0      Protein 100 (A) NEG mg/dL    Glucose 250 mg/dL    Ketone Negative NEG mg/dL    Bilirubin Negative NEG      Blood MODERATE (A) NEG      Urobilinogen 0.2 0.2 - 1.0 EU/dL    Nitrites Negative NEG      Leukocyte Esterase Negative NEG     CULTURE, BLOOD    Collection Time: 07/21/20  5:32 PM    Specimen: Blood   Result Value Ref Range    Special Requests: LEFT  HAND        Culture result: NO GROWTH AFTER 9 HOURS     PROCALCITONIN    Collection Time: 07/21/20  5:33 PM   Result Value Ref Range    Procalcitonin 0.32 ng/mL   CULTURE, BLOOD    Collection Time: 07/21/20  5:36 PM    Specimen: Blood   Result Value Ref Range    Special Requests: RIGHT  HAND        Culture result: NO GROWTH AFTER 9 HOURS     CBC W/O DIFF    Collection Time: 07/22/20  5:46 AM   Result Value Ref Range    WBC 10.5 4.3 - 11.1 K/uL    RBC 2.76 (L) 4.05 - 5.2 M/uL    HGB 8.0 (L) 11.7 - 15.4 g/dL    HCT 25.3 (L) 35.8 - 46.3 %    MCV 91.7 79.6 - 97.8 FL    MCH 29.0 26.1 - 32.9 PG    MCHC 31.6 31.4 - 35.0 g/dL    RDW 14.1 11.9 - 14.6 %    PLATELET 647 306 - 934 K/uL    MPV 10.0 9.4 - 12.3 FL    ABSOLUTE NRBC 0.00 0.0 - 0.2 K/uL   METABOLIC PANEL, COMPREHENSIVE    Collection Time: 07/22/20  5:46 AM   Result Value Ref Range    Sodium 141 136 - 145 mmol/L    Potassium 3.8 3.5 - 5.1 mmol/L    Chloride 109 (H) 98 - 107 mmol/L    CO2 20 (L) 21 - 32 mmol/L    Anion gap 12 7 - 16 mmol/L    Glucose 92 65 - 100 mg/dL    BUN 82 (H) 8 - 23 MG/DL    Creatinine 9.35 (H) 0.6 - 1.0 MG/DL    GFR est AA 5 (L) >60 ml/min/1.73m2    GFR est non-AA 4 (L) >60 ml/min/1.73m2    Calcium 8.1 (L) 8.3 - 10.4 MG/DL    Bilirubin, total 0.3 0.2 - 1.1 MG/DL    ALT (SGPT) 7 (L) 12 - 65 U/L    AST (SGOT) 9 (L) 15 - 37 U/L    Alk. phosphatase 64 50 - 136 U/L    Protein, total 6.5 6.3 - 8.2 g/dL    Albumin 2.8 (L) 3.2 - 4.6 g/dL    Globulin 3.7 (H) 2.3 - 3.5 g/dL    A-G Ratio 0.8 (L) 1.2 - 3.5         Current Facility-Administered Medications   Medication Dose Route Frequency    lip protectant (BLISTEX) ointment 1 Each  1 Each Topical PRN    lactated Ringers infusion  75 mL/hr IntraVENous CONTINUOUS    ferric gluconate (FERRLECIT) 125 mg in 0.9% sodium chloride 100 mL IVPB  125 mg IntraVENous DAILY    hydrocortisone (CORTAID) 1 % cream   Topical TID    ondansetron (ZOFRAN) injection 8 mg  8 mg IntraVENous Q4H PRN    sodium bicarbonate tablet 1,300 mg  1,300 mg Oral TID    0.9% sodium chloride infusion 250 mL  250 mL IntraVENous PRN    levothyroxine (SYNTHROID) tablet 150 mcg  150 mcg Oral ACB    metoprolol tartrate (LOPRESSOR) tablet 50 mg  50 mg Oral BID    sodium chloride (NS) flush 5-40 mL  5-40 mL IntraVENous Q8H    sodium chloride (NS) flush 5-40 mL  5-40 mL IntraVENous PRN    heparin (porcine) injection 5,000 Units  5,000 Units SubCUTAneous Q8H    acetaminophen (TYLENOL) tablet 650 mg  650 mg Oral Q4H PRN    HYDROmorphone (PF) (DILAUDID) injection 0.5 mg  0.5 mg IntraVENous Q6H PRN    0.9% sodium chloride infusion 250 mL  250 mL IntraVENous PRN    hydrALAZINE (APRESOLINE) tablet 25 mg  25 mg Oral TID    hydrALAZINE (APRESOLINE) tablet 25 mg  25 mg Oral TID PRN          Imaging:    Us Retroperitoneum Comp    Result Date: 7/16/2020  EXAM: Ultrasound of the kidneys. INDICATION: Acute renal failure. COMPARISON: Prior CT abdomen on January 12, 2016. TECHNIQUE: A standard protocol kidney ultrasound was performed. FINDINGS: The right kidney measures 10.8 x 5.8 cm and the left kidney measures 11.6 x 5.8 cm. There is moderate bilateral hydronephrosis, not significantly changed from the prior CT scan, allowing for differences in technique.  There are also solitary bilateral simple cysts, measuring 3.7 x 4.2 x 4.0 cm on the right and 1.2 x 0.6 x 0.7 cm on the left. No shadowing kidney stone is identified. The urinary bladder is decompressed around a Zamora catheter. IMPRESSION: 1. Moderate bilateral hydronephrosis, not significantly changed from the prior CT abdomen January, 2016. 2. Solitary bilateral kidney simple cysts. 3. Zamora catheter in the urinary bladder. ASSESSMENT:    Problem List  Date Reviewed: 1/18/2017          Codes Class Noted    * (Principal) Acute renal failure (ARF) ([facility] [address] ICD-10-CM: N17.9  ICD-9-CM: 584.9  7/16/2020        Anemia ICD-10-CM: D64.9  ICD-9-CM: 285.9  7/16/2020        Lower obstructive uropathy ICD-10-CM: N13.9  ICD-9-CM: 599.60  7/16/2020        Acquired hypothyroidism ICD-10-CM: E03.9  ICD-9-CM: 244.9  7/16/2020                PLAN:    Fever -resolved  UA is clean with no evidence of infection. Chest x-ray is clear. Blood cultures are negative to date. No fevers since last afternoon off of antibiotic therapy. Continue to monitor. Follow-up blood cultures    Acute renal failure secondary to obstructive uropathy from urethral stricture s/p dilation in OR  Patient underwent successful dilation in the OR. Zamora is now draining clear urine. Creatinine did not improve since last night. We will continue to monitor. Currently 9   Follow-up urology recommendations as well as nephrology recommendations   Monitor electrolyte abnormalities given renal failure   Zamora placement and care   DC IVF     Metabolic acidosis secondary to acute renal failure -improving  Metabolic acidosis almost resolved. Continue p.o. bicarb as per nephro for now     Hypertension  BP better controlled when on medication.   Camryn Mort control PRN   Continue with metoprolol 50 mg XL daily   Hydralazine 25 mg 3 times daily, PRN hydralazine for systolic BP greater than 384     Hypothyroidism  Continue with Synthroid as per home dose     Fluids:  Lactated Ringer's as per nephro  electrolytes: Replete as needed  Nutrition: Renal diet  CODE STATUS: Full     DVT prophylaxis: Heparin subcu  Estimated LOS:  Greater than 2 midnights    Patient's renal function did not improve overnight. Will need to continue to monitor to ensure that her baseline renal function has not plateaued at this current level. No anticipated day of discharge at this time.

## 2020-07-22 NOTE — PROGRESS NOTES
CAT NEPHROLOGY PROGRESS NOTE    Follow up for: ELIJAH    Subjective:   Patient sitting up in bed. No complaints. . Zamora in place.     ROS:  Gen - no fever, no chills, appetite okay  CV - no chest pain, no orthopnea  Lung - no shortness of breath, no cough  Abd - no tenderness, no nausea, no vomiting  Ext - no edema    Objective:   Exam:  Vitals:    07/21/20 1935 07/21/20 2326 07/22/20 0409 07/22/20 0721   BP: 132/74 138/71 142/74 125/68   Pulse: 94 89 74 86   Resp: 18 20 18 18   Temp: 98.9 °F (37.2 °C) 98.1 °F (36.7 °C) 98.5 °F (36.9 °C) 98.4 °F (36.9 °C)   SpO2: 96% 97% 98% 96%   Weight:       Height:             Intake/Output Summary (Last 24 hours) at 7/22/2020 1218  Last data filed at 7/22/2020 0516  Gross per 24 hour   Intake 2199 ml   Output 2750 ml   Net -551 ml       Current Facility-Administered Medications   Medication Dose Route Frequency    lip protectant (BLISTEX) ointment 1 Each  1 Each Topical PRN    lactated Ringers infusion  75 mL/hr IntraVENous CONTINUOUS    hydrocortisone (CORTAID) 1 % cream   Topical TID    ondansetron (ZOFRAN) injection 8 mg  8 mg IntraVENous Q4H PRN    sodium bicarbonate tablet 1,300 mg  1,300 mg Oral TID    0.9% sodium chloride infusion 250 mL  250 mL IntraVENous PRN    levothyroxine (SYNTHROID) tablet 150 mcg  150 mcg Oral ACB    metoprolol tartrate (LOPRESSOR) tablet 50 mg  50 mg Oral BID    sodium chloride (NS) flush 5-40 mL  5-40 mL IntraVENous Q8H    sodium chloride (NS) flush 5-40 mL  5-40 mL IntraVENous PRN    heparin (porcine) injection 5,000 Units  5,000 Units SubCUTAneous Q8H    acetaminophen (TYLENOL) tablet 650 mg  650 mg Oral Q4H PRN    HYDROmorphone (PF) (DILAUDID) injection 0.5 mg  0.5 mg IntraVENous Q6H PRN    0.9% sodium chloride infusion 250 mL  250 mL IntraVENous PRN    hydrALAZINE (APRESOLINE) tablet 25 mg  25 mg Oral TID    hydrALAZINE (APRESOLINE) tablet 25 mg  25 mg Oral TID PRN       EXAM  GEN - Alert, oriented, in no distress  HEENT - NC/AT, non icteric sclera  CV - no audible murmur, regular rate  Lung - equal chest rise, no audible wheezing  Abd - non distended  Ext - no edema, no cyanosis      Recent Labs     07/22/20  0546 07/21/20  0643 07/20/20  0533   WBC 10.5 8.6 5.3   HGB 8.0* 9.0* 8.7*   HCT 25.3* 26.7* 24.9*    171 156        Recent Labs     07/22/20  0546 07/21/20  0643 07/20/20  0533    139 142   K 3.8 3.4* 3.8   * 105 111*   CO2 20* 21 20*   BUN 82* 82* 88*   CREA 9.35* 9.90* 11.00*   CA 8.1* 8.1* 8.5   GLU 92 100 90       Assessment and Plan:   ELIJAH 2/2 obstructive uropathy  - last known Cr about 1.3 in Aug '19 from labs faxed from PCP office  - s/p dilation in OR on 7/16.  - resarted IV fluids yesterday because of polyuria. UOP down some today. - Continued improvement in renal function (albeit slowly). She will likely have some residual dysfunction. Metabolic acidosis - Continue PO bicarb. Postobstructive diuresis. Anemia - will follow. Slightly iron deficient. Will give a couple of doses of IV  iron.       Misha Skinner MD  [facility]

## 2020-07-22 NOTE — PROGRESS NOTES
Problem: Falls - Risk of  Goal: *Absence of Falls  Description: Document Chucky Berry Fall Risk and appropriate interventions in the flowsheet.   Outcome: Progressing Towards Goal  Note: Fall Risk Interventions:  Mobility Interventions: Patient to call before getting OOB         Medication Interventions: Evaluate medications/consider consulting pharmacy, Patient to call before getting OOB, Teach patient to arise slowly    Elimination Interventions: Bed/chair exit alarm, Call light in reach, Elevated toilet seat, Stay With Me (per policy), Toilet paper/wipes in reach, Toileting schedule/hourly rounds, Patient to call for help with toileting needs              Problem: Patient Education: Go to Patient Education Activity  Goal: Patient/Family Education  Outcome: Progressing Towards Goal     Problem: Infection - Risk of, Urinary Catheter-Associated Urinary Tract Infection  Goal: *Absence of infection signs and symptoms  Outcome: Progressing Towards Goal     Problem: Patient Education: Go to Patient Education Activity  Goal: Patient/Family Education  Outcome: Progressing Towards Goal     Problem: Nutrition Deficit  Goal: *Optimize nutritional status  Outcome: Progressing Towards Goal

## 2020-07-22 NOTE — PROGRESS NOTES
She will need to keep marquez catheter and RTO in 10 days for f/u appt with Dr. Lexi Odonnell with BMP prior. Teach marquez care.

## 2020-07-22 NOTE — PROGRESS NOTES
Hourly rounds completed this shift. Pt c/o dry cracked lips, blistex ordered. Afebrile throughout shift with no other complaints. Zamora in place and draining yellow/clear urine. All needs met at this time. Bed low/locked. Call light within reach. Will continue to monitor and give bedside report to oncoming nurse.

## 2020-07-22 NOTE — PROGRESS NOTES
Hourly rounds performed this shift. Bed lowered and locked, side rails x2, and call light in reach. All patient needs are met at this time. Bedside shift report will be given to oncoming nurse.

## 2020-07-23 VITALS
WEIGHT: 165.34 LBS | BODY MASS INDEX: 23.67 KG/M2 | RESPIRATION RATE: 19 BRPM | HEART RATE: 77 BPM | OXYGEN SATURATION: 98 % | HEIGHT: 70 IN | SYSTOLIC BLOOD PRESSURE: 102 MMHG | DIASTOLIC BLOOD PRESSURE: 61 MMHG | TEMPERATURE: 98.4 F

## 2020-07-23 LAB
ALBUMIN SERPL-MCNC: 2.6 G/DL (ref 3.2–4.6)
ALBUMIN/GLOB SERPL: 0.7 {RATIO} (ref 1.2–3.5)
ALP SERPL-CCNC: 66 U/L (ref 50–136)
ALT SERPL-CCNC: 9 U/L (ref 12–65)
ANION GAP SERPL CALC-SCNC: 13 MMOL/L (ref 7–16)
AST SERPL-CCNC: 11 U/L (ref 15–37)
BILIRUB SERPL-MCNC: 0.2 MG/DL (ref 0.2–1.1)
BUN SERPL-MCNC: 76 MG/DL (ref 8–23)
CALCIUM SERPL-MCNC: 7.9 MG/DL (ref 8.3–10.4)
CHLORIDE SERPL-SCNC: 105 MMOL/L (ref 98–107)
CO2 SERPL-SCNC: 19 MMOL/L (ref 21–32)
CREAT SERPL-MCNC: 7.8 MG/DL (ref 0.6–1)
ERYTHROCYTE [DISTWIDTH] IN BLOOD BY AUTOMATED COUNT: 13.6 % (ref 11.9–14.6)
GLOBULIN SER CALC-MCNC: 3.5 G/DL (ref 2.3–3.5)
GLUCOSE SERPL-MCNC: 88 MG/DL (ref 65–100)
HCT VFR BLD AUTO: 23.4 % (ref 35.8–46.3)
HGB BLD-MCNC: 7.6 G/DL (ref 11.7–15.4)
MCH RBC QN AUTO: 29.8 PG (ref 26.1–32.9)
MCHC RBC AUTO-ENTMCNC: 32.5 G/DL (ref 31.4–35)
MCV RBC AUTO: 91.8 FL (ref 79.6–97.8)
NRBC # BLD: 0 K/UL (ref 0–0.2)
PLATELET # BLD AUTO: 185 K/UL (ref 150–450)
PMV BLD AUTO: 10.2 FL (ref 9.4–12.3)
POTASSIUM SERPL-SCNC: 3.3 MMOL/L (ref 3.5–5.1)
PROT SERPL-MCNC: 6.1 G/DL (ref 6.3–8.2)
RBC # BLD AUTO: 2.55 M/UL (ref 4.05–5.2)
SODIUM SERPL-SCNC: 137 MMOL/L (ref 136–145)
WBC # BLD AUTO: 8.3 K/UL (ref 4.3–11.1)

## 2020-07-23 PROCEDURE — 36415 COLL VENOUS BLD VENIPUNCTURE: CPT

## 2020-07-23 PROCEDURE — 77030012865 HC BG URIN LEG MDII -A

## 2020-07-23 PROCEDURE — 77030040831 HC BAG URINE DRNG MDII -A

## 2020-07-23 PROCEDURE — 74011250637 HC RX REV CODE- 250/637: Performed by: INTERNAL MEDICINE

## 2020-07-23 PROCEDURE — 74011000250 HC RX REV CODE- 250

## 2020-07-23 PROCEDURE — 80053 COMPREHEN METABOLIC PANEL: CPT

## 2020-07-23 PROCEDURE — 74011250637 HC RX REV CODE- 250/637: Performed by: UROLOGY

## 2020-07-23 PROCEDURE — 74011250636 HC RX REV CODE- 250/636: Performed by: INTERNAL MEDICINE

## 2020-07-23 PROCEDURE — 85027 COMPLETE CBC AUTOMATED: CPT

## 2020-07-23 RX ORDER — SODIUM BICARBONATE 650 MG/1
1300 TABLET ORAL 3 TIMES DAILY
Qty: 84 TAB | Refills: 0 | Status: SHIPPED | OUTPATIENT
Start: 2020-07-23 | End: 2020-08-06

## 2020-07-23 RX ORDER — HYDRALAZINE HYDROCHLORIDE 25 MG/1
25 TABLET, FILM COATED ORAL 3 TIMES DAILY
Qty: 90 TAB | Refills: 0 | Status: SHIPPED | OUTPATIENT
Start: 2020-07-23 | End: 2020-08-19 | Stop reason: SDUPTHER

## 2020-07-23 RX ADMIN — Medication 10 ML: at 05:15

## 2020-07-23 RX ADMIN — METOPROLOL TARTRATE 50 MG: 50 TABLET, FILM COATED ORAL at 08:27

## 2020-07-23 RX ADMIN — SODIUM BICARBONATE 650 MG TABLET 1300 MG: at 08:26

## 2020-07-23 RX ADMIN — HYDRALAZINE HYDROCHLORIDE 25 MG: 25 TABLET, FILM COATED ORAL at 08:27

## 2020-07-23 RX ADMIN — SODIUM CHLORIDE, SODIUM LACTATE, POTASSIUM CHLORIDE, AND CALCIUM CHLORIDE 75 ML/HR: 600; 310; 30; 20 INJECTION, SOLUTION INTRAVENOUS at 08:36

## 2020-07-23 RX ADMIN — ACETAMINOPHEN 650 MG: 325 TABLET, FILM COATED ORAL at 08:29

## 2020-07-23 RX ADMIN — LEVOTHYROXINE SODIUM 150 MCG: 0.07 TABLET ORAL at 05:09

## 2020-07-23 RX ADMIN — ANTI-PRURITIC: 1 CREAM TOPICAL at 08:47

## 2020-07-23 NOTE — PROGRESS NOTES
[facility] NEPHROLOGY PROGRESS NOTE    Follow up for: ELIJAH    Subjective:   Patient seen and evaluated. Anticipating d/c home today.     ROS:  Gen - no fever, no chills, appetite okay  CV - no chest pain, no orthopnea  Lung - no shortness of breath, no cough  Abd - no tenderness, no nausea, no vomiting  Ext - no edema    Objective:   Exam:  Vitals:    07/22/20 1946 07/22/20 2347 07/23/20 0448 07/23/20 0709   BP: 142/88 125/71 127/69 136/85   Pulse: 86 81 82 84   Resp: 18 18 18 19   Temp: 98.2 °F (36.8 °C) 97.8 °F (36.6 °C) 97.9 °F (36.6 °C) 98.2 °F (36.8 °C)   SpO2: 92% 95% 95% 97%   Weight:       Height:             Intake/Output Summary (Last 24 hours) at 7/23/2020 1028  Last data filed at 7/23/2020 0929  Gross per 24 hour   Intake 480 ml   Output 3000 ml   Net -2520 ml       Current Facility-Administered Medications   Medication Dose Route Frequency    lip protectant (BLISTEX) ointment 1 Each  1 Each Topical PRN    lactated Ringers infusion  75 mL/hr IntraVENous CONTINUOUS    hydrocortisone (CORTAID) 1 % cream   Topical TID    ondansetron (ZOFRAN) injection 8 mg  8 mg IntraVENous Q4H PRN    sodium bicarbonate tablet 1,300 mg  1,300 mg Oral TID    0.9% sodium chloride infusion 250 mL  250 mL IntraVENous PRN    levothyroxine (SYNTHROID) tablet 150 mcg  150 mcg Oral ACB    metoprolol tartrate (LOPRESSOR) tablet 50 mg  50 mg Oral BID    sodium chloride (NS) flush 5-40 mL  5-40 mL IntraVENous Q8H    sodium chloride (NS) flush 5-40 mL  5-40 mL IntraVENous PRN    heparin (porcine) injection 5,000 Units  5,000 Units SubCUTAneous Q8H    acetaminophen (TYLENOL) tablet 650 mg  650 mg Oral Q4H PRN    HYDROmorphone (PF) (DILAUDID) injection 0.5 mg  0.5 mg IntraVENous Q6H PRN    0.9% sodium chloride infusion 250 mL  250 mL IntraVENous PRN    hydrALAZINE (APRESOLINE) tablet 25 mg  25 mg Oral TID    hydrALAZINE (APRESOLINE) tablet 25 mg  25 mg Oral TID PRN       EXAM  GEN - Alert, oriented, in no distress  HEENT - NC/AT, non icteric sclera  CV - no audible murmur, regular rate  Lung - equal chest rise, no audible wheezing  Abd - non distended  Ext - no edema, no cyanosis      Recent Labs     07/23/20  0440 07/22/20  0546 07/21/20  0643   WBC 8.3 10.5 8.6   HGB 7.6* 8.0* 9.0*   HCT 23.4* 25.3* 26.7*    194 171        Recent Labs     07/23/20  0440 07/22/20  0546 07/21/20  0643    141 139   K 3.3* 3.8 3.4*    109* 105   CO2 19* 20* 21   BUN 76* 82* 82*   CREA 7.80* 9.35* 9.90*   CA 7.9* 8.1* 8.1*   GLU 88 92 100       Assessment and Plan:   ELIJAH 2/2 obstructive uropathy  - last known Cr about 1.3 in Aug '19 from labs faxed from PCP office  - s/p dilation in OR on 7/16.  - Continued improvement in renal function (albeit slowly). She will likely have some residual dysfunction. Metabolic acidosis - Continue PO bicarb. Postobstructive diuresis. Anemia -s/p IV Fe. Stable.      Jacqui Razo MD  [facility]

## 2020-07-23 NOTE — PROGRESS NOTES
CM met with patient to discuss discharge needs. Patient voiced no needs at this time. CM asked if [facility] services are needed. Patient declined [facility] services at this time. Patient to discharge home this day. Please consult or notify CM if any needs shall arise. CM remains available. Care Management Interventions  PCP Verified by CM: Yes  Mode of Transport at Discharge: Other (see comment)(Spouse Prince tellez [phone])  Transition of Care Consult (CM Consult): Discharge Planning  Discharge Durable Medical Equipment: No(Patient confirmed DME, such as a rollator walker. )  Physical Therapy Consult: No  Occupational Therapy Consult: No  Speech Therapy Consult: No  Current Support Network: Lives with Spouse, Own Home(Patient lives with her spouse Prince tellez [phone]. )  Confirm Follow Up Transport: Self  The Plan for Transition of Care is Related to the Following Treatment Goals : Patient to obtain care to become medically stable and to return home with a safe transition. The Patient and/or Patient Representative was Provided with a Choice of Provider and Agrees with the Discharge Plan?: Yes  Name of the Patient Representative Who was Provided with a Choice of Provider and Agrees with the Discharge Plan: Patient.    Freedom of Choice List was Provided with Basic Dialogue that Supports the Patient's Individualized Plan of Care/Goals, Treatment Preferences and Shares the Quality Data Associated with the Providers?: Yes   Resource Information Provided?: No  Discharge Location  Discharge Placement: Home

## 2020-07-23 NOTE — DISCHARGE INSTRUCTIONS
Patient Education        Acute Kidney Injury: Care Instructions  Your Care Instructions     Acute kidney injury (ELIJAH) is a sudden decrease in kidney function. This can happen over a period of hours, days or, in some cases, weeks. ELIJAH used to be called acute renal failure, but kidney failure doesn't always happen with ELIJAH. Common causes of ELIJAH are dehydration, blood loss, and medicines. When ELIJAH happens, the kidneys have trouble removing waste and excess fluids from the body. The waste and fluids build up and become harmful. Kidney function may return to normal if the cause of ELIJAH is treated quickly. Your chance of a full recovery depends on what caused the problem, how quickly the cause was treated, and what other medical problems you have. A machine may be used to help your kidneys remove waste and fluids for a short period of time. This is called dialysis. Follow-up care is a key part of your treatment and safety. Be sure to make and go to all appointments, and call your doctor if you are having problems. It's also a good idea to know your test results and keep a list of the medicines you take. How can you care for yourself at home? · Talk to your doctor about how much fluid you should drink. · Eat a balanced diet. Talk to your doctor or a dietitian about what type of diet may be best for you. You may need to limit sodium, potassium, and phosphorus. · If you need dialysis, follow the instructions and schedule for dialysis that your doctor gives you. · Do not smoke. Smoking can make your condition worse. If you need help quitting, talk to your doctor about stop-smoking programs and medicines. These can increase your chances of quitting for good. · Do not drink alcohol. · Review all of your medicines with your doctor.  Do not take any medicines, including nonsteroidal anti-inflammatory drugs (NSAIDs) such as ibuprofen (Advil, Motrin) or naproxen (Aleve), unless your doctor says it is safe for you to do so.  · Make sure that anyone treating you for any health problem knows that you have had ELIJAH. When should you call for help? [id number] anytime you think you may need emergency care. For example, call if:  · You passed out (lost consciousness). Call your doctor now or seek immediate medical care if:  · You have new or worse nausea and vomiting. · You have much less urine than normal, or you have no urine. · You are feeling confused or cannot think clearly. · You have new or more blood in your urine. · You have new swelling. · You are dizzy or lightheaded, or feel like you may faint. Watch closely for changes in your health, and be sure to contact your doctor if:  · You do not get better as expected. Where can you learn more? Go to http://polly-tiffanie.info/  Enter X166 in the search box to learn more about \"Acute Kidney Injury: Care Instructions. \"  Current as of: August 12, 2019               Content Version: 12.5  © 4645-3170 [employer]. Care instructions adapted under license by [employer] (which disclaims liability or warranty for this information). If you have questions about a medical condition or this instruction, always ask your healthcare professional. [address] any warranty or liability for your use of this information. DISCHARGE SUMMARY from Nurse    PATIENT INSTRUCTIONS:    After general anesthesia or intravenous sedation, for 24 hours or while taking prescription Narcotics:  · Limit your activities  · Do not drive and operate hazardous machinery  · Do not make important personal or business decisions  · Do  not drink alcoholic beverages  · If you have not urinated within 8 hours after discharge, please contact your surgeon on call.     Report the following to your surgeon:  · Excessive pain, swelling, redness or odor of or around the surgical area  · Temperature over 100.5  · Nausea and vomiting lasting longer than 4 hours or if unable to take medications  · Any signs of decreased circulation or nerve impairment to extremity: change in color, persistent  numbness, tingling, coldness or increase pain  · Any questions    What to do at Home:  Recommended activity: Activity as tolerated    If you experience any of the following symptoms Fever, no urine from Zamora, worsening or unrelieved pain, please follow up with Nephrology, Urology, PCP, or Emergency room. *  Please give a list of your current medications to your Primary Care Provider. *  Please update this list whenever your medications are discontinued, doses are      changed, or new medications (including over-the-counter products) are added. *  Please carry medication information at all times in case of emergency situations. These are general instructions for a healthy lifestyle:    No smoking/ No tobacco products/ Avoid exposure to second hand smoke  Surgeon General's Warning:  Quitting smoking now greatly reduces serious risk to your health. Obesity, smoking, and sedentary lifestyle greatly increases your risk for illness    A healthy diet, regular physical exercise & weight monitoring are important for maintaining a healthy lifestyle    You may be retaining fluid if you have a history of heart failure or if you experience any of the following symptoms:  Weight gain of 3 pounds or more overnight or 5 pounds in a week, increased swelling in our hands or feet or shortness of breath while lying flat in bed. Please call your doctor as soon as you notice any of these symptoms; do not wait until your next office visit. The discharge information has been reviewed with the patient. The patient verbalized understanding.   Discharge medications reviewed with the patient and appropriate educational materials and side effects teaching were provided.   ___________________________________________________________________________________________________________________________________

## 2020-07-23 NOTE — PROGRESS NOTES
CM was notified by floor RN Geovani Max, patient has requested a new PCP. CM sent referral to [facility] this day to assist with this need. CM also informed staff, they will need to follow up with the patient, as the patient will be discharged this day. CM remains available.

## 2020-07-23 NOTE — PROGRESS NOTES
Discharge instructions given. Education provided. All questions answered and verbally voiced understanding. Medication changes and follow up appointments discussed. Script provided. AVS reviewed, signed, and placed in chart. Copy provided for pt. Pt voiced wanting a new PCP so CM sent referral to [facility] at pt request. Pt given Zamora instructions and care. Pt completed return demonstration and voiced understanding. Pt  aware of discharge and is planning to come  pt. Pt aware to call desk when ready to discharge.

## 2020-07-23 NOTE — DISCHARGE SUMMARY
Discharge Summary     Patient: Geneva Ellis MRN: [id number]  SSN: [id number]    YOB: 1950  Age: 81 y.o. Sex: female       Admit Date: 7/16/2020    Discharge Date: 7/23/2020      Admission Diagnoses: Acute renal failure (ARF) ([facility] [N17.9]    Discharge Diagnoses:   Problem List as of 7/23/2020 Date Reviewed: 1/18/2017          Codes Class Noted - Resolved    * (Principal) Acute renal failure (ARF) ([facility] ICD-10-CM: N17.9  ICD-9-CM: 584.9  7/16/2020 - Present        Anemia ICD-10-CM: D64.9  ICD-9-CM: 285.9  7/16/2020 - Present        Lower obstructive uropathy ICD-10-CM: N13.9  ICD-9-CM: 599.60  7/16/2020 - Present        Acquired hypothyroidism ICD-10-CM: E03.9  ICD-9-CM: 244.9  7/16/2020 - Present               Discharge Condition: Stable    Physical Exam:  General:      HEENT:   CV:     Lungs:     Abdomen:      Extremities:    Skin:       Neuro:    Psych:      Hospital Course:   Geneva Ellis is a 81 y.o. female with past medical history significant for hypertension, hypothyroidism, urethral stricture presents for generalized malaise, abdominal distention and pain. Patient reports she went to her PCP and got labs drawn. Labs came back abnormal and PCP recommended patient come to the ER for further evaluation. Upon ER arrival, patient found to have a creatinine of 17 and a distended bladder. Patient also found to have a hemoglobin of 6.3. Multiple attempts were made by urology to place Zamora catheter but patient has significant stricture that is cannot be passed. Patient to be taken to the OR for Zamora placement at this time.       Patient denies any recent fevers, chills, cough, shortness of breath, chest pain. She has been having worsening nausea and several episodes of vomiting over the past week. She reports following social desisting measures and wearing a mask.     Patient reports that she has had urinary problems for the past 35 years.   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 She does not drink alcohol.     Patient to be admitted to hospital service for management of anemia, acute renal failure.        10 systems reviewed and negative except as noted in HPI. Patient was admitted to the medical floor for further evaluation and management. She underwent OR dilation immediately from the ER due to the urethral stricture. It was successfully dilated and Zamora was placed. Patient's labs were monitored closely for the next several days and her creatinine began to trend down which is expected. Infectious work-up was negative and patient has been doing quite well. As her creatinine continues to trend down, she is currently medically stable for discharge. She will need close follow-up. Patient has been instructed to obtain lab work within the next 3 to 5 days and then follow-up nephrology and urology. Appointments have been made for both and both teams are aware. Patient instructed to continue drinking  Water. She will be discharged on bicarbonate tablets to be continued until nephrology sees her. We will make a determination when that will finish at that time. Patient is also started on new antihypertensive medications and her home medications were adjusted due to renal function. Consults: Nephrology and Urology    Significant Diagnostic Studies: Xr Chest Pa Lat    Result Date: 7/21/2020  EXAMINATION: XR CHEST PA LAT 7/21/2020 4:18 PM ACCESSION NUMBER: [id number] COMPARISON: None available INDICATION: fevers, new onset TECHNIQUE: PA and lateral views of the chest were obtained. FINDINGS: Support devices: None Lungs: No focal airspace disease. Cardiac Silhouette: Within normal limits in size. Mediastinum: The aorta is normal. Upper Abdomen: Normal Miscellaneous: There is previous anterior fusion in the lower cervical spine. IMPRESSION: 1.  No acute abnormality     Us Retroperitoneum Comp    Result Date: 7/16/2020  EXAM: Ultrasound of the kidneys. INDICATION: Acute renal failure. COMPARISON: Prior CT abdomen on January 12, 2016. TECHNIQUE: A standard protocol kidney ultrasound was performed. FINDINGS: The right kidney measures 10.8 x 5.8 cm and the left kidney measures 11.6 x 5.8 cm. There is moderate bilateral hydronephrosis, not significantly changed from the prior CT scan, allowing for differences in technique. There are also solitary bilateral simple cysts, measuring 3.7 x 4.2 x 4.0 cm on the right and 1.2 x 0.6 x 0.7 cm on the left. No shadowing kidney stone is identified. The urinary bladder is decompressed around a Zamora catheter. IMPRESSION: 1. Moderate bilateral hydronephrosis, not significantly changed from the prior CT abdomen January, 2016. 2. Solitary bilateral kidney simple cysts. 3. Zamora catheter in the urinary bladder. Disposition: home    Discharge Medications:   Current Discharge Medication List      START taking these medications    Details   hydrALAZINE (APRESOLINE) 25 mg tablet Take 1 Tab by mouth three (3) times daily for 30 days. Qty: 90 Tab, Refills: 0      sodium bicarbonate 650 mg tablet Take 2 Tabs by mouth three (3) times daily for 14 days. Qty: 84 Tab, Refills: 0         CONTINUE these medications which have NOT CHANGED    Details   buPROPion XL (WELLBUTRIN XL) 300 mg XL tablet Take 300 mg by mouth daily. DULoxetine (CYMBALTA) 30 mg capsule TAKE 1 CAPSULE BY MOUTH ONCE DAILY      metoprolol tartrate (LOPRESSOR) 50 mg tablet Take 50 mg by mouth two (2) times a day. ASPIRIN/CAFFEINE (ANACIN PO) Take  by mouth as needed. cholecalciferol, VITAMIN D3, (VITAMIN D3) 5,000 unit tab tablet Take 5,000 Units by mouth daily. pantoprazole (PROTONIX) 40 mg tablet Take 40 mg by mouth daily. atorvastatin (LIPITOR) 20 mg tablet Take 20 mg by mouth every other day.       levothyroxine (SYNTHROID) 100 mcg tablet Take 150 mcg by mouth Daily (before breakfast). STOP taking these medications       DULOXETINE HCL, BULK, Comments:   Reason for Stopping:         lisinopriL (PRINIVIL, ZESTRIL) 40 mg tablet Comments:   Reason for Stopping:         losartan (COZAAR) 100 mg tablet Comments:   Reason for Stopping:         fentaNYL (DURAGESIC) 50 mcg/hr PATCH Comments:   Reason for Stopping:               Activity: Activity as tolerated  Diet: Regular Diet  Wound Care: None needed    Follow-up Appointments   Procedures    FOLLOW UP VISIT Appointment in: One Week Please set up 1 week hospital follow up with [facility] upon d/c and renal panel and cbc w/o diff prior to appointment please set up labs for Monday 7/27. [phone] Thanks     Please set up 1 week hospital follow up with [facility] upon d/c and renal panel and cbc w/o diff prior to appointment please set up labs for Monday 7/27.  [phone]  Thanks     Standing Status:   Standing     Number of Occurrences:   1     Order Specific Question:   Appointment in     Answer: One Week    FOLLOW UP VISIT Appointment in: One Week Please follow up with the nephrologist within the next 1 week. An Appointment has been made for you by the nephrologists. Please obtain labs prior to the visit. A prescription referral for lab work as bee. .. Please follow up with the nephrologist within the next 1 week. An Appointment has been made for you by the nephrologists. Please obtain labs prior to the visit. A prescription referral for lab work as been printed out for you to be taken Monday. Please follow up with the urologist.  An appointment is being made for you as well within the next week. Standing Status:   Standing     Number of Occurrences:   1     Order Specific Question:   Appointment in     Answer:    One Week       Signed By: Reena Bustos MD     July 23, 2020

## 2020-07-24 ENCOUNTER — PATIENT OUTREACH (OUTPATIENT)
Dept: CASE MANAGEMENT | Age: 70
End: 2020-07-24

## 2020-07-24 NOTE — PROGRESS NOTES
Transition of Care Hospital Discharge Follow-Up      Date/Time:  2020 9:29 AM    Patient was admitted to [facility] on 2020 and discharged on 2020 for Acute Renal Failure. The physician discharge summary was available at the time of outreach. Patient was contacted within 7 business days of discharge. Inpatient RUR score: 3  Was this a readmission? no   Patient stated reason for the readmission: none  Patients top risk factors for readmission: Acute Renal failure    ADV Care Plan  Updated: 2020    LPN Care Coordinator contacted the patient by telephone to perform post hospital discharge assessment. Verified name and  with patient as identifiers. Provided introduction to self, and explanation of the Care Coordinator role. Patient received hospital discharge instructions. LPN reviewed discharge instructions and red flags with patient who verbalized understanding. Patient given an opportunity to ask questions and does not have any further questions or concerns at this time. The patient agrees to contact the PCP office for questions related to their healthcare. LPN provided contact information for future reference. Home Health orders at discharge: [address]:  Date of initial or scheduled visit:   (Assist with coordination of services if necessary.)    Durable Medical Equipment ordered at discharge: none  [address]:   [address] [facility] received:   (Assist patient in obtaining DME orders &/or equipment if necessary.)    Medication(s):   Medication review was performed with patient, who verbalizes understanding of administration of home medications. There were no barriers to obtaining medications identified at this time. Current Outpatient Medications   Medication Sig    hydrALAZINE (APRESOLINE) 25 mg tablet Take 1 Tab by mouth three (3) times daily for 30 days.     sodium bicarbonate 650 mg tablet Take 2 Tabs by mouth three (3) times daily for 14 days.  buPROPion XL (WELLBUTRIN XL) 300 mg XL tablet Take 300 mg by mouth daily.  DULoxetine (CYMBALTA) 30 mg capsule TAKE 1 CAPSULE BY MOUTH ONCE DAILY    pantoprazole (PROTONIX) 40 mg tablet Take 40 mg by mouth daily.  metoprolol tartrate (LOPRESSOR) 50 mg tablet Take 50 mg by mouth two (2) times a day.  atorvastatin (LIPITOR) 20 mg tablet Take 20 mg by mouth every other day.  ASPIRIN/CAFFEINE (ANACIN PO) Take  by mouth as needed.  cholecalciferol, VITAMIN D3, (VITAMIN D3) 5,000 unit tab tablet Take 5,000 Units by mouth daily.  levothyroxine (SYNTHROID) 100 mcg tablet Take 150 mcg by mouth Daily (before breakfast). No current facility-administered medications for this visit. There are no discontinued medications. ADL assessment:   (If patient is unable to perform ADLs  what is the limiting factor(s)? Do they have a support system that can assist? If no support system is present, discuss possible assistance that they may be able to obtain. Escalate for SW if ongoing issues are verbalized by pt or anticipated)    [facility] follow up appointment(s): No future appointments. Non-[facility] follow up appointment(s):   7 Day follow up with PCP or Specialist:[facility] 8/10/2020/ A Request made for a [facility] new PCPc   Transportation arranged: none    Covid Risk Education    Patient has following risk factors of: Acute Renal Failure. Education provided regarding infection prevention, and signs and symptoms of COVID-19 and when to seek medical attention with patient who verbalized understanding. Discussed exposure protocols and quarantine From CDC: Are you at higher risk for severe illness?  and given an opportunity for questions and concerns. The patient agrees to contact the COVID-19 hotline [phone] or PCP office for questions related to COVID-19.      For more information on steps you can take to protect yourself, see CDC's How to Protect Yourself Patient/family/caregiver given information for [employer] and agrees to enroll no  Patient's preferred e-mail: declines  Patient's preferred phone number: declines      Any other questions or concerns expressed by patient? Patient voiced no concerns.     Scheduled next follow up call or referral to CTN/ ACM:within 14 days  Next follow up call:    Goals Addressed                 This Visit's Progress     Takes Medications as prescribed   On track

## 2020-07-26 LAB
BACTERIA SPEC CULT: NORMAL
BACTERIA SPEC CULT: NORMAL
SERVICE CMNT-IMP: NORMAL
SERVICE CMNT-IMP: NORMAL

## 2020-08-03 NOTE — H&P (VIEW-ONLY)
[facility] [address] [facility], [address] [facility] [address] : 1950 Chief Complaint Patient presents with  Follow-up From Hospital  
 
 
  
[facility] Vaishnavi Delaware is a 81 y.o. female with hx of urethral stricture disease and incomplete bladder emptying who is being seen for acute urinary retention and inability to place marquez catheter. She presented to ER per PCP advice based on abnormal blood work with a Cn of 15.5. In the ER she reports difficulty voiding and has long hx of this. She is only able to dribble urine. She said it has gotten progressively worse over the last 2 days. Abdomen distended. RNs attempted to place marquez but unable to locate urethra. We are consulted. Cn today is 17.4. Hx of urethral stricture, last dilations in 2016 and 2016.  
  
Unable to visualize the urethral meatus. Attempted to place 14F coude catheter by touch with no success. Dr. Maude Salamanca attempted placing 16F coude catheter with catheter guide followed by dilating her with VB sounds and these were both unsuccessful d/t urethral stenosis. Decision was made to take her to OR for cystoscopy under anesthesia with marquez placement and possible SP tube if unable to place marquez. She had urethral catheter placed. Today she returns for hospital f/u and to discuss SP catheter. creatinine down to 7.8. last checked with nephrology one week ago. Today she reports no fever, chills, gross hematuria, or problem with the catheter. Past Medical History:  
Diagnosis Date  Arthritis  GERD (gastroesophageal reflux disease)   
 no meds  High cholesterol  Hypertension  Kidney disease  Liver disease   
 hep C, ~, treated interferon/ribo  Melanoma ([facility] [address] [facility] RLE, ~   PUD (peptic ulcer disease)  Rheumatic fever   
 as a child  Thyroid disease   
 hypo Past Surgical History:  
Procedure Laterality Date  HX APPENDECTOMY  HX CHOLECYSTECTOMY  HX ORTHOPAEDIC Left KNEE COPE  
 NEUROLOGICAL PROCEDURE UNLISTED    
 CERVICAL AND LUMBAR SX  
 
Current Outpatient Medications Medication Sig Dispense Refill  hydrALAZINE (APRESOLINE) 25 mg tablet Take 1 Tab by mouth three (3) times daily for 30 days. 90 Tab 0  
 sodium bicarbonate 650 mg tablet Take 2 Tabs by mouth three (3) times daily for 14 days. 84 Tab 0  
 buPROPion XL (WELLBUTRIN XL) 300 mg XL tablet Take 300 mg by mouth daily.  DULoxetine (CYMBALTA) 30 mg capsule TAKE 1 CAPSULE BY MOUTH ONCE DAILY  pantoprazole (PROTONIX) 40 mg tablet Take 40 mg by mouth daily.  metoprolol tartrate (LOPRESSOR) 50 mg tablet Take 50 mg by mouth two (2) times a day.  atorvastatin (LIPITOR) 20 mg tablet Take 20 mg by mouth every other day.  ASPIRIN/CAFFEINE (ANACIN PO) Take  by mouth as needed.  cholecalciferol, VITAMIN D3, (VITAMIN D3) 5,000 unit tab tablet Take 5,000 Units by mouth daily.  levothyroxine (SYNTHROID) 100 mcg tablet Take 150 mcg by mouth Daily (before breakfast). No Known Allergies Social History Socioeconomic History  Marital status:  Spouse name: Not on file  Number of children: Not on file  Years of education: Not on file  Highest education level: Not on file Occupational History  Not on file Social Needs  Financial resource strain: Not on file  Food insecurity Worry: Not on file Inability: Not on file  Transportation needs Medical: Not on file Non-medical: Not on file Tobacco Use  Smoking status: Current Every Day Smoker Packs/day: 0.50 Years: 20.00 Pack years: 10.00  Smokeless tobacco: Never Used Substance and Sexual Activity  Alcohol use: No  
 Drug use: Not on file  Sexual activity: Not on file Lifestyle  Physical activity Days per week: Not on file Minutes per session: Not on file  Stress: Not on file Relationships  Social connections Talks on phone: Not on file Gets together: Not on file Attends [employer] service: Not on file Active member of club or organization: Not on file Attends meetings of clubs or organizations: Not on file Relationship status: Not on file  Intimate partner violence Fear of current or ex partner: Not on file Emotionally abused: Not on file Physically abused: Not on file Forced sexual activity: Not on file Other Topics Concern  Not on file Social History Narrative  Not on file Family History Family history unknown: Yes  
Problem Relation Age of Onset  Family history unknown: Yes  No Known Problems Mother  No Known Problems Father Review of Systems Constitutional:   Negative for fever, chills, appetite change, malaise/fatigue, headaches and weight loss. Skin:  Negative for skin lesions, rash and itching. Eyes:  Negative for visual disturbance, eye pain and eye discharge. ENT:  Negative for difficulty articulating words, pain swallowing, high frequency hearing loss and dry mouth. Respiratory:  Negative for cough, blood in sputum, shortness of breath and wheezing. Cardiovascular: Positive for hypertension and varicose veins. Negative for chest pain, irregular heartbeat, leg pain, leg swelling and regular rate and rhythm. GI: Positive for abdominal pain, constipation, indigestion and heartburn. Negative for nausea, vomiting, blood in stool and diarrhea. Genitourinary: Positive for hematuria, flank pain, recurrent UTIs, nocturia, slower stream, straining, urgency, leakage w/ urge, frequent urination and incomplete emptying. Negative for urinary burning, history of urolithiasis, sexually transmitted disease, menstrual problem, endometriosis, vaginal pain and hysterectomy. Number of pregnancies: 1. Number of births: 1. Musculoskeletal: Positive for back pain, bone pain, arthralgias and neck pain. Negative for tenderness and muscle weakness. Neurological:  Negative for dizziness, focal weakness, numbness, seizures and tremors. Psychological:  Negative for depression and psychiatric problem. Endocrine: Positive for cold intolerance and fatigue. Negative for thirst and excessive urination. Hem/Lymphatic:  Negative for easy bleeding, easy bruising and frequent infections. PHYSICAL EXAM 
 
Visit Vitals /76 Pulse 81 Ht 5' 10\" (1.778 m) Wt 160 lb (72.6 kg) BMI 22.96 kg/m² General appearance - well appearing and in no distress Mental status - alert, oriented to person, place, and time Neck - supple, no significant adenopathy Heart-  Normal S1/S2, No murmurs Chest/Lung-  Quiet, even and easy respiratory effort without use of accessory muscles, BS clear all lung fields Neurological - normal speech, no focal findings or movement disorder noted on gross visual exam 
Musculoskeletal - normal gait and station Skin - normal coloration and turgor, no rashes Assessment and Plan ICD-10-CM ICD-9-CM 1. Stricture of female urethra, unspecified stricture type  N35.92 598.9 Discussed CIC vs SP cath placement vs indwelling marquez catheter. She opts to have SP catheter placed. Risks, benefits, and alternatives reviewed. Surgery scheduler to be notified. Advised to call sooner if needed. WILLIAMS Lucas Dr. is supervising physician today and he approves plan of care.

## 2020-08-07 ENCOUNTER — PATIENT OUTREACH (OUTPATIENT)
Dept: CASE MANAGEMENT | Age: 70
End: 2020-08-07

## 2020-08-07 NOTE — PROGRESS NOTES
Attempted to call patient to f/u on PELON call, unable to reach patient on contact information provided, left voice message. This Care Coordinator will graduate this patient from this program, patient will be added back if phone call is returned. Episode Resolved.

## 2020-08-13 ENCOUNTER — ANESTHESIA EVENT (OUTPATIENT)
Dept: SURGERY | Age: 70
End: 2020-08-13
Payer: MEDICARE

## 2020-08-14 ENCOUNTER — ANESTHESIA (OUTPATIENT)
Dept: SURGERY | Age: 70
End: 2020-08-14
Payer: MEDICARE

## 2020-08-14 ENCOUNTER — HOSPITAL ENCOUNTER (OUTPATIENT)
Age: 70
Setting detail: OUTPATIENT SURGERY
Discharge: HOME | End: 2020-08-14
Attending: UROLOGY | Admitting: UROLOGY
Payer: MEDICARE

## 2020-08-14 VITALS
HEART RATE: 61 BPM | RESPIRATION RATE: 16 BRPM | OXYGEN SATURATION: 100 % | BODY MASS INDEX: 21.76 KG/M2 | TEMPERATURE: 97.8 F | WEIGHT: 152 LBS | SYSTOLIC BLOOD PRESSURE: 157 MMHG | HEIGHT: 70 IN | DIASTOLIC BLOOD PRESSURE: 90 MMHG

## 2020-08-14 LAB
ANION GAP SERPL CALC-SCNC: 10 MMOL/L (ref 7–16)
APPEARANCE UR: ABNORMAL
BACTERIA URNS QL MICRO: ABNORMAL /HPF
BILIRUB UR QL: NEGATIVE
BUN SERPL-MCNC: 48 MG/DL (ref 8–23)
CALCIUM SERPL-MCNC: 9.2 MG/DL (ref 8.3–10.4)
CHLORIDE SERPL-SCNC: 103 MMOL/L (ref 98–107)
CO2 SERPL-SCNC: 20 MMOL/L (ref 21–32)
COLOR UR: YELLOW
CREAT SERPL-MCNC: 4.98 MG/DL (ref 0.6–1)
EPI CELLS #/AREA URNS HPF: ABNORMAL /HPF
ERYTHROCYTE [DISTWIDTH] IN BLOOD BY AUTOMATED COUNT: 13.1 % (ref 11.9–14.6)
GLUCOSE SERPL-MCNC: 90 MG/DL (ref 65–100)
GLUCOSE UR STRIP.AUTO-MCNC: NEGATIVE MG/DL
HCT VFR BLD AUTO: 27.6 % (ref 35.8–46.3)
HGB BLD-MCNC: 9 G/DL (ref 11.7–15.4)
HGB UR QL STRIP: ABNORMAL
KETONES UR QL STRIP.AUTO: NEGATIVE MG/DL
LEUKOCYTE ESTERASE UR QL STRIP.AUTO: ABNORMAL
MCH RBC QN AUTO: 29.5 PG (ref 26.1–32.9)
MCHC RBC AUTO-ENTMCNC: 32.6 G/DL (ref 31.4–35)
MCV RBC AUTO: 90.5 FL (ref 79.6–97.8)
NITRITE UR QL STRIP.AUTO: POSITIVE
NRBC # BLD: 0 K/UL (ref 0–0.2)
PH UR STRIP: 7 [PH] (ref 5–9)
PLATELET # BLD AUTO: 277 K/UL (ref 150–450)
PMV BLD AUTO: 9 FL (ref 9.4–12.3)
POTASSIUM SERPL-SCNC: 4.7 MMOL/L (ref 3.5–5.1)
PROT UR STRIP-MCNC: 100 MG/DL
RBC # BLD AUTO: 3.05 M/UL (ref 4.05–5.2)
RBC #/AREA URNS HPF: ABNORMAL /HPF
SODIUM SERPL-SCNC: 133 MMOL/L (ref 136–145)
SP GR UR REFRACTOMETRY: 1.01 (ref 1–1.02)
UROBILINOGEN UR QL STRIP.AUTO: 0.2 EU/DL (ref 0.2–1)
WBC # BLD AUTO: 5.6 K/UL (ref 4.3–11.1)
WBC URNS QL MICRO: ABNORMAL /HPF

## 2020-08-14 PROCEDURE — 74011250636 HC RX REV CODE- 250/636: Performed by: NURSE ANESTHETIST, CERTIFIED REGISTERED

## 2020-08-14 PROCEDURE — 77030019908 HC STETH ESOPH SIMS -A: Performed by: ANESTHESIOLOGY

## 2020-08-14 PROCEDURE — 77030005518 HC CATH URETH FOL 2W BARD -B: Performed by: UROLOGY

## 2020-08-14 PROCEDURE — 74011000250 HC RX REV CODE- 250: Performed by: NURSE ANESTHETIST, CERTIFIED REGISTERED

## 2020-08-14 PROCEDURE — 80048 BASIC METABOLIC PNL TOTAL CA: CPT

## 2020-08-14 PROCEDURE — 74011250636 HC RX REV CODE- 250/636: Performed by: ANESTHESIOLOGY

## 2020-08-14 PROCEDURE — C1894 INTRO/SHEATH, NON-LASER: HCPCS | Performed by: UROLOGY

## 2020-08-14 PROCEDURE — 77030040831 HC BAG URINE DRNG MDII -A: Performed by: UROLOGY

## 2020-08-14 PROCEDURE — 76010000138 HC OR TIME 0.5 TO 1 HR: Performed by: UROLOGY

## 2020-08-14 PROCEDURE — 77030019927 HC TBNG IRR CYSTO BAXT -A: Performed by: UROLOGY

## 2020-08-14 PROCEDURE — 74011250636 HC RX REV CODE- 250/636: Performed by: UROLOGY

## 2020-08-14 PROCEDURE — 76210000006 HC OR PH I REC 0.5 TO 1 HR: Performed by: UROLOGY

## 2020-08-14 PROCEDURE — 76210000020 HC REC RM PH II FIRST 0.5 HR: Performed by: UROLOGY

## 2020-08-14 PROCEDURE — 76060000032 HC ANESTHESIA 0.5 TO 1 HR: Performed by: UROLOGY

## 2020-08-14 PROCEDURE — 81001 URINALYSIS AUTO W/SCOPE: CPT

## 2020-08-14 PROCEDURE — 74011250637 HC RX REV CODE- 250/637: Performed by: ANESTHESIOLOGY

## 2020-08-14 PROCEDURE — C1769 GUIDE WIRE: HCPCS | Performed by: UROLOGY

## 2020-08-14 PROCEDURE — 77030040361 HC SLV COMPR DVT MDII -B: Performed by: UROLOGY

## 2020-08-14 PROCEDURE — 85027 COMPLETE CBC AUTOMATED: CPT

## 2020-08-14 PROCEDURE — 77030010509 HC AIRWY LMA MSK TELE -A: Performed by: ANESTHESIOLOGY

## 2020-08-14 RX ORDER — CIPROFLOXACIN 2 MG/ML
400 INJECTION, SOLUTION INTRAVENOUS ONCE
Status: COMPLETED | OUTPATIENT
Start: 2020-08-14 | End: 2020-08-14

## 2020-08-14 RX ORDER — LIDOCAINE HYDROCHLORIDE 20 MG/ML
INJECTION, SOLUTION EPIDURAL; INFILTRATION; INTRACAUDAL; PERINEURAL AS NEEDED
Status: DISCONTINUED | OUTPATIENT
Start: 2020-08-14 | End: 2020-08-14 | Stop reason: HOSPADM

## 2020-08-14 RX ORDER — ACETAMINOPHEN 500 MG
1000 TABLET ORAL ONCE
Status: COMPLETED | OUTPATIENT
Start: 2020-08-14 | End: 2020-08-14

## 2020-08-14 RX ORDER — DEXAMETHASONE SODIUM PHOSPHATE 4 MG/ML
INJECTION, SOLUTION INTRA-ARTICULAR; INTRALESIONAL; INTRAMUSCULAR; INTRAVENOUS; SOFT TISSUE AS NEEDED
Status: DISCONTINUED | OUTPATIENT
Start: 2020-08-14 | End: 2020-08-14 | Stop reason: HOSPADM

## 2020-08-14 RX ORDER — FENTANYL CITRATE 50 UG/ML
INJECTION, SOLUTION INTRAMUSCULAR; INTRAVENOUS AS NEEDED
Status: DISCONTINUED | OUTPATIENT
Start: 2020-08-14 | End: 2020-08-14 | Stop reason: HOSPADM

## 2020-08-14 RX ORDER — ONDANSETRON 2 MG/ML
4 INJECTION INTRAMUSCULAR; INTRAVENOUS
Status: DISCONTINUED | OUTPATIENT
Start: 2020-08-14 | End: 2020-08-14 | Stop reason: HOSPADM

## 2020-08-14 RX ORDER — ALBUTEROL SULFATE 0.83 MG/ML
2.5 SOLUTION RESPIRATORY (INHALATION) AS NEEDED
Status: DISCONTINUED | OUTPATIENT
Start: 2020-08-14 | End: 2020-08-14 | Stop reason: HOSPADM

## 2020-08-14 RX ORDER — SODIUM CHLORIDE, SODIUM LACTATE, POTASSIUM CHLORIDE, CALCIUM CHLORIDE 600; 310; 30; 20 MG/100ML; MG/100ML; MG/100ML; MG/100ML
1000 INJECTION, SOLUTION INTRAVENOUS CONTINUOUS
Status: DISCONTINUED | OUTPATIENT
Start: 2020-08-14 | End: 2020-08-14 | Stop reason: HOSPADM

## 2020-08-14 RX ORDER — MIDAZOLAM HYDROCHLORIDE 1 MG/ML
2 INJECTION, SOLUTION INTRAMUSCULAR; INTRAVENOUS
Status: COMPLETED | OUTPATIENT
Start: 2020-08-14 | End: 2020-08-14

## 2020-08-14 RX ORDER — OXYCODONE HYDROCHLORIDE 5 MG/1
5 TABLET ORAL
Status: DISCONTINUED | OUTPATIENT
Start: 2020-08-14 | End: 2020-08-14 | Stop reason: HOSPADM

## 2020-08-14 RX ORDER — PROPOFOL 10 MG/ML
INJECTION, EMULSION INTRAVENOUS AS NEEDED
Status: DISCONTINUED | OUTPATIENT
Start: 2020-08-14 | End: 2020-08-14 | Stop reason: HOSPADM

## 2020-08-14 RX ORDER — LIDOCAINE HYDROCHLORIDE 10 MG/ML
0.1 INJECTION INFILTRATION; PERINEURAL AS NEEDED
Status: DISCONTINUED | OUTPATIENT
Start: 2020-08-14 | End: 2020-08-14 | Stop reason: HOSPADM

## 2020-08-14 RX ORDER — HYDROMORPHONE HYDROCHLORIDE 2 MG/ML
0.5 INJECTION, SOLUTION INTRAMUSCULAR; INTRAVENOUS; SUBCUTANEOUS
Status: DISCONTINUED | OUTPATIENT
Start: 2020-08-14 | End: 2020-08-14 | Stop reason: HOSPADM

## 2020-08-14 RX ORDER — ONDANSETRON 2 MG/ML
INJECTION INTRAMUSCULAR; INTRAVENOUS AS NEEDED
Status: DISCONTINUED | OUTPATIENT
Start: 2020-08-14 | End: 2020-08-14 | Stop reason: HOSPADM

## 2020-08-14 RX ADMIN — SODIUM CHLORIDE, SODIUM LACTATE, POTASSIUM CHLORIDE, AND CALCIUM CHLORIDE: 600; 310; 30; 20 INJECTION, SOLUTION INTRAVENOUS at 12:02

## 2020-08-14 RX ADMIN — LIDOCAINE HYDROCHLORIDE 60 MG: 20 INJECTION, SOLUTION EPIDURAL; INFILTRATION; INTRACAUDAL; PERINEURAL at 12:10

## 2020-08-14 RX ADMIN — FENTANYL CITRATE 25 MCG: 50 INJECTION INTRAMUSCULAR; INTRAVENOUS at 12:24

## 2020-08-14 RX ADMIN — ONDANSETRON 4 MG: 2 INJECTION INTRAMUSCULAR; INTRAVENOUS at 12:24

## 2020-08-14 RX ADMIN — SODIUM CHLORIDE, SODIUM LACTATE, POTASSIUM CHLORIDE, AND CALCIUM CHLORIDE 1000 ML: 600; 310; 30; 20 INJECTION, SOLUTION INTRAVENOUS at 09:12

## 2020-08-14 RX ADMIN — DEXAMETHASONE SODIUM PHOSPHATE 4 MG: 4 INJECTION, SOLUTION INTRAMUSCULAR; INTRAVENOUS at 12:24

## 2020-08-14 RX ADMIN — CIPROFLOXACIN 400 MG: 2 INJECTION, SOLUTION INTRAVENOUS at 12:15

## 2020-08-14 RX ADMIN — ACETAMINOPHEN 1000 MG: 500 TABLET, FILM COATED ORAL at 09:11

## 2020-08-14 RX ADMIN — MIDAZOLAM 2 MG: 1 INJECTION INTRAMUSCULAR; INTRAVENOUS at 10:26

## 2020-08-14 RX ADMIN — PROPOFOL 200 MG: 10 INJECTION, EMULSION INTRAVENOUS at 12:10

## 2020-08-14 NOTE — ANESTHESIA PREPROCEDURE EVALUATION
Relevant Problems   No relevant active problems       Anesthetic History   No history of anesthetic complications            Review of Systems / Medical History  Patient summary reviewed and pertinent labs reviewed    Pulmonary  Within defined limits                 Neuro/Psych         Psychiatric history     Cardiovascular    Hypertension: well controlled          Hyperlipidemia    Exercise tolerance: >4 METS     GI/Hepatic/Renal     GERD: well controlled  Hepatitis: type C  Renal disease: ARF  PUD     Endo/Other      Hypothyroidism  Arthritis, cancer and anemia     Other Findings              Physical Exam    Airway  Mallampati: II  TM Distance: 4 - 6 cm  Neck ROM: normal range of motion   Mouth opening: Normal     Cardiovascular  Regular rate and rhythm,  S1 and S2 normal,  no murmur, click, rub, or gallop  Rhythm: regular  Rate: normal         Dental  No notable dental hx       Pulmonary  Breath sounds clear to auscultation               Abdominal  Abdominal exam normal       Other Findings            Anesthetic Plan    ASA: 3  Anesthesia type: general          Induction: Intravenous  Anesthetic plan and risks discussed with: Patient

## 2020-08-14 NOTE — DISCHARGE INSTRUCTIONS
You are going home with a suprapubic catheter in place. This tube is placed directly into the bladder through your abdomen to drain urine from your bladder. Home care  [facility] as necessary.  Change your dressing every day until your follow up appointment. When to call your healthcare provider  Call your health care provider right away if you have any of the following:   Catheter that falls out, or is clogged or feels clogged   Urine leaking around catheter   Urine that is cloudy, bloody, or smells bad   No urine drainage   Bladder that feels full or painful   Rash, itching, redness, swelling, or drainage at the catheter site  DIET  · Clear liquids until no nausea or vomiting; then light diet for the first day. · Advance to regular diet on second day, unless your doctor orders otherwise. · If nausea and vomiting continues, call your doctor. PAIN  · Take pain medication as directed by your doctor. · Call your doctor if pain is NOT relieved by medication. · DO NOT take aspirin of blood thinners unless directed by your doctor. After general anesthesia or intravenous sedation, for 24 hours or while taking prescription Narcotics:  · Limit your activities  · A responsible adult needs to be with you for the next 24 hours  · Do not drive and operate hazardous machinery  · Do not make important personal or business decisions  · Do not drink alcoholic beverages  · If you have not urinated within 8 hours after discharge, please contact your surgeon on call. · If you have sleep apnea and have a CPAP machine, please use it for all naps and sleeping. · Please use caution when taking narcotics and any of your home medications that may cause drowsiness. These are general instructions for a healthy lifestyle:  No smoking/ No tobacco products/ Avoid exposure to second hand smoke  Surgeon General's Warning:  Quitting smoking now greatly reduces serious risk to your health.     Patient Education Learning About How to Care for a Person's Indwelling Urinary Catheter  Introduction     A urinary catheter is a flexible plastic tube used to drain urine from the bladder when a person cannot urinate. A doctor will place the catheter into the bladder by inserting it through the urethra. The urethra is the opening that carries urine from the bladder to the outside of the body. When the catheter is in the bladder, a small balloon is used to keep the catheter in place. The catheter lets urine drain from the bladder into a collection bag. Urinary catheters can be used in both men and women. A catheter that stays in place for a longer period of time is called an indwelling catheter. A catheter may be needed because of certain medical conditions. These include an enlarged prostate or problems controlling urine. It may be used after surgery on the pelvis or urinary tract. Urinary catheters are also used when the lower part of the body is paralyzed. When helping a loved one with a catheter, try to be relaxed. Caring for a catheter can be embarrassing for both of you. If you are calm and don't seem embarrassed, the person may feel more comfortable. How do you take care of the catheter? Wear disposable gloves when handling someone's catheter. Make sure to follow all of the instructions the doctor has given. And always wash your hands before and after you're done. Here are some other things to remember when caring for someone's catheter:  Make sure that urine is running out of the catheter into the urine collection bag. And make sure that the catheter tubing does not get twisted or bent. Keep the urine collection bag below the level of the bladder. At night it may be helpful to hang the bag on the side of the bed. Make sure that the urine collection bag does not drag and pull on the catheter. It is okay to shower with a catheter and urine collection bag in place, unless the doctor says not to.   Check for swelling or signs of infection in the area around the catheter. Signs of infection include pus or irritated, swollen, red, or tender skin. Clean the area around the catheter twice a day with soap and water. Dry with a clean towel afterward. Do not apply powder or lotion to the skin around the catheter. Do not tug or pull on the catheter. Sexual intercourse may still be possible for individuals who wear a catheter. It is best to talk with a doctor about options. How do you empty the bag? The urine collection bag needs to be emptied regularly. It is best to empty the bag when it's about half full or at bedtime. If the doctor has asked you to measure the amount of urine, do that before you empty the urine into the toilet. When you are ready to empty the bag, follow these steps:  Put on disposable gloves. Remove the drain spout from its sleeve at the bottom of the collection bag. Open the valve on the spout. Let the urine flow out of the bag and into the toilet or a container. Do not let the tubing or drain spout touch anything. After you empty the bag, close the valve and put the drain spout back into its sleeve. Remove your gloves and throw them away. Wash your hands with soap and water. How do you care for someone after the catheter is removed? After the catheter is taken out, the person may have trouble urinating. If this happens, try helping them sit in a few inches of warm water (sitz bath). If the urge to urinate comes during the sitz bath, it may be easier for them to urinate while still in the bath. Some burning may happen the first few times the person urinates. If the burning lasts longer, it may be a sign of an infection. If the catheter causes irritation or a rash, wearing loose, cotton underwear may help. Watch closely for changes in the person's health, and be sure to contact their doctor if you notice any problems. Where can you learn more?   Go to http://polly-tiffanie.info/  Enter [id number] in the search box to learn more about \"Learning About How to Care for a Person's Indwelling Urinary Catheter. \"  Current as of: December 9, 2019               Content Version: 12.5  © 2663-4685 [employer]. Care instructions adapted under license by [employer] (which disclaims liability or warranty for this information). If you have questions about a medical condition or this instruction, always ask your healthcare professional. [address] any warranty or liability for your use of this information.

## 2020-08-14 NOTE — PERIOP NOTES
Catheter changed to leg bag. Pt instructed on how to care for catheter at home. DC instructions reviewed with  car side. Verbalizes understanding.

## 2020-08-14 NOTE — INTERVAL H&P NOTE
Update History & Physical 
 
The Patient's History and Physical of August 3,  
2020 was reviewed with the patient and I examined the patient. There was no change. The surgical site was confirmed by the patient and me. Plan:  The risk, benefits, expected outcome, and alternative to the recommended procedure have been discussed with the patient. Patient understands and wants to proceed with the procedure.  
 
Electronically signed by Lonny Marion MD on 8/14/2020 at 9:45 AM

## 2020-08-14 NOTE — BRIEF OP NOTE
Brief Postoperative Note    Patient: Hamzah De La Torre  YOB: 1950  MRN: [id number]    Date of Procedure: 8/14/2020     Pre-Op Diagnosis: Stricture of female urethra, unspecified stricture type [N35.92], urinary retention    Post-Op Diagnosis: Same as preoperative diagnosis.       Procedure(s):  CYSTOSCOPY WITH SUPRAPUBIC TUBE INSERTION    Surgeon(s):  Whitney Escamilla MD    Surgical Assistant: None    Anesthesia: General     Estimated Blood Loss (mL): Minimal    Complications: None    Specimens: * No specimens in log *     Implants: * No implants in log *    Drains: * No LDAs found *    Findings: see dictation    Electronically Signed by Marivel Diana MD on 8/14/2020 at 12:31 PM

## 2020-08-15 NOTE — OP NOTES
[address] [place] [address]  OPERATIVE REPORT    Name:  Daljit Pérez  MR#:  [id number]  :  1950  ACCOUNT #:  [de-identified]  DATE OF SERVICE:  2020    PREOPERATIVE DIAGNOSES:  1. Urinary retention. 2.  Urethral stricture disease. POSTOPERATIVE DIAGNOSES:  1. Urinary retention. 2.  Urethral stricture disease. PROCEDURES PERFORMED:  Cystoscopy and suprapubic tube placement. SURGEON:  Vivienne Darden MD    ASSISTANT:  None. ANESTHESIA:  General.    COMPLICATIONS:  None apparent. SPECIMENS REMOVED:  None. IMPLANTS:  None. ESTIMATED BLOOD LOSS:  Minimal.    INDICATIONS:  This is a 72-year-old female with a known history of urethral stricture disease who was lost to follow up from 2017 until presenting to [facility] in 2020 in renal failure with a creatinine above 17 and a urethral stricture that required a trip to the operating room to dilate with catheter placement. After discussion of the potential options for bladder management considering her severe urethral stricture disease, she decided upon suprapubic tube placement. TECHNIQUE:  The patient was taken to the operating room, placed in the supine position. Anesthesia was induced via the Anesthesiology Service. She was repositioned in the lithotomy position and prepped and draped in sterile surgical fashion with the lower abdomen and genitalia in the sterile field. A 22-Finnish rigid cystoscope was introduced atraumatically via the urethra. Pancystoscopy and ureteroscopy was performed. She had a large capacity bladder with 3+ trabeculations diffusely. The bilateral ureteral orifices were orthotopic in location. I then filled her bladder with approximately 750 mL of water at which point, just above her pubic symphysis, approximately 2 fingerbreadths, the bladder was palpable through the skin.   A 14-Finnish Angiocath was used to make a stab incision into the bladder at this position such that I could see the entry point cystoscopically. A Sensor wire was passed through this until it curled within the bladder and the Angiocath was then removed. The Amplatz dilator set was used to dilate the tract from 8-Venezuelan all the way to 22-Venezuelan with each pass being visualized with the cystoscope to ensure no pass pointing. After the tract had been dilated and over the wire, an 18-Venezuelan [place] tip catheter was placed with 10 mL of sterile water used to inflate the balloon and this catheter was left in place to serve as her suprapubic tube. A dressing was applied. The bladder was emptied. The cystoscope was removed and this concluded today's procedure. PLAN:  The patient will follow up in exactly one month with our nurse practitioner in the office for her first suprapubic tube change and she is to leave it open to bag drainage until then.       Nury Trujillo MD      [id number]  D:  08/14/2020 12:41  T:  08/14/2020 23:24  JOB #:  [id number]

## 2020-08-15 NOTE — ANESTHESIA POSTPROCEDURE EVALUATION
Procedure(s):  CYSTOSCOPY WITH SUPRAPUBIC TUBE INSERTION. general    Anesthesia Post Evaluation      Multimodal analgesia: multimodal analgesia used between 6 hours prior to anesthesia start to PACU discharge  Patient location during evaluation: PACU  Patient participation: complete - patient participated  Level of consciousness: awake and alert  Pain management: adequate  Airway patency: patent  Anesthetic complications: no  Cardiovascular status: acceptable and hemodynamically stable  Respiratory status: acceptable  Hydration status: acceptable  Post anesthesia nausea and vomiting:  none  Final Post Anesthesia Temperature Assessment:  Normothermia (36.0-37.5 degrees C)      INITIAL Post-op Vital signs:   Vitals Value Taken Time   /90 8/14/2020  1:18 PM   Temp 36.6 °C (97.8 °F) 8/14/2020  1:17 PM   Pulse 60 8/14/2020  1:21 PM   Resp 16 8/14/2020  1:17 PM   SpO2 100 % 8/14/2020  1:21 PM   Vitals shown include unvalidated device data.

## 2020-08-19 PROBLEM — K21.9 GASTROESOPHAGEAL REFLUX DISEASE WITHOUT ESOPHAGITIS: Status: ACTIVE | Noted: 2020-08-19

## 2020-08-19 PROBLEM — I15.9 SECONDARY HYPERTENSION: Status: ACTIVE | Noted: 2020-08-19

## 2020-10-28 PROBLEM — K43.2 INCISIONAL HERNIA, WITHOUT OBSTRUCTION OR GANGRENE: Status: ACTIVE | Noted: 2018-08-16

## 2021-08-16 PROCEDURE — 88305 TISSUE EXAM BY PATHOLOGIST: CPT

## 2021-08-16 PROCEDURE — 88312 SPECIAL STAINS GROUP 1: CPT

## 2021-08-17 ENCOUNTER — HOSPITAL ENCOUNTER (OUTPATIENT)
Dept: LAB | Age: 71
Discharge: HOME | End: 2021-08-17

## 2021-08-19 ENCOUNTER — HOSPITAL ENCOUNTER (OUTPATIENT)
Dept: GENERAL RADIOLOGY | Age: 71
Discharge: HOME | End: 2021-08-19
Attending: NURSE PRACTITIONER
Payer: MEDICARE

## 2021-08-19 PROCEDURE — 73560 X-RAY EXAM OF KNEE 1 OR 2: CPT

## 2022-03-19 PROBLEM — I15.9 SECONDARY HYPERTENSION: Status: ACTIVE | Noted: 2020-08-19

## 2022-03-19 PROBLEM — D64.9 ANEMIA: Status: ACTIVE | Noted: 2020-07-16

## 2022-03-19 PROBLEM — N17.9 ACUTE RENAL FAILURE (ARF) (HCC): Status: ACTIVE | Noted: 2020-07-16

## 2022-03-19 PROBLEM — E03.9 ACQUIRED HYPOTHYROIDISM: Status: ACTIVE | Noted: 2020-07-16

## 2022-03-19 PROBLEM — N13.9 LOWER OBSTRUCTIVE UROPATHY: Status: ACTIVE | Noted: 2020-07-16

## 2022-03-19 PROBLEM — K21.9 GASTROESOPHAGEAL REFLUX DISEASE WITHOUT ESOPHAGITIS: Status: ACTIVE | Noted: 2020-08-19

## 2022-03-20 PROBLEM — K43.2 INCISIONAL HERNIA, WITHOUT OBSTRUCTION OR GANGRENE: Status: ACTIVE | Noted: 2018-08-16

## 2022-05-16 PROBLEM — N18.4 STAGE 4 CHRONIC KIDNEY DISEASE (HCC): Status: ACTIVE | Noted: 2022-05-16

## 2022-05-16 PROBLEM — I44.0 FIRST DEGREE AV BLOCK: Status: ACTIVE | Noted: 2022-05-16

## 2022-05-16 PROBLEM — Z93.59 SUPRAPUBIC CATHETER (HCC): Status: ACTIVE | Noted: 2022-05-16

## 2022-05-16 PROBLEM — N17.9 ACUTE RENAL FAILURE (ARF) (HCC): Status: RESOLVED | Noted: 2020-07-16 | Resolved: 2022-05-16

## 2022-06-03 ENCOUNTER — OFFICE VISIT (OUTPATIENT)
Dept: UROLOGY | Age: 72
End: 2022-06-03
Payer: MEDICARE

## 2022-06-03 DIAGNOSIS — Z93.59 SUPRAPUBIC CATHETER (HCC): Primary | ICD-10-CM

## 2022-06-03 DIAGNOSIS — Z46.6 URINARY CATHETER CHANGE REQUIRED: ICD-10-CM

## 2022-06-03 PROCEDURE — 51702 INSERT TEMP BLADDER CATH: CPT | Performed by: UROLOGY

## 2022-06-03 NOTE — PROGRESS NOTES
Pt came in for q 4 weeks catheter change. 18f suprapubic catheter changed without incident.  Patient tolerated procedure well.

## 2022-06-13 NOTE — PROGRESS NOTES
 Alcohol use: No      Not on File      ROS:  No obvious pertinent positives on review of systems except for what was outlined above.        Objective:       BP (!) 148/100   Pulse 80   Ht 5' 10\" (1.778 m)   Wt 160 lb (72.6 kg)   BMI 22.96 kg/m²     BP Readings from Last 3 Encounters:   06/15/22 (!) 148/100   05/16/22 (!) 170/90   08/19/21 126/88       Wt Readings from Last 3 Encounters:   06/15/22 160 lb (72.6 kg)   05/16/22 160 lb 3.2 oz (72.7 kg)   08/19/21 153 lb (69.4 kg)       General/Constitutional:   Alert and oriented x 3, no acute distress  HEENT:   normocephalic, atraumatic, moist mucous membranes  Neck:   No JVD or carotid bruits bilaterally  Cardiovascular:   regular rate and rhythm, no rub/gallop appreciated  Pulmonary:   clear to auscultation bilaterally, no respiratory distress  Abdomen:   soft, non-tender, non-distended  Ext:   No sig LE edema bilaterally  Skin:  warm and dry, no obvious rashes seen  Neuro:   no obvious sensory or motor deficits  Psychiatric:   normal mood and affect    Data Review:   Lab Results   Component Value Date    CHOL 218 (H) 04/29/2022    CHOL 256 (H) 06/16/2021    CHOL 177 10/19/2020     Lab Results   Component Value Date    TRIG 254 (H) 04/29/2022    TRIG 407 (H) 06/16/2021    TRIG 138 10/19/2020     Lab Results   Component Value Date    HDL 72 04/29/2022    HDL 65 06/16/2021    HDL 78 10/19/2020     Lab Results   Component Value Date    LDLCALC 103 (H) 04/29/2022    LDLCALC 120 (H) 06/16/2021    LDLCALC 76 10/19/2020     Lab Results   Component Value Date    VLDL 43 (H) 04/29/2022    VLDL 71 (H) 06/16/2021    VLDL 23 10/19/2020     No results found for: [facility]     Lab Results   Component Value Date     04/29/2022     06/16/2021     08/14/2020    K 3.7 04/29/2022    K 5.2 06/16/2021    K 4.7 08/14/2020     04/29/2022     06/16/2021     08/14/2020    CO2 18 04/29/2022    CO2 16 06/16/2021    CO2 20 08/14/2020    BUN 39 04/29/2022    BUN 40 06/16/2021    BUN 48 08/14/2020    CREATININE 3.83 04/29/2022    CREATININE 3.76 06/16/2021    CREATININE 4.98 08/14/2020    GLUCOSE 202 04/29/2022    GLUCOSE 85 06/16/2021    GLUCOSE 90 08/14/2020    CALCIUM 9.0 04/29/2022    CALCIUM 9.5 06/16/2021    CALCIUM 9.2 08/14/2020         Lab Results   Component Value Date    ALT 7 04/29/2022    ALT 9 06/16/2021    ALT 9 (L) 07/23/2020    AST 17 04/29/2022    AST 18 06/16/2021    AST 11 (L) 07/23/2020        Assessment/Plan:   1. CONKLIN (dyspnea on exertion)  - Obtain an echocardiogram  - Pertinent negatives include angina    2. Chronic kidney disease (CKD), stage V ([facility] [address]  - Patient reports declining dialysis and has not followed up with her nephrologist since  - Discussed with patient that symptoms and signs resulting directly from diminished kidney function include edema and hypertension  - Encouraged her to re-establish with nephrology    3. Hypertension, unspecified type  - Pertinent positives include CKD stage V  - Defer management to PCP and nephrologist  - Currently on Lopressor    4. Hyperlipidemia, unspecified hyperlipidemia type  - Continue with atorvastatin    5.  Atypical chest pain  - Discussed with patient that obstructive CAD is on the differential diagnosis; however, discussed the challenges of pursuing an ischemic evaluation in patients with CKD stage V  - Discussed the ISCHEMIA-CKD trial that randomized patients with advanced CKD (EGFR <30 or ESRD) with moderate to severe ischemia on stress testing to [facility] and coronary revascularization (when appropriate) or to OMT:  Incidence of the primary end point of death or myocardial infarction was equivalent in the 2 groups   - Patient is on OMT with baby aspirin daily, Lopressor and atorvastatin, and patient's chest pain is not consistent with typical angina  - After shared decision-making strategy, the patient declined a noninvasive ischemic evaluation at this time    F/U: As needed    Benito Morgan Rose Marie Goyal MD

## 2022-06-15 ENCOUNTER — INITIAL CONSULT (OUTPATIENT)
Dept: CARDIOLOGY CLINIC | Age: 72
End: 2022-06-15
Payer: MEDICARE

## 2022-06-15 ENCOUNTER — PATIENT MESSAGE (OUTPATIENT)
Dept: CARDIOLOGY CLINIC | Age: 72
End: 2022-06-15

## 2022-06-15 VITALS
SYSTOLIC BLOOD PRESSURE: 148 MMHG | BODY MASS INDEX: 22.9 KG/M2 | DIASTOLIC BLOOD PRESSURE: 100 MMHG | WEIGHT: 160 LBS | HEIGHT: 70 IN | HEART RATE: 80 BPM

## 2022-06-15 DIAGNOSIS — I10 HYPERTENSION, UNSPECIFIED TYPE: ICD-10-CM

## 2022-06-15 DIAGNOSIS — N18.5 CHRONIC KIDNEY DISEASE (CKD), STAGE V (HCC): ICD-10-CM

## 2022-06-15 DIAGNOSIS — R07.89 ATYPICAL CHEST PAIN: ICD-10-CM

## 2022-06-15 DIAGNOSIS — E78.5 HYPERLIPIDEMIA, UNSPECIFIED HYPERLIPIDEMIA TYPE: ICD-10-CM

## 2022-06-15 DIAGNOSIS — R06.09 DOE (DYSPNEA ON EXERTION): Primary | ICD-10-CM

## 2022-06-15 PROCEDURE — 1123F ACP DISCUSS/DSCN MKR DOCD: CPT | Performed by: INTERNAL MEDICINE

## 2022-06-15 PROCEDURE — 99204 OFFICE O/P NEW MOD 45 MIN: CPT | Performed by: INTERNAL MEDICINE

## 2022-06-15 RX ORDER — AMOXICILLIN 500 MG
1 CAPSULE ORAL DAILY
COMMUNITY

## 2022-06-15 RX ORDER — DULOXETIN HYDROCHLORIDE 60 MG/1
CAPSULE, DELAYED RELEASE ORAL
COMMUNITY
Start: 2022-05-20 | End: 2022-11-04

## 2022-06-16 ENCOUNTER — TELEPHONE (OUTPATIENT)
Dept: CARDIOLOGY CLINIC | Age: 72
End: 2022-06-16

## 2022-06-16 NOTE — TELEPHONE ENCOUNTER
From: Gabino Vázquez  To: Dr. Elen Thomason: 6/15/2022 9:19 PM EDT  Subject: EKG    I was referred to you for an EKG done at primary care physicians office a month ago that supposedly indicated a problem. I completely forgot to ask and would like to know if you saw the results. I did not complain about trouble breathing until Dr Huitron President asked after seeing the EKG. Thanks for your valuable input about the EKG.

## 2022-06-16 NOTE — TELEPHONE ENCOUNTER
Please let the patient know she was in sinus rhythm with a first degree AV block. This is more accurately prolongation of AV conduction rather than a block, and it is most commonly asymptomatic.

## 2022-06-27 ENCOUNTER — TELEPHONE (OUTPATIENT)
Dept: INTERNAL MEDICINE CLINIC | Facility: CLINIC | Age: 72
End: 2022-06-27

## 2022-06-27 NOTE — TELEPHONE ENCOUNTER
From: Faith Valencia  To: Dr. Tasia Dee: 6/23/2022  4:30 PM EDT  Subject: Recent BLOOD TEST     Im seeing Dr. Jhony Hopson, nephrologist, after a disturbing visit with dr Aguilar Sierra frightened me with conflicting info about my kidneys and addressed little about the concerns I was referred to him for.  Could you PLEASE send Dr. Brooke Dodge results of recent blood test at [facility]. Rogelio Boudreaux is [phone].  Thank you for your attention to this.

## 2022-07-13 ENCOUNTER — NURSE ONLY (OUTPATIENT)
Dept: UROLOGY | Age: 72
End: 2022-07-13
Payer: MEDICARE

## 2022-07-13 DIAGNOSIS — Z93.59 SUPRAPUBIC CATHETER (HCC): Primary | ICD-10-CM

## 2022-07-13 DIAGNOSIS — Z46.6 URINARY CATHETER CHANGE REQUIRED: ICD-10-CM

## 2022-07-13 PROCEDURE — 51702 INSERT TEMP BLADDER CATH: CPT | Performed by: UROLOGY

## 2022-07-19 ENCOUNTER — TELEPHONE (OUTPATIENT)
Dept: INTERNAL MEDICINE CLINIC | Facility: CLINIC | Age: 72
End: 2022-07-19

## 2022-07-19 DIAGNOSIS — I10 PRIMARY HYPERTENSION: Primary | ICD-10-CM

## 2022-07-19 RX ORDER — HYDRALAZINE HYDROCHLORIDE 50 MG/1
50 TABLET, FILM COATED ORAL 2 TIMES DAILY
Qty: 60 TABLET | Refills: 5 | Status: SHIPPED | OUTPATIENT
Start: 2022-07-19 | End: 2022-07-27 | Stop reason: DRUGHIGH

## 2022-07-20 ENCOUNTER — COMMUNITY OUTREACH (OUTPATIENT)
Dept: INTERNAL MEDICINE CLINIC | Facility: CLINIC | Age: 72
End: 2022-07-20

## 2022-07-21 NOTE — TELEPHONE ENCOUNTER
LMOVM informing pt, per Dr. Marilyn Chase, she is to increase Hydralazine to 50 mg bid, new rx sent to pharm; continue Metoprolol 50 mg; follow low sodium diet; monitor bp at home and call w/ readings next week.

## 2022-07-25 ENCOUNTER — COMMUNITY OUTREACH (OUTPATIENT)
Dept: INTERNAL MEDICINE CLINIC | Facility: CLINIC | Age: 72
End: 2022-07-25

## 2022-07-25 NOTE — PROGRESS NOTES
Patient's HM shows they are overdue for Mammogram, Colorectal Screening and Cervical Cancer Screening. [employer] and  files searched. No results to attach to order nor HM updated.

## 2022-07-26 ENCOUNTER — TELEPHONE (OUTPATIENT)
Dept: CARDIOLOGY CLINIC | Age: 72
End: 2022-07-26

## 2022-07-26 ENCOUNTER — PATIENT MESSAGE (OUTPATIENT)
Dept: INTERNAL MEDICINE CLINIC | Facility: CLINIC | Age: 72
End: 2022-07-26

## 2022-07-26 NOTE — TELEPHONE ENCOUNTER
Very high BP-159/111 for many weeks. Patient states she is in stage 5 renal failure. Was told by Dr. Michael Walker that high BP is not because of heart problems. Very concerned that she will have stroke, if BP remains this high. Patient asks new cardiologist, and asks for appointment with another cardiologist, ASAP. Advised patient that high BP is usually managed by nephrologist in late stages of renal failure. Tentatively scheduled SA appointment with Dr. Serge Gil tomorrow at 2:15 pm. Advised patient that both Dr. Michael Walker and Dr. Serge Gil will have to give okay for patient to change cardiologist. Winston Sheparddain patient that I will ask Dr. Michael Walker and Dr. Serge Gil to okay change, and call back. Patient verbalized understanding.

## 2022-07-27 ENCOUNTER — PATIENT MESSAGE (OUTPATIENT)
Dept: INTERNAL MEDICINE CLINIC | Facility: CLINIC | Age: 72
End: 2022-07-27

## 2022-07-27 ENCOUNTER — OFFICE VISIT (OUTPATIENT)
Dept: CARDIOLOGY CLINIC | Age: 72
End: 2022-07-27
Payer: MEDICARE

## 2022-07-27 ENCOUNTER — TELEPHONE (OUTPATIENT)
Dept: INTERNAL MEDICINE CLINIC | Facility: CLINIC | Age: 72
End: 2022-07-27

## 2022-07-27 VITALS
BODY MASS INDEX: 22.79 KG/M2 | WEIGHT: 159.2 LBS | SYSTOLIC BLOOD PRESSURE: 164 MMHG | HEIGHT: 70 IN | DIASTOLIC BLOOD PRESSURE: 90 MMHG | HEART RATE: 67 BPM

## 2022-07-27 DIAGNOSIS — R06.02 SHORTNESS OF BREATH: ICD-10-CM

## 2022-07-27 DIAGNOSIS — I10 ESSENTIAL HYPERTENSION: Primary | ICD-10-CM

## 2022-07-27 DIAGNOSIS — N18.5 STAGE 5 CHRONIC KIDNEY DISEASE (HCC): ICD-10-CM

## 2022-07-27 DIAGNOSIS — R07.89 ATYPICAL CHEST PAIN: ICD-10-CM

## 2022-07-27 PROCEDURE — 93000 ELECTROCARDIOGRAM COMPLETE: CPT | Performed by: INTERNAL MEDICINE

## 2022-07-27 PROCEDURE — 1123F ACP DISCUSS/DSCN MKR DOCD: CPT | Performed by: INTERNAL MEDICINE

## 2022-07-27 PROCEDURE — 99214 OFFICE O/P EST MOD 30 MIN: CPT | Performed by: INTERNAL MEDICINE

## 2022-07-27 RX ORDER — HYDRALAZINE HYDROCHLORIDE 100 MG/1
100 TABLET, FILM COATED ORAL 3 TIMES DAILY
Qty: 360 TABLET | Refills: 3 | Status: SHIPPED | OUTPATIENT
Start: 2022-07-27

## 2022-07-27 ASSESSMENT — ENCOUNTER SYMPTOMS: SHORTNESS OF BREATH: 1

## 2022-07-27 NOTE — TELEPHONE ENCOUNTER
From: Elisa Hernandez  To: Dr. Eb Mathew: 2022  2:28 PM EDT  Subject: Blood pressure     Spoke with insurance rep last night. She confirmed stage 5 kidney failure and were both alarmed with readings that continue without change. Ill try to secure a cardiologist today. Meanwhile, Darius started taking BOTH blood pressure meds THREE times per day for 5 days yet no change in blood pressure. Im abit worried and hope you have time to address my concerns. Thank you          Note         What bp readings is she getting at home?  Schedule FTF visit, first available, with bp check;

## 2022-07-27 NOTE — PATIENT INSTRUCTIONS
Patient Education        High Blood Pressure: Care Instructions  Overview     It's normal for blood pressure to go up and down throughout the day. But if it stays up, you have high blood pressure. Another name for high blood pressure ishypertension. Despite what a lot of people think, high blood pressure usually doesn't cause headaches or make you feel dizzy or lightheaded. It usually has no symptoms. But it does increase your risk of stroke, heart attack, and other problems. You and your doctor will talk about your risks of these problems based on yourblood pressure. Your doctor will give you a goal for your blood pressure. Your goal will bebased on your health and your age. Lifestyle changes, such as eating healthy and being active, are always important to help lower blood pressure. You might also take medicine to reachyour blood pressure goal.  Follow-up care is a key part of your treatment and safety. Be sure to make and go to all appointments, and call your doctor if you are having problems. It's also a good idea to know your test results and keep alist of the medicines you take. How can you care for yourself at home? Medical treatment  If you stop taking your medicine, your blood pressure will go back up. You may take one or more types of medicine to lower your blood pressure. Be safe with medicines. Take your medicine exactly as prescribed. Call your doctor if you think you are having a problem with your medicine. Talk to your doctor before you start taking aspirin every day. Aspirin can help certain people lower their risk of a heart attack or stroke. But taking aspirin isn't right for everyone, because it can cause serious bleeding. See your doctor regularly. You may need to see the doctor more often at first or until your blood pressure comes down. If you are taking blood pressure medicine, talk to your doctor before you take decongestants or anti-inflammatory medicine, such as ibuprofen.  Some of these medicines can raise blood pressure. Learn how to check your blood pressure at home. Lifestyle changes  Stay at a healthy weight. This is especially important if you put on weight around the waist. Losing even 10 pounds can help you lower your blood pressure. If your doctor recommends it, get more exercise. Walking is a good choice. Bit by bit, increase the amount you walk every day. Try for at least 30 minutes on most days of the week. You also may want to swim, bike, or do other activities. Avoid or limit alcohol. Talk to your doctor about whether you can drink any alcohol. Try to limit how much sodium you eat to less than 2,300 milligrams (mg) a day. Your doctor may ask you to try to eat less than 1,500 mg a day. Eat plenty of fruits (such as bananas and oranges), vegetables, legumes, whole grains, and low-fat dairy products. Lower the amount of saturated fat in your diet. Saturated fat is found in animal products such as milk, cheese, and meat. Limiting these foods may help you lose weight and also lower your risk for heart disease. Do not smoke. Smoking increases your risk for heart attack and stroke. If you need help quitting, talk to your doctor about stop-smoking programs and medicines. These can increase your chances of quitting for good. When should you call for help? Call 911  anytime you think you may need emergency care. This may mean having symptoms that suggest that your blood pressure is causing a serious heart or blood vessel problem. Your blood pressure may be over 180/120. For example, call 911 if:    You have symptoms of a heart attack. These may include:  Chest pain or pressure, or a strange feeling in the chest.  Sweating. Shortness of breath. Nausea or vomiting. Pain, pressure, or a strange feeling in the back, neck, jaw, or upper belly or in one or both shoulders or arms. Lightheadedness or sudden weakness. A fast or irregular heartbeat.      You have symptoms of a stroke. These may include:  Sudden numbness, tingling, weakness, or loss of movement in your face, arm, or leg, especially on only one side of your body. Sudden vision changes. Sudden trouble speaking. Sudden confusion or trouble understanding simple statements. Sudden problems with walking or balance. A sudden, severe headache that is different from past headaches. You have severe back or belly pain. Do not wait until your blood pressure comes down on its own. Get help right away. Call your doctor now or seek immediate care if:    Your blood pressure is much higher than normal (such as 180/120 or higher), but you don't have symptoms. You think high blood pressure is causing symptoms, such as:  Severe headache. Blurry vision. Watch closely for changes in your health, and be sure to contact your doctor if:    Your blood pressure measures higher than your doctor recommends at least 2 times. That means the top number is higher or the bottom number is higher, or both. You think you may be having side effects from your blood pressure medicine. Where can you learn more? Go to https://[email or web address].ComptTIA. org and sign in to your [employer] account. Enter H882 in the BFKW box to learn more about \"High Blood Pressure: Care Instructions. \"     If you do not have an account, please click on the \"Sign Up Now\" link. Current as of: January 10, 2022               Content Version: 13.3  © 6123-8772 Healthwise, Incorporated. Care instructions adapted under license by [facility]. If you have questions about a medical condition or this instruction, always ask your healthcare professional. [address] any warranty or liability for your use of this information. Please visit www.cardiosmart. org for more information regarding cardiovascular disease prevention and treatment.

## 2022-07-27 NOTE — TELEPHONE ENCOUNTER
From: Dacia Scott  To: Dr. Edy Copeland: 7/27/2022 12:23 PM EDT  Subject: Nephrologist     Please refer me to a nephrologist. The one assigned to me at \[facility]\"" isnt available until September. Im seeing a cardiologist today but will need an accessible kidney doctor. They all seem to be from same demian group and not scheduling appts.

## 2022-07-27 NOTE — TELEPHONE ENCOUNTER
From: Aleena Maharaj  To: Dr. Alexus Childress: 7/26/2022 2:28 PM EDT  Subject: Blood pressure     Spoke with insurance rep last night. She confirmed stage 5 kidney failure and were both alarmed with readings that continue without change. Ill try to secure a cardiologist today. Meanwhile, Darius started taking BOTH blood pressure meds THREE times per day for 5 days yet no change in blood pressure. Im dengt worried and hope you have time to address my concerns.  Thank you

## 2022-07-27 NOTE — TELEPHONE ENCOUNTER
Advised patient primary cardiologist change approved by Dr. Delmer Kidd and Dr. Elkin King. Advised patient okay to come for today's 2:15 pm SA appointment, as scheduled. Patient verbalized understanding.

## 2022-07-27 NOTE — PROGRESS NOTES
(CHOLECALCIFEROL) 125 MCG (5000 UT) TABS tablet Take 5,000 Units by mouth daily   Yes Ar Automatic Reconciliation   levothyroxine (SYNTHROID) 125 MCG tablet Take 125 mcg by mouth every morning (before breakfast) 3/29/22  Yes Ar Automatic Reconciliation   metoprolol tartrate (LOPRESSOR) 50 MG tablet Take 50 mg by mouth in the morning and 50 mg at noon and 50 mg before bedtime.  1/26/22  Yes Ar Automatic Reconciliation   pantoprazole (PROTONIX) 40 MG tablet Take 40 mg by mouth 2 times daily 2/9/22  Yes Ar Automatic Reconciliation   sodium bicarbonate 650 MG tablet TAKE 1 TABLET BY MOUTH TWICE DAILY 11/4/21  Yes Ar Automatic Reconciliation     No Known Allergies  Past Medical History:   Diagnosis Date    Arthritis     Chronic pain     arthritis knees    GERD (gastroesophageal reflux disease)     controlled with med    High cholesterol     Hypertension     Kidney disease     acute renal failure 7/2020 (hospitalized x 6 days)  Followed by Dr. You Whitten, nephrologist      Liver disease     hep C,     Melanoma ([facility] [address]     RLE,     Psychiatric disorder     depression, treated with cymbalta    PUD (peptic ulcer disease)     Rheumatic fever     as a child    Thyroid disease     hypo     Past Surgical History:   Procedure Laterality Date    APPENDECTOMY      CATARACT REMOVAL  2019    bilateral, IOL implant    CHOLECYSTECTOMY      COLONOSCOPY  2015    NEUROLOGICAL SURGERY      CERVICAL AND LUMBAR SX    ORTHOPEDIC SURGERY Left     KNEE SCOPE    UPPER GASTROINTESTINAL ENDOSCOPY      EGD    UROLOGICAL SURGERY  07/2020    cystoscopy, insertion berkowitz catheter (suprapubic)    UROLOGICAL SURGERY  2016    cystoscopy, urethral dilatation     Family History   Problem Relation Age of Onset    No Known Problems Mother     No Known Problems Father      Social History     Tobacco Use    Smoking status: Former     Packs/day: 0.50     Types: Cigarettes     Start date: 10/28/2019    Smokeless tobacco: Never    Tobacco comments:     Quit smoking: Quit 2019   Substance Use Topics    Alcohol use: No       ROS:    Review of Systems   Constitutional: Positive for malaise/fatigue. Respiratory:  Positive for shortness of breath. PHYSICAL EXAM:   BP (!) 164/90   Pulse 67   Ht 5' 10\" (1.778 m)   Wt 159 lb 3.2 oz (72.2 kg) Comment: shoes  BMI 22.84 kg/m²      Wt Readings from Last 3 Encounters:   07/27/22 159 lb 3.2 oz (72.2 kg)   06/15/22 160 lb (72.6 kg)   05/16/22 160 lb 3.2 oz (72.7 kg)     BP Readings from Last 3 Encounters:   07/27/22 (!) 164/90   06/15/22 (!) 148/100   05/16/22 (!) 170/90     Pulse Readings from Last 3 Encounters:   07/27/22 67   06/15/22 80   05/16/22 70           Physical Exam  Constitutional:       Appearance: Normal appearance. HENT:      Head: Normocephalic and atraumatic. Eyes:      Conjunctiva/sclera: Conjunctivae normal.   Neck:      Vascular: No carotid bruit. Cardiovascular:      Rate and Rhythm: Normal rate and regular rhythm. Heart sounds: No murmur heard. No friction rub. No gallop. Pulmonary:      Effort: No respiratory distress. Breath sounds: No wheezing or rales. Musculoskeletal:         General: No swelling. Cervical back: Neck supple. Skin:     General: Skin is warm and dry. Neurological:      General: No focal deficit present. Mental Status: She is alert. Psychiatric:         Mood and Affect: Mood normal.         Behavior: Behavior normal.       Medical problems and test results were reviewed with the patient today.      DATA REVIEW    LIPID PANEL     Lab Results   Component Value Date    CHOL 218 (H) 04/29/2022    CHOL 256 (H) 06/16/2021    CHOL 177 10/19/2020     Lab Results   Component Value Date    TRIG 254 (H) 04/29/2022    TRIG 407 (H) 06/16/2021    TRIG 138 10/19/2020     Lab Results   Component Value Date    HDL 72 04/29/2022    HDL 65 06/16/2021    HDL 78 10/19/2020     Lab Results   Component Value Date    LDLCALC 103 (H) 04/29/2022    LDLCALC 120 (H) 06/16/2021 LDLCALC 76 10/19/2020     Lab Results   Component Value Date    VLDL 43 (H) 04/29/2022    VLDL 71 (H) 06/16/2021    VLDL 23 10/19/2020     No results found for: CHOLHDLRATIO    BMP  Lab Results   Component Value Date/Time     04/29/2022 02:04 PM    K 3.7 04/29/2022 02:04 PM     04/29/2022 02:04 PM    CO2 18 04/29/2022 02:04 PM    BUN 39 04/29/2022 02:04 PM    CREATININE 3.83 04/29/2022 02:04 PM    GLUCOSE 202 04/29/2022 02:04 PM    CALCIUM 9.0 04/29/2022 02:04 PM          EKG    5/16/22 - SR, first degree AV block, nstwf  No significant change    Today - baseline artifact  Sinus rhythm  first degree AV block  normal axis  nstwf  no change from previous    CXR/IMAGING        DEVICE INTERROGATION        OUTSIDE RECORDS REVIEW    Records from outside providers have been reviewed and summarized as noted in the HPI, past history and data review sections of this note, and reviewed with patient. .       ASSESSMENT and PLAN    Elizabeth Taveras was seen today for new patient and hypertension. Diagnoses and all orders for this visit:    Essential hypertension  -     EKG 12 Lead    Stage 5 chronic kidney disease (HCC)    Atypical chest pain    Shortness of breath  -     Transthoracic echocardiogram (TTE) complete with contrast, bubble, strain, and 3D PRN; Future    Other orders  -     hydrALAZINE (APRESOLINE) 100 MG tablet; Take 1 tablet by mouth in the morning and 1 tablet at noon and 1 tablet before bedtime. IMPRESSION:    Stage V CKD/ESRD. Explained this will drive hypertension, fatigue and makes treatment difficult with limited medication options. Recommend increase hydralazine to 100 mg TID, return in 4-6 weeks with home monitor for nurse BP check. Heart rate will limit BB therapy, renal disease will negate ACE/ARB/ARNI but could add amlodipine if need be. Chronic dyspnea and fatigue, encouraged to get echo previously ordered and will follow up afterwards    Work on following up with nephrology.

## 2022-07-28 ENCOUNTER — OFFICE VISIT (OUTPATIENT)
Dept: UROLOGY | Age: 72
End: 2022-07-28
Payer: MEDICARE

## 2022-07-28 DIAGNOSIS — Z93.59 SUPRAPUBIC CATHETER (HCC): Primary | ICD-10-CM

## 2022-07-28 DIAGNOSIS — N35.92 STRICTURE OF FEMALE URETHRA, UNSPECIFIED STRICTURE TYPE: ICD-10-CM

## 2022-07-28 PROCEDURE — 1123F ACP DISCUSS/DSCN MKR DOCD: CPT | Performed by: UROLOGY

## 2022-07-28 PROCEDURE — 99214 OFFICE O/P EST MOD 30 MIN: CPT | Performed by: UROLOGY

## 2022-07-28 ASSESSMENT — ENCOUNTER SYMPTOMS: NAUSEA: 0

## 2022-07-28 NOTE — PROGRESS NOTES
[facility]  [address] [facility]    [address], [address] [facility]  [phone]          Timothy Gregg  : 1950    Chief Complaint   Patient presents with    Follow-up          HPI     Timothy Gregg is a 73 y.o. female with hx of urethral stricture disease and incomplete bladder emptying. She presented to ER per PCP advice based on abnormal blood work with a Cn of 15.5. In the ER she reported difficulty voiding and had long hx of this. She was only able to dribble urine. She said it had gotten progressively worse over the previous 2 days. Abdomen was distended. RNs attempted to place berkowitz but were unable to locate urethra. Hx of urethral stricture, last dilations in 2016 and 2016. S/P SP cath placement 2020. She originally plugged/unplugged but now keeps it open to bag. Has been having monthly cath change. She has had a couple episodes of cellulitis around SP.  NO issue currently.       Past Medical History:   Diagnosis Date    Arthritis     Chronic pain     arthritis knees    GERD (gastroesophageal reflux disease)     controlled with med    High cholesterol     Hypertension     Kidney disease     acute renal failure 2020 (hospitalized x 6 days)  Followed by Dr. Acosta Ortega, nephrologist      Liver disease     hep C,     Melanoma ([facility] [address]     RLE,     Psychiatric disorder     depression, treated with cymbalta    PUD (peptic ulcer disease)     Rheumatic fever     as a child    Thyroid disease     hypo     Past Surgical History:   Procedure Laterality Date    APPENDECTOMY      CATARACT REMOVAL      bilateral, IOL implant    CHOLECYSTECTOMY      COLONOSCOPY  2015    NEUROLOGICAL SURGERY      CERVICAL AND LUMBAR SX    ORTHOPEDIC SURGERY Left     KNEE SCOPE    UPPER GASTROINTESTINAL ENDOSCOPY      EGD    UROLOGICAL SURGERY  2020    cystoscopy, insertion berkowitz catheter (suprapubic)    UROLOGICAL SURGERY  2016    cystoscopy, urethral dilatation     Current Outpatient

## 2022-07-29 ENCOUNTER — TELEPHONE (OUTPATIENT)
Dept: INTERNAL MEDICINE CLINIC | Facility: CLINIC | Age: 72
End: 2022-07-29

## 2022-07-29 NOTE — TELEPHONE ENCOUNTER
Patient requested Nephrology referral; she saw Dr. Man Fairly last year; has she tried to contact his office for appointment or does she need referral to see someone else?

## 2022-08-04 NOTE — TELEPHONE ENCOUNTER
LMOVM informing pt, per Dr. Aysha Jarrell, she saw Dr. Chris Gould last year. Has she tried to contact his office for an appointment or does she need referral to see someone else? Asked pt to call back and let us know.

## 2022-08-05 NOTE — TELEPHONE ENCOUNTER
Finally got in with another doctor at [facility]  and they are working on blood pressure. Same group as Dr. Newton Shaffer. Thanks for your help though!

## 2022-08-17 ENCOUNTER — NURSE ONLY (OUTPATIENT)
Dept: UROLOGY | Age: 72
End: 2022-08-17
Payer: MEDICARE

## 2022-08-17 DIAGNOSIS — Z46.6 URINARY CATHETER CHANGE REQUIRED: ICD-10-CM

## 2022-08-17 DIAGNOSIS — Z93.59 SUPRAPUBIC CATHETER (HCC): Primary | ICD-10-CM

## 2022-08-17 PROCEDURE — 51702 INSERT TEMP BLADDER CATH: CPT | Performed by: UROLOGY

## 2022-08-29 ENCOUNTER — NURSE ONLY (OUTPATIENT)
Dept: CARDIOLOGY CLINIC | Age: 72
End: 2022-08-29

## 2022-08-29 NOTE — PROGRESS NOTES
Patient came in for BP check. Nephrologist added nifedipine 30 mg 1 qd. Also takes hydralazine 100 mg 1 TID and metoprolol 50 mg 1 bid. /92 HR:74    BP by home machine :130/96 HR:75    Patient states BP starting dropping after the addition of Nifedipine except for her bottom number. Patient also requested echo results. Per Dr. Pina Twan and BP looks good. No added salts and exercise will help bring bottom number down. Informed patient of of Dr. Rogelio Garland response. She voiced understanding and wishes to cancel follow-up appointment.

## 2022-09-28 ENCOUNTER — NURSE ONLY (OUTPATIENT)
Dept: UROLOGY | Age: 72
End: 2022-09-28

## 2022-09-28 DIAGNOSIS — Z93.59 SUPRAPUBIC CATHETER (HCC): Primary | ICD-10-CM

## 2022-09-28 DIAGNOSIS — Z46.6 URINARY CATHETER CHANGE REQUIRED: ICD-10-CM

## 2022-09-28 NOTE — PROGRESS NOTES
Pt came in for catheter change. 18f suprapubic catheter changed without incident. Patient tolerated procedure well.

## 2022-09-30 ENCOUNTER — PATIENT MESSAGE (OUTPATIENT)
Dept: UROLOGY | Age: 72
End: 2022-09-30

## 2022-09-30 DIAGNOSIS — N39.0 URINARY TRACT INFECTION WITHOUT HEMATURIA, SITE UNSPECIFIED: Primary | ICD-10-CM

## 2022-10-03 ENCOUNTER — TELEPHONE (OUTPATIENT)
Dept: UROLOGY | Age: 72
End: 2022-10-03

## 2022-10-03 RX ORDER — SULFAMETHOXAZOLE AND TRIMETHOPRIM 800; 160 MG/1; MG/1
1 TABLET ORAL 2 TIMES DAILY
Qty: 14 TABLET | Refills: 0 | Status: SHIPPED | OUTPATIENT
Start: 2022-10-03 | End: 2022-10-10

## 2022-10-03 NOTE — TELEPHONE ENCOUNTER
SAE Loredo CNP 10/3/2022 3:40 PM EDT      ----- Message -----  From: Chang Kaufman MA  Sent: 10/3/2022 2:59 PM EDT  To: SAE Loredo CNP  Subject: FW: Burning       ----- Message -----  From: Ines Bonds  Sent: 9/30/2022 8:52 PM EDT  To: , *  Subject: Burning     The 6 weeks I let go by between catheter change may have caused infection. Burning at urethra, cloudy discharge at hole and discomfort around it. Had blood clots passing for 24hrs but none noticed recently.  Please advise what to do for my own stupidity

## 2022-10-03 NOTE — TELEPHONE ENCOUNTER
Regarding: Burning   ----- Message from Mikala Epperson MD sent at 10/3/2022  4:53 PM EDT -----       ----- Message from Mone Robles \"Deepti Or Wilda\" to SAE Moreno CNP sent at 9/30/2022  8:52 PM -----   The 6 weeks I let go by between catheter change may have caused infection. Burning at urethra, cloudy discharge at hole and discomfort around it. Had blood clots passing for 24hrs but none noticed recently.   Please advise what to do for my own stupidity

## 2022-10-28 RX ORDER — DULOXETIN HYDROCHLORIDE 60 MG/1
CAPSULE, DELAYED RELEASE ORAL
Qty: 90 CAPSULE | Refills: 1 | OUTPATIENT
Start: 2022-10-28

## 2022-11-02 ENCOUNTER — NURSE ONLY (OUTPATIENT)
Dept: UROLOGY | Age: 72
End: 2022-11-02
Payer: MEDICARE

## 2022-11-02 DIAGNOSIS — Z46.6 URINARY CATHETER CHANGE REQUIRED: ICD-10-CM

## 2022-11-02 DIAGNOSIS — Z93.59 SUPRAPUBIC CATHETER (HCC): Primary | ICD-10-CM

## 2022-11-02 PROCEDURE — 51705 CHANGE OF BLADDER TUBE: CPT | Performed by: UROLOGY

## 2022-11-04 ENCOUNTER — OFFICE VISIT (OUTPATIENT)
Dept: INTERNAL MEDICINE CLINIC | Facility: CLINIC | Age: 72
End: 2022-11-04
Payer: MEDICARE

## 2022-11-04 VITALS
TEMPERATURE: 97.4 F | OXYGEN SATURATION: 98 % | HEIGHT: 70 IN | WEIGHT: 154 LBS | DIASTOLIC BLOOD PRESSURE: 95 MMHG | BODY MASS INDEX: 22.05 KG/M2 | SYSTOLIC BLOOD PRESSURE: 120 MMHG | HEART RATE: 81 BPM

## 2022-11-04 DIAGNOSIS — F32.A DEPRESSIVE DISORDER: ICD-10-CM

## 2022-11-04 DIAGNOSIS — M54.12 CHRONIC RADICULAR CERVICAL PAIN: Primary | ICD-10-CM

## 2022-11-04 DIAGNOSIS — Z91.81 AT HIGH RISK FOR FALLS: ICD-10-CM

## 2022-11-04 DIAGNOSIS — R10.2 PELVIC PAIN: ICD-10-CM

## 2022-11-04 DIAGNOSIS — G89.29 CHRONIC RADICULAR CERVICAL PAIN: Primary | ICD-10-CM

## 2022-11-04 PROCEDURE — 1123F ACP DISCUSS/DSCN MKR DOCD: CPT | Performed by: NURSE PRACTITIONER

## 2022-11-04 PROCEDURE — 99214 OFFICE O/P EST MOD 30 MIN: CPT | Performed by: NURSE PRACTITIONER

## 2022-11-04 PROCEDURE — 3079F DIAST BP 80-89 MM HG: CPT | Performed by: NURSE PRACTITIONER

## 2022-11-04 PROCEDURE — 3074F SYST BP LT 130 MM HG: CPT | Performed by: NURSE PRACTITIONER

## 2022-11-04 RX ORDER — CEFDINIR 300 MG/1
CAPSULE ORAL
COMMUNITY
Start: 2022-10-14

## 2022-11-04 RX ORDER — GABAPENTIN 100 MG/1
100 CAPSULE ORAL DAILY
Qty: 60 CAPSULE | Refills: 1 | Status: SHIPPED | OUTPATIENT
Start: 2022-11-04 | End: 2023-01-03

## 2022-11-04 RX ORDER — ERGOCALCIFEROL 1.25 MG/1
CAPSULE ORAL
COMMUNITY
Start: 2022-09-12

## 2022-11-04 RX ORDER — DULOXETIN HYDROCHLORIDE 60 MG/1
60 CAPSULE, DELAYED RELEASE ORAL 2 TIMES DAILY
Qty: 60 CAPSULE | Refills: 3 | Status: SHIPPED | OUTPATIENT
Start: 2022-11-04

## 2022-11-04 RX ORDER — NIFEDIPINE 30 MG/1
TABLET, EXTENDED RELEASE ORAL
COMMUNITY
Start: 2022-10-04

## 2022-11-04 RX ORDER — BUDESONIDE 3 MG/1
9 CAPSULE, COATED PELLETS ORAL DAILY
COMMUNITY

## 2022-11-04 RX ORDER — FERROUS SULFATE 325(65) MG
TABLET ORAL
COMMUNITY
Start: 2015-07-28

## 2022-11-04 RX ORDER — SENNOSIDES 8.6 MG
650 CAPSULE ORAL EVERY 8 HOURS PRN
COMMUNITY

## 2022-11-04 ASSESSMENT — ENCOUNTER SYMPTOMS
COUGH: 0
EYE DISCHARGE: 0
NAUSEA: 0
ABDOMINAL PAIN: 0
SHORTNESS OF BREATH: 0
EYE PAIN: 0
BACK PAIN: 0
CONSTIPATION: 0
COLOR CHANGE: 0
APNEA: 0
ABDOMINAL DISTENTION: 0
SINUS PAIN: 0
DIARRHEA: 0
EYE REDNESS: 0
EYE ITCHING: 0

## 2022-11-04 NOTE — PROGRESS NOTES
[unfilled]  [address] [place] [id number]      PROGRESS NOTE    SUBJECTIVE:   Mark Noel is a 81 y.o. female seen for a follow up visit regarding the following chief complaint:     Chief Complaint   Patient presents with    Otalgia     R ear pain along jaw       Otalgia   Pertinent negatives include no abdominal pain, coughing, diarrhea, headaches, neck pain or rash. Patient with history of cervical disc disease presents with concerns of right ear pain that radiates into neck and down right arm. She denies any new injury or numbness or weakness. Denies any ear drainage or sinus drainage or fevers. Pain in jaw is positional and reproducible, worse with lying on right side. She has a history of disc problems with cervical surgeries. She takes topical voltaren with slight relief. Additional concerns of pelvic pain that is intermittent and random. Pain is on both lower pelvic area. She has a history urostomy and follows both urology and nephrology for CKD. She is also complaining of worsening depressed mood. She takes cymbalta 60mg daily for this but continues to have frequent depressed thoughts, trouble coping and increased sadness.   Current Outpatient Medications   Medication Sig Dispense Refill    vitamin D (ERGOCALCIFEROL) 1.25 MG (57076 UT) CAPS capsule TAKE ONE CAPSULE BY MOUTH EVERY WEEK FOR 7 DOSES      diclofenac sodium (VOLTAREN) 1 % GEL Apply 2 g topically 2 times daily      NIFEdipine (PROCARDIA XL) 30 MG extended release tablet TAKE ONE TABLET BY MOUTH ONE TIME DAILY      cefdinir (OMNICEF) 300 MG capsule TAKE ONE CAPSULE BY MOUTH ONE TIME DAILY FOR 11 DOSES THEN TAKE ONE CAPSULE BY MOUTH EVERY 12 HOURS ON THE 12TH DAY THEN TAKE ONE CAPSULE BY      ferrous sulfate (IRON 325) 325 (65 Fe) MG tablet 1 tab(s)      Ibuprofen-diphenhydrAMINE HCl 200-25 MG CAPS Take 2 capsules by mouth as needed      budesonide (ENTOCORT EC) 3 MG extended release capsule Take 9 mg by mouth daily      acetaminophen (TYLENOL) 650 MG extended release tablet Take 650 mg by mouth every 8 hours as needed      gabapentin (NEURONTIN) 100 MG capsule Take 1 capsule by mouth daily for 60 days. 60 capsule 1    DULoxetine (CYMBALTA) 60 MG extended release capsule Take 1 capsule by mouth 2 times daily 60 capsule 3    hydrALAZINE (APRESOLINE) 100 MG tablet Take 1 tablet by mouth in the morning and 1 tablet at noon and 1 tablet before bedtime. 360 tablet 3    Omega-3 Fatty Acids (FISH OIL) 1200 MG CAPS Take 1 capsule by mouth daily      ASPIRIN PO Take by mouth      atorvastatin (LIPITOR) 20 MG tablet Take 20 mg by mouth every other day      vitamin D3 (CHOLECALCIFEROL) 125 MCG (5000 UT) TABS tablet Take 5,000 Units by mouth daily      levothyroxine (SYNTHROID) 125 MCG tablet Take 125 mcg by mouth every morning (before breakfast)      metoprolol tartrate (LOPRESSOR) 50 MG tablet Take 50 mg by mouth in the morning and 50 mg at noon and 50 mg before bedtime. pantoprazole (PROTONIX) 40 MG tablet Take 40 mg by mouth 2 times daily      sodium bicarbonate 650 MG tablet TAKE 1 TABLET BY MOUTH TWICE DAILY       No current facility-administered medications for this visit.      Allergies   Allergen Reactions    Latex Itching, Rash and Swelling       Past Medical History:   Diagnosis Date    Arthritis     Chronic pain     arthritis knees    GERD (gastroesophageal reflux disease)     controlled with med    High cholesterol     Hypertension     Kidney disease     acute renal failure 7/2020 (hospitalized x 6 days)  Followed by Dr. Mansoor Price, nephrologist      Liver disease     hep C,     Melanoma ([facility] [address]     RLE,     Psychiatric disorder     depression, treated with cymbalta    PUD (peptic ulcer disease)     Rheumatic fever     as a child    Thyroid disease     hypo     Past Surgical History:   Procedure Laterality Date    APPENDECTOMY      CATARACT REMOVAL  2019    bilateral, IOL implant    CHOLECYSTECTOMY      COLONOSCOPY  2015 NEUROLOGICAL SURGERY      CERVICAL AND LUMBAR SX    ORTHOPEDIC SURGERY Left     KNEE SCOPE    UPPER GASTROINTESTINAL ENDOSCOPY      EGD    UROLOGICAL SURGERY  07/2020    cystoscopy, insertion berkowitz catheter (suprapubic)    UROLOGICAL SURGERY  2016    cystoscopy, urethral dilatation     Family History   Problem Relation Age of Onset    No Known Problems Mother     No Known Problems Father      Social History     Tobacco Use    Smoking status: Former     Packs/day: 0.50     Types: Cigarettes     Start date: 10/28/2019    Smokeless tobacco: Never    Tobacco comments:     Quit smoking: Quit 2019   Substance Use Topics    Alcohol use: No       Review of Systems   Constitutional:  Negative for activity change, appetite change, chills, fatigue and fever. HENT:  Positive for ear pain. Negative for congestion and sinus pain. Eyes:  Negative for pain, discharge, redness and itching. Respiratory:  Negative for apnea, cough and shortness of breath. Cardiovascular:  Negative for chest pain, palpitations and leg swelling. Gastrointestinal:  Negative for abdominal distention, abdominal pain, constipation, diarrhea and nausea. Endocrine: Negative for cold intolerance and heat intolerance. Genitourinary:  Negative for difficulty urinating, dysuria, enuresis and urgency. Musculoskeletal:  Positive for arthralgias (neck pain radiating to right arm). Negative for back pain, joint swelling, myalgias and neck pain. Skin:  Negative for color change and rash. Neurological:  Negative for dizziness, weakness and headaches. Psychiatric/Behavioral:  Negative for behavioral problems and sleep disturbance. The patient is not nervous/anxious.         OBJECTIVE:  BP (!) 120/95 (Site: Left Upper Arm, Position: Sitting, Cuff Size: Small Adult)   Pulse 81   Temp 97.4 °F (36.3 °C) (Temporal)   Ht 5' 10\" (1.778 m)   Wt 154 lb (69.9 kg)   SpO2 98%   BMI 22.10 kg/m²      Physical Exam  Constitutional:       General: She is not in acute distress. Appearance: Normal appearance. She is not ill-appearing or toxic-appearing. HENT:      Right Ear: Tympanic membrane normal.      Left Ear: Tympanic membrane normal.      Nose: Nose normal.   Cardiovascular:      Rate and Rhythm: Normal rate and regular rhythm. Pulmonary:      Effort: Pulmonary effort is normal. No respiratory distress. Musculoskeletal:         General: Normal range of motion. Skin:     General: Skin is warm and dry. Neurological:      Mental Status: She is alert. Mental status is at baseline. Psychiatric:         Mood and Affect: Mood normal.         Behavior: Behavior normal.         Thought Content: Thought content normal.         ASSESSMENT and PLAN  Tami Lauren was seen today for otalgia. Diagnoses and all orders for this visit:    Chronic radicular cervical pain  -     Wesly Ratliff MD, [address]  -     gabapentin (NEURONTIN) 100 MG capsule; Take 1 capsule by mouth daily for 60 days. Pelvic pain  -     US PELVIS COMPLETE NON-OB TRANSABDOMINAL AND TRANSVAGINAL; Future    At high risk for falls    Depressive disorder  -     DULoxetine (CYMBALTA) 60 MG extended release capsule; Take 1 capsule by mouth 2 times daily    We discussed fall prevention and being cautious of surroundings; Start on neurontin for pain and follow with Orthopedic for consult; increase cymbalta to 120mg daily; obtain pelvic US for ongoing pelvic pain. Greater than 50% of this 35 min visit was spent counseling the patient about test results, prognosis, importance of compliance, education about disease process, benefits of medications, instructions for management of acute symptoms, and follow up plans. Follow-up and Dispositions    Return in about 3 months (around 2/4/2023) for recheck depression/pelvic pain.          Maria Luisa Bach NP, APRN - CNP

## 2022-11-09 ENCOUNTER — HOSPITAL ENCOUNTER (OUTPATIENT)
Dept: ULTRASOUND IMAGING | Age: 72
Discharge: HOME | End: 2022-11-12

## 2022-11-09 DIAGNOSIS — R10.2 PELVIC PAIN: ICD-10-CM

## 2022-11-11 ENCOUNTER — TELEPHONE (OUTPATIENT)
Dept: INTERNAL MEDICINE CLINIC | Facility: CLINIC | Age: 72
End: 2022-11-11

## 2022-11-11 NOTE — TELEPHONE ENCOUNTER
----- Message from SAE Cazares CNP sent at 11/11/2022  7:26 AM EST -----  Please let patient know that her US showed 2 small fibroids that would be causing her pain and irregular bleeding. She can follow with gynecology to further evaluate this-no referral necessary.   ----- Message -----  From: Woo Felix Incoming Orders Results To [facility]  Sent: 11/10/2022   4:05 PM EST  To: SAE Cazares CNP

## 2022-11-14 ENCOUNTER — OFFICE VISIT (OUTPATIENT)
Dept: ORTHOPEDIC SURGERY | Age: 72
End: 2022-11-14
Payer: MEDICARE

## 2022-11-14 DIAGNOSIS — Z98.1 STATUS POST CERVICAL ARTHRODESIS: ICD-10-CM

## 2022-11-14 DIAGNOSIS — M54.2 NECK PAIN: Primary | ICD-10-CM

## 2022-11-14 DIAGNOSIS — M50.30 DDD (DEGENERATIVE DISC DISEASE), CERVICAL: ICD-10-CM

## 2022-11-14 DIAGNOSIS — M54.12 CERVICAL RADICULOPATHY: ICD-10-CM

## 2022-11-14 PROCEDURE — 99203 OFFICE O/P NEW LOW 30 MIN: CPT | Performed by: PHYSICIAN ASSISTANT

## 2022-11-14 PROCEDURE — 1123F ACP DISCUSS/DSCN MKR DOCD: CPT | Performed by: PHYSICIAN ASSISTANT

## 2022-11-14 NOTE — LETTER
Belia PALMA HUYNH  1950  MRN [id number]                                              ROOM NUMBER______      Radiographic Studies:    Cervical MRI      Thoracic MRI         Lumbar MRI          Pelvis MRI        CONTRAST    CT Myelogram: _______________   NCS/EMG ________________ ( UE  /  LE )     MRI of ___________________          Other: ____________________      Injections:    KNEE    HIP  Depomedrol _____ mg Euflexxa _____    _______________ TFESI/SNRB  _______________ SI Joint  _______________ AP    _______________ Facet  _______________Piriformis/ Sciatica      Medications:    Oral Steroids _______________  NSAIDS _______________    Muscle Relaxers _______________  Neurontin/Lyrica _______________    Pain Medicine _______________  Other _______________                       Physical Therapy:    Lumbar     Thoracic      Cervical     Hip       Knee       Shoulder               Traction          Ultrasound          Dry Needling      Referral:    Pain referral:  [facility]   [facility]   Other: ______________________________    Follow-up/ Refer__________________________________________________    Authorization to hold blood thinners:___________________________________

## 2022-11-14 NOTE — PROGRESS NOTES
Name: Esa Briceno  YOB: 1950  Gender: female  MRN: [id number]    CC: Neck Pain (Right side neck and shoulder pain)       History of present illness: This is a very pleasant 81 y.o. old female who  has a past medical history of Arthritis, Chronic pain, GERD (gastroesophageal reflux disease), High cholesterol, Hypertension, Kidney disease, Liver disease, Melanoma ([address] Psychiatric disorder, PUD (peptic ulcer disease), Rheumatic fever, and Thyroid disease. .  She presents today with pain on the right side of her neck it can radiate into the shoulder and even radiated to the ear and her jaw at 1 time it was so severe she could hardly chew. Her PCP did feel that it was likely coming from the cervical spine. She was prescribed gabapentin. The acute pain has improved. She has a kidney disease, stage V and cannot take NSAIDs. She does report her symptoms have somewhat improved she has a little bit of tingling in her right hand no specific dermatomal pattern there is no weakness of the upper extremities. There have not been changes in fine motor skills such as handwriting and buttoning buttons. There have not been changes in gait since the onset of the symptoms. The patient has not had cervical surgery in the past.           AMB PAIN ASSESSMENT 11/14/2022   Location of Pain Neck   Location Modifiers Right   Severity of Pain 5   Duration of Pain A few minutes   Frequency of Pain Intermittent   Date Pain First Started 9/26/2022   Aggravating Factors Other (Comment)   Limiting Behavior No   Relieving Factors Other (Comment)   Result of Injury No   Work-Related Injury No   Are there other pain locations you wish to document? No          ROS/Meds/PSH/PMH/FH/SH: I personally reviewed the patient's collected intake data.   Below are the pertinents:    Allergies   Allergen Reactions    Latex Itching, Rash and Swelling         Current Outpatient Medications:     vitamin D (ERGOCALCIFEROL) 1.25 MG (30998 UT) CAPS capsule, TAKE ONE CAPSULE BY MOUTH EVERY WEEK FOR 7 DOSES, Disp: , Rfl:     diclofenac sodium (VOLTAREN) 1 % GEL, Apply 2 g topically 2 times daily, Disp: , Rfl:     NIFEdipine (PROCARDIA XL) 30 MG extended release tablet, TAKE ONE TABLET BY MOUTH ONE TIME DAILY, Disp: , Rfl:     cefdinir (OMNICEF) 300 MG capsule, TAKE ONE CAPSULE BY MOUTH ONE TIME DAILY FOR 11 DOSES THEN TAKE ONE CAPSULE BY MOUTH EVERY 12 HOURS ON THE 12TH DAY THEN TAKE ONE CAPSULE BY, Disp: , Rfl:     ferrous sulfate (IRON 325) 325 (65 Fe) MG tablet, 1 tab(s), Disp: , Rfl:     Ibuprofen-diphenhydrAMINE HCl 200-25 MG CAPS, Take 2 capsules by mouth as needed, Disp: , Rfl:     budesonide (ENTOCORT EC) 3 MG extended release capsule, Take 9 mg by mouth daily, Disp: , Rfl:     acetaminophen (TYLENOL) 650 MG extended release tablet, Take 650 mg by mouth every 8 hours as needed, Disp: , Rfl:     gabapentin (NEURONTIN) 100 MG capsule, Take 1 capsule by mouth daily for 60 days. , Disp: 60 capsule, Rfl: 1    DULoxetine (CYMBALTA) 60 MG extended release capsule, Take 1 capsule by mouth 2 times daily, Disp: 60 capsule, Rfl: 3    hydrALAZINE (APRESOLINE) 100 MG tablet, Take 1 tablet by mouth in the morning and 1 tablet at noon and 1 tablet before bedtime. , Disp: 360 tablet, Rfl: 3    Omega-3 Fatty Acids (FISH OIL) 1200 MG CAPS, Take 1 capsule by mouth daily, Disp: , Rfl:     ASPIRIN PO, Take by mouth, Disp: , Rfl:     atorvastatin (LIPITOR) 20 MG tablet, Take 20 mg by mouth every other day, Disp: , Rfl:     vitamin D3 (CHOLECALCIFEROL) 125 MCG (5000 UT) TABS tablet, Take 5,000 Units by mouth daily, Disp: , Rfl:     levothyroxine (SYNTHROID) 125 MCG tablet, Take 125 mcg by mouth every morning (before breakfast), Disp: , Rfl:     metoprolol tartrate (LOPRESSOR) 50 MG tablet, Take 50 mg by mouth in the morning and 50 mg at noon and 50 mg before bedtime. , Disp: , Rfl:     pantoprazole (PROTONIX) 40 MG tablet, Take 40 mg by mouth 2 times daily, Disp: , Rfl:     sodium bicarbonate 650 MG tablet, TAKE 1 TABLET BY MOUTH TWICE DAILY, Disp: , Rfl:   Past Surgical History:   Procedure Laterality Date    APPENDECTOMY      CATARACT REMOVAL  2019    bilateral, IOL implant    CHOLECYSTECTOMY      COLONOSCOPY  2015    NEUROLOGICAL SURGERY      CERVICAL AND LUMBAR SX    ORTHOPEDIC SURGERY Left     KNEE SCOPE    UPPER GASTROINTESTINAL ENDOSCOPY      EGD    UROLOGICAL SURGERY  07/2020    cystoscopy, insertion berkowitz catheter (suprapubic)    UROLOGICAL SURGERY  2016    cystoscopy, urethral dilatation     Patient Active Problem List   Diagnosis    Multiple joint pain    Anemia    Knee effusion    Lower obstructive uropathy    Hypertension    Acquired hypothyroidism    Gastroesophageal reflux disease without esophagitis    Primary osteoarthritis of left knee    Incisional hernia, without obstruction or gangrene    Suprapubic catheter (HCC)    History of hepatitis C    Stage 4 chronic kidney disease (HCC)    First degree AV block    CONKLIN (dyspnea on exertion)    Chronic kidney disease (CKD), stage V (HCC)    Hyperlipidemia    Atypical chest pain         Tobacco:  reports that she has quit smoking. Her smoking use included cigarettes. She started smoking about 3 years ago. She smoked an average of .5 packs per day. She has never used smokeless tobacco.  Alcohol:   Social History     Substance and Sexual Activity   Alcohol Use No        Physical Exam:   BMI: There is no height or weight on file to calculate BMI. GENERAL:  Adult in no acute distress, well developed, well nourished . Patient is appropriately conversant  CERVICAL SPINE:  Inspection of the neck reveals no evidence of rash or skin lesion. Examination of the cervical spine reveals no evidence of sagittal or coronal plane deformity and decreased ROM  Spurling's sign is negative to the bilateral  side for reproduction of radicular symptoms. Patient ambulates with a normal gait.    Sensory testing reveals intact sensation to light touch in the distribution of the C5-T1 dermatomes bilaterally  Reflexes     Right Left   Biceps (C5) 2 2   Brachio radialis (C6) 2 2    Triceps (C7) 2 2     Hernándze's is negative     Inverted radial reflex is negative      Tinel's and Alysha testing over the cubital and carpal tunnels do not reproduce the symptoms. Shoulder examination is not consistent with adhesive capsulitis or acute rotator cuff tendinitis. Strength testing in the upper extremity reveals the following based on the 5 point grading scale:     Delt(C5) Bicep(C6) WE(C6) Tricep (C7) WF(C7) (C8) Int (T1)   Right 5 5 5 5 5 5 5   Left 5 5 5 5 5 5 5     Pulses are palpable over bilateral radial arteries. Radiographic Studies:     AP and Lateral views of the Cervical Spine: 11/14/22  AP of the cervical spine shows advanced degenerative changes with facet arthropathy  Sagittal view of the cervical spine reveals degenerative change C2-3, C3-4.  4 5 without significant degenerative change, C5-6 is status post ACDF and C6-7 degenerative change. Niurka Mason impression: Prior ACDF C5-6. C2-4 degenerative change and C 6-7 degenerative change      Assessment/Plan:         Diagnosis Orders   1. Neck pain  XR CERVICAL SPINE (2-3 VIEWS)    Amb External Referral To Physical Therapy      2. Cervical radiculopathy  Amb External Referral To Physical Therapy      3. DDD (degenerative disc disease), cervical  Amb External Referral To Physical Therapy      4. Status post cervical arthrodesis  Amb External Referral To Physical Therapy          This patient's clinical history and physical exam is consistent with cervical radiculopathy involving primarily the right C2 and C3 nerve root(s). She has not showing exam findings or symptoms consistent with myelopathy at this time. Symptoms have improved since he initially began.   Recommend conservative treatment with physical therapy but if symptoms do not improve, MRI scan    I discussed the

## 2022-12-07 ENCOUNTER — NURSE ONLY (OUTPATIENT)
Dept: UROLOGY | Age: 72
End: 2022-12-07
Payer: MEDICARE

## 2022-12-07 DIAGNOSIS — Z46.6 URINARY CATHETER CHANGE REQUIRED: ICD-10-CM

## 2022-12-07 DIAGNOSIS — N35.92 STRICTURE OF FEMALE URETHRA, UNSPECIFIED STRICTURE TYPE: ICD-10-CM

## 2022-12-07 DIAGNOSIS — Z93.59 SUPRAPUBIC CATHETER (HCC): Primary | ICD-10-CM

## 2022-12-07 PROCEDURE — 51705 CHANGE OF BLADDER TUBE: CPT | Performed by: UROLOGY

## 2023-01-03 DIAGNOSIS — F32.A DEPRESSIVE DISORDER: ICD-10-CM

## 2023-01-04 RX ORDER — DULOXETIN HYDROCHLORIDE 60 MG/1
60 CAPSULE, DELAYED RELEASE ORAL 2 TIMES DAILY
Qty: 180 CAPSULE | Refills: 1 | Status: SHIPPED | OUTPATIENT
Start: 2023-01-04

## 2023-01-18 ENCOUNTER — NURSE ONLY (OUTPATIENT)
Dept: UROLOGY | Age: 73
End: 2023-01-18
Payer: COMMERCIAL

## 2023-01-18 DIAGNOSIS — Z93.59 SUPRAPUBIC CATHETER (HCC): Primary | ICD-10-CM

## 2023-01-18 DIAGNOSIS — Z46.6 URINARY CATHETER CHANGE REQUIRED: ICD-10-CM

## 2023-01-18 PROCEDURE — 51702 INSERT TEMP BLADDER CATH: CPT | Performed by: UROLOGY

## 2023-01-26 ENCOUNTER — HOSPITAL ENCOUNTER (INPATIENT)
Age: 73
LOS: 4 days | Discharge: HOME | DRG: 673 | End: 2023-01-30
Attending: EMERGENCY MEDICINE | Admitting: FAMILY MEDICINE
Payer: MEDICARE

## 2023-01-26 DIAGNOSIS — N18.9 ACUTE ON CHRONIC RENAL INSUFFICIENCY: Primary | ICD-10-CM

## 2023-01-26 DIAGNOSIS — N28.9 ACUTE ON CHRONIC RENAL INSUFFICIENCY: Primary | ICD-10-CM

## 2023-01-26 DIAGNOSIS — N18.6 ESRD (END STAGE RENAL DISEASE) (HCC): ICD-10-CM

## 2023-01-26 PROBLEM — N13.9 LOWER OBSTRUCTIVE UROPATHY: Status: ACTIVE | Noted: 2020-07-16

## 2023-01-26 PROBLEM — N10 ACUTE PYELONEPHRITIS: Status: ACTIVE | Noted: 2023-01-26

## 2023-01-26 PROBLEM — N17.9 ACUTE KIDNEY INJURY SUPERIMPOSED ON CKD (HCC): Status: ACTIVE | Noted: 2023-01-26

## 2023-01-26 PROBLEM — K21.9 GASTROESOPHAGEAL REFLUX DISEASE WITHOUT ESOPHAGITIS: Status: ACTIVE | Noted: 2020-08-19

## 2023-01-26 PROBLEM — E87.20 METABOLIC ACIDOSIS: Status: ACTIVE | Noted: 2023-01-26

## 2023-01-26 PROBLEM — Z93.59 SUPRAPUBIC CATHETER (HCC): Status: ACTIVE | Noted: 2022-05-16

## 2023-01-26 PROBLEM — I16.0 HYPERTENSIVE URGENCY: Status: ACTIVE | Noted: 2023-01-26

## 2023-01-26 PROBLEM — E03.9 ACQUIRED HYPOTHYROIDISM: Status: ACTIVE | Noted: 2020-07-16

## 2023-01-26 LAB
ALBUMIN SERPL-MCNC: 3.7 G/DL (ref 3.2–4.6)
ALBUMIN/GLOB SERPL: 0.8 (ref 0.4–1.6)
ALP SERPL-CCNC: 194 U/L (ref 50–136)
ALT SERPL-CCNC: 16 U/L (ref 12–65)
ANION GAP SERPL CALC-SCNC: 11 MMOL/L (ref 2–11)
APPEARANCE UR: ABNORMAL
APTT PPP: 28.5 SEC (ref 24.5–34.2)
ARTERIAL PATENCY WRIST A: ABNORMAL
AST SERPL-CCNC: 41 U/L (ref 15–37)
BACTERIA URNS QL MICRO: ABNORMAL /HPF
BASE DEFICIT BLDV-SCNC: 8.1 MMOL/L
BASOPHILS # BLD: 0.1 K/UL (ref 0–0.2)
BASOPHILS NFR BLD: 1 % (ref 0–2)
BILIRUB SERPL-MCNC: 0.4 MG/DL (ref 0.2–1.1)
BILIRUB UR QL: NEGATIVE
BUN SERPL-MCNC: 54 MG/DL (ref 8–23)
CALCIUM SERPL-MCNC: 9 MG/DL (ref 8.3–10.4)
CASTS URNS QL MICRO: ABNORMAL /LPF
CHLORIDE SERPL-SCNC: 111 MMOL/L (ref 101–110)
CO2 BLD-SCNC: 17 MMOL/L (ref 13–23)
CO2 SERPL-SCNC: 19 MMOL/L (ref 21–32)
COLOR UR: ABNORMAL
CREAT SERPL-MCNC: 6.8 MG/DL (ref 0.6–1)
DIFFERENTIAL METHOD BLD: ABNORMAL
EOSINOPHIL # BLD: 0.1 K/UL (ref 0–0.8)
EOSINOPHIL NFR BLD: 1 % (ref 0.5–7.8)
EPI CELLS #/AREA URNS HPF: ABNORMAL /HPF
ERYTHROCYTE [DISTWIDTH] IN BLOOD BY AUTOMATED COUNT: 13.7 % (ref 11.9–14.6)
GLOBULIN SER CALC-MCNC: 4.6 G/DL (ref 2.8–4.5)
GLUCOSE SERPL-MCNC: 119 MG/DL (ref 65–100)
GLUCOSE UR STRIP.AUTO-MCNC: 100 MG/DL
HCO3 BLDV-SCNC: 17.9 MMOL/L (ref 23–28)
HCT VFR BLD AUTO: 36.9 % (ref 35.8–46.3)
HGB BLD-MCNC: 12 G/DL (ref 11.7–15.4)
HGB UR QL STRIP: ABNORMAL
IMM GRANULOCYTES # BLD AUTO: 0.1 K/UL (ref 0–0.5)
IMM GRANULOCYTES NFR BLD AUTO: 1 % (ref 0–5)
INR PPP: 1
KETONES UR QL STRIP.AUTO: 15 MG/DL
LEUKOCYTE ESTERASE UR QL STRIP.AUTO: ABNORMAL
LYMPHOCYTES # BLD: 1 K/UL (ref 0.5–4.6)
LYMPHOCYTES NFR BLD: 8 % (ref 13–44)
MCH RBC QN AUTO: 29.9 PG (ref 26.1–32.9)
MCHC RBC AUTO-ENTMCNC: 32.5 G/DL (ref 31.4–35)
MCV RBC AUTO: 91.8 FL (ref 82–102)
MONOCYTES # BLD: 0.5 K/UL (ref 0.1–1.3)
MONOCYTES NFR BLD: 4 % (ref 4–12)
NEUTS SEG # BLD: 10.9 K/UL (ref 1.7–8.2)
NEUTS SEG NFR BLD: 85 % (ref 43–78)
NITRITE UR QL STRIP.AUTO: NEGATIVE
NRBC # BLD: 0 K/UL (ref 0–0.2)
OTHER OBSERVATIONS: ABNORMAL
PCO2 BLDV: 37.6 MMHG (ref 41–51)
PH BLDV: 7.29 (ref 7.32–7.42)
PH UR STRIP: 6 (ref 5–9)
PLATELET # BLD AUTO: 273 K/UL (ref 150–450)
PMV BLD AUTO: 9.6 FL (ref 9.4–12.3)
PO2 BLDV: 14 MMHG
POTASSIUM SERPL-SCNC: 4.7 MMOL/L (ref 3.5–5.1)
PROT SERPL-MCNC: 8.3 G/DL (ref 6.3–8.2)
PROT UR STRIP-MCNC: >300 MG/DL
PROTHROMBIN TIME: 13.5 SEC (ref 12.6–14.3)
RBC # BLD AUTO: 4.02 M/UL (ref 4.05–5.2)
SAO2 % BLDV: 14 % (ref 65–88)
SERVICE CMNT-IMP: ABNORMAL
SERVICE CMNT-IMP: ABNORMAL
SODIUM SERPL-SCNC: 141 MMOL/L (ref 133–143)
SP GR UR REFRACTOMETRY: 1.02 (ref 1–1.02)
SPECIMEN TYPE: ABNORMAL
UROBILINOGEN UR QL STRIP.AUTO: 0.2 EU/DL (ref 0.2–1)
WBC # BLD AUTO: 12.6 K/UL (ref 4.3–11.1)
WBC URNS QL MICRO: >100 /HPF

## 2023-01-26 PROCEDURE — 99285 EMERGENCY DEPT VISIT HI MDM: CPT | Performed by: EMERGENCY MEDICINE

## 2023-01-26 PROCEDURE — 2500000003 HC RX 250 WO HCPCS: Performed by: FAMILY MEDICINE

## 2023-01-26 PROCEDURE — 96361 HYDRATE IV INFUSION ADD-ON: CPT | Performed by: EMERGENCY MEDICINE

## 2023-01-26 PROCEDURE — 85025 COMPLETE CBC W/AUTO DIFF WBC: CPT

## 2023-01-26 PROCEDURE — 85610 PROTHROMBIN TIME: CPT

## 2023-01-26 PROCEDURE — 96375 TX/PRO/DX INJ NEW DRUG ADDON: CPT | Performed by: EMERGENCY MEDICINE

## 2023-01-26 PROCEDURE — 6360000002 HC RX W HCPCS

## 2023-01-26 PROCEDURE — 2500000003 HC RX 250 WO HCPCS: Performed by: EMERGENCY MEDICINE

## 2023-01-26 PROCEDURE — 82803 BLOOD GASES ANY COMBINATION: CPT

## 2023-01-26 PROCEDURE — 87088 URINE BACTERIA CULTURE: CPT

## 2023-01-26 PROCEDURE — 80053 COMPREHEN METABOLIC PANEL: CPT

## 2023-01-26 PROCEDURE — 81001 URINALYSIS AUTO W/SCOPE: CPT

## 2023-01-26 PROCEDURE — 96374 THER/PROPH/DIAG INJ IV PUSH: CPT | Performed by: EMERGENCY MEDICINE

## 2023-01-26 PROCEDURE — 85730 THROMBOPLASTIN TIME PARTIAL: CPT

## 2023-01-26 PROCEDURE — 1100000000 HC RM PRIVATE

## 2023-01-26 PROCEDURE — 87186 SC STD MICRODIL/AGAR DIL: CPT

## 2023-01-26 PROCEDURE — 6370000000 HC RX 637 (ALT 250 FOR IP): Performed by: FAMILY MEDICINE

## 2023-01-26 PROCEDURE — 6360000002 HC RX W HCPCS: Performed by: FAMILY MEDICINE

## 2023-01-26 PROCEDURE — 2580000003 HC RX 258: Performed by: FAMILY MEDICINE

## 2023-01-26 PROCEDURE — 87086 URINE CULTURE/COLONY COUNT: CPT

## 2023-01-26 PROCEDURE — 2580000003 HC RX 258

## 2023-01-26 RX ORDER — LABETALOL HYDROCHLORIDE 5 MG/ML
10 INJECTION, SOLUTION INTRAVENOUS EVERY 6 HOURS PRN
Status: DISCONTINUED | OUTPATIENT
Start: 2023-01-26 | End: 2023-01-30 | Stop reason: HOSPADM

## 2023-01-26 RX ORDER — SODIUM CHLORIDE 0.9 % (FLUSH) 0.9 %
5-40 SYRINGE (ML) INJECTION EVERY 12 HOURS SCHEDULED
Status: DISCONTINUED | OUTPATIENT
Start: 2023-01-26 | End: 2023-01-30 | Stop reason: HOSPADM

## 2023-01-26 RX ORDER — HYDRALAZINE HYDROCHLORIDE 25 MG/1
25 TABLET, FILM COATED ORAL EVERY 8 HOURS SCHEDULED
Status: DISCONTINUED | OUTPATIENT
Start: 2023-01-26 | End: 2023-01-29

## 2023-01-26 RX ORDER — METOPROLOL TARTRATE 50 MG/1
50 TABLET, FILM COATED ORAL 2 TIMES DAILY
Status: DISCONTINUED | OUTPATIENT
Start: 2023-01-26 | End: 2023-01-30 | Stop reason: HOSPADM

## 2023-01-26 RX ORDER — FERROUS SULFATE 325(65) MG
325 TABLET ORAL
Status: DISCONTINUED | OUTPATIENT
Start: 2023-01-27 | End: 2023-01-30 | Stop reason: HOSPADM

## 2023-01-26 RX ORDER — DULOXETIN HYDROCHLORIDE 30 MG/1
60 CAPSULE, DELAYED RELEASE ORAL 2 TIMES DAILY
Status: DISCONTINUED | OUTPATIENT
Start: 2023-01-26 | End: 2023-01-30 | Stop reason: HOSPADM

## 2023-01-26 RX ORDER — METOPROLOL TARTRATE 50 MG/1
50 TABLET, FILM COATED ORAL 3 TIMES DAILY
Status: DISCONTINUED | OUTPATIENT
Start: 2023-01-26 | End: 2023-01-26

## 2023-01-26 RX ORDER — 0.9 % SODIUM CHLORIDE 0.9 %
1000 INTRAVENOUS SOLUTION INTRAVENOUS ONCE
Status: COMPLETED | OUTPATIENT
Start: 2023-01-26 | End: 2023-01-26

## 2023-01-26 RX ORDER — POLYETHYLENE GLYCOL 3350 17 G/17G
17 POWDER, FOR SOLUTION ORAL DAILY PRN
Status: DISCONTINUED | OUTPATIENT
Start: 2023-01-26 | End: 2023-01-30 | Stop reason: HOSPADM

## 2023-01-26 RX ORDER — ACETAMINOPHEN 650 MG/1
650 SUPPOSITORY RECTAL EVERY 6 HOURS PRN
Status: DISCONTINUED | OUTPATIENT
Start: 2023-01-26 | End: 2023-01-30 | Stop reason: HOSPADM

## 2023-01-26 RX ORDER — ASPIRIN 81 MG/1
81 TABLET, CHEWABLE ORAL DAILY
Status: DISCONTINUED | OUTPATIENT
Start: 2023-01-27 | End: 2023-01-30 | Stop reason: HOSPADM

## 2023-01-26 RX ORDER — SODIUM CHLORIDE 9 MG/ML
INJECTION, SOLUTION INTRAVENOUS PRN
Status: DISCONTINUED | OUTPATIENT
Start: 2023-01-26 | End: 2023-01-30 | Stop reason: HOSPADM

## 2023-01-26 RX ORDER — HEPARIN SODIUM 5000 [USP'U]/ML
5000 INJECTION, SOLUTION INTRAVENOUS; SUBCUTANEOUS 2 TIMES DAILY
Status: DISCONTINUED | OUTPATIENT
Start: 2023-01-27 | End: 2023-01-30 | Stop reason: HOSPADM

## 2023-01-26 RX ORDER — HYDRALAZINE HYDROCHLORIDE 25 MG/1
100 TABLET, FILM COATED ORAL 3 TIMES DAILY
Status: DISCONTINUED | OUTPATIENT
Start: 2023-01-26 | End: 2023-01-26

## 2023-01-26 RX ORDER — HYDRALAZINE HYDROCHLORIDE 20 MG/ML
10 INJECTION INTRAMUSCULAR; INTRAVENOUS EVERY 6 HOURS PRN
Status: DISCONTINUED | OUTPATIENT
Start: 2023-01-26 | End: 2023-01-30 | Stop reason: HOSPADM

## 2023-01-26 RX ORDER — PANTOPRAZOLE SODIUM 40 MG/1
40 TABLET, DELAYED RELEASE ORAL 2 TIMES DAILY
Status: DISCONTINUED | OUTPATIENT
Start: 2023-01-26 | End: 2023-01-30 | Stop reason: HOSPADM

## 2023-01-26 RX ORDER — HEPARIN SODIUM 5000 [USP'U]/ML
5000 INJECTION, SOLUTION INTRAVENOUS; SUBCUTANEOUS EVERY 8 HOURS SCHEDULED
Status: DISCONTINUED | OUTPATIENT
Start: 2023-01-26 | End: 2023-01-26

## 2023-01-26 RX ORDER — ONDANSETRON 4 MG/1
4 TABLET, ORALLY DISINTEGRATING ORAL EVERY 8 HOURS PRN
Status: DISCONTINUED | OUTPATIENT
Start: 2023-01-26 | End: 2023-01-30 | Stop reason: HOSPADM

## 2023-01-26 RX ORDER — VITAMIN B COMPLEX
5000 TABLET ORAL DAILY
Status: DISCONTINUED | OUTPATIENT
Start: 2023-01-26 | End: 2023-01-30 | Stop reason: HOSPADM

## 2023-01-26 RX ORDER — ATORVASTATIN CALCIUM 20 MG/1
20 TABLET, FILM COATED ORAL EVERY OTHER DAY
Status: DISCONTINUED | OUTPATIENT
Start: 2023-01-26 | End: 2023-01-30 | Stop reason: HOSPADM

## 2023-01-26 RX ORDER — NIFEDIPINE 30 MG/1
30 TABLET, EXTENDED RELEASE ORAL DAILY
Status: DISCONTINUED | OUTPATIENT
Start: 2023-01-26 | End: 2023-01-30 | Stop reason: HOSPADM

## 2023-01-26 RX ORDER — LABETALOL HYDROCHLORIDE 5 MG/ML
20 INJECTION, SOLUTION INTRAVENOUS
Status: COMPLETED | OUTPATIENT
Start: 2023-01-26 | End: 2023-01-26

## 2023-01-26 RX ORDER — SODIUM CHLORIDE 0.9 % (FLUSH) 0.9 %
5-40 SYRINGE (ML) INJECTION PRN
Status: DISCONTINUED | OUTPATIENT
Start: 2023-01-26 | End: 2023-01-30 | Stop reason: HOSPADM

## 2023-01-26 RX ORDER — BUDESONIDE 3 MG/1
9 CAPSULE, COATED PELLETS ORAL DAILY
Status: DISCONTINUED | OUTPATIENT
Start: 2023-01-26 | End: 2023-01-26

## 2023-01-26 RX ORDER — ONDANSETRON 2 MG/ML
4 INJECTION INTRAMUSCULAR; INTRAVENOUS
Status: COMPLETED | OUTPATIENT
Start: 2023-01-26 | End: 2023-01-26

## 2023-01-26 RX ORDER — ACETAMINOPHEN 325 MG/1
650 TABLET ORAL EVERY 6 HOURS PRN
Status: DISCONTINUED | OUTPATIENT
Start: 2023-01-26 | End: 2023-01-30 | Stop reason: HOSPADM

## 2023-01-26 RX ORDER — ONDANSETRON 2 MG/ML
4 INJECTION INTRAMUSCULAR; INTRAVENOUS EVERY 6 HOURS PRN
Status: DISCONTINUED | OUTPATIENT
Start: 2023-01-26 | End: 2023-01-30 | Stop reason: HOSPADM

## 2023-01-26 RX ADMIN — SODIUM CHLORIDE, PRESERVATIVE FREE 10 ML: 5 INJECTION INTRAVENOUS at 20:24

## 2023-01-26 RX ADMIN — ONDANSETRON 4 MG: 2 INJECTION INTRAMUSCULAR; INTRAVENOUS at 11:55

## 2023-01-26 RX ADMIN — DULOXETINE HYDROCHLORIDE 60 MG: 30 CAPSULE, DELAYED RELEASE ORAL at 16:08

## 2023-01-26 RX ADMIN — ATORVASTATIN CALCIUM 20 MG: 20 TABLET, FILM COATED ORAL at 16:07

## 2023-01-26 RX ADMIN — LABETALOL HYDROCHLORIDE 20 MG: 5 INJECTION INTRAVENOUS at 13:19

## 2023-01-26 RX ADMIN — DULOXETINE HYDROCHLORIDE 60 MG: 30 CAPSULE, DELAYED RELEASE ORAL at 20:22

## 2023-01-26 RX ADMIN — SODIUM CHLORIDE 1000 ML: 9 INJECTION, SOLUTION INTRAVENOUS at 11:56

## 2023-01-26 RX ADMIN — METOPROLOL TARTRATE 50 MG: 50 TABLET, FILM COATED ORAL at 20:22

## 2023-01-26 RX ADMIN — SODIUM BICARBONATE: 84 INJECTION, SOLUTION INTRAVENOUS at 17:06

## 2023-01-26 RX ADMIN — CEFTRIAXONE 1000 MG: 1 INJECTION, POWDER, FOR SOLUTION INTRAMUSCULAR; INTRAVENOUS at 16:07

## 2023-01-26 RX ADMIN — PANTOPRAZOLE SODIUM 40 MG: 40 TABLET, DELAYED RELEASE ORAL at 20:22

## 2023-01-26 RX ADMIN — NIFEDIPINE 30 MG: 30 TABLET, EXTENDED RELEASE ORAL at 16:05

## 2023-01-26 RX ADMIN — HYDRALAZINE HYDROCHLORIDE 25 MG: 25 TABLET, FILM COATED ORAL at 20:22

## 2023-01-26 RX ADMIN — PANTOPRAZOLE SODIUM 40 MG: 40 TABLET, DELAYED RELEASE ORAL at 16:06

## 2023-01-26 RX ADMIN — HEPARIN SODIUM 5000 UNITS: 5000 INJECTION INTRAVENOUS; SUBCUTANEOUS at 16:05

## 2023-01-26 ASSESSMENT — LIFESTYLE VARIABLES
HOW OFTEN DO YOU HAVE A DRINK CONTAINING ALCOHOL: NEVER
HOW MANY STANDARD DRINKS CONTAINING ALCOHOL DO YOU HAVE ON A TYPICAL DAY: PATIENT DOES NOT DRINK

## 2023-01-26 ASSESSMENT — ENCOUNTER SYMPTOMS
DIARRHEA: 0
SHORTNESS OF BREATH: 0
ABDOMINAL PAIN: 0
NAUSEA: 1
CHEST TIGHTNESS: 0
VOMITING: 1
COLOR CHANGE: 0

## 2023-01-26 ASSESSMENT — PAIN SCALES - GENERAL
PAINLEVEL_OUTOF10: 4
PAINLEVEL_OUTOF10: 6

## 2023-01-26 ASSESSMENT — PAIN DESCRIPTION - ORIENTATION
ORIENTATION: ANTERIOR
ORIENTATION: RIGHT;LEFT

## 2023-01-26 ASSESSMENT — PAIN DESCRIPTION - FREQUENCY: FREQUENCY: INTERMITTENT

## 2023-01-26 ASSESSMENT — PAIN DESCRIPTION - LOCATION
LOCATION: FLANK
LOCATION: HEAD

## 2023-01-26 ASSESSMENT — PAIN DESCRIPTION - DESCRIPTORS: DESCRIPTORS: ACHING

## 2023-01-26 ASSESSMENT — PAIN DESCRIPTION - PAIN TYPE: TYPE: ACUTE PAIN

## 2023-01-26 ASSESSMENT — PAIN DESCRIPTION - ONSET: ONSET: GRADUAL

## 2023-01-26 ASSESSMENT — PAIN - FUNCTIONAL ASSESSMENT: PAIN_FUNCTIONAL_ASSESSMENT: 0-10

## 2023-01-26 NOTE — ED TRIAGE NOTES
Pt ambulatory to triage. Pt reports she has not been feeling well for the past 4 days, symptoms include brain fog, unsteady on her feet. Pt states she vomited her medications this morning, vomited several times today. Pt states she also has a suprapubic catheter. Pt reports her BP, on average, has been around 120-130s.

## 2023-01-26 NOTE — PROGRESS NOTES
TRANSFER - IN REPORT:    Verbal report received from NAVEEN richards on Leonelchelsea May  being received from ED for routine progression of patient care      Report consisted of patient's Situation, Background, Assessment and   Recommendations(SBAR). Information from the following report(s) Nurse Handoff Report was reviewed with the receiving nurse. Opportunity for questions and clarification was provided. Assessment completed upon patient's arrival to unit and care assumed.

## 2023-01-26 NOTE — CONSULTS
RENAL H&P/CONSULT    Subjective:     Patient is a 80 y/o WF with  Chronic Kidney Disease stage 5 with obstructive uropathy due to urethral stricture notified the office today that she was too sick to attend the scheduled outpatient visit and was going to the ER because of progressive weakness. She also has HTN, hypothyroidism, generalized weakness, bilateral flank pain of 3-4 day duration associated with nausea/vomiting and diarrhea. She had been followed by [facility] and requested to change service as she was being seen by too many different providers. She reports that her blood pressure had been uncontrolled. She had not attended renal failure education yet. She has a suprapubic Lewis catheter in place and has several house pets indoors, including the bedroom. She had previously refused dialysis, but realizes she may be too ill to stay off dialysis. She did not have dialysis access placement. Her son moved here recently and is interested to be a living related kidney donor. She vomited this am after taking her medications and in the ED her BP was initially 222/121. Creatinine is up to 6.8 from prior  baseline 4-5, was 4.32 in 11/2022 . UA consistent with UTI.   She was treated with Labetalol, Zofran and IVF and we discussed that unless her renal function indices would markedly improve with medical therapy overnight she would be very likely to require dialysis in am.        Past Medical History:   Diagnosis Date    Arthritis     Chronic pain     arthritis knees    GERD (gastroesophageal reflux disease)     controlled with med    High cholesterol     Hypertension     Kidney disease     acute renal failure 7/2020 (hospitalized x 6 days)  Followed by Dr. Jason Núñez, nephrologist      Liver disease     hep C,     Melanoma ([facility] [address]     RLE,     Psychiatric disorder     depression, treated with cymbalta    PUD (peptic ulcer disease)     Rheumatic fever     as a child    Thyroid disease     hypo      Past Surgical History:   Procedure Laterality Date    APPENDECTOMY      CATARACT REMOVAL  2019    bilateral, IOL implant    CHOLECYSTECTOMY      COLONOSCOPY  2015    NEUROLOGICAL SURGERY      CERVICAL AND LUMBAR SX    ORTHOPEDIC SURGERY Left     KNEE SCOPE    UPPER GASTROINTESTINAL ENDOSCOPY      EGD    UROLOGICAL SURGERY  07/2020    cystoscopy, insertion berkowitz catheter (suprapubic)    UROLOGICAL SURGERY  2016    cystoscopy, urethral dilatation      Prior to Admission medications    Medication Sig Start Date End Date Taking? Authorizing Provider   DULoxetine (CYMBALTA) 60 MG extended release capsule Take 1 capsule by mouth 2 times daily 1/4/23   Amilcar Cai MD   vitamin D (ERGOCALCIFEROL) 1.25 MG (96776 UT) CAPS capsule TAKE ONE CAPSULE BY MOUTH EVERY WEEK FOR 7 DOSES 9/12/22   Historical Provider, MD   diclofenac sodium (VOLTAREN) 1 % GEL Apply 2 g topically 2 times daily    Historical Provider, MD   NIFEdipine (PROCARDIA XL) 30 MG extended release tablet TAKE ONE TABLET BY MOUTH ONE TIME DAILY 10/4/22   Historical Provider, MD   cefdinir (OMNICEF) 300 MG capsule TAKE ONE CAPSULE BY MOUTH ONE TIME DAILY FOR 11 DOSES THEN TAKE ONE CAPSULE BY MOUTH EVERY 12 HOURS ON THE 12TH DAY THEN TAKE ONE CAPSULE BY 10/14/22   Historical Provider, MD   ferrous sulfate (IRON 325) 325 (65 Fe) MG tablet 1 tab(s) 7/28/15   Historical Provider, MD   Ibuprofen-diphenhydrAMINE HCl 200-25 MG CAPS Take 2 capsules by mouth as needed    Historical Provider, MD   budesonide (ENTOCORT EC) 3 MG extended release capsule Take 9 mg by mouth daily    Historical Provider, MD   acetaminophen (TYLENOL) 650 MG extended release tablet Take 650 mg by mouth every 8 hours as needed    Historical Provider, MD   gabapentin (NEURONTIN) 100 MG capsule Take 1 capsule by mouth daily for 60 days. 11/4/22 1/3/23  Sarah Shah APRN - CNP   hydrALAZINE (APRESOLINE) 100 MG tablet Take 1 tablet by mouth in the morning and 1 tablet at noon and 1 tablet before bedtime. 7/27/22   Claudine Pak MD   Omega-3 Fatty Acids (FISH OIL) 1200 MG CAPS Take 1 capsule by mouth daily    Historical Provider, MD   ASPIRIN PO Take by mouth    Historical Provider, MD   atorvastatin (LIPITOR) 20 MG tablet Take 20 mg by mouth every other day 1/26/22   Ar Automatic Reconciliation   vitamin D3 (CHOLECALCIFEROL) 125 MCG (5000 UT) TABS tablet Take 5,000 Units by mouth daily    Ar Automatic Reconciliation   levothyroxine (SYNTHROID) 125 MCG tablet Take 125 mcg by mouth every morning (before breakfast) 3/29/22   Ar Automatic Reconciliation   metoprolol tartrate (LOPRESSOR) 50 MG tablet Take 50 mg by mouth in the morning and 50 mg at noon and 50 mg before bedtime.  1/26/22   Ar Automatic Reconciliation   pantoprazole (PROTONIX) 40 MG tablet Take 40 mg by mouth 2 times daily 2/9/22   Ar Automatic Reconciliation   sodium bicarbonate 650 MG tablet TAKE 1 TABLET BY MOUTH TWICE DAILY 11/4/21   Ar Automatic Reconciliation     Allergies   Allergen Reactions    Latex Itching, Rash and Swelling      Social History     Tobacco Use    Smoking status: Former     Packs/day: 0.50     Types: Cigarettes     Start date: 10/28/2019    Smokeless tobacco: Never    Tobacco comments:     Quit smoking: Quit 2019   Substance Use Topics    Alcohol use: No      Family History   Problem Relation Age of Onset    No Known Problems Mother     No Known Problems Father           Review of Systems    Constitutional: no fever, positive for anorexia and fatigue  Eyes: fair vision, negative  Ears, nose, mouth, throat, and face:fair hearing, negative  Respiratory: no asthma, negative  Cardiovascular:no chest pain, negative  Gastrointestinal:positive for diarrhea, nausea and vomiting  Genitourinary: no dysuria, suprapubic Lewis catheter in place  Hematologic/lymphatic: no bleeding tendency, she has not been treated with WILLIAM  Neurological: no seizures,  no new tremor  Behvioral/Psych: no psych hospitalization negative  Endocrine: no goiter, negative      Objective:       BP (!) 214/125   Pulse 81   Temp 98.6 °F (37 °C)   Resp 18   SpO2 99%     No intake/output data recorded. No intake/output data recorded. BP (!) 214/125   Pulse 81   Temp 98.6 °F (37 °C)   Resp 18   SpO2 99%   General:  Alert, cooperative, mild distress, appears stated age. Head:  Normocephalic, without obvious abnormality, atraumatic. Eyes:  Conjunctivae/corneas clear. EOMs intact. Ears:  Normal external ear canals both ears. Nose: Nares normal. Septum midline. Mucosa normal. No drainage or sinus tenderness. Throat: Lips, mucosa, and tongue normal. Teeth and gums normal.   Neck: Supple, symmetrical, trachea midline, no adenopathy, no JVD. Back:   Symmetric, no curvature. ROM normal. No CVA tenderness. Lungs:   Clear to auscultation bilaterally. Chest wall:  No tenderness or deformity. Heart:  Regular rate and rhythm, S1, S2 normal, no murmur,  rub or gallop. Abdomen:   Soft, non-tender. Bowel sounds normal. No masses,  No organomegaly. No renal bruit. Suprapubic Lewis catheter in place. Extremities: Extremities normal, atraumatic, no cyanosis or edema. Skin: Skin color, texture, turgor normal. No rashes or lesions. Lymph nodes: Cervical and supraclavicular nodes normal.   Neurologic: Grossly intact. No asterixis.          Data Review:      Latest Reference Range & Units 7/22/20 05:46 7/23/20 04:40 8/14/20 09:07 6/16/21 14:37 4/29/22 14:04 1/26/23 11:47   Sodium 133 - 143 mmol/L 141 137 133 (L) 142 142 141   Potassium 3.5 - 5.1 mmol/L 3.8 3.3 (L) 4.7 5.2 3.7 4.7   Chloride 101 - 110 mmol/L 109 (H) 105 103 106 106 111 (H)   CO2 21 - 32 mmol/L 20 (L) 19 (L) 20 (L) 16 (L) 18 (L) 19 (L)   BUN,BUNPL 8 - 23 MG/DL 82 (H) 76 (H) 48 (H) 40 (H) 39 (H) 54 (H)   Creatinine 0.6 - 1.0 MG/DL 9.35 (H) 7.80 (H) 4.98 (H) 3.76 (H) 3.83 (H) 6.80 (H)   Bun/Cre Ratio 12 - 28 NA    11 (L) 10 (L)    Anion Gap 2 - 11 mmol/L 12 13 10   11   Est, Glom Filt Rate >60 ml/min/1.73m2      6 (L)   EGFR IF NonAfrican American >59 mL/min/1.73 4 (L) 5 (L) 9 (L) 12 (L)     GFR  >59 mL/min/1.73 5 (L) 7 (L) 11 (L) 13 (L)     Glucose, Random 65 - 100 mg/dL 92 88 90 85 202 (H) 119 (H)   CALCIUM, SERUM, 569588 8.3 - 10.4 MG/DL 8.1 (L) 7.9 (L) 9.2 9.5 9.0 9.0   ALBUMIN/GLOBULIN RATIO 0.4 - 1.6        0.8   Total Protein 6.3 - 8.2 g/dL 6.5 6.1 (L)  7.2 7.1 8.3 (H)   (L): Data is abnormally low  (H): Data is abnormally high     Latest Reference Range & Units 7/21/20 06:43 7/22/20 05:46 7/23/20 04:40 8/14/20 09:07 6/16/21 14:37 4/29/22 14:04 1/26/23 11:47   WBC 4.3 - 11.1 K/uL 8.6 10.5 8.3 5.6 7.7 4.2 12.6 (H)   RBC 4.05 - 5.2 M/uL 2.97 (L) 2.76 (L) 2.55 (L) 3.05 (L) 4.23 4.09 4.02 (L)   Hemoglobin Quant 11.7 - 15.4 g/dL 9.0 (L) 8.0 (L) 7.6 (L) 9.0 (L) 12.4 12.0 12.0   Hematocrit 35.8 - 46.3 % 26.7 (L) 25.3 (L) 23.4 (L) 27.6 (L) 37.9 36.7 36.9   MCV 82 - 102 FL 89.9 91.7 91.8 90.5 90 90 91.8   MCH 26.1 - 32.9 PG 30.3 29.0 29.8 29.5 29.3 29.3 29.9   MCHC 31.4 - 35.0 g/dL 33.7 31.6 32.5 32.6 32.7 32.7 32.5   MPV 9.4 - 12.3 FL 10.1 10.0 10.2 9.0 (L)   9.6   RDW 11.9 - 14.6 % 14.0 14.1 13.6 13.1 12.5 13.4 13.7   Platelet Count 108 - 450 K/uL 171 194 185 277 290 246 273   (H): Data is abnormally high  (L): Data is abnormally low    Recent Results (from the past 24 hour(s))   Urinalysis    Collection Time: 01/26/23 11:46 AM   Result Value Ref Range    Color, UA YELLOW/STRAW      Appearance TURBID      Specific Gravity, UA 1.020 1.001 - 1.023      pH, Urine 6.0 5.0 - 9.0      Protein, UA >300 (A) NEG mg/dL    Glucose,  mg/dL    Ketones, Urine 15 (A) NEG mg/dL    Bilirubin Urine Negative NEG      Blood, Urine SMALL (A) NEG      Urobilinogen, Urine 0.2 0.2 - 1.0 EU/dL    Nitrite, Urine Negative NEG      Leukocyte Esterase, Urine MODERATE (A) NEG      WBC, UA >100 0 /hpf    Epithelial Cells UA 3-5 0 /hpf    BACTERIA, URINE 4+ (H) 0 /hpf    Casts 0-3 0 /lpf    Other observations RESULTS VERIFIED MANUALLY     CBC with Auto Differential    Collection Time: 01/26/23 11:47 AM   Result Value Ref Range    WBC 12.6 (H) 4.3 - 11.1 K/uL    RBC 4.02 (L) 4.05 - 5.2 M/uL    Hemoglobin 12.0 11.7 - 15.4 g/dL    Hematocrit 36.9 35.8 - 46.3 %    MCV 91.8 82 - 102 FL    MCH 29.9 26.1 - 32.9 PG    MCHC 32.5 31.4 - 35.0 g/dL    RDW 13.7 11.9 - 14.6 %    Platelets 642 917 - 284 K/uL    MPV 9.6 9.4 - 12.3 FL    nRBC 0.00 0.0 - 0.2 K/uL    Differential Type AUTOMATED      Seg Neutrophils 85 (H) 43 - 78 %    Lymphocytes 8 (L) 13 - 44 %    Monocytes 4 4.0 - 12.0 %    Eosinophils % 1 0.5 - 7.8 %    Basophils 1 0.0 - 2.0 %    Immature Granulocytes 1 0.0 - 5.0 %    Segs Absolute 10.9 (H) 1.7 - 8.2 K/UL    Absolute Lymph # 1.0 0.5 - 4.6 K/UL    Absolute Mono # 0.5 0.1 - 1.3 K/UL    Absolute Eos # 0.1 0.0 - 0.8 K/UL    Basophils Absolute 0.1 0.0 - 0.2 K/UL    Absolute Immature Granulocyte 0.1 0.0 - 0.5 K/UL   CMP    Collection Time: 01/26/23 11:47 AM   Result Value Ref Range    Sodium 141 133 - 143 mmol/L    Potassium 4.7 3.5 - 5.1 mmol/L    Chloride 111 (H) 101 - 110 mmol/L    CO2 19 (L) 21 - 32 mmol/L    Anion Gap 11 2 - 11 mmol/L    Glucose 119 (H) 65 - 100 mg/dL    BUN 54 (H) 8 - 23 MG/DL    Creatinine 6.80 (H) 0.6 - 1.0 MG/DL    Est, Glom Filt Rate 6 (L) >60 ml/min/1.73m2    Calcium 9.0 8.3 - 10.4 MG/DL    Total Bilirubin 0.4 0.2 - 1.1 MG/DL    ALT 16 12 - 65 U/L    AST 41 (H) 15 - 37 U/L    Alk Phosphatase 194 (H) 50 - 136 U/L    Total Protein 8.3 (H) 6.3 - 8.2 g/dL    Albumin 3.7 3.2 - 4.6 g/dL    Globulin 4.6 (H) 2.8 - 4.5 g/dL    Albumin/Globulin Ratio 0.8 0.4 - 1.6     APTT    Collection Time: 01/26/23 11:47 AM   Result Value Ref Range    PTT 28.5 24.5 - 34.2 SEC   Protime-INR    Collection Time: 01/26/23 11:47 AM   Result Value Ref Range    Protime 13.5 12.6 - 14.3 sec    INR 1.0         CXR;   No results found for this or any previous visit. KUB:  No results found for this or any previous visit. Renal US:  No results found for this or any previous visit. CT Abdomen  Results for orders placed in visit on 08/31/21    CT ABDOMEN PELVIS WO CONTRAST           Active Problems:    * No active hospital problems. *  Resolved Problems:    * No resolved hospital problems.  *      Assessment:     Acute on Chronic Kidney Disease stage 5 versus progression to End Stage Renal Disease - YOJANA due to volume depletion and underlying infection is possible, but progression to ESRD from obstructive and hypertensive nephropathy with uremic symptoms as manifested by N/V/diarrhea, weakness and anorexia over the last weeks is more likely - the absence of significant anemia suggests that a trial with medical therapy is appropriate, but we will plan for possible HD catheter, coag studies are normal -   initiation of dialysis as early as tomorrow is likely  Accelerated hypertension - she was not able tolerate her outpatient antihypertensives today and reports even previously inadequate BP control - will resume antihypertensives and monitor response   Metabolic acidosis - treat with bicarbonate infusion   Anemia - milder than expected for CKD stage - monitor response to medical therapy  Hyperlipidemia - treat with statin  Hypothyroidism - on replacement therapy       Plan:     As above     An Pyle M.D.

## 2023-01-26 NOTE — PROGRESS NOTES
2 RN skin assessment with Dae Dejesus, NAVEEN and Yadiel Peter RN. Patient A&O admitted from ED with a chronic suprapubic catheter draining with a leg bag. Abrasion on LLE. Tattoo on upper back. Suprapubic site of insertion with no drainage. Bed in low position, call light and personal items within reach.

## 2023-01-26 NOTE — ED PROVIDER NOTES
Emergency Department Provider Note                   PCP:                Tyrese Rojas MD               Age: 81 y.o. Sex: female       ICD-10-CM    1. Acute on chronic renal insufficiency  N28.9     N18.9           DISPOSITION Admitted 01/26/2023 02:28:06 PM        Medical Decision Making  In summary this is a 68-year-old  female with history of end-stage renal disease, hypertension presenting to the emergency department for evaluation of generalized weakness, nausea vomiting, and bilateral flank pain for the past 4 days. States she is followed by nephrologist Dr. Conrad Gao. She was supposed to see Dr. Conrad Gao in the office today, however, due to the symptoms she was advised to present here instead. I did consult with Dr. Conrad Gao via phone who is concerned she may be uremic and may need access for dialysis. He states that her baseline creatinine is between 4 and 5. She has been seen by many nephrologist in the past with ultimately lost to follow-up. Upon arrival, patient's vitals notable for a blood pressure of 214/125. She states that her systolic pressures normally range in the 120-130s. States she is compliant with her medications. Other vitals today are normal.  Exam unremarkable. We will proceed with CMP, CBC, APTT, PT, EKG given possible hyperkalemia in the setting of renal failure, urinalysis. We will also hang fluid bolus given renal failure. Patient's initial labs notable for creatinine of 6.8, BUN 54, , CO2 19, proteinuria, bacteriuria. Did discuss this case with attending physician DR. Abiel Boyer. Did ultimately end up giving this patient a dose of IV labetalol which improved her blood pressure to 186/111. Dr. Conrad Gao did also arrive to evaluate this patient independently and agrees with the work-up thus far. He advised admit to medicine with plans to consult nephrology, IV fluids, acidemia correction and further evaluation.   I did reach out to hospitalist colleagues and Dr. Swetha Rincon is agreeable to admit this patient for continuation of care. Patient verbalizes understanding and wishes to proceed accordingly. Amount and/or Complexity of Data Reviewed  Labs: ordered. ECG/medicine tests: ordered and independent interpretation performed. Details: NSR with ventricular rate of 73. Normal QTc. Normal axis. No ST-T segment abnormality. Risk  Prescription drug management. Decision regarding hospitalization. Complexity of Problem: 1 or more chronic illnesses with severe exacerbation. (5)    I have conducted an independent ordering and review of Labs. I have conducted an independent ordering and review of EKG. I have reviewed records from an external source: provider visit notes from outside specialist.  Considerations: Shared decision making was utilized in the care of this patient. I discussed disposition with another provider. Patient was admitted I have communication with admitting physician. ED Course as of 01/26/23 1520   Thu Jan 26, 2023   1347 ===================================  ED EKG Interpretation  EKG was interpreted in the absence of a cardiologist.    Rate: 73  EKG Interpretation: Sinus rhythm. Prolonged [place] interval.  Nonspecific interventricular conduction delay. ST Segments: Nonspecific ST segments - NO STEMI    Gregor Cali MD 1:47 PM    [JL]      ED Course User Index  [JL] Sharron Villar MD        Orders Placed This Encounter   Procedures    Culture, Urine    US RETROPERITONEAL LIMITED    CBC with Auto Differential    CMP    APTT    Protime-INR    Urinalysis    Basic Metabolic Panel w/ Reflex to MG    CBC with Auto Differential    Blood Gas, Venous    ADULT DIET;  Regular    Vital Signs (Recheck)    Vital signs per unit routine    Notify physician    Up as tolerated    Daily weights    Intake and output    Monitor for signs/symptoms of urinary retention    Full code    Consult to Nephrology    OT eval and treat    PT evaluation and treat    Initiate Oxygen Therapy Protocol    PLEASE READ & DOCUMENT PPD TEST IN 24 HRS    PLEASE READ & DOCUMENT PPD TEST IN 48 HRS    PLEASE READ & DOCUMENT PPD TEST IN 72 HRS    EKG 12 Lead    ADMIT TO INPATIENT        Medications   aspirin chewable tablet 81 mg (has no administration in time range)   atorvastatin (LIPITOR) tablet 20 mg (has no administration in time range)   budesonide (ENTOCORT EC) extended release capsule 9 mg (has no administration in time range)   DULoxetine (CYMBALTA) extended release capsule 60 mg (has no administration in time range)   ferrous sulfate (IRON 325) tablet 325 mg (has no administration in time range)   hydrALAZINE (APRESOLINE) tablet 100 mg (has no administration in time range)   levothyroxine (SYNTHROID) tablet 125 mcg (has no administration in time range)   metoprolol tartrate (LOPRESSOR) tablet 50 mg (has no administration in time range)   NIFEdipine (PROCARDIA XL) extended release tablet 30 mg (has no administration in time range)   pantoprazole (PROTONIX) tablet 40 mg (has no administration in time range)   Vitamin D (CHOLECALCIFEROL) tablet 5,000 Units (has no administration in time range)   sodium chloride flush 0.9 % injection 5-40 mL (has no administration in time range)   sodium chloride flush 0.9 % injection 5-40 mL (has no administration in time range)   0.9 % sodium chloride infusion (has no administration in time range)   heparin (porcine) injection 5,000 Units (has no administration in time range)   ondansetron (ZOFRAN-ODT) disintegrating tablet 4 mg (has no administration in time range)     Or   ondansetron (ZOFRAN) injection 4 mg (has no administration in time range)   polyethylene glycol (GLYCOLAX) packet 17 g (has no administration in time range)   acetaminophen (TYLENOL) tablet 650 mg (has no administration in time range)     Or   acetaminophen (TYLENOL) suppository 650 mg (has no administration in time range)   tuberculin injection 5 Units (has no administration in time range)   sodium bicarbonate 150 mEq in dextrose 5 % 1,000 mL infusion (has no administration in time range)   cefTRIAXone (ROCEPHIN) 1,000 mg in sodium chloride 0.9 % 50 mL IVPB (mini-bag) (has no administration in time range)   0.9 % sodium chloride bolus (0 mLs IntraVENous Stopped 1/26/23 1238)   ondansetron (ZOFRAN) injection 4 mg (4 mg IntraVENous Given 1/26/23 1155)   labetalol (NORMODYNE;TRANDATE) injection 20 mg (20 mg IntraVENous Given 1/26/23 1319)       New Prescriptions    No medications on file        Torres Carmona is a 81 y.o. female who presents to the Emergency Department with chief complaint of    Chief Complaint   Patient presents with    Hypertension    Flank Pain    Emesis      Torres Carmona is a 68-year-old  female with history of end-stage renal disease, PUD, hypothyroidism, chronic pain, GERD, hypertension presenting to the emergency department for evaluation of generalized weakness, nausea vomiting, and bilateral flank pain. Patient states the symptoms have been occurring for the past 4 days. States she was scheduled to see her nephrologist today, however, was unable to make this appointment due to the symptoms. States that she has not yet been established to this group. Patient states that she has a suprapubic catheter in place and has not had many issues with this. She is still making urine to some extent. States her blood pressure is usually controlled in the 665-608 systolics. She denies any additional aggravating or alleviating factors or radiation of her symptoms. She has no other complaints today. The history is provided by the patient. Review of Systems   Constitutional:  Negative for chills, fatigue and fever. Eyes:  Negative for visual disturbance. Respiratory:  Negative for chest tightness and shortness of breath. Cardiovascular:  Negative for chest pain and palpitations.    Gastrointestinal:  Positive for nausea and vomiting. Negative for abdominal pain and diarrhea. Genitourinary:  Positive for flank pain. Negative for dysuria. Musculoskeletal:  Negative for arthralgias and myalgias. Skin:  Negative for color change and wound. Neurological:  Negative for dizziness, syncope, weakness, light-headedness and headaches. All other systems reviewed and are negative.     Past Medical History:   Diagnosis Date    Arthritis     Chronic pain     arthritis knees    GERD (gastroesophageal reflux disease)     controlled with med    High cholesterol     Hypertension     Kidney disease     acute renal failure 7/2020 (hospitalized x 6 days)  Followed by Dr. Jazz Gómez, nephrologist      Liver disease     hep C,     Melanoma ([facility] [address]     RLE,     Psychiatric disorder     depression, treated with cymbalta    PUD (peptic ulcer disease)     Rheumatic fever     as a child    Thyroid disease     hypo        Past Surgical History:   Procedure Laterality Date    APPENDECTOMY      CATARACT REMOVAL  2019    bilateral, IOL implant    CHOLECYSTECTOMY      COLONOSCOPY  2015    NEUROLOGICAL SURGERY      CERVICAL AND LUMBAR SX    ORTHOPEDIC SURGERY Left     KNEE SCOPE    UPPER GASTROINTESTINAL ENDOSCOPY      EGD    UROLOGICAL SURGERY  07/2020    cystoscopy, insertion berkowitz catheter (suprapubic)    UROLOGICAL SURGERY  2016    cystoscopy, urethral dilatation        Family History   Problem Relation Age of Onset    No Known Problems Mother     No Known Problems Father         Social History     Socioeconomic History    Marital status:    Tobacco Use    Smoking status: Former     Packs/day: 0.50     Types: Cigarettes     Start date: 10/28/2019    Smokeless tobacco: Never    Tobacco comments:     Quit smoking: Quit 2019   Substance and Sexual Activity    Alcohol use: No    Drug use: Not Currently         Latex     Previous Medications    ACETAMINOPHEN (TYLENOL) 650 MG EXTENDED RELEASE TABLET    Take 650 mg by mouth every 8 hours as needed    ASPIRIN PO Take by mouth    ATORVASTATIN (LIPITOR) 20 MG TABLET    Take 20 mg by mouth every other day    BUDESONIDE (ENTOCORT EC) 3 MG EXTENDED RELEASE CAPSULE    Take 9 mg by mouth daily    CEFDINIR (OMNICEF) 300 MG CAPSULE    TAKE ONE CAPSULE BY MOUTH ONE TIME DAILY FOR 11 DOSES THEN TAKE ONE CAPSULE BY MOUTH EVERY 12 HOURS ON THE 12TH DAY THEN TAKE ONE CAPSULE BY    DICLOFENAC SODIUM (VOLTAREN) 1 % GEL    Apply 2 g topically 2 times daily    DULOXETINE (CYMBALTA) 60 MG EXTENDED RELEASE CAPSULE    Take 1 capsule by mouth 2 times daily    FERROUS SULFATE (IRON 325) 325 (65 FE) MG TABLET    1 tab(s)    GABAPENTIN (NEURONTIN) 100 MG CAPSULE    Take 1 capsule by mouth daily for 60 days. HYDRALAZINE (APRESOLINE) 100 MG TABLET    Take 1 tablet by mouth in the morning and 1 tablet at noon and 1 tablet before bedtime. IBUPROFEN-DIPHENHYDRAMINE -25 MG CAPS    Take 2 capsules by mouth as needed    LEVOTHYROXINE (SYNTHROID) 125 MCG TABLET    Take 125 mcg by mouth every morning (before breakfast)    METOPROLOL TARTRATE (LOPRESSOR) 50 MG TABLET    Take 50 mg by mouth in the morning and 50 mg at noon and 50 mg before bedtime. NIFEDIPINE (PROCARDIA XL) 30 MG EXTENDED RELEASE TABLET    TAKE ONE TABLET BY MOUTH ONE TIME DAILY    OMEGA-3 FATTY ACIDS (FISH OIL) 1200 MG CAPS    Take 1 capsule by mouth daily    PANTOPRAZOLE (PROTONIX) 40 MG TABLET    Take 40 mg by mouth 2 times daily    SODIUM BICARBONATE 650 MG TABLET    TAKE 1 TABLET BY MOUTH TWICE DAILY    VITAMIN D (ERGOCALCIFEROL) 1.25 MG (86981 UT) CAPS CAPSULE    TAKE ONE CAPSULE BY MOUTH EVERY WEEK FOR 7 DOSES    VITAMIN D3 (CHOLECALCIFEROL) 125 MCG (5000 UT) TABS TABLET    Take 5,000 Units by mouth daily        Vitals signs and nursing note reviewed.    Patient Vitals for the past 4 hrs:   Pulse Resp BP SpO2   01/26/23 1500 80 17 -- 95 %   01/26/23 1445 80 21 (!) 180/106 97 %   01/26/23 1430 79 16 (!) 186/111 99 %   01/26/23 1415 77 14 (!) 170/93 99 %   01/26/23 1400 79 18 (!) 179/108 100 %   01/26/23 1345 79 18 (!) 179/114 98 %   01/26/23 1330 73 16 (!) 191/107 97 %   01/26/23 1325 81 19 (!) 177/107 95 %   01/26/23 1324 81 -- (!) 206/129 --   01/26/23 1320 83 18 (!) 182/129 93 %   01/26/23 1319 85 -- (!) 206/129 --   01/26/23 1315 86 16 (!) 206/129 98 %   01/26/23 1309 82 19 (!) 222/121 99 %   01/26/23 1308 91 22 (!) 222/121 --          Physical Exam  Vitals and nursing note reviewed. Constitutional:       General: She is not in acute distress. Appearance: Normal appearance. She is not ill-appearing. HENT:      Head: Normocephalic and atraumatic. Right Ear: External ear normal.      Left Ear: External ear normal.      Nose: Nose normal.   Eyes:      General: No scleral icterus. Right eye: No discharge. Left eye: No discharge. Extraocular Movements: Extraocular movements intact. Conjunctiva/sclera: Conjunctivae normal.      Pupils: Pupils are equal, round, and reactive to light. Cardiovascular:      Rate and Rhythm: Normal rate and regular rhythm. Pulses: Normal pulses. Heart sounds: Normal heart sounds. Pulmonary:      Effort: Pulmonary effort is normal.      Breath sounds: Normal breath sounds. Abdominal:      General: Abdomen is flat. Palpations: Abdomen is soft. Tenderness: There is no abdominal tenderness. Musculoskeletal:         General: Normal range of motion. Cervical back: Normal range of motion and neck supple. Skin:     General: Skin is warm and dry. Neurological:      General: No focal deficit present. Mental Status: She is alert and oriented to person, place, and time.    Psychiatric:         Mood and Affect: Mood normal.         Behavior: Behavior normal.        Procedures    Results for orders placed or performed during the hospital encounter of 01/26/23   CBC with Auto Differential   Result Value Ref Range    WBC 12.6 (H) 4.3 - 11.1 K/uL    RBC 4.02 (L) 4.05 - 5.2 M/uL    Hemoglobin 12.0 11.7 - 15.4 g/dL    Hematocrit 36.9 35.8 - 46.3 %    MCV 91.8 82 - 102 FL    MCH 29.9 26.1 - 32.9 PG    MCHC 32.5 31.4 - 35.0 g/dL    RDW 13.7 11.9 - 14.6 %    Platelets 550 091 - 324 K/uL    MPV 9.6 9.4 - 12.3 FL    nRBC 0.00 0.0 - 0.2 K/uL    Differential Type AUTOMATED      Seg Neutrophils 85 (H) 43 - 78 %    Lymphocytes 8 (L) 13 - 44 %    Monocytes 4 4.0 - 12.0 %    Eosinophils % 1 0.5 - 7.8 %    Basophils 1 0.0 - 2.0 %    Immature Granulocytes 1 0.0 - 5.0 %    Segs Absolute 10.9 (H) 1.7 - 8.2 K/UL    Absolute Lymph # 1.0 0.5 - 4.6 K/UL    Absolute Mono # 0.5 0.1 - 1.3 K/UL    Absolute Eos # 0.1 0.0 - 0.8 K/UL    Basophils Absolute 0.1 0.0 - 0.2 K/UL    Absolute Immature Granulocyte 0.1 0.0 - 0.5 K/UL   CMP   Result Value Ref Range    Sodium 141 133 - 143 mmol/L    Potassium 4.7 3.5 - 5.1 mmol/L    Chloride 111 (H) 101 - 110 mmol/L    CO2 19 (L) 21 - 32 mmol/L    Anion Gap 11 2 - 11 mmol/L    Glucose 119 (H) 65 - 100 mg/dL    BUN 54 (H) 8 - 23 MG/DL    Creatinine 6.80 (H) 0.6 - 1.0 MG/DL    Est, Glom Filt Rate 6 (L) >60 ml/min/1.73m2    Calcium 9.0 8.3 - 10.4 MG/DL    Total Bilirubin 0.4 0.2 - 1.1 MG/DL    ALT 16 12 - 65 U/L    AST 41 (H) 15 - 37 U/L    Alk Phosphatase 194 (H) 50 - 136 U/L    Total Protein 8.3 (H) 6.3 - 8.2 g/dL    Albumin 3.7 3.2 - 4.6 g/dL    Globulin 4.6 (H) 2.8 - 4.5 g/dL    Albumin/Globulin Ratio 0.8 0.4 - 1.6     APTT   Result Value Ref Range    PTT 28.5 24.5 - 34.2 SEC   Protime-INR   Result Value Ref Range    Protime 13.5 12.6 - 14.3 sec    INR 1.0     Urinalysis   Result Value Ref Range    Color, UA YELLOW/STRAW      Appearance TURBID      Specific Gravity, UA 1.020 1.001 - 1.023      pH, Urine 6.0 5.0 - 9.0      Protein, UA >300 (A) NEG mg/dL    Glucose,  mg/dL    Ketones, Urine 15 (A) NEG mg/dL    Bilirubin Urine Negative NEG      Blood, Urine SMALL (A) NEG      Urobilinogen, Urine 0.2 0.2 - 1.0 EU/dL    Nitrite, Urine Negative NEG      Leukocyte Esterase, Urine MODERATE (A) NEG      WBC, UA >100 0 /hpf    Epithelial Cells UA 3-5 0 /hpf    BACTERIA, URINE 4+ (H) 0 /hpf    Casts 0-3 0 /lpf    Other observations RESULTS VERIFIED MANUALLY          US RETROPERITONEAL LIMITED    (Results Pending)                       Voice dictation software was used during the making of this note. This software is not perfect and grammatical and other typographical errors may be present. This note has not been completely proofread for errors.       Mona Hernandezma  01/26/23 4219

## 2023-01-26 NOTE — H&P
Hospitalist History and Physical   Admit Date:  2023 11:28 AM   Name:  Belia Marks   Age:  81 y.o. Sex:  female  :  1950   MRN:  [id number]   Room:  ER07/    Presenting Complaint: Hypertension, Flank Pain, and Emesis     Reason(s) for Admission: No admission diagnoses are documented for this encounter. History of Present Illness:   Belia Marks is a 81 y.o. female with medical history of CKD stage 4, hypothyroidism, HTN who presented with confusion, generalized weakness, b/l flank pain of 3-4 day duration associated with nausea and vomiting. Denies any alleviating factors. She has been taking her home meds without improvement. She has a chronic suprapubic catheter in place due to history of urethral stricture and follows urology outpatient. She follows [facility] outpatient, previously refused dialysis in the past but now interested. In ED, BP was initially 222/121. CR 6.8 on admission with baseline 4-5. UA consistent with UTI. She was given labetalol, Zofran 1L NS bolus. Nephrology Dr. Jahaira Lovett saw patient in the ER and advised hospitalist to admit. Assessment & Plan:     YOJANA on CKD stage 5  Cr 6.8 on admission (baseline ~4-5) most likely d/t UTI, volume depletion. Avoid nephrotoxic meds  Accurate I&O's  MIVF with bicarb  Consult nephrology, appreciate recs    Hypertensive urgency  Cont home antihypertensives--metoprolol and Procardia  Add hydralazine po TID  IV hydralazine and IV labetalol as needed    Acute pyelonephritis  WBC 12K on admission w/ UA c/w UTI  ABx: Rocephin (-. ..)  Obtain urine culture  Check renal US to r/o obstruction    Metabolic acidosis  2/2 ESRD per above  Start mIVF with bicarb gtt for now  F/u BMP, vBG    Chronic suprapubic catheter  Hx of urethral stricture  Follows urology outpatient  Continue catheter care    GERD  Continue PPI    EDMOND / anemia of chronic disease  Continue home ferrous sulfate    HLD  Continue home statin Anticipated discharge needs:     >2 midnights    Diet: No diet orders on file  VTE ppx: Heparin SQ  Code status: No Order    Hospital Problems:  Active Problems:    * No active hospital problems. *  Resolved Problems:    * No resolved hospital problems.  *       Past History:     Past Medical History:   Diagnosis Date    Arthritis     Chronic pain     arthritis knees    GERD (gastroesophageal reflux disease)     controlled with med    High cholesterol     Hypertension     Kidney disease     acute renal failure 7/2020 (hospitalized x 6 days)  Followed by Dr. Jazz Gómez, nephrologist      Liver disease     hep C,     Melanoma ([facility] [address]     RLE,     Psychiatric disorder     depression, treated with cymbalta    PUD (peptic ulcer disease)     Rheumatic fever     as a child    Thyroid disease     hypo       Past Surgical History:   Procedure Laterality Date    APPENDECTOMY      CATARACT REMOVAL  2019    bilateral, IOL implant    CHOLECYSTECTOMY      COLONOSCOPY  2015    NEUROLOGICAL SURGERY      CERVICAL AND LUMBAR SX    ORTHOPEDIC SURGERY Left     KNEE SCOPE    UPPER GASTROINTESTINAL ENDOSCOPY      EGD    UROLOGICAL SURGERY  07/2020    cystoscopy, insertion berkowitz catheter (suprapubic)    UROLOGICAL SURGERY  2016    cystoscopy, urethral dilatation        Social History     Tobacco Use    Smoking status: Former     Packs/day: 0.50     Types: Cigarettes     Start date: 10/28/2019    Smokeless tobacco: Never    Tobacco comments:     Quit smoking: Quit 2019   Substance Use Topics    Alcohol use: No      Social History     Substance and Sexual Activity   Drug Use Not Currently       Family History   Problem Relation Age of Onset    No Known Problems Mother     No Known Problems Father         Immunization History   Administered Date(s) Administered    COVID-19, MODERNA BLUE border, Primary or Immunocompromised, (age 12y+), IM, 100 mcg/0.5mL 05/14/2021, 06/01/2021     Allergies   Allergen Reactions    Latex Itching, Rash and Swelling Prior to Admit Medications:  Current Outpatient Medications   Medication Instructions    acetaminophen (TYLENOL) 650 mg, Oral, EVERY 8 HOURS PRN    ASPIRIN PO Oral    atorvastatin (LIPITOR) 20 mg, Oral, EVERY OTHER DAY    budesonide (ENTOCORT EC) 9 mg, Oral, DAILY    cefdinir (OMNICEF) 300 MG capsule TAKE ONE CAPSULE BY MOUTH ONE TIME DAILY FOR 11 DOSES THEN TAKE ONE CAPSULE BY MOUTH EVERY 12 HOURS ON THE 12TH DAY THEN TAKE ONE CAPSULE BY    diclofenac sodium (VOLTAREN) 2 g, Topical, 2 TIMES DAILY    DULoxetine (CYMBALTA) 60 mg, Oral, 2 TIMES DAILY    ferrous sulfate (IRON 325) 325 (65 Fe) MG tablet 1 tab(s)    gabapentin (NEURONTIN) 100 mg, Oral, DAILY    hydrALAZINE (APRESOLINE) 100 mg, Oral, 3 TIMES DAILY    Ibuprofen-diphenhydrAMINE HCl 200-25 MG CAPS 2 capsules, Oral, PRN    levothyroxine (SYNTHROID) 125 mcg, Oral, DAILY BEFORE BREAKFAST    metoprolol tartrate (LOPRESSOR) 50 mg, Oral, 3 TIMES DAILY    NIFEdipine (PROCARDIA XL) 30 MG extended release tablet TAKE ONE TABLET BY MOUTH ONE TIME DAILY    Omega-3 Fatty Acids (FISH OIL) 1200 MG CAPS 1 capsule, Oral, DAILY    pantoprazole (PROTONIX) 40 mg, Oral, 2 TIMES DAILY    sodium bicarbonate 650 MG tablet TAKE 1 TABLET BY MOUTH TWICE DAILY    vitamin D (ERGOCALCIFEROL) 1.25 MG (80939 UT) CAPS capsule TAKE ONE CAPSULE BY MOUTH EVERY WEEK FOR 7 DOSES    vitamin D3 (CHOLECALCIFEROL) 5,000 Units, Oral, DAILY         Objective:   Patient Vitals for the past 24 hrs:   Temp Pulse Resp BP SpO2   01/26/23 1345 -- 79 18 (!) 179/114 98 %   01/26/23 1330 -- 73 16 (!) 191/107 97 %   01/26/23 1325 -- 81 19 (!) 177/107 95 %   01/26/23 1324 -- 81 -- (!) 206/129 --   01/26/23 1320 -- 83 18 (!) 182/129 93 %   01/26/23 1319 -- 85 -- (!) 206/129 --   01/26/23 1315 -- 86 16 (!) 206/129 98 %   01/26/23 1309 -- 82 19 (!) 222/121 99 %   01/26/23 1308 -- 91 22 (!) 222/121 --   01/26/23 1115 98.6 °F (37 °C) 81 18 (!) 214/125 99 %       Oxygen Therapy  SpO2: 98 %  Pulse via Oximetry: 77 beats per minute  O2 Device: None (Room air)    Estimated body mass index is 21.52 kg/m² as calculated from the following:    Height as of this encounter: 5' 10\" (1.778 m). Weight as of this encounter: 150 lb (68 kg). No intake or output data in the 24 hours ending 01/26/23 1409      Physical Exam:    Blood pressure (!) 179/114, pulse 79, temperature 98.6 °F (37 °C), resp. rate 18, height 5' 10\" (1.778 m), weight 150 lb (68 kg), SpO2 98 %. General:    Well nourished. Head:  Normocephalic, atraumatic  Eyes:  Sclerae appear normal.  Pupils equally round. ENT:  Nares appear normal.  Moist oral mucosa  Neck:  No restricted ROM. Trachea midline   CV:   RRR. No m/r/g. No jugular venous distension. Lungs:   CTAB. No wheezing, rhonchi, or rales. Symmetric expansion. Abdomen:   Soft, nontender, nondistended. Extremities: No cyanosis or clubbing. No edema  Skin:     No rashes and normal coloration. Warm and dry. Neuro:  CN II-XII grossly intact. Sensation intact. Psych:  Normal mood and affect.       I have personally reviewed labs and tests:  Recent Labs:  Recent Results (from the past 24 hour(s))   Urinalysis    Collection Time: 01/26/23 11:46 AM   Result Value Ref Range    Color, UA YELLOW/STRAW      Appearance TURBID      Specific Gravity, UA 1.020 1.001 - 1.023      pH, Urine 6.0 5.0 - 9.0      Protein, UA >300 (A) NEG mg/dL    Glucose,  mg/dL    Ketones, Urine 15 (A) NEG mg/dL    Bilirubin Urine Negative NEG      Blood, Urine SMALL (A) NEG      Urobilinogen, Urine 0.2 0.2 - 1.0 EU/dL    Nitrite, Urine Negative NEG      Leukocyte Esterase, Urine MODERATE (A) NEG      WBC, UA >100 0 /hpf    Epithelial Cells UA 3-5 0 /hpf    BACTERIA, URINE 4+ (H) 0 /hpf    Casts 0-3 0 /lpf    Other observations RESULTS VERIFIED MANUALLY     CBC with Auto Differential    Collection Time: 01/26/23 11:47 AM   Result Value Ref Range    WBC 12.6 (H) 4.3 - 11.1 K/uL    RBC 4.02 (L) 4.05 - 5.2 M/uL    Hemoglobin 12.0 11.7 - 15.4 g/dL    Hematocrit 36.9 35.8 - 46.3 %    MCV 91.8 82 - 102 FL    MCH 29.9 26.1 - 32.9 PG    MCHC 32.5 31.4 - 35.0 g/dL    RDW 13.7 11.9 - 14.6 %    Platelets 760 533 - 699 K/uL    MPV 9.6 9.4 - 12.3 FL    nRBC 0.00 0.0 - 0.2 K/uL    Differential Type AUTOMATED      Seg Neutrophils 85 (H) 43 - 78 %    Lymphocytes 8 (L) 13 - 44 %    Monocytes 4 4.0 - 12.0 %    Eosinophils % 1 0.5 - 7.8 %    Basophils 1 0.0 - 2.0 %    Immature Granulocytes 1 0.0 - 5.0 %    Segs Absolute 10.9 (H) 1.7 - 8.2 K/UL    Absolute Lymph # 1.0 0.5 - 4.6 K/UL    Absolute Mono # 0.5 0.1 - 1.3 K/UL    Absolute Eos # 0.1 0.0 - 0.8 K/UL    Basophils Absolute 0.1 0.0 - 0.2 K/UL    Absolute Immature Granulocyte 0.1 0.0 - 0.5 K/UL   CMP    Collection Time: 01/26/23 11:47 AM   Result Value Ref Range    Sodium 141 133 - 143 mmol/L    Potassium 4.7 3.5 - 5.1 mmol/L    Chloride 111 (H) 101 - 110 mmol/L    CO2 19 (L) 21 - 32 mmol/L    Anion Gap 11 2 - 11 mmol/L    Glucose 119 (H) 65 - 100 mg/dL    BUN 54 (H) 8 - 23 MG/DL    Creatinine 6.80 (H) 0.6 - 1.0 MG/DL    Est, Glom Filt Rate 6 (L) >60 ml/min/1.73m2    Calcium 9.0 8.3 - 10.4 MG/DL    Total Bilirubin 0.4 0.2 - 1.1 MG/DL    ALT 16 12 - 65 U/L    AST 41 (H) 15 - 37 U/L    Alk Phosphatase 194 (H) 50 - 136 U/L    Total Protein 8.3 (H) 6.3 - 8.2 g/dL    Albumin 3.7 3.2 - 4.6 g/dL    Globulin 4.6 (H) 2.8 - 4.5 g/dL    Albumin/Globulin Ratio 0.8 0.4 - 1.6     APTT    Collection Time: 01/26/23 11:47 AM   Result Value Ref Range    PTT 28.5 24.5 - 34.2 SEC   Protime-INR    Collection Time: 01/26/23 11:47 AM   Result Value Ref Range    Protime 13.5 12.6 - 14.3 sec    INR 1.0         I have personally reviewed imaging studies:  No results found.     Echocardiogram:  08/22/22    TRANSTHORACIC ECHOCARDIOGRAM (TTE) COMPLETE (CONTRAST/BUBBLE/3D PRN) 08/23/2022  7:31 AM, 08/23/2022 12:00 AM (Final)    Interpretation Summary    Left Ventricle: Normal left ventricular systolic function with a visually estimated EF of 65 - 70%. Left ventricle size is normal. Moderately increased wall thickness. Normal wall motion. Normal diastolic function. Aortic Valve: Valve structure is normal. Mild sclerosis of the aortic valve cusp. Mild regurgitation with a centrally directed jet. Mitral Valve: Mild regurgitation. Tricuspid Valve: The estimated RVSP is 14 mmHg. Signed by: Kirsten Barrow MD on 8/23/2022  7:31 AM, Signed by: Unknown Provider Result on 8/23/2022 12:00 AM        Orders Placed This Encounter   Medications    0.9 % sodium chloride bolus    ondansetron (ZOFRAN) injection 4 mg    labetalol (NORMODYNE;TRANDATE) injection 20 mg         Signed: Dot Gonzalez DO    Part of this note may have been written by using a voice dictation software. The note has been proof read but may still contain some grammatical/other typographical errors.

## 2023-01-26 NOTE — ED NOTES
TRANSFER - OUT REPORT:    Verbal report given to Nelson Boone RN on Roula Valdes  being transferred to [phone] for routine progression of patient care       Report consisted of patient's Situation, Background, Assessment and   Recommendations(SBAR). Information from the following report(s) ED SBAR was reviewed with the receiving nurse. Br[place]ley Assessment: No data recorded  Lines:   Peripheral IV 01/26/23 Right Antecubital (Active)   Site Assessment Clean, dry & intact 01/26/23 1154   Line Status Blood return noted 01/26/23 1154   Phlebitis Assessment No symptoms 01/26/23 1154   Infiltration Assessment 0 01/26/23 1154        Opportunity for questions and clarification was provided.       Patient transported with:  Aleena Courtney RN  01/26/23 5711

## 2023-01-27 ENCOUNTER — APPOINTMENT (OUTPATIENT)
Dept: ULTRASOUND IMAGING | Age: 73
DRG: 673 | End: 2023-01-27
Payer: MEDICARE

## 2023-01-27 ENCOUNTER — APPOINTMENT (OUTPATIENT)
Dept: INTERVENTIONAL RADIOLOGY/VASCULAR | Age: 73
DRG: 673 | End: 2023-01-27
Payer: MEDICARE

## 2023-01-27 PROBLEM — N17.9 ACUTE KIDNEY INJURY SUPERIMPOSED ON CKD (HCC): Status: RESOLVED | Noted: 2023-01-26 | Resolved: 2023-01-27

## 2023-01-27 PROBLEM — N18.9 ACUTE KIDNEY INJURY SUPERIMPOSED ON CKD (HCC): Status: RESOLVED | Noted: 2023-01-26 | Resolved: 2023-01-27

## 2023-01-27 PROBLEM — N18.6 END STAGE RENAL DISEASE (HCC): Status: ACTIVE | Noted: 2023-01-27

## 2023-01-27 LAB
ALBUMIN SERPL-MCNC: 3.2 G/DL (ref 3.2–4.6)
ANION GAP SERPL CALC-SCNC: 10 MMOL/L (ref 2–11)
ANION GAP SERPL CALC-SCNC: 10 MMOL/L (ref 2–11)
BASOPHILS # BLD: 0 K/UL (ref 0–0.2)
BASOPHILS NFR BLD: 1 % (ref 0–2)
BUN SERPL-MCNC: 53 MG/DL (ref 8–23)
BUN SERPL-MCNC: 55 MG/DL (ref 8–23)
CALCIUM SERPL-MCNC: 9 MG/DL (ref 8.3–10.4)
CALCIUM SERPL-MCNC: 9 MG/DL (ref 8.3–10.4)
CHLORIDE SERPL-SCNC: 110 MMOL/L (ref 101–110)
CHLORIDE SERPL-SCNC: 110 MMOL/L (ref 101–110)
CO2 SERPL-SCNC: 22 MMOL/L (ref 21–32)
CO2 SERPL-SCNC: 22 MMOL/L (ref 21–32)
CREAT SERPL-MCNC: 6.3 MG/DL (ref 0.6–1)
CREAT SERPL-MCNC: 6.4 MG/DL (ref 0.6–1)
DIFFERENTIAL METHOD BLD: ABNORMAL
EOSINOPHIL # BLD: 0.3 K/UL (ref 0–0.8)
EOSINOPHIL NFR BLD: 6 % (ref 0.5–7.8)
ERYTHROCYTE [DISTWIDTH] IN BLOOD BY AUTOMATED COUNT: 13.5 % (ref 11.9–14.6)
GLUCOSE SERPL-MCNC: 106 MG/DL (ref 65–100)
GLUCOSE SERPL-MCNC: 108 MG/DL (ref 65–100)
HBV SURFACE AB SERPL IA-ACNC: <3.1 MIU/ML
HBV SURFACE AG SER QL: NONREACTIVE
HCT VFR BLD AUTO: 30 % (ref 35.8–46.3)
HGB BLD-MCNC: 9.7 G/DL (ref 11.7–15.4)
IMM GRANULOCYTES # BLD AUTO: 0 K/UL (ref 0–0.5)
IMM GRANULOCYTES NFR BLD AUTO: 0 % (ref 0–5)
LYMPHOCYTES # BLD: 1.6 K/UL (ref 0.5–4.6)
LYMPHOCYTES NFR BLD: 27 % (ref 13–44)
MAGNESIUM SERPL-MCNC: 2.3 MG/DL (ref 1.8–2.4)
MCH RBC QN AUTO: 29.3 PG (ref 26.1–32.9)
MCHC RBC AUTO-ENTMCNC: 32.3 G/DL (ref 31.4–35)
MCV RBC AUTO: 90.6 FL (ref 82–102)
MONOCYTES # BLD: 0.5 K/UL (ref 0.1–1.3)
MONOCYTES NFR BLD: 8 % (ref 4–12)
NEUTS SEG # BLD: 3.3 K/UL (ref 1.7–8.2)
NEUTS SEG NFR BLD: 58 % (ref 43–78)
NRBC # BLD: 0 K/UL (ref 0–0.2)
PHOSPHATE SERPL-MCNC: 4.8 MG/DL (ref 2.3–3.7)
PHOSPHATE SERPL-MCNC: 5 MG/DL (ref 2.3–3.7)
PLATELET # BLD AUTO: 167 K/UL (ref 150–450)
PMV BLD AUTO: 8.8 FL (ref 9.4–12.3)
POTASSIUM SERPL-SCNC: 3.4 MMOL/L (ref 3.5–5.1)
POTASSIUM SERPL-SCNC: 3.5 MMOL/L (ref 3.5–5.1)
RBC # BLD AUTO: 3.31 M/UL (ref 4.05–5.2)
SARS-COV-2 RDRP RESP QL NAA+PROBE: NOT DETECTED
SODIUM SERPL-SCNC: 142 MMOL/L (ref 133–143)
SODIUM SERPL-SCNC: 142 MMOL/L (ref 133–143)
SOURCE: NORMAL
WBC # BLD AUTO: 5.7 K/UL (ref 4.3–11.1)

## 2023-01-27 PROCEDURE — 76937 US GUIDE VASCULAR ACCESS: CPT

## 2023-01-27 PROCEDURE — 86706 HEP B SURFACE ANTIBODY: CPT

## 2023-01-27 PROCEDURE — 0JH63XZ INSERTION OF TUNNELED VASCULAR ACCESS DEVICE INTO CHEST SUBCUTANEOUS TISSUE AND FASCIA, PERCUTANEOUS APPROACH: ICD-10-PCS | Performed by: RADIOLOGY

## 2023-01-27 PROCEDURE — 6360000002 HC RX W HCPCS: Performed by: FAMILY MEDICINE

## 2023-01-27 PROCEDURE — 6360000002 HC RX W HCPCS: Performed by: INTERNAL MEDICINE

## 2023-01-27 PROCEDURE — 97530 THERAPEUTIC ACTIVITIES: CPT

## 2023-01-27 PROCEDURE — 02H633Z INSERTION OF INFUSION DEVICE INTO RIGHT ATRIUM, PERCUTANEOUS APPROACH: ICD-10-PCS | Performed by: RADIOLOGY

## 2023-01-27 PROCEDURE — 85025 COMPLETE CBC W/AUTO DIFF WBC: CPT

## 2023-01-27 PROCEDURE — 86803 HEPATITIS C AB TEST: CPT

## 2023-01-27 PROCEDURE — 6370000000 HC RX 637 (ALT 250 FOR IP): Performed by: FAMILY MEDICINE

## 2023-01-27 PROCEDURE — 97165 OT EVAL LOW COMPLEX 30 MIN: CPT

## 2023-01-27 PROCEDURE — 84100 ASSAY OF PHOSPHORUS: CPT

## 2023-01-27 PROCEDURE — 76775 US EXAM ABDO BACK WALL LIM: CPT

## 2023-01-27 PROCEDURE — 80069 RENAL FUNCTION PANEL: CPT

## 2023-01-27 PROCEDURE — 87522 HEPATITIS C REVRS TRNSCRPJ: CPT

## 2023-01-27 PROCEDURE — 93970 EXTREMITY STUDY: CPT

## 2023-01-27 PROCEDURE — 6360000002 HC RX W HCPCS: Performed by: RADIOLOGY

## 2023-01-27 PROCEDURE — 83735 ASSAY OF MAGNESIUM: CPT

## 2023-01-27 PROCEDURE — 1100000000 HC RM PRIVATE

## 2023-01-27 PROCEDURE — 80048 BASIC METABOLIC PNL TOTAL CA: CPT

## 2023-01-27 PROCEDURE — 2580000003 HC RX 258: Performed by: FAMILY MEDICINE

## 2023-01-27 PROCEDURE — 36415 COLL VENOUS BLD VENIPUNCTURE: CPT

## 2023-01-27 PROCEDURE — 8010000000 HC HEMODIALYSIS ACUTE INPT

## 2023-01-27 PROCEDURE — 87340 HEPATITIS B SURFACE AG IA: CPT

## 2023-01-27 PROCEDURE — 87635 SARS-COV-2 COVID-19 AMP PRB: CPT

## 2023-01-27 PROCEDURE — 5A1D70Z PERFORMANCE OF URINARY FILTRATION, INTERMITTENT, LESS THAN 6 HOURS PER DAY: ICD-10-PCS | Performed by: RADIOLOGY

## 2023-01-27 PROCEDURE — 97161 PT EVAL LOW COMPLEX 20 MIN: CPT

## 2023-01-27 PROCEDURE — 2500000003 HC RX 250 WO HCPCS: Performed by: FAMILY MEDICINE

## 2023-01-27 RX ORDER — FENTANYL CITRATE 50 UG/ML
INJECTION, SOLUTION INTRAMUSCULAR; INTRAVENOUS
Status: COMPLETED | OUTPATIENT
Start: 2023-01-27 | End: 2023-01-27

## 2023-01-27 RX ORDER — MANNITOL 250 MG/ML
12.5 INJECTION, SOLUTION INTRAVENOUS
Status: DISCONTINUED | OUTPATIENT
Start: 2023-01-27 | End: 2023-01-27

## 2023-01-27 RX ORDER — MANNITOL 250 MG/ML
6.25 INJECTION, SOLUTION INTRAVENOUS PRN
Status: DISPENSED | OUTPATIENT
Start: 2023-01-28 | End: 2023-01-28

## 2023-01-27 RX ORDER — HEPARIN SODIUM 1000 [USP'U]/ML
INJECTION, SOLUTION INTRAVENOUS; SUBCUTANEOUS
Status: COMPLETED | OUTPATIENT
Start: 2023-01-27 | End: 2023-01-27

## 2023-01-27 RX ORDER — DIPHENHYDRAMINE HYDROCHLORIDE 50 MG/ML
INJECTION INTRAMUSCULAR; INTRAVENOUS
Status: COMPLETED | OUTPATIENT
Start: 2023-01-27 | End: 2023-01-27

## 2023-01-27 RX ORDER — MIDAZOLAM HYDROCHLORIDE 1 MG/ML
INJECTION INTRAMUSCULAR; INTRAVENOUS
Status: COMPLETED | OUTPATIENT
Start: 2023-01-27 | End: 2023-01-27

## 2023-01-27 RX ORDER — OXYCODONE HYDROCHLORIDE 5 MG/1
5 TABLET ORAL EVERY 4 HOURS PRN
Status: DISCONTINUED | OUTPATIENT
Start: 2023-01-27 | End: 2023-01-30 | Stop reason: HOSPADM

## 2023-01-27 RX ORDER — MANNITOL 250 MG/ML
6.25 INJECTION, SOLUTION INTRAVENOUS PRN
Status: COMPLETED | OUTPATIENT
Start: 2023-01-27 | End: 2023-01-27

## 2023-01-27 RX ADMIN — DIPHENHYDRAMINE HYDROCHLORIDE 50 MG: 50 INJECTION, SOLUTION INTRAMUSCULAR; INTRAVENOUS at 15:46

## 2023-01-27 RX ADMIN — MANNITOL 6.25 G: 12.5 INJECTION, SOLUTION INTRAVENOUS at 17:07

## 2023-01-27 RX ADMIN — MIDAZOLAM 1 MG: 1 INJECTION INTRAMUSCULAR; INTRAVENOUS at 15:55

## 2023-01-27 RX ADMIN — SODIUM CHLORIDE, PRESERVATIVE FREE 10 ML: 5 INJECTION INTRAVENOUS at 21:22

## 2023-01-27 RX ADMIN — HEPARIN SODIUM 4600 UNITS: 1000 INJECTION, SOLUTION INTRAVENOUS; SUBCUTANEOUS at 16:09

## 2023-01-27 RX ADMIN — MANNITOL 6.25 G: 12.5 INJECTION, SOLUTION INTRAVENOUS at 17:32

## 2023-01-27 RX ADMIN — FENTANYL CITRATE 50 MCG: 50 INJECTION, SOLUTION INTRAMUSCULAR; INTRAVENOUS at 15:46

## 2023-01-27 RX ADMIN — NIFEDIPINE 30 MG: 30 TABLET, EXTENDED RELEASE ORAL at 09:04

## 2023-01-27 RX ADMIN — ACETAMINOPHEN 650 MG: 325 TABLET ORAL at 05:30

## 2023-01-27 RX ADMIN — ONDANSETRON 4 MG: 2 INJECTION INTRAMUSCULAR; INTRAVENOUS at 08:56

## 2023-01-27 RX ADMIN — PANTOPRAZOLE SODIUM 40 MG: 40 TABLET, DELAYED RELEASE ORAL at 20:46

## 2023-01-27 RX ADMIN — SODIUM BICARBONATE: 84 INJECTION, SOLUTION INTRAVENOUS at 05:22

## 2023-01-27 RX ADMIN — HYDRALAZINE HYDROCHLORIDE 25 MG: 25 TABLET, FILM COATED ORAL at 20:46

## 2023-01-27 RX ADMIN — CEFTRIAXONE 1000 MG: 1 INJECTION, POWDER, FOR SOLUTION INTRAMUSCULAR; INTRAVENOUS at 14:23

## 2023-01-27 RX ADMIN — HEPARIN SODIUM 5000 UNITS: 5000 INJECTION INTRAVENOUS; SUBCUTANEOUS at 20:47

## 2023-01-27 RX ADMIN — MIDAZOLAM 1 MG: 1 INJECTION INTRAMUSCULAR; INTRAVENOUS at 15:46

## 2023-01-27 RX ADMIN — MIDAZOLAM 1 MG: 1 INJECTION INTRAMUSCULAR; INTRAVENOUS at 15:50

## 2023-01-27 RX ADMIN — DULOXETINE HYDROCHLORIDE 60 MG: 30 CAPSULE, DELAYED RELEASE ORAL at 20:46

## 2023-01-27 RX ADMIN — DULOXETINE HYDROCHLORIDE 60 MG: 30 CAPSULE, DELAYED RELEASE ORAL at 09:04

## 2023-01-27 RX ADMIN — PANTOPRAZOLE SODIUM 40 MG: 40 TABLET, DELAYED RELEASE ORAL at 09:05

## 2023-01-27 RX ADMIN — FENTANYL CITRATE 50 MCG: 50 INJECTION, SOLUTION INTRAMUSCULAR; INTRAVENOUS at 15:55

## 2023-01-27 RX ADMIN — METOPROLOL TARTRATE 50 MG: 50 TABLET, FILM COATED ORAL at 09:05

## 2023-01-27 RX ADMIN — SODIUM CHLORIDE, PRESERVATIVE FREE 10 ML: 5 INJECTION INTRAVENOUS at 08:56

## 2023-01-27 RX ADMIN — HYDRALAZINE HYDROCHLORIDE 25 MG: 25 TABLET, FILM COATED ORAL at 14:23

## 2023-01-27 RX ADMIN — METOPROLOL TARTRATE 50 MG: 50 TABLET, FILM COATED ORAL at 20:46

## 2023-01-27 RX ADMIN — HYDRALAZINE HYDROCHLORIDE 25 MG: 25 TABLET, FILM COATED ORAL at 05:22

## 2023-01-27 RX ADMIN — LABETALOL HYDROCHLORIDE 10 MG: 5 INJECTION INTRAVENOUS at 18:19

## 2023-01-27 RX ADMIN — FENTANYL CITRATE 50 MCG: 50 INJECTION, SOLUTION INTRAMUSCULAR; INTRAVENOUS at 15:50

## 2023-01-27 RX ADMIN — LABETALOL HYDROCHLORIDE 10 MG: 5 INJECTION INTRAVENOUS at 11:30

## 2023-01-27 RX ADMIN — LEVOTHYROXINE SODIUM 125 MCG: 0.07 TABLET ORAL at 05:22

## 2023-01-27 ASSESSMENT — PAIN - FUNCTIONAL ASSESSMENT: PAIN_FUNCTIONAL_ASSESSMENT: NONE - DENIES PAIN

## 2023-01-27 ASSESSMENT — PAIN DESCRIPTION - PAIN TYPE: TYPE: ACUTE PAIN

## 2023-01-27 ASSESSMENT — PAIN DESCRIPTION - LOCATION: LOCATION: HEAD

## 2023-01-27 ASSESSMENT — PAIN SCALES - GENERAL
PAINLEVEL_OUTOF10: 0
PAINLEVEL_OUTOF10: 4

## 2023-01-27 NOTE — PROGRESS NOTES
TRANSFER - OUT REPORT:           Verbal report given to NAVEEN Leal(name) on Luisana Villeda, currently in dialysis unit and later returning to [facility], for routine progression of patient care. Report consisted of patients Situation, Background, Assessment and      Recommendations(SBAR). Information from the following report(s) SBAR, Procedure Summary, and MAR was reviewed with the receiving nurse. Opportunity for questions and clarification was provided. Conscious Sedation:    150 Mcg of Fentanyl administered   3 Mg of Versed administered   50 Mg of Benadryl administered        Pt tolerated procedure well. Peripheral Intravenous Line:   Peripheral IV 01/26/23 Right Antecubital (Active)   Site Assessment Clean, dry & intact 01/27/23 0904   Line Status Flushed; Infusing 01/27/23 0904   Phlebitis Assessment No symptoms 01/27/23 0904   Infiltration Assessment 0 01/27/23 0904   Dressing Status Clean, dry & intact 01/27/23 0904   Dressing Type Transparent 01/27/23 0904       VITALS:  BP (!) 169/91   Pulse 62   Temp 97.4 °F (36.3 °C) (Temporal)   Resp 10   Ht 5' 10\" (1.778 m)   Wt 137 lb (62.1 kg)   SpO2 100%   BMI 19.66 kg/m²

## 2023-01-27 NOTE — PRE SEDATION
Sedation Pre-Procedure Note    Patient Name: Yanira Hale   YOB: 1950  Room/Bed: [facility]  Medical Record Number: [id number]  Date: 1/27/2023   Time: 3:31 PM       Indication:  ESRD. Initiating hemodialysis. Consent: I have discussed with the patient and/or the patient representative the indication, alternatives, and the possible risks and/or complications of the planned procedure and the anesthesia methods. The patient and/or patient representative appear to understand and agree to proceed. Vital Signs:   Vitals:    01/27/23 1448   BP: (!) 161/91   Pulse: 75   Resp: 16   Temp: 97.4 °F (36.3 °C)   SpO2: 99%       Past Medical History:   has a past medical history of Arthritis, Chronic pain, GERD (gastroesophageal reflux disease), High cholesterol, Hypertension, Kidney disease, Liver disease, Melanoma ([facility] [address] Psychiatric disorder, PUD (peptic ulcer disease), Rheumatic fever, and Thyroid disease. Past Surgical History:   has a past surgical history that includes Colonoscopy (2015); Upper gastrointestinal endoscopy; Urological Surgery (07/2020); Urological Surgery (2016); Cholecystectomy; orthopedic surgery (Left); Appendectomy; neurological surgery; and Cataract removal (2019).     Medications:   Scheduled Meds:    aspirin  81 mg Oral Daily    atorvastatin  20 mg Oral Every Other Day    DULoxetine  60 mg Oral BID    ferrous sulfate  325 mg Oral Daily with breakfast    levothyroxine  125 mcg Oral QAM AC    NIFEdipine  30 mg Oral Daily    pantoprazole  40 mg Oral BID    Vitamin D  5,000 Units Oral Daily    sodium chloride flush  5-40 mL IntraVENous 2 times per day    tuberculin  5 Units IntraDERmal Once    cefTRIAXone (ROCEPHIN) IV  1,000 mg IntraVENous Q24H    heparin (porcine)  5,000 Units SubCUTAneous BID    metoprolol tartrate  50 mg Oral BID    hydrALAZINE  25 mg Oral 3 times per day     Continuous Infusions:    sodium chloride      sodium bicarbonate infusion 100 mL/hr at 01/27/23 0522 PRN Meds: [START ON 1/28/2023] mannitol, mannitol, sodium chloride flush, sodium chloride, ondansetron **OR** ondansetron, polyethylene glycol, acetaminophen **OR** acetaminophen, labetalol, hydrALAZINE  Home Meds:   Prior to Admission medications    Medication Sig Start Date End Date Taking? Authorizing Provider   DULoxetine (CYMBALTA) 60 MG extended release capsule Take 1 capsule by mouth 2 times daily 1/4/23   Zonia Niño MD   vitamin D (ERGOCALCIFEROL) 1.25 MG (28093 UT) CAPS capsule TAKE ONE CAPSULE BY MOUTH EVERY WEEK FOR 7 DOSES 9/12/22   Historical Provider, MD   diclofenac sodium (VOLTAREN) 1 % GEL Apply 2 g topically 2 times daily    Historical Provider, MD   NIFEdipine (PROCARDIA XL) 30 MG extended release tablet TAKE ONE TABLET BY MOUTH ONE TIME DAILY 10/4/22   Historical Provider, MD   cefdinir (OMNICEF) 300 MG capsule TAKE ONE CAPSULE BY MOUTH ONE TIME DAILY FOR 11 DOSES THEN TAKE ONE CAPSULE BY MOUTH EVERY 12 HOURS ON THE 12TH DAY THEN TAKE ONE CAPSULE BY 10/14/22   Historical Provider, MD   ferrous sulfate (IRON 325) 325 (65 Fe) MG tablet 1 tab(s) 7/28/15   Historical Provider, MD   Ibuprofen-diphenhydrAMINE HCl 200-25 MG CAPS Take 2 capsules by mouth as needed  Patient not taking: Reported on 1/26/2023    Historical Provider, MD   budesonide (ENTOCORT EC) 3 MG extended release capsule Take 9 mg by mouth daily  Patient not taking: Reported on 1/26/2023    Historical Provider, MD   acetaminophen (TYLENOL) 650 MG extended release tablet Take 650 mg by mouth every 8 hours as needed    Historical Provider, MD   gabapentin (NEURONTIN) 100 MG capsule Take 1 capsule by mouth daily for 60 days. 11/4/22 1/3/23  SAE Sanchez - CNP   hydrALAZINE (APRESOLINE) 100 MG tablet Take 1 tablet by mouth in the morning and 1 tablet at noon and 1 tablet before bedtime.  7/27/22   Suzanne Holley MD   Omega-3 Fatty Acids (FISH OIL) 1200 MG CAPS Take 1 capsule by mouth daily    Historical Provider, MD ASPIRIN PO Take by mouth    Historical Provider, MD   atorvastatin (LIPITOR) 20 MG tablet Take 20 mg by mouth every other day 1/26/22   Ar Automatic Reconciliation   vitamin D3 (CHOLECALCIFEROL) 125 MCG (5000 UT) TABS tablet Take 5,000 Units by mouth daily    Ar Automatic Reconciliation   levothyroxine (SYNTHROID) 125 MCG tablet Take 125 mcg by mouth every morning (before breakfast) 3/29/22   Ar Automatic Reconciliation   metoprolol tartrate (LOPRESSOR) 50 MG tablet Take 50 mg by mouth in the morning and 50 mg at noon and 50 mg before bedtime. 1/26/22   Ar Automatic Reconciliation   pantoprazole (PROTONIX) 40 MG tablet Take 40 mg by mouth 2 times daily 2/9/22   Ar Automatic Reconciliation   sodium bicarbonate 650 MG tablet TAKE 1 TABLET BY MOUTH TWICE DAILY 11/4/21   Ar Automatic Reconciliation       Pre-Sedation Documentation and Exam:   Vital signs have been reviewed (see flow sheet for vitals).     Mallampati Airway Assessment:  Mallampati Class II - (soft palate, fauces & uvula are visible)    ASA Classification:  Class 3 - A patient with severe systemic disease that limits activity but is not incapacitating    Sedation/ Anesthesia Plan:   intravenous sedation    Patient is an appropriate candidate for plan of sedation: yes    Electronically signed by Munir Bryan MD on 1/27/2023 at 3:31 PM

## 2023-01-27 NOTE — BRIEF OP NOTE
Department of Interventional Radiology  [phone]        Interventional Radiology Brief Procedure Note    Patient: Wilda Camara MRN: [id number]  SSN: [id number]    YOB: 1950  Age: 72 y.o.  Sex: female      Date of Procedure: 1/27/2023    Pre-Procedure Diagnosis: ESRD.  Initiating HD today.     Post-Procedure Diagnosis: SAME    Procedure(s):  Tunneled Hemodialysis Venous Catheter    Brief Description of Procedure: RIJV    Performed By: MOMO ALBA MD     Assistants: None    Anesthesia:Moderate Sedation    Estimated Blood Loss: Less than 10ml    Specimens:  None    Implants:  Tunneled Hemodialysis Catheter    Findings: Tip in RA.      Complications: None    Recommendations: 1 hour bedrest.  Ready to use.      Follow Up: PRN    Signed By: MOMO ALBA MD     January 27, 2023

## 2023-01-27 NOTE — PROGRESS NOTES
OCCUPATIONAL THERAPY Initial Assessment and Discharge          Acknowledge Orders  Time  OT Charge Capture  Rehab Caseload Tracker      Roula Valdes is a 81 y.o. female   PRIMARY DIAGNOSIS: End stage renal disease ([facility] [address]  Acute on chronic renal insufficiency [N28.9, N18.9]  Acute kidney injury superimposed on CKD ([facility] [address] [N17.9, N18.9]       Reason for Referral: Generalized Muscle Weakness (M62.81)  Inpatient: Payor: Ching Gross / Plan: Karine Goyal PPO / Product Type: Medicare /     ASSESSMENT:     REHAB RECOMMENDATIONS:   Recommendation to date pending progress:  Setting:  No further skilled therapy after discharge from hospital    Equipment:    None     ASSESSMENT:  Ms. Александр Teran presented with mild generalized weakness and decreased activity tolerance, however remains independent with ADLs and mobility for ADLs w/o an AD. Pt educated on energy conservation techniques to address deficits and verbalized good understanding. Pt does not require further skilled OT services at this time, no d/c needs. [address] [address] [facility] 6 Clicks Daily Activity Inpatient Short Form:    AM-PAC Daily Activity Inpatient   How much help for putting on and taking off regular lower body clothing?: None  How much help for Bathing?: None  How much help for Toileting?: None  How much help for putting on and taking off regular upper body clothing?: None  How much help for taking care of personal grooming?: None  How much help for eating meals?: None  [facility] Inpatient Daily Activity Raw Score: 24  AM-PAC Inpatient ADL T-Scale Score : 57.54  ADL Inpatient CMS 0-100% Score: 0  ADL Inpatient CMS G-Code Modifier : CH           SUBJECTIVE:     Ms. Александр Teran states, \"I have 8 dogs, I play with them all day. \"     Social/Functional Lives With: Spouse  Type of Home: House  Home Layout: One level  Home Access: Stairs to enter with rails  Entrance Stairs - Number of Steps: 3  Bathroom Shower/Tub: Tub/Shower unit  Bathroom Toilet: Standard  Bathroom Accessibility: Accessible  Home Equipment: Kia Lindsey, Rollator  Has the patient had two or more falls in the past year or any fall with injury in the past year?: Yes  Receives Help From: Family  ADL Assistance: [address]: Independent  Homemaking Responsibilities: Yes  Ambulation Assistance: Independent  Transfer Assistance: Independent  Active : Yes  Mode of Transportation: Car  Occupation: Retired    OBJECTIVE:     ANTWAN / Daly Arceo / Verner Clan: IV    RESTRICTIONS/PRECAUTIONS:       PAIN: Elizebeth Chewelah / O2:   Pre Treatment: 0         Post Treatment: 0       Vitals          Oxygen            GROSS EVALUATION: INTACT IMPAIRED   (See Comments)   UE AROM [x] []   UE PROM [] []   Strength [x]       Posture / Balance [x]     Sensation []     Coordination []       Tone []       Edema []    Activity Tolerance []  Grossly decreased     Hand Dominance R [] L []      COGNITION/  PERCEPTION: INTACT IMPAIRED   (See Comments)   Orientation [x]     Vision [x]     Hearing [x]     Cognition  [x]     Perception [x]       MOBILITY: I Mod I S SBA CGA Min Mod Max Total  NT x2 Comments:   Bed Mobility    Rolling [] [] [] [] [] [] [] [] [] [] []    Supine to Sit [x] [] [] [] [] [] [] [] [] [] []    Scooting [x] [] [] [] [] [] [] [] [] [] []    Sit to Supine [x] [] [] [] [] [] [] [] [] [] []    Transfers    Sit to Stand [x] [] [] [] [] [] [] [] [] [] []    Bed to Chair [x] [] [] [] [] [] [] [] [] [] []    Stand to Sit [] [] [] [] [] [] [] [] [] [] []    Tub/Shower [] [] [] [] [] [] [] [] [] [] []     Toilet [x] [] [] [] [] [] [] [] [] [] []      [] [] [] [] [] [] [] [] [] [] []    I=Independent, Mod I=Modified Independent, S=Supervision/Setup, SBA=Standby Assistance, CGA=Contact Guard Assistance, Min=Minimal Assistance, Mod=Moderate Assistance, Max=Maximal Assistance, Total=Total Assistance, NT=Not Tested    ACTIVITIES OF DAILY LIVING: I Mod I S SBA CGA Min Mod Max Total NT Comments BASIC ADLs:              Upper Body Bathing  [] [] [] [] [] [] [] [] [] []    Lower Body Bathing [] [] [] [] [] [] [] [] [] []    Toileting [] [] [] [] [] [] [] [] [] []    Upper Body Dressing [] [] [] [] [] [] [] [] [] []    Lower Body Dressing [] [] [] [] [] [] [] [] [] []    Feeding [] [] [] [] [] [] [] [] [] []    Grooming [] [] [] [] [] [] [] [] [] []    Personal Device Care [] [] [] [] [] [] [] [] [] []    Functional Mobility [x] [] [] [] [] [] [] [] [] []    I=Independent, Mod I=Modified Independent, S=Supervision/Setup, SBA=Standby Assistance, CGA=Contact Guard Assistance, Min=Minimal Assistance, Mod=Moderate Assistance, Max=Maximal Assistance, Total=Total Assistance, NT=Not Tested        TREATMENT:     EVALUATION: LOW COMPLEXITY: (Untimed Charge)        AFTER TREATMENT PRECAUTIONS: Bed, Call light within reach, Needs within reach, and RN notified    INTERDISCIPLINARY COLLABORATION:  RN/ PCT    EDUCATION:  Education Given To: Patient  Education Provided: Role of Therapy;Plan of Care;Energy Conservation    TOTAL TREATMENT DURATION AND TIME:  Time In: [facility]  Time Out: [address]  Minutes: [address], OT

## 2023-01-27 NOTE — PROGRESS NOTES
RENAL H&P/CONSULT    Subjective:     Patient is a 80 y/o WF with  Chronic Kidney Disease stage 5 with obstructive uropathy due to urethral stricture notified the office today that she was too sick to attend the scheduled outpatient visit and was going to the ER because of progressive weakness. She also has HTN, hypothyroidism, generalized weakness, bilateral flank pain of 3-4 day duration associated with nausea/vomiting and diarrhea. She had been followed by [facility] and requested to change service as she was being seen by too many different providers. She reports that her blood pressure had been uncontrolled. She had not attended renal failure education yet. She has a suprapubic Lewis catheter in place and has several house pets indoors, including the bedroom. She had previously refused dialysis, but realizes she may be too ill to stay off dialysis. She did not have dialysis access placement. Her son moved here recently and is interested to be a living related kidney donor. She vomited this am after taking her medications and in the ED her BP was initially 222/121. Creatinine is up to 6.8 from prior  baseline 4-5, was 4.32 in 11/2022 . UA consistent with UTI.   She was treated with Labetalol, Zofran and IVF and we discussed that unless her renal function indices would markedly improve with medical therapy overnight she would be very likely to require dialysis in am.   1/27/23 - still much nausea and bad taste in mouth - BP is still high and we discussed administration of BP meds with RN - holding heparin and ASA due to tunneled HD catheter today - crea is not sufficiently improved to crocker off dialysis and he agrees to proceed with dialysis        Past Medical History:   Diagnosis Date    Arthritis     Chronic pain     arthritis knees    GERD (gastroesophageal reflux disease)     controlled with med    High cholesterol     Hypertension     Kidney disease     acute renal failure 7/2020 (hospitalized x 6 days)  Followed by Dr. Mansoor Price, nephrologist      Liver disease     hep C,     Melanoma ([facility] [address]     RLE,     Psychiatric disorder     depression, treated with cymbalta    PUD (peptic ulcer disease)     Rheumatic fever     as a child    Thyroid disease     hypo      Past Surgical History:   Procedure Laterality Date    APPENDECTOMY      CATARACT REMOVAL  2019    bilateral, IOL implant    CHOLECYSTECTOMY      COLONOSCOPY  2015    NEUROLOGICAL SURGERY      CERVICAL AND LUMBAR SX    ORTHOPEDIC SURGERY Left     KNEE SCOPE    UPPER GASTROINTESTINAL ENDOSCOPY      EGD    UROLOGICAL SURGERY  07/2020    cystoscopy, insertion berkowitz catheter (suprapubic)    UROLOGICAL SURGERY  2016    cystoscopy, urethral dilatation      Prior to Admission medications    Medication Sig Start Date End Date Taking?  Authorizing Provider   DULoxetine (CYMBALTA) 60 MG extended release capsule Take 1 capsule by mouth 2 times daily 1/4/23   Dora Leyden, MD   vitamin D (ERGOCALCIFEROL) 1.25 MG (17782 UT) CAPS capsule TAKE ONE CAPSULE BY MOUTH EVERY WEEK FOR 7 DOSES 9/12/22   Historical Provider, MD   diclofenac sodium (VOLTAREN) 1 % GEL Apply 2 g topically 2 times daily    Historical Provider, MD   NIFEdipine (PROCARDIA XL) 30 MG extended release tablet TAKE ONE TABLET BY MOUTH ONE TIME DAILY 10/4/22   Historical Provider, MD   cefdinir (OMNICEF) 300 MG capsule TAKE ONE CAPSULE BY MOUTH ONE TIME DAILY FOR 11 DOSES THEN TAKE ONE CAPSULE BY MOUTH EVERY 12 HOURS ON THE 12TH DAY THEN TAKE ONE CAPSULE BY 10/14/22   Historical Provider, MD   ferrous sulfate (IRON 325) 325 (65 Fe) MG tablet 1 tab(s) 7/28/15   Historical Provider, MD   Ibuprofen-diphenhydrAMINE HCl 200-25 MG CAPS Take 2 capsules by mouth as needed  Patient not taking: Reported on 1/26/2023    Historical Provider, MD   budesonide (ENTOCORT EC) 3 MG extended release capsule Take 9 mg by mouth daily  Patient not taking: Reported on 1/26/2023    Historical Provider, MD   acetaminophen (TYLENOL) 650 MG extended release tablet Take 650 mg by mouth every 8 hours as needed    Historical Provider, MD   gabapentin (NEURONTIN) 100 MG capsule Take 1 capsule by mouth daily for 60 days. 11/4/22 1/3/23  SAE Vann - CNP   hydrALAZINE (APRESOLINE) 100 MG tablet Take 1 tablet by mouth in the morning and 1 tablet at noon and 1 tablet before bedtime. 7/27/22   Ida Greenberg MD   Omega-3 Fatty Acids (FISH OIL) 1200 MG CAPS Take 1 capsule by mouth daily    Historical Provider, MD   ASPIRIN PO Take by mouth    Historical Provider, MD   atorvastatin (LIPITOR) 20 MG tablet Take 20 mg by mouth every other day 1/26/22   Ar Automatic Reconciliation   vitamin D3 (CHOLECALCIFEROL) 125 MCG (5000 UT) TABS tablet Take 5,000 Units by mouth daily    Ar Automatic Reconciliation   levothyroxine (SYNTHROID) 125 MCG tablet Take 125 mcg by mouth every morning (before breakfast) 3/29/22   Ar Automatic Reconciliation   metoprolol tartrate (LOPRESSOR) 50 MG tablet Take 50 mg by mouth in the morning and 50 mg at noon and 50 mg before bedtime.  1/26/22   Ar Automatic Reconciliation   pantoprazole (PROTONIX) 40 MG tablet Take 40 mg by mouth 2 times daily 2/9/22   Ar Automatic Reconciliation   sodium bicarbonate 650 MG tablet TAKE 1 TABLET BY MOUTH TWICE DAILY 11/4/21   Ar Automatic Reconciliation     No Known Allergies     Social History     Tobacco Use    Smoking status: Former     Packs/day: 0.50     Types: Cigarettes     Start date: 10/28/2019    Smokeless tobacco: Never    Tobacco comments:     Quit smoking: Quit 2019   Substance Use Topics    Alcohol use: No      Family History   Problem Relation Age of Onset    No Known Problems Mother     No Known Problems Father           Review of Systems    Constitutional: no fever, positive for anorexia and fatigue  Eyes: fair vision, negative  Ears, nose, mouth, throat, and face:fair hearing, negative  Respiratory: no asthma, negative  Cardiovascular:no chest pain, negative  Gastrointestinal:positive for diarrhea, nausea and vomiting  Genitourinary: no dysuria, suprapubic Lewis catheter in place  Hematologic/lymphatic: no bleeding tendency, she has not been treated with WILLIAM  Neurological: no seizures,  no new tremor  Behvioral/Psych: no psych hospitalization negative  Endocrine: no goiter, negative      Objective:       BP (!) 160/90   Pulse 73   Temp 98.2 °F (36.8 °C) (Oral)   Resp 18   Ht 5' 10\" (1.778 m)   Wt 137 lb 12.8 oz (62.5 kg)   SpO2 99%   BMI 19.77 kg/m²     No intake/output data recorded. 01/25 1901 - 01/27 0700  In: 240 [P.O.:240]  Out: 650 [Urine:650]    BP (!) 160/90   Pulse 73   Temp 98.2 °F (36.8 °C) (Oral)   Resp 18   Ht 5' 10\" (1.778 m)   Wt 137 lb 12.8 oz (62.5 kg)   SpO2 99%   BMI 19.77 kg/m²   General:  Alert, cooperative, mild distress, appears stated age. Head:  Normocephalic, without obvious abnormality, atraumatic. Eyes:  Conjunctivae/corneas clear. EOMs intact. Ears:  Normal external ear canals both ears. Nose: Nares normal. Septum midline. Mucosa normal. No drainage or sinus tenderness. Throat: Lips, mucosa, and tongue normal. Teeth and gums normal.   Neck: Supple, symmetrical, trachea midline, no adenopathy, no JVD. Back:   Symmetric, no curvature. ROM normal. No CVA tenderness. Lungs:   Clear to auscultation bilaterally. Chest wall:  No tenderness or deformity. Heart:  Regular rate and rhythm, S1, S2 normal, no murmur,  rub or gallop. Abdomen:   Soft, non-tender. Bowel sounds normal. No masses,  No organomegaly. No renal bruit. Suprapubic Lewis catheter in place. Extremities: Extremities normal, atraumatic, no cyanosis or edema. Skin: Skin color, texture, turgor normal. No rashes or lesions. Lymph nodes: Cervical and supraclavicular nodes normal.   Neurologic: Grossly intact. No asterixis.          Data Review:      Latest Reference Range & Units 7/22/20 05:46 7/23/20 04:40 8/14/20 09:07 6/16/21 14:37 4/29/22 14:04 1/26/23 11:47   Sodium 133 - 143 mmol/L 141 137 133 (L) 142 142 141   Potassium 3.5 - 5.1 mmol/L 3.8 3.3 (L) 4.7 5.2 3.7 4.7   Chloride 101 - 110 mmol/L 109 (H) 105 103 106 106 111 (H)   CO2 21 - 32 mmol/L 20 (L) 19 (L) 20 (L) 16 (L) 18 (L) 19 (L)   BUN,BUNPL 8 - 23 MG/DL 82 (H) 76 (H) 48 (H) 40 (H) 39 (H) 54 (H)   Creatinine 0.6 - 1.0 MG/DL 9.35 (H) 7.80 (H) 4.98 (H) 3.76 (H) 3.83 (H) 6.80 (H)   Bun/Cre Ratio 12 - 28 NA    11 (L) 10 (L)    Anion Gap 2 - 11 mmol/L 12 13 10   11   Est, Glom Filt Rate >60 ml/min/1.73m2      6 (L)   EGFR IF NonAfrican American >59 mL/min/1.73 4 (L) 5 (L) 9 (L) 12 (L)     GFR  >59 mL/min/1.73 5 (L) 7 (L) 11 (L) 13 (L)     Glucose, Random 65 - 100 mg/dL 92 88 90 85 202 (H) 119 (H)   CALCIUM, SERUM, 995035 8.3 - 10.4 MG/DL 8.1 (L) 7.9 (L) 9.2 9.5 9.0 9.0   ALBUMIN/GLOBULIN RATIO 0.4 - 1.6        0.8   Total Protein 6.3 - 8.2 g/dL 6.5 6.1 (L)  7.2 7.1 8.3 (H)   (L): Data is abnormally low  (H): Data is abnormally high     Latest Reference Range & Units 7/21/20 06:43 7/22/20 05:46 7/23/20 04:40 8/14/20 09:07 6/16/21 14:37 4/29/22 14:04 1/26/23 11:47   WBC 4.3 - 11.1 K/uL 8.6 10.5 8.3 5.6 7.7 4.2 12.6 (H)   RBC 4.05 - 5.2 M/uL 2.97 (L) 2.76 (L) 2.55 (L) 3.05 (L) 4.23 4.09 4.02 (L)   Hemoglobin Quant 11.7 - 15.4 g/dL 9.0 (L) 8.0 (L) 7.6 (L) 9.0 (L) 12.4 12.0 12.0   Hematocrit 35.8 - 46.3 % 26.7 (L) 25.3 (L) 23.4 (L) 27.6 (L) 37.9 36.7 36.9   MCV 82 - 102 FL 89.9 91.7 91.8 90.5 90 90 91.8   MCH 26.1 - 32.9 PG 30.3 29.0 29.8 29.5 29.3 29.3 29.9   MCHC 31.4 - 35.0 g/dL 33.7 31.6 32.5 32.6 32.7 32.7 32.5   MPV 9.4 - 12.3 FL 10.1 10.0 10.2 9.0 (L)   9.6   RDW 11.9 - 14.6 % 14.0 14.1 13.6 13.1 12.5 13.4 13.7   Platelet Count 620 - 450 K/uL 171 194 185 277 290 246 273   (H): Data is abnormally high  (L): Data is abnormally low    Recent Results (from the past 24 hour(s))   Urinalysis    Collection Time: 01/26/23 11:46 AM   Result Value Ref Range    Color, UA YELLOW/STRAW      Appearance TURBID      Specific Gravity, UA 1.020 1.001 - 1.023      pH, Urine 6.0 5.0 - 9.0      Protein, UA >300 (A) NEG mg/dL    Glucose,  mg/dL    Ketones, Urine 15 (A) NEG mg/dL    Bilirubin Urine Negative NEG      Blood, Urine SMALL (A) NEG      Urobilinogen, Urine 0.2 0.2 - 1.0 EU/dL    Nitrite, Urine Negative NEG      Leukocyte Esterase, Urine MODERATE (A) NEG      WBC, UA >100 0 /hpf    Epithelial Cells UA 3-5 0 /hpf    BACTERIA, URINE 4+ (H) 0 /hpf    Casts 0-3 0 /lpf    Other observations RESULTS VERIFIED MANUALLY     Culture, Urine    Collection Time: 01/26/23 11:46 AM    Specimen: Urine, clean catch   Result Value Ref Range    Special Requests NO SPECIAL REQUESTS      Culture        No growth after short period of incubation. Further results to follow after overnight incubation.    CBC with Auto Differential    Collection Time: 01/26/23 11:47 AM   Result Value Ref Range    WBC 12.6 (H) 4.3 - 11.1 K/uL    RBC 4.02 (L) 4.05 - 5.2 M/uL    Hemoglobin 12.0 11.7 - 15.4 g/dL    Hematocrit 36.9 35.8 - 46.3 %    MCV 91.8 82 - 102 FL    MCH 29.9 26.1 - 32.9 PG    MCHC 32.5 31.4 - 35.0 g/dL    RDW 13.7 11.9 - 14.6 %    Platelets 583 274 - 594 K/uL    MPV 9.6 9.4 - 12.3 FL    nRBC 0.00 0.0 - 0.2 K/uL    Differential Type AUTOMATED      Seg Neutrophils 85 (H) 43 - 78 %    Lymphocytes 8 (L) 13 - 44 %    Monocytes 4 4.0 - 12.0 %    Eosinophils % 1 0.5 - 7.8 %    Basophils 1 0.0 - 2.0 %    Immature Granulocytes 1 0.0 - 5.0 %    Segs Absolute 10.9 (H) 1.7 - 8.2 K/UL    Absolute Lymph # 1.0 0.5 - 4.6 K/UL    Absolute Mono # 0.5 0.1 - 1.3 K/UL    Absolute Eos # 0.1 0.0 - 0.8 K/UL    Basophils Absolute 0.1 0.0 - 0.2 K/UL    Absolute Immature Granulocyte 0.1 0.0 - 0.5 K/UL   CMP    Collection Time: 01/26/23 11:47 AM   Result Value Ref Range    Sodium 141 133 - 143 mmol/L    Potassium 4.7 3.5 - 5.1 mmol/L    Chloride 111 (H) 101 - 110 mmol/L    CO2 19 (L) 21 - 32 mmol/L    Anion Gap 11 2 - 11 mmol/L Glucose 119 (H) 65 - 100 mg/dL    BUN 54 (H) 8 - 23 MG/DL    Creatinine 6.80 (H) 0.6 - 1.0 MG/DL    Est, Glom Filt Rate 6 (L) >60 ml/min/1.73m2    Calcium 9.0 8.3 - 10.4 MG/DL    Total Bilirubin 0.4 0.2 - 1.1 MG/DL    ALT 16 12 - 65 U/L    AST 41 (H) 15 - 37 U/L    Alk Phosphatase 194 (H) 50 - 136 U/L    Total Protein 8.3 (H) 6.3 - 8.2 g/dL    Albumin 3.7 3.2 - 4.6 g/dL    Globulin 4.6 (H) 2.8 - 4.5 g/dL    Albumin/Globulin Ratio 0.8 0.4 - 1.6     APTT    Collection Time: 01/26/23 11:47 AM   Result Value Ref Range    PTT 28.5 24.5 - 34.2 SEC   Protime-INR    Collection Time: 01/26/23 11:47 AM   Result Value Ref Range    Protime 13.5 12.6 - 14.3 sec    INR 1.0     Venous Blood Gas, POC    Collection Time: 01/26/23  6:01 PM   Result Value Ref Range    PH, VENOUS (POC) 7.29 (L) 7.32 - 7.42      PCO2, [place], POC 37.6 (L) 41 - 51 MMHG    PO2, VENOUS (POC) 14 (LL) mmHg    HCO3, Venous 17.9 (L) 23 - 28 MMOL/L    SO2, VENOUS (POC) 14.0 (L) 65 - 88 %    Base Deficit, Venous 8.1 mmol/L    POC Taiwo's Test NOT APPLICABLE      Specimen type: VENOUS BLOOD      Performed by:  Margie     POC TCO2 17 13 - 23 MMOL/L    Critical Value Read Back AARON    Basic Metabolic Panel w/ Reflex to MG    Collection Time: 01/27/23  4:41 AM   Result Value Ref Range    Sodium 142 133 - 143 mmol/L    Potassium 3.5 3.5 - 5.1 mmol/L    Chloride 110 101 - 110 mmol/L    CO2 22 21 - 32 mmol/L    Anion Gap 10 2 - 11 mmol/L    Glucose 108 (H) 65 - 100 mg/dL    BUN 55 (H) 8 - 23 MG/DL    Creatinine 6.30 (H) 0.6 - 1.0 MG/DL    Est, Glom Filt Rate 7 (L) >60 ml/min/1.73m2    Calcium 9.0 8.3 - 10.4 MG/DL   CBC with Auto Differential    Collection Time: 01/27/23  4:41 AM   Result Value Ref Range    WBC 5.7 4.3 - 11.1 K/uL    RBC 3.31 (L) 4.05 - 5.2 M/uL    Hemoglobin 9.7 (L) 11.7 - 15.4 g/dL    Hematocrit 30.0 (L) 35.8 - 46.3 %    MCV 90.6 82 - 102 FL    MCH 29.3 26.1 - 32.9 PG    MCHC 32.3 31.4 - 35.0 g/dL    RDW 13.5 11.9 - 14.6 %    Platelets 731 786 - 450 K/uL    MPV 8.8 (L) 9.4 - 12.3 FL    nRBC 0.00 0.0 - 0.2 K/uL    Differential Type AUTOMATED      Seg Neutrophils 58 43 - 78 %    Lymphocytes 27 13 - 44 %    Monocytes 8 4.0 - 12.0 %    Eosinophils % 6 0.5 - 7.8 %    Basophils 1 0.0 - 2.0 %    Immature Granulocytes 0 0.0 - 5.0 %    Segs Absolute 3.3 1.7 - 8.2 K/UL    Absolute Lymph # 1.6 0.5 - 4.6 K/UL    Absolute Mono # 0.5 0.1 - 1.3 K/UL    Absolute Eos # 0.3 0.0 - 0.8 K/UL    Basophils Absolute 0.0 0.0 - 0.2 K/UL    Absolute Immature Granulocyte 0.0 0.0 - 0.5 K/UL   Hepatitis B Surface Antibody    Collection Time: 01/27/23  4:41 AM   Result Value Ref Range    Hep B S Ab <3.10 mIU/mL   Hepatitis B Surface Antigen    Collection Time: 01/27/23  4:41 AM   Result Value Ref Range    Hepatitis B Surface Ag NONREACTIVE NR     Phosphorus    Collection Time: 01/27/23  4:41 AM   Result Value Ref Range    Phosphorus 5.0 (H) 2.3 - 3.7 MG/DL   Magnesium    Collection Time: 01/27/23  4:41 AM   Result Value Ref Range    Magnesium 2.3 1.8 - 2.4 mg/dL       CXR;   No results found for this or any previous visit. KUB:  No results found for this or any previous visit. Renal US:  No results found for this or any previous visit. CT Abdomen  Results for orders placed in visit on 08/31/21    CT ABDOMEN PELVIS WO CONTRAST           Principal Problem:    Acute kidney injury superimposed on CKD ([address]  Active Problems:    Chronic kidney disease (CKD), stage V (HCC)    Hyperlipidemia    Metabolic acidosis    Acute pyelonephritis    Hypertensive urgency    Lower obstructive uropathy    Acquired hypothyroidism    Gastroesophageal reflux disease without esophagitis    Suprapubic catheter ([address]  Resolved Problems:    * No resolved hospital problems.  *      Assessment:     New onset  End Stage Renal Disease -marked uremic symptoms with progression to ESRD from obstructive and hypertensive nephropathy  as manifested by N/V/diarrhea, weakness and anorexia over the last weeks  - a trial with medical therapy failed to achieve satisfactory results as crea improved only to 6.3 - plan for tunneled HD catheter, coag studies are normal and heparin and ASA have been held today -    initiation of dialysis today is needed and plan next HD tomorrow  Accelerated hypertension - she was not able tolerate her outpatient antihypertensives today and reports even previously inadequate BP control - BP is responding to resumed antihypertensives   Metabolic acidosis - treating with bicarbonate infusion   Anemia - HB decreased as expected with IVF  Hyperlipidemia - treat with statin  Hypothyroidism - on replacement therapy       Plan:     As above     Desmond Adair M.D.

## 2023-01-27 NOTE — PROGRESS NOTES
Hospitalist Progress Note   Admit Date:  2023 11:28 AM   Name:  Charlet Schlatter   Age:  81 y.o. Sex:  female  :  1950   MRN:  [id number]   Room:  2/    Presenting Complaint: Hypertension, Flank Pain, and Emesis     Reason(s) for Admission: Acute on chronic renal insufficiency [N28.9, N18.9]  Acute kidney injury superimposed on CKD ([facility] [address] [N17.9, N18.9]     Hospital Course:   Patient is a 80 y/o female with medical history of urethral stricture s/p chronic suprapubic catheter, CKD IV, hypothyroidism, HTN who presented to ED with cc confusion, generalized weakness, bilateral flank pain of 3-4 day duration associated with nausea and vomiting. Patient follows both Urology and Nephrology. ED workup: afebrile, HR 81 /125  Labs notable for BUN/Cr 54/6.8 (baseline 4-5), CO2 19, Alk phos 194, AST 41, WBC 12.6. UA c/w UTI, initiated on ceftriaxone, Ucx pending. Patient received labetalol, zofran, and 1000 cc NS bolus. Nephrology evaluated in ED and requested hospitalist admission. Initiated on bicarb gtt. Nephrology consult, new onset ESRD, place line, initiate HD today with plan for next run tomorrow. PT/OT with no needs on discharge. Subjective & 24hr Events (23): Patient alert, oriented, no acute distress. Complaint of mild nausea and weakness. Denies chest pain, SOB, abdominal pain.     Assessment & Plan:     YOJANA on CKD stage 5, new onset ESRD  -Nephrology consult, place line, initiate HD today with plan for next run tomorrow     Hypertensive urgency  -Cont home metoprolol and Procardia, added hydralazine, prn hydralazine and labetalol available, /90 this am     Acute pyelonephritis  -Continue ceftriaxone, follow urine culture, leukocytosis normalized  -Renal ultrasound obtained and pending to r/o obstruction     Metabolic acidosis  -Secondary to esrd as above, continue bicarb gtt, Nephrology following, follow labs     Hx urethral stricture s/p chronic suprapubic catheter ([facility] [address]  -Follows Urology outpatient     GERD  -Continue PPI     EDMOND / anemia of chronic disease  -Continue home ferrous sulfate  -Mild drop secondary to IVF, repeat am labs     HLD  -Continue home statin    Hypothyroidism  -Continue home synthroid    Mild hyperkalemia  -K 3.4, first run HD today, repeat am labs     Discharge planning: new onset ESRD, home pending clinical course    Diet:  Diet NPO  DVT PPx: Heparin SQ  Code status: Full Code    Hospital Problems:  Principal Problem:    Acute kidney injury superimposed on CKD ([facility] [address]  Active Problems:    Chronic kidney disease (CKD), stage V ([facility] [address]    Hyperlipidemia    Metabolic acidosis    Acute pyelonephritis    Hypertensive urgency    Lower obstructive uropathy    Acquired hypothyroidism    Gastroesophageal reflux disease without esophagitis    Suprapubic catheter ([facility] [address]  Resolved Problems:    * No resolved hospital problems.  *      Objective:   Patient Vitals for the past 24 hrs:   Temp Pulse Resp BP SpO2   01/27/23 0719 98.2 °F (36.8 °C) 73 18 (!) 160/90 99 %   01/27/23 0347 98.4 °F (36.9 °C) 80 19 (!) 170/95 99 %   01/26/23 2340 98.6 °F (37 °C) 69 19 (!) 154/95 100 %   01/26/23 1959 99 °F (37.2 °C) 85 18 (!) 179/102 99 %   01/26/23 1554 98.1 °F (36.7 °C) 81 18 (!) 209/101 98 %   01/26/23 1500 -- 80 17 -- 95 %   01/26/23 1445 -- 80 21 (!) 180/106 97 %   01/26/23 1430 -- 79 16 (!) 186/111 99 %   01/26/23 1415 -- 77 14 (!) 170/93 99 %   01/26/23 1400 -- 79 18 (!) 179/108 100 %   01/26/23 1345 -- 79 18 (!) 179/114 98 %   01/26/23 1330 -- 73 16 (!) 191/107 97 %   01/26/23 1325 -- 81 19 (!) 177/107 95 %   01/26/23 1324 -- 81 -- (!) 206/129 --   01/26/23 1320 -- 83 18 (!) 182/129 93 %   01/26/23 1319 -- 85 -- (!) 206/129 --   01/26/23 1315 -- 86 16 (!) 206/129 98 %   01/26/23 1309 -- 82 19 (!) 222/121 99 %   01/26/23 1308 -- 91 22 (!) 222/121 --   01/26/23 1115 98.6 °F (37 °C) 81 18 (!) 214/125 99 %       Oxygen Therapy  SpO2: 99 %  Pulse via Oximetry: 80 beats per minute  O2 Device: None (Room air)    Estimated body mass index is 19.77 kg/m² as calculated from the following:    Height as of this encounter: 5' 10\" (1.778 m). Weight as of this encounter: 137 lb 12.8 oz (62.5 kg). Intake/Output Summary (Last 24 hours) at 1/27/2023 0751  Last data filed at 1/27/2023 0547  Gross per 24 hour   Intake 240 ml   Output 650 ml   Net -410 ml         Physical Exam:     Blood pressure (!) 160/90, pulse 73, temperature 98.2 °F (36.8 °C), temperature source Oral, resp. rate 18, height 5' 10\" (1.778 m), weight 137 lb 12.8 oz (62.5 kg), SpO2 99 %. General:    Well nourished. Alert. No acute distress  Head:  Normocephalic, atraumatic  Eyes:  Sclerae appear normal.  Pupils equally round. ENT:  Nares appear normal.  Moist oral mucosa  Neck:  No restricted ROM. Trachea midline   CV:   RRR. No m/r/g. No jugular venous distension. Lungs:   CTAB posterior. No wheezing, rhonchi, or rales. Symmetric expansion. Respirations even and unlabored on RA  Abdomen:   Soft, nontender, nondistended. Extremities: No cyanosis or clubbing. No edema  Skin:     No rashes and normal coloration. Tattoos to back. Warm and dry. Neuro:  CN II-XII grossly intact. A&O x 3. No focal deficits. Psych:  Normal mood and affect.       I have personally reviewed labs and tests:  Recent Labs:  Recent Results (from the past 48 hour(s))   Urinalysis    Collection Time: 01/26/23 11:46 AM   Result Value Ref Range    Color, UA YELLOW/STRAW      Appearance TURBID      Specific Gravity, UA 1.020 1.001 - 1.023      pH, Urine 6.0 5.0 - 9.0      Protein, UA >300 (A) NEG mg/dL    Glucose,  mg/dL    Ketones, Urine 15 (A) NEG mg/dL    Bilirubin Urine Negative NEG      Blood, Urine SMALL (A) NEG      Urobilinogen, Urine 0.2 0.2 - 1.0 EU/dL    Nitrite, Urine Negative NEG      Leukocyte Esterase, Urine MODERATE (A) NEG      WBC, UA >100 0 /hpf    Epithelial Cells UA 3-5 0 /hpf    BACTERIA, URINE 4+ (H) 0 /hpf Casts 0-3 0 /lpf    Other observations RESULTS VERIFIED MANUALLY     CBC with Auto Differential    Collection Time: 01/26/23 11:47 AM   Result Value Ref Range    WBC 12.6 (H) 4.3 - 11.1 K/uL    RBC 4.02 (L) 4.05 - 5.2 M/uL    Hemoglobin 12.0 11.7 - 15.4 g/dL    Hematocrit 36.9 35.8 - 46.3 %    MCV 91.8 82 - 102 FL    MCH 29.9 26.1 - 32.9 PG    MCHC 32.5 31.4 - 35.0 g/dL    RDW 13.7 11.9 - 14.6 %    Platelets 598 737 - 459 K/uL    MPV 9.6 9.4 - 12.3 FL    nRBC 0.00 0.0 - 0.2 K/uL    Differential Type AUTOMATED      Seg Neutrophils 85 (H) 43 - 78 %    Lymphocytes 8 (L) 13 - 44 %    Monocytes 4 4.0 - 12.0 %    Eosinophils % 1 0.5 - 7.8 %    Basophils 1 0.0 - 2.0 %    Immature Granulocytes 1 0.0 - 5.0 %    Segs Absolute 10.9 (H) 1.7 - 8.2 K/UL    Absolute Lymph # 1.0 0.5 - 4.6 K/UL    Absolute Mono # 0.5 0.1 - 1.3 K/UL    Absolute Eos # 0.1 0.0 - 0.8 K/UL    Basophils Absolute 0.1 0.0 - 0.2 K/UL    Absolute Immature Granulocyte 0.1 0.0 - 0.5 K/UL   CMP    Collection Time: 01/26/23 11:47 AM   Result Value Ref Range    Sodium 141 133 - 143 mmol/L    Potassium 4.7 3.5 - 5.1 mmol/L    Chloride 111 (H) 101 - 110 mmol/L    CO2 19 (L) 21 - 32 mmol/L    Anion Gap 11 2 - 11 mmol/L    Glucose 119 (H) 65 - 100 mg/dL    BUN 54 (H) 8 - 23 MG/DL    Creatinine 6.80 (H) 0.6 - 1.0 MG/DL    Est, Glom Filt Rate 6 (L) >60 ml/min/1.73m2    Calcium 9.0 8.3 - 10.4 MG/DL    Total Bilirubin 0.4 0.2 - 1.1 MG/DL    ALT 16 12 - 65 U/L    AST 41 (H) 15 - 37 U/L    Alk Phosphatase 194 (H) 50 - 136 U/L    Total Protein 8.3 (H) 6.3 - 8.2 g/dL    Albumin 3.7 3.2 - 4.6 g/dL    Globulin 4.6 (H) 2.8 - 4.5 g/dL    Albumin/Globulin Ratio 0.8 0.4 - 1.6     APTT    Collection Time: 01/26/23 11:47 AM   Result Value Ref Range    PTT 28.5 24.5 - 34.2 SEC   Protime-INR    Collection Time: 01/26/23 11:47 AM   Result Value Ref Range    Protime 13.5 12.6 - 14.3 sec    INR 1.0     Venous Blood Gas, POC    Collection Time: 01/26/23  6:01 PM   Result Value Ref Range PH, VENOUS (POC) 7.29 (L) 7.32 - 7.42      PCO2, Marleny, POC 37.6 (L) 41 - 51 MMHG    PO2, VENOUS (POC) 14 (LL) mmHg    HCO3, Venous 17.9 (L) 23 - 28 MMOL/L    SO2, VENOUS (POC) 14.0 (L) 65 - 88 %    Base Deficit, Venous 8.1 mmol/L    POC Taiwo's Test NOT APPLICABLE      Specimen type: VENOUS BLOOD      Performed by:  Margie     POC TCO2 17 13 - 23 MMOL/L    Critical Value Read Back AARON    Basic Metabolic Panel w/ Reflex to MG    Collection Time: 01/27/23  4:41 AM   Result Value Ref Range    Sodium 142 133 - 143 mmol/L    Potassium 3.5 3.5 - 5.1 mmol/L    Chloride 110 101 - 110 mmol/L    CO2 22 21 - 32 mmol/L    Anion Gap 10 2 - 11 mmol/L    Glucose 108 (H) 65 - 100 mg/dL    BUN 55 (H) 8 - 23 MG/DL    Creatinine 6.30 (H) 0.6 - 1.0 MG/DL    Est, Glom Filt Rate 7 (L) >60 ml/min/1.73m2    Calcium 9.0 8.3 - 10.4 MG/DL   CBC with Auto Differential    Collection Time: 01/27/23  4:41 AM   Result Value Ref Range    WBC 5.7 4.3 - 11.1 K/uL    RBC 3.31 (L) 4.05 - 5.2 M/uL    Hemoglobin 9.7 (L) 11.7 - 15.4 g/dL    Hematocrit 30.0 (L) 35.8 - 46.3 %    MCV 90.6 82 - 102 FL    MCH 29.3 26.1 - 32.9 PG    MCHC 32.3 31.4 - 35.0 g/dL    RDW 13.5 11.9 - 14.6 %    Platelets 354 784 - 985 K/uL    MPV 8.8 (L) 9.4 - 12.3 FL    nRBC 0.00 0.0 - 0.2 K/uL    Differential Type AUTOMATED      Seg Neutrophils 58 43 - 78 %    Lymphocytes 27 13 - 44 %    Monocytes 8 4.0 - 12.0 %    Eosinophils % 6 0.5 - 7.8 %    Basophils 1 0.0 - 2.0 %    Immature Granulocytes 0 0.0 - 5.0 %    Segs Absolute 3.3 1.7 - 8.2 K/UL    Absolute Lymph # 1.6 0.5 - 4.6 K/UL    Absolute Mono # 0.5 0.1 - 1.3 K/UL    Absolute Eos # 0.3 0.0 - 0.8 K/UL    Basophils Absolute 0.0 0.0 - 0.2 K/UL    Absolute Immature Granulocyte 0.0 0.0 - 0.5 K/UL   Hepatitis B Surface Antibody    Collection Time: 01/27/23  4:41 AM   Result Value Ref Range    Hep B S Ab <3.10 mIU/mL   Hepatitis B Surface Antigen    Collection Time: 01/27/23  4:41 AM   Result Value Ref Range    Hepatitis B Surface Ag NONREACTIVE NR     Phosphorus    Collection Time: 01/27/23  4:41 AM   Result Value Ref Range    Phosphorus 5.0 (H) 2.3 - 3.7 MG/DL   Magnesium    Collection Time: 01/27/23  4:41 AM   Result Value Ref Range    Magnesium 2.3 1.8 - 2.4 mg/dL       I have personally reviewed imaging studies:  Other Studies:  US RETROPERITONEAL LIMITED    (Results Pending)   IR TUNNELED CVC PLACE WO SQ PORT/PUMP > 5 YEARS    (Results Pending)       Current Meds:  Current Facility-Administered Medications   Medication Dose Route Frequency    aspirin chewable tablet 81 mg  81 mg Oral Daily    atorvastatin (LIPITOR) tablet 20 mg  20 mg Oral Every Other Day    DULoxetine (CYMBALTA) extended release capsule 60 mg  60 mg Oral BID    ferrous sulfate (IRON 325) tablet 325 mg  325 mg Oral Daily with breakfast    levothyroxine (SYNTHROID) tablet 125 mcg  125 mcg Oral QAM AC    NIFEdipine (PROCARDIA XL) extended release tablet 30 mg  30 mg Oral Daily    pantoprazole (PROTONIX) tablet 40 mg  40 mg Oral BID    Vitamin D (CHOLECALCIFEROL) tablet 5,000 Units  5,000 Units Oral Daily    sodium chloride flush 0.9 % injection 5-40 mL  5-40 mL IntraVENous 2 times per day    sodium chloride flush 0.9 % injection 5-40 mL  5-40 mL IntraVENous PRN    0.9 % sodium chloride infusion   IntraVENous PRN    ondansetron (ZOFRAN-ODT) disintegrating tablet 4 mg  4 mg Oral Q8H PRN    Or    ondansetron (ZOFRAN) injection 4 mg  4 mg IntraVENous Q6H PRN    polyethylene glycol (GLYCOLAX) packet 17 g  17 g Oral Daily PRN    acetaminophen (TYLENOL) tablet 650 mg  650 mg Oral Q6H PRN    Or    acetaminophen (TYLENOL) suppository 650 mg  650 mg Rectal Q6H PRN    tuberculin injection 5 Units  5 Units IntraDERmal Once    sodium bicarbonate 150 mEq in dextrose 5 % 1,000 mL infusion   IntraVENous Continuous    cefTRIAXone (ROCEPHIN) 1,000 mg in sodium chloride 0.9 % 50 mL IVPB (mini-bag)  1,000 mg IntraVENous Q24H    heparin (porcine) injection 5,000 Units  5,000 Units SubCUTAneous BID    metoprolol tartrate (LOPRESSOR) tablet 50 mg  50 mg Oral BID    labetalol (NORMODYNE;TRANDATE) injection 10 mg  10 mg IntraVENous Q6H PRN    hydrALAZINE (APRESOLINE) injection 10 mg  10 mg IntraVENous Q6H PRN    hydrALAZINE (APRESOLINE) tablet 25 mg  25 mg Oral 3 times per day     Signed: Rosa Hill AGACNP-BC

## 2023-01-27 NOTE — PROGRESS NOTES
Dialysis RN Yadira Dukes at bedside. Verbal report given, patient en route at this time to dialysis with dialysis RN.

## 2023-01-27 NOTE — PROGRESS NOTES
Reviewed notes for new spiritual concerns      Will continue to assess how we can best serve this family        [place].       Per notes:       [address]

## 2023-01-27 NOTE — DIALYSIS
TRANSFER OUT- DIALYSIS    Hemodialysis treatment completed. PT ran 1.5 hours, without complications. Patient a/ox3 and VS stable  /119  P 70       0.5 Kg removed. Flushed both ports with 10 mL of NS.  CVC dressing clean, dry, and intact, tego caps intact, curos caps placed. Meds given: mannitol. RBCs given during dialysis: 0    Patient to 812 after dialysis.

## 2023-01-27 NOTE — PROGRESS NOTES
ACUTE PHYSICAL THERAPY GOALS:   (Developed with and agreed upon by patient and/or caregiver. )  LTG:  (1.)Ms. Carlos Alberto Louise will move from supine to sit and sit to supine , scoot up and down, and roll side to side in bed with INDEPENDENT within 1 treatment day(s). GOAL MET 1/27/2023  (2.)Ms. Carlos Alberto Louise will ambulate with STAND BY ASSIST for 250 feet with the least restrictive device within 1 treatment day(s). GOAL MET 1/27/2023   ________________________________________________________________________________________________     PHYSICAL THERAPY Initial Assessment, Discharge, and AM  (Link to Caseload Tracking: PT Visit Days : 1  Acknowledge Orders  Time In/Out  PT Charge Capture  Rehab Caseload Tracker    Shanique Ruiz is a 81 y.o. female   PRIMARY DIAGNOSIS: End stage renal disease ([facility] [address]  Acute on chronic renal insufficiency [N28.9, N18.9]  Acute kidney injury superimposed on CKD ([facility] [address] [N17.9, N18.9]       Reason for Referral: Difficulty in walking, Not elsewhere classified (R26.2)  Inpatient: Payor: Adenike Boudreaux / Plan: Isra Prescott PPO / Product Type: Medicare /     ASSESSMENT:     REHAB RECOMMENDATIONS:   Recommendation to date pending progress:  Setting:  No further skilled therapy after discharge from hospital-suggested outpatient balance therapy if needed due to c/o LOB at home    Equipment:    None     ASSESSMENT:  Ms. Carlos Alberto Louise presents to hospital on 1/26/23 with n/v, weakness and flank pain. Pt is A & O x 4, reports mod I with rollator or cane at home, independent ADLs, lives with spouse. Pt states 1 fall in past month out in yard with dogs, states does have some balance deficits, reports chronic B knee pain. Pt today independent with bed mobility, transfers. Pt ambulated in hallway pushing IV pole, no LOB or SOB with activity. Pt with slight limp on L with ambulation, slow rashad; reports baseline for mobility.  PT discussed with pt regarding possible outpatient balance therapy if desired due to c/o LOB at home and recent fall. Pt currently has no acute care needs, functioning at baseline, will d/c.       [address] [address] [facility] 6 Clicks Basic Mobility Inpatient Short Form  AM-PAC Mobility Inpatient   How much difficulty turning over in bed?: None  How much difficulty sitting down on / standing up from a chair with arms?: None  How much difficulty moving from lying on back to sitting on side of bed?: None  How much help from another person moving to and from a bed to a chair?: None  How much help from another person needed to walk in hospital room?: None  How much help from another person for climbing 3-5 steps with a railing?: A Little  AM-[facility] Inpatient Mobility Raw Score : 23  AM-PAC Inpatient T-Scale Score : 56.93  Mobility Inpatient CMS 0-100% Score: 11.2  Mobility Inpatient CMS G-Code Modifier : CI    SUBJECTIVE:   Ms. Margarita Capps states, \"I am just waiting on them to place the port\"     Social/Functional Lives With: Spouse  Type of Home: House  Home Layout: One level  Home Access: Stairs to enter with rails  Entrance Stairs - Number of Steps: 3  Bathroom Shower/Tub: Tub/Shower unit  Home Equipment: Pama Troppin  Has the patient had two or more falls in the past year or any fall with injury in the past year?: Yes  Receives Help From: Family  ADL Assistance: Independent  Ambulation Assistance: Needs assistance (MOD I with cane or rollator)    OBJECTIVE:     PAIN: VITALS / O2: PRECAUTION / Paulette Ventura / DRAINS:   Pre Treatment:    0/10      Post Treatment: 0/10 Vitals        Oxygen     RA IV    RESTRICTIONS/PRECAUTIONS:                    GROSS EVALUATION: Intact Impaired (Comments):   AROM [x]     PROM [x]    Strength [x]     Balance [] Standing - Static: Fair, +  Standing - Dynamic: Fair, +   Posture [] N/A   Sensation [x]     Coordination [x]      Tone [x]     Edema [x]    Activity Tolerance []  General fatigue    []      COGNITION/  PERCEPTION: Intact Impaired (Comments):   Orientation [x]     Vision [x]     Hearing [x]     Cognition  [x]       MOBILITY: I Mod I S SBA CGA Min Mod Max Total  NT x2 Comments:   Bed Mobility    Rolling [x] [] [] [] [] [] [] [] [] [] []    Supine to Sit [x] [] [] [] [] [] [] [] [] [] []    Scooting [x] [] [] [] [] [] [] [] [] [] []    Sit to Supine [x] [] [] [] [] [] [] [] [] [] []    Transfers    Sit to Stand [x] [] [] [] [] [] [] [] [] [] []    Bed to Chair [] [] [] [] [] [] [] [] [] [x] []    Stand to Sit [x] [] [] [] [] [] [] [] [] [] []     [] [] [] [] [] [] [] [] [] [] []    I=Independent, Mod I=Modified Independent, S=Supervision, SBA=Standby Assistance, CGA=Contact Guard Assistance,   Min=Minimal Assistance, Mod=Moderate Assistance, Max=Maximal Assistance, Total=Total Assistance, NT=Not Tested    GAIT: I Mod I S SBA CGA Min Mod Max Total  NT x2 Comments:   Level of Assistance [] [] [] [x] [] [] [] [] [] [] []    Distance 250  feet    DME Pushing IV pole    Gait Quality Antalgic, Decreased rashad , and Shuffling     Weightbearing Status      Stairs      I=Independent, Mod I=Modified Independent, S=Supervision, SBA=Standby Assistance, CGA=Contact Guard Assistance,   Min=Minimal Assistance, Mod=Moderate Assistance, Max=Maximal Assistance, Total=Total Assistance, NT=Not Tested    PLAN:   FREQUENCY AND DURATION:  (eval and d/c) for duration of hospital stay or until stated goals are met, whichever comes first.    THERAPY PROGNOSIS: Good    PROBLEM LIST:   (Skilled intervention is medically necessary to address:)  Decreased Activity Tolerance INTERVENTIONS PLANNED:   (Benefits and precautions of physical therapy have been discussed with the patient.)  Therapeutic Activity  Education       TREATMENT:   EVALUATION: LOW COMPLEXITY: (Untimed Charge)    TREATMENT:   Therapeutic Activity (8 Minutes):  Therapeutic activity included Supine to Sit, Sit to Supine, Scooting, Transfer Training, Ambulation on level ground, Sitting balance , and Standing balance to improve functional Activity tolerance, Balance, Coordination, and Mobility.     TREATMENT GRID:  N/A    AFTER TREATMENT PRECAUTIONS: Bed, Bed/Chair Locked, Call light within reach, Needs within reach, RN notified, and Side rails x3    INTERDISCIPLINARY COLLABORATION:  RN/ PCT and PT/ PTA    EDUCATION: Education Given To: Patient  Education Provided: Role of Therapy;Plan of Care  Education Method: Verbal  Barriers to Learning: None  Education Outcome: Verbalized understanding    TIME IN/OUT:  Time In: 1010  Time Out: [facility] 7715  Minutes: [address] PT

## 2023-01-27 NOTE — CARE COORDINATION
MSN, CM:  spoke with patient this AM about discharge planning. Patient lives with her  in own home with 3 steps for entrance. Patient is independent with all ADLs' and requires no equipment for ambulation but does have a cane and rollator if needed. Patient c/o of weakness so PT/OT to be consulted for evaluation and recommendations. Patient will require an outpatient HD slot at [facility]. Labs ordered, line to be placed, and first HD to be completed today. Patient states she can get herself back and forth to the dialysis unit, so no transport set up. Case Management will continue to follow for any discharge needs. 01/27/23 1115   Service Assessment   Patient Orientation Alert and Oriented   Cognition Alert   History Provided By Patient   Primary Caregiver Self   Support Systems Spouse/Significant Other   Patient's Healthcare Decision Maker is: Legal Next of Kin  ()   PCP Verified by CM Yes   Last Visit to PCP Within last 3 months   Prior Functional Level Independent in ADLs/IADLs   Current Functional Level Other (see comment)  (PT consulted)   Can patient return to prior living arrangement Yes   Ability to make needs known: Good   Family able to assist with home care needs: Yes   Would you like for me to discuss the discharge plan with any other family members/significant others, and if so, who?  Yes  ()   Financial Resources Medicare   Social/Functional History   Lives With Spouse   Type of [address] One level   [address] to enter with rails   Entrance Stairs - Number of Steps 3   Bathroom Shower/Tub Tub/Shower unit   [address] Help From Family   ADL Assistance Independent   Homemaking Assistance Independent   Homemaking Responsibilities Yes   Ambulation Assistance Independent   Transfer Assistance Independent   Active  Yes   Mode of Transportation Car   Occupation Retired   Discharge Planning   Type of [address] Prior To Admission None   DME Ordered? No   Potential Assistance Purchasing Medications No   Patient expects to be discharged to: House   One/Two Story Residence One story   History of falls?  1

## 2023-01-27 NOTE — DIALYSIS
TRANSFER IN - DIALYSIS    Received patient in dialysis unit  from  (via 812) (unit) for ordered procedure. Consent verified for renal replacement therapy. Procedure explained to patient, opportunity for Q&A provided. Call light given. Patient a/ox3 and vital signs stable. /103,  P80  , 0L O2 via RA. Hemodialysis initiated using R CVC. Aspirated and flushed both ports without difficulty. Dressing clean, dry and intact. Machine settings per MD order. Heparin 0 unit bolus and 0 units/hr. Will monitor during treatment.

## 2023-01-28 LAB
ALBUMIN SERPL-MCNC: 3 G/DL (ref 3.2–4.6)
ALBUMIN/GLOB SERPL: 0.9 (ref 0.4–1.6)
ALP SERPL-CCNC: 157 U/L (ref 50–136)
ALT SERPL-CCNC: 13 U/L (ref 12–65)
ANION GAP SERPL CALC-SCNC: 8 MMOL/L (ref 2–11)
AST SERPL-CCNC: 14 U/L (ref 15–37)
BILIRUB SERPL-MCNC: 0.8 MG/DL (ref 0.2–1.1)
BUN SERPL-MCNC: 30 MG/DL (ref 8–23)
CALCIUM SERPL-MCNC: 8.8 MG/DL (ref 8.3–10.4)
CHLORIDE SERPL-SCNC: 106 MMOL/L (ref 101–110)
CO2 SERPL-SCNC: 30 MMOL/L (ref 21–32)
CREAT SERPL-MCNC: 4.7 MG/DL (ref 0.6–1)
GLOBULIN SER CALC-MCNC: 3.5 G/DL (ref 2.8–4.5)
GLUCOSE SERPL-MCNC: 103 MG/DL (ref 65–100)
HCT VFR BLD AUTO: 29.3 % (ref 35.8–46.3)
HGB BLD-MCNC: 9.6 G/DL (ref 11.7–15.4)
MAGNESIUM SERPL-MCNC: 2.2 MG/DL (ref 1.8–2.4)
POTASSIUM SERPL-SCNC: 3.3 MMOL/L (ref 3.5–5.1)
PROT SERPL-MCNC: 6.5 G/DL (ref 6.3–8.2)
SODIUM SERPL-SCNC: 144 MMOL/L (ref 133–143)

## 2023-01-28 PROCEDURE — 8010000000 HC HEMODIALYSIS ACUTE INPT

## 2023-01-28 PROCEDURE — 6360000002 HC RX W HCPCS: Performed by: FAMILY MEDICINE

## 2023-01-28 PROCEDURE — 36415 COLL VENOUS BLD VENIPUNCTURE: CPT

## 2023-01-28 PROCEDURE — 85014 HEMATOCRIT: CPT

## 2023-01-28 PROCEDURE — 6370000000 HC RX 637 (ALT 250 FOR IP): Performed by: FAMILY MEDICINE

## 2023-01-28 PROCEDURE — 6360000002 HC RX W HCPCS: Performed by: INTERNAL MEDICINE

## 2023-01-28 PROCEDURE — 80053 COMPREHEN METABOLIC PANEL: CPT

## 2023-01-28 PROCEDURE — 2580000003 HC RX 258: Performed by: FAMILY MEDICINE

## 2023-01-28 PROCEDURE — 2500000003 HC RX 250 WO HCPCS: Performed by: INTERNAL MEDICINE

## 2023-01-28 PROCEDURE — 1100000000 HC RM PRIVATE

## 2023-01-28 PROCEDURE — 83735 ASSAY OF MAGNESIUM: CPT

## 2023-01-28 PROCEDURE — 2500000003 HC RX 250 WO HCPCS: Performed by: FAMILY MEDICINE

## 2023-01-28 RX ADMIN — PANTOPRAZOLE SODIUM 40 MG: 40 TABLET, DELAYED RELEASE ORAL at 08:59

## 2023-01-28 RX ADMIN — METOPROLOL TARTRATE 50 MG: 50 TABLET, FILM COATED ORAL at 21:27

## 2023-01-28 RX ADMIN — SODIUM CHLORIDE, PRESERVATIVE FREE 10 ML: 5 INJECTION INTRAVENOUS at 09:00

## 2023-01-28 RX ADMIN — SODIUM CHLORIDE, PRESERVATIVE FREE 10 ML: 5 INJECTION INTRAVENOUS at 21:28

## 2023-01-28 RX ADMIN — DULOXETINE HYDROCHLORIDE 60 MG: 30 CAPSULE, DELAYED RELEASE ORAL at 08:59

## 2023-01-28 RX ADMIN — TUBERCULIN PURIFIED PROTEIN DERIVATIVE 5 UNITS: 5 INJECTION, SOLUTION INTRADERMAL at 15:53

## 2023-01-28 RX ADMIN — METOPROLOL TARTRATE 50 MG: 50 TABLET, FILM COATED ORAL at 08:59

## 2023-01-28 RX ADMIN — DULOXETINE HYDROCHLORIDE 60 MG: 30 CAPSULE, DELAYED RELEASE ORAL at 21:28

## 2023-01-28 RX ADMIN — SODIUM BICARBONATE: 84 INJECTION, SOLUTION INTRAVENOUS at 00:52

## 2023-01-28 RX ADMIN — ASPIRIN 81 MG 81 MG: 81 TABLET ORAL at 09:01

## 2023-01-28 RX ADMIN — HEPARIN SODIUM 5000 UNITS: 5000 INJECTION INTRAVENOUS; SUBCUTANEOUS at 21:28

## 2023-01-28 RX ADMIN — PANTOPRAZOLE SODIUM 40 MG: 40 TABLET, DELAYED RELEASE ORAL at 21:28

## 2023-01-28 RX ADMIN — MANNITOL 6.25 G: 12.5 INJECTION, SOLUTION INTRAVENOUS at 12:33

## 2023-01-28 RX ADMIN — HYDRALAZINE HYDROCHLORIDE 25 MG: 25 TABLET, FILM COATED ORAL at 14:23

## 2023-01-28 RX ADMIN — LEVOTHYROXINE SODIUM 125 MCG: 0.07 TABLET ORAL at 06:00

## 2023-01-28 RX ADMIN — HYDRALAZINE HYDROCHLORIDE 25 MG: 25 TABLET, FILM COATED ORAL at 06:00

## 2023-01-28 RX ADMIN — ATORVASTATIN CALCIUM 20 MG: 20 TABLET, FILM COATED ORAL at 08:58

## 2023-01-28 RX ADMIN — HEPARIN SODIUM 5000 UNITS: 5000 INJECTION INTRAVENOUS; SUBCUTANEOUS at 09:00

## 2023-01-28 RX ADMIN — CEFTRIAXONE 1000 MG: 1 INJECTION, POWDER, FOR SOLUTION INTRAMUSCULAR; INTRAVENOUS at 14:23

## 2023-01-28 RX ADMIN — NIFEDIPINE 30 MG: 30 TABLET, EXTENDED RELEASE ORAL at 08:59

## 2023-01-28 RX ADMIN — HYDRALAZINE HYDROCHLORIDE 25 MG: 25 TABLET, FILM COATED ORAL at 21:28

## 2023-01-28 RX ADMIN — FERROUS SULFATE TAB 325 MG (65 MG ELEMENTAL FE) 325 MG: 325 (65 FE) TAB at 08:59

## 2023-01-28 RX ADMIN — CHOLECALCIFEROL TAB 25 MCG (1000 UNIT) 5000 UNITS: 25 TAB at 08:59

## 2023-01-28 NOTE — PROGRESS NOTES
Hemodialysis Rounding Note    Subjective:     Ines Bonds is a 81 y.o.  who presents with Acute on chronic renal insufficiency [N28.9, N18.9]  Acute kidney injury superimposed on CKD ([address] [N17.9, N18.9]. The patient is dialyzing utilizing the following method:Intermittent Hemodialysis  Artificial Kidney Dialysis Machine No.: [id number]   hours Dialyze Hours: 2.5   blood flow rate Blood Flow Rate (ml/min): 250 ml/min   dialysate rate    ultrafiltration Ultrafiltration Rate (ml/hr): 200 ml/hr   Heparin is  used during the dialysis treatment. Complaints none and improved sleep and nausea noted .      Current Facility-Administered Medications   Medication Dose Route Frequency    mannitol 25 % injection 6.25 g  6.25 g IntraVENous PRN    oxyCODONE (ROXICODONE) immediate release tablet 5 mg  5 mg Oral Q4H PRN    aspirin chewable tablet 81 mg  81 mg Oral Daily    atorvastatin (LIPITOR) tablet 20 mg  20 mg Oral Every Other Day    DULoxetine (CYMBALTA) extended release capsule 60 mg  60 mg Oral BID    ferrous sulfate (IRON 325) tablet 325 mg  325 mg Oral Daily with breakfast    levothyroxine (SYNTHROID) tablet 125 mcg  125 mcg Oral QAM AC    NIFEdipine (PROCARDIA XL) extended release tablet 30 mg  30 mg Oral Daily    pantoprazole (PROTONIX) tablet 40 mg  40 mg Oral BID    Vitamin D (CHOLECALCIFEROL) tablet 5,000 Units  5,000 Units Oral Daily    sodium chloride flush 0.9 % injection 5-40 mL  5-40 mL IntraVENous 2 times per day    sodium chloride flush 0.9 % injection 5-40 mL  5-40 mL IntraVENous PRN    0.9 % sodium chloride infusion   IntraVENous PRN    ondansetron (ZOFRAN-ODT) disintegrating tablet 4 mg  4 mg Oral Q8H PRN    Or    ondansetron (ZOFRAN) injection 4 mg  4 mg IntraVENous Q6H PRN    polyethylene glycol (GLYCOLAX) packet 17 g  17 g Oral Daily PRN    acetaminophen (TYLENOL) tablet 650 mg  650 mg Oral Q6H PRN    Or    acetaminophen (TYLENOL) suppository 650 mg  650 mg Rectal Q6H PRN    tuberculin injection 5 Units  5 Units IntraDERmal Once    sodium bicarbonate 150 mEq in dextrose 5 % 1,000 mL infusion   IntraVENous Continuous    cefTRIAXone (ROCEPHIN) 1,000 mg in sodium chloride 0.9 % 50 mL IVPB (mini-bag)  1,000 mg IntraVENous Q24H    heparin (porcine) injection 5,000 Units  5,000 Units SubCUTAneous BID    metoprolol tartrate (LOPRESSOR) tablet 50 mg  50 mg Oral BID    labetalol (NORMODYNE;TRANDATE) injection 10 mg  10 mg IntraVENous Q6H PRN    hydrALAZINE (APRESOLINE) injection 10 mg  10 mg IntraVENous Q6H PRN    hydrALAZINE (APRESOLINE) tablet 25 mg  25 mg Oral 3 times per day       No intake/output data recorded.  1901 -  0700  In: 500   Out:  [Urine:1050]    Recent Results (from the past 24 hour(s))   Comprehensive Metabolic Panel w/ Reflex to MG    Collection Time: 23  4:47 AM   Result Value Ref Range    Sodium 144 (H) 133 - 143 mmol/L    Potassium 3.3 (L) 3.5 - 5.1 mmol/L    Chloride 106 101 - 110 mmol/L    CO2 30 21 - 32 mmol/L    Anion Gap 8 2 - 11 mmol/L    Glucose 103 (H) 65 - 100 mg/dL    BUN 30 (H) 8 - 23 MG/DL    Creatinine 4.70 (H) 0.6 - 1.0 MG/DL    Est, Glom Filt Rate 9 (L) >60 ml/min/1.73m2    Calcium 8.8 8.3 - 10.4 MG/DL    Total Bilirubin 0.8 0.2 - 1.1 MG/DL    ALT 13 12 - 65 U/L    AST 14 (L) 15 - 37 U/L    Alk Phosphatase 157 (H) 50 - 136 U/L    Total Protein 6.5 6.3 - 8.2 g/dL    Albumin 3.0 (L) 3.2 - 4.6 g/dL    Globulin 3.5 2.8 - 4.5 g/dL    Albumin/Globulin Ratio 0.9 0.4 - 1.6     Hemoglobin and Hematocrit    Collection Time: 23  4:47 AM   Result Value Ref Range    Hemoglobin 9.6 (L) 11.7 - 15.4 g/dL    Hematocrit 29.3 (L) 35.8 - 46.3 %   Magnesium    Collection Time: 23  4:47 AM   Result Value Ref Range    Magnesium 2.2 1.8 - 2.4 mg/dL             Objective:     Blood pressure (!) 151/94, pulse 72, temperature 98.1 °F (36.7 °C), temperature source Oral, resp. rate 18, height 5' 10\" (1.778 m), weight 137 lb (62.1 kg), SpO2 98 %.   Temp (24hrs), Av.2 °F (36.8 °C), Min:97.2 °F (36.2 °C), Max:99 °F (37.2 °C)      Physical Exam:   Near eovolemic      Assessment:     End Stage Renal Disease:  Patient is tolerating dialysis treatment well. .  Additionally the patient has experienced normal dialysis treatment during dialysis. Dry weight   same. Anemia:    Recent Labs     01/28/23  0447   HCT 29.3*   HGB 9.6*       Renal Metabolic Bone Disease:  No results for input(s): CA, ALB, PTH in the last 72 hours.     Invalid input(s): PO4                     Treatment:  PO4 binders, Cinacaleat, Vitamin D  Hypertension: uncontrolled - medication changes/orders    Access: adequate Tunnel Cuff Catheter    Principal Problem:    End stage renal disease (HCC)  Active Problems:    Hyperlipidemia    Metabolic acidosis    Acute pyelonephritis    Hypertensive urgency    Lower obstructive uropathy    Acquired hypothyroidism    Gastroesophageal reflux disease without esophagitis    Suprapubic catheter ([address]  Resolved Problems:    Acute kidney injury superimposed on CKD ([address]         Plan:     Continue scheduled dialysis

## 2023-01-28 NOTE — PROGRESS NOTES
Patient resting in bed watching TV. Patient denies any pain and appears comfortable at this time. Call light within reach and patient instructed to call if assistance is needed.   Report to be given to oncoming RN 7p-7a

## 2023-01-28 NOTE — PROGRESS NOTES
Patient back from dialysis with no distress. Patient denies any pain and appears comfortable at this time. Call light within reach and patient instructed to call if assistance is needed. Will continue to follow.

## 2023-01-28 NOTE — PROGRESS NOTES
Pt is resting in bed with no complaints of pain or discomfort at this time. Pt is alert and oriented times 4. Pt is on room air. Pt has Sodium Bicarb infusing at 100ml/h. No further needs expressed at this time.

## 2023-01-28 NOTE — PROGRESS NOTES
Hospitalist Progress Note   Admit Date:  2023 11:28 AM   Name:  Leonel May   Age:  81 y.o. Sex:  female  :  1950   MRN:  [id number]   Room:  2/    Presenting Complaint: Hypertension, Flank Pain, and Emesis     Reason(s) for Admission: Acute on chronic renal insufficiency [N28.9, N18.9]  Acute kidney injury superimposed on CKD ([facility] [N17.9, N18.9]     Hospital Course:   Leonel May is a 81 y.o. female with medical history of urethral structure s/p suprapubic cath, stage IV now ESRD, hypothyroidism, HTN who presented with confusion, generalized weakness, and b/l flank pain along with n/v. Found to have UTI, worsening Cr now considered ESRD, along with hypertensive urgency and metabolic acidosis. Nephrology c/s, started on HD and antibiotics. Subjective & 24hr Events (23): Seen at bedside. No complaints. States that she feels better since starting dialysis. Otherwise has been up independently. Denies n/v/d, constipation, pain, chest pain, SOB. Assessment & Plan:     Principal Problem:    End stage renal disease ([facility]  - nephrology following, appreciate assistance; continue HD per their discretion  - metabolic acidosis, resolved - will d/c bicard drip    Hypertensive urgency, improving  - Cont home metoprolol and Procardia, hydralazine and labetalol     Acute pyelonephritis  -Continue ceftriaxone, Ucx GNR, susceptibilities pending, leukocytosis normalized  -Renal ultrasound:  Impression       1. No hydronephrosis. 2. Incompletely characterized partially cystic lower pole left renal cortical   lesion measuring 2.8 cm in diameter. Consider routine follow-up evaluation with   renal mass protocol CT or MRI.        Hx urethral stricture s/p chronic suprapubic catheter (HCC)  - will need exchange while inpt, consider urology c/s     GERD  -Continue PPI     EDMOND / anemia of chronic disease  -Continue home ferrous sulfate  - Continued decrease, repeat am labs     HLD  - Continue atorvastatin     Hypothyroidism  - Continue home synthroid      Anticipated discharge needs:      - will need dialysis set up at discharge, otherwise do not anticipate other needs    Diet:  ADULT DIET; Regular; Low Sodium (2 gm)  DVT PPx: heparin  Code status: Full Code    Hospital Problems:  Principal Problem:    End stage renal disease ([facility]  Active Problems:    Hyperlipidemia    Metabolic acidosis    Acute pyelonephritis    Hypertensive urgency    Lower obstructive uropathy    Acquired hypothyroidism    Gastroesophageal reflux disease without esophagitis    Suprapubic catheter ([facility]  Resolved Problems:    Acute kidney injury superimposed on CKD ([facility]      Objective:   Patient Vitals for the past 24 hrs:   Temp Pulse Resp BP SpO2   01/28/23 0749 98.6 °F (37 °C) 79 19 (!) 165/98 95 %   01/28/23 0406 98.8 °F (37.1 °C) 76 16 (!) 146/100 96 %   01/27/23 2310 99 °F (37.2 °C) 70 18 (!) 141/94 97 %   01/27/23 2005 98.2 °F (36.8 °C) 79 18 (!) 116/93 98 %   01/27/23 1815 97.2 °F (36.2 °C) 77 20 (!) 174/105 98 %   01/27/23 1730 -- 70 -- (!) 181/107 --   01/27/23 1700 -- 69 -- (!) 178/115 --   01/27/23 1632 -- 76 -- (!) 178/105 --   01/27/23 1615 -- 62 -- (!) 169/91 100 %   01/27/23 1610 -- 75 -- (!) 167/95 100 %   01/27/23 1605 -- 83 -- (!) 199/103 100 %   01/27/23 1600 -- 76 10 (!) 194/98 99 %   01/27/23 1555 -- 76 12 (!) 183/96 99 %   01/27/23 1550 -- 75 16 (!) 164/92 100 %   01/27/23 1546 -- 74 18 (!) 175/96 99 %   01/27/23 1448 97.4 °F (36.3 °C) 75 16 (!) 161/91 99 %   01/27/23 1105 98.4 °F (36.9 °C) 66 17 (!) 185/102 99 %       Oxygen Therapy  SpO2: 95 %  Pulse via Oximetry: 80 beats per minute  Pulse Oximeter Device Mode: Continuous  O2 Device: None (Room air)    Estimated body mass index is 19.66 kg/m² as calculated from the following:    Height as of this encounter: 5' 10\" (1.778 m). Weight as of this encounter: 137 lb (62.1 kg).     Intake/Output Summary (Last 24 hours) at 1/28/2023 5939  Last data filed at 1/28/2023 0604  Gross per 24 hour   Intake 500 ml   Output 1400 ml   Net -900 ml         Physical Exam:   Blood pressure (!) 165/98, pulse 79, temperature 98.6 °F (37 °C), temperature source Oral, resp. rate 19, height 5' 10\" (1.778 m), weight 137 lb (62.1 kg), SpO2 95 %. General:    Well nourished. Head:  Normocephalic, atraumatic  Eyes:  Sclerae appear normal.  Pupils equally round. ENT:  Nares appear normal.  Moist oral mucosa  Neck:  No restricted ROM. Trachea midline   CV:   RRR. No m/r/g. No jugular venous distension. Lungs:   CTAB. No wheezing, rhonchi, or rales. Symmetric expansion. Abdomen:   Soft, nontender, nondistended. Extremities: No cyanosis or clubbing. No edema  Skin:     No rashes and normal coloration. Warm and dry. Neuro:  CN II-XII grossly intact. Psych:  Normal mood and affect. I have personally reviewed labs and tests:  Recent Labs:  Recent Results (from the past 48 hour(s))   Urinalysis    Collection Time: 01/26/23 11:46 AM   Result Value Ref Range    Color, UA YELLOW/STRAW      Appearance TURBID      Specific Gravity, UA 1.020 1.001 - 1.023      pH, Urine 6.0 5.0 - 9.0      Protein, UA >300 (A) NEG mg/dL    Glucose,  mg/dL    Ketones, Urine 15 (A) NEG mg/dL    Bilirubin Urine Negative NEG      Blood, Urine SMALL (A) NEG      Urobilinogen, Urine 0.2 0.2 - 1.0 EU/dL    Nitrite, Urine Negative NEG      Leukocyte Esterase, Urine MODERATE (A) NEG      WBC, UA >100 0 /hpf    Epithelial Cells UA 3-5 0 /hpf    BACTERIA, URINE 4+ (H) 0 /hpf    Casts 0-3 0 /lpf    Other observations RESULTS VERIFIED MANUALLY     Culture, Urine    Collection Time: 01/26/23 11:46 AM    Specimen: Urine, clean catch   Result Value Ref Range    Special Requests NO SPECIAL REQUESTS      Culture        No growth after short period of incubation. Further results to follow after overnight incubation.    CBC with Auto Differential    Collection Time: 01/26/23 11:47 AM   Result Value Ref Range WBC 12.6 (H) 4.3 - 11.1 K/uL    RBC 4.02 (L) 4.05 - 5.2 M/uL    Hemoglobin 12.0 11.7 - 15.4 g/dL    Hematocrit 36.9 35.8 - 46.3 %    MCV 91.8 82 - 102 FL    MCH 29.9 26.1 - 32.9 PG    MCHC 32.5 31.4 - 35.0 g/dL    RDW 13.7 11.9 - 14.6 %    Platelets 131 365 - 928 K/uL    MPV 9.6 9.4 - 12.3 FL    nRBC 0.00 0.0 - 0.2 K/uL    Differential Type AUTOMATED      Seg Neutrophils 85 (H) 43 - 78 %    Lymphocytes 8 (L) 13 - 44 %    Monocytes 4 4.0 - 12.0 %    Eosinophils % 1 0.5 - 7.8 %    Basophils 1 0.0 - 2.0 %    Immature Granulocytes 1 0.0 - 5.0 %    Segs Absolute 10.9 (H) 1.7 - 8.2 K/UL    Absolute Lymph # 1.0 0.5 - 4.6 K/UL    Absolute Mono # 0.5 0.1 - 1.3 K/UL    Absolute Eos # 0.1 0.0 - 0.8 K/UL    Basophils Absolute 0.1 0.0 - 0.2 K/UL    Absolute Immature Granulocyte 0.1 0.0 - 0.5 K/UL   CMP    Collection Time: 01/26/23 11:47 AM   Result Value Ref Range    Sodium 141 133 - 143 mmol/L    Potassium 4.7 3.5 - 5.1 mmol/L    Chloride 111 (H) 101 - 110 mmol/L    CO2 19 (L) 21 - 32 mmol/L    Anion Gap 11 2 - 11 mmol/L    Glucose 119 (H) 65 - 100 mg/dL    BUN 54 (H) 8 - 23 MG/DL    Creatinine 6.80 (H) 0.6 - 1.0 MG/DL    Est, Glom Filt Rate 6 (L) >60 ml/min/1.73m2    Calcium 9.0 8.3 - 10.4 MG/DL    Total Bilirubin 0.4 0.2 - 1.1 MG/DL    ALT 16 12 - 65 U/L    AST 41 (H) 15 - 37 U/L    Alk Phosphatase 194 (H) 50 - 136 U/L    Total Protein 8.3 (H) 6.3 - 8.2 g/dL    Albumin 3.7 3.2 - 4.6 g/dL    Globulin 4.6 (H) 2.8 - 4.5 g/dL    Albumin/Globulin Ratio 0.8 0.4 - 1.6     APTT    Collection Time: 01/26/23 11:47 AM   Result Value Ref Range    PTT 28.5 24.5 - 34.2 SEC   Protime-INR    Collection Time: 01/26/23 11:47 AM   Result Value Ref Range    Protime 13.5 12.6 - 14.3 sec    INR 1.0     Venous Blood Gas, POC    Collection Time: 01/26/23  6:01 PM   Result Value Ref Range    PH, VENOUS (POC) 7.29 (L) 7.32 - 7.42      PCO2, Marleny, POC 37.6 (L) 41 - 51 MMHG    PO2, VENOUS (POC) 14 (LL) mmHg    HCO3, Venous 17.9 (L) 23 - 28 MMOL/L SO2, VENOUS (POC) 14.0 (L) 65 - 88 %    Base Deficit, Venous 8.1 mmol/L    POC Taiwo's Test NOT APPLICABLE      Specimen type: VENOUS BLOOD      Performed by:  Margie     POC TCO2 17 13 - 23 MMOL/L    Critical Value Read Back AARON    Basic Metabolic Panel w/ Reflex to MG    Collection Time: 01/27/23  4:41 AM   Result Value Ref Range    Sodium 142 133 - 143 mmol/L    Potassium 3.5 3.5 - 5.1 mmol/L    Chloride 110 101 - 110 mmol/L    CO2 22 21 - 32 mmol/L    Anion Gap 10 2 - 11 mmol/L    Glucose 108 (H) 65 - 100 mg/dL    BUN 55 (H) 8 - 23 MG/DL    Creatinine 6.30 (H) 0.6 - 1.0 MG/DL    Est, Glom Filt Rate 7 (L) >60 ml/min/1.73m2    Calcium 9.0 8.3 - 10.4 MG/DL   CBC with Auto Differential    Collection Time: 01/27/23  4:41 AM   Result Value Ref Range    WBC 5.7 4.3 - 11.1 K/uL    RBC 3.31 (L) 4.05 - 5.2 M/uL    Hemoglobin 9.7 (L) 11.7 - 15.4 g/dL    Hematocrit 30.0 (L) 35.8 - 46.3 %    MCV 90.6 82 - 102 FL    MCH 29.3 26.1 - 32.9 PG    MCHC 32.3 31.4 - 35.0 g/dL    RDW 13.5 11.9 - 14.6 %    Platelets 099 586 - 692 K/uL    MPV 8.8 (L) 9.4 - 12.3 FL    nRBC 0.00 0.0 - 0.2 K/uL    Differential Type AUTOMATED      Seg Neutrophils 58 43 - 78 %    Lymphocytes 27 13 - 44 %    Monocytes 8 4.0 - 12.0 %    Eosinophils % 6 0.5 - 7.8 %    Basophils 1 0.0 - 2.0 %    Immature Granulocytes 0 0.0 - 5.0 %    Segs Absolute 3.3 1.7 - 8.2 K/UL    Absolute Lymph # 1.6 0.5 - 4.6 K/UL    Absolute Mono # 0.5 0.1 - 1.3 K/UL    Absolute Eos # 0.3 0.0 - 0.8 K/UL    Basophils Absolute 0.0 0.0 - 0.2 K/UL    Absolute Immature Granulocyte 0.0 0.0 - 0.5 K/UL   Hepatitis B Surface Antibody    Collection Time: 01/27/23  4:41 AM   Result Value Ref Range    Hep B S Ab <3.10 mIU/mL   Hepatitis B Surface Antigen    Collection Time: 01/27/23  4:41 AM   Result Value Ref Range    Hepatitis B Surface Ag NONREACTIVE NR     Hepatitis C Ab, Rflx to Qt by PCR    Collection Time: 01/27/23  4:41 AM   Result Value Ref Range    HCV Ab >11.0 (H) 0.0 - 0.9 s/co ratio   Phosphorus    Collection Time: 01/27/23  4:41 AM   Result Value Ref Range    Phosphorus 5.0 (H) 2.3 - 3.7 MG/DL   Magnesium    Collection Time: 01/27/23  4:41 AM   Result Value Ref Range    Magnesium 2.3 1.8 - 2.4 mg/dL   Renal Function Panel    Collection Time: 01/27/23  4:41 AM   Result Value Ref Range    Sodium 142 133 - 143 mmol/L    Potassium 3.4 (L) 3.5 - 5.1 mmol/L    Chloride 110 101 - 110 mmol/L    CO2 22 21 - 32 mmol/L    Anion Gap 10 2 - 11 mmol/L    Glucose 106 (H) 65 - 100 mg/dL    BUN 53 (H) 8 - 23 MG/DL    Creatinine 6.40 (H) 0.6 - 1.0 MG/DL    Est, Glom Filt Rate 7 (L) >60 ml/min/1.73m2    Calcium 9.0 8.3 - 10.4 MG/DL    Phosphorus 4.8 (H) 2.3 - 3.7 MG/DL    Albumin 3.2 3.2 - 4.6 g/dL   HCV by PCR Qn Reflex    Collection Time: 01/27/23  4:41 AM   Result Value Ref Range    Hep C Qnt PENDING IU/mL    HCV LOG10 PENDING log10 IU/mL    TEST INFORMATION Comment      Interpretation PENDING     COVID-19, Rapid    Collection Time: 01/27/23 10:34 AM    Specimen: Nasopharyngeal   Result Value Ref Range    Source NASAL      SARS-CoV-2, Rapid Not detected NOTD     Comprehensive Metabolic Panel w/ Reflex to MG    Collection Time: 01/28/23  4:47 AM   Result Value Ref Range    Sodium 144 (H) 133 - 143 mmol/L    Potassium 3.3 (L) 3.5 - 5.1 mmol/L    Chloride 106 101 - 110 mmol/L    CO2 30 21 - 32 mmol/L    Anion Gap 8 2 - 11 mmol/L    Glucose 103 (H) 65 - 100 mg/dL    BUN 30 (H) 8 - 23 MG/DL    Creatinine 4.70 (H) 0.6 - 1.0 MG/DL    Est, Glom Filt Rate 9 (L) >60 ml/min/1.73m2    Calcium 8.8 8.3 - 10.4 MG/DL    Total Bilirubin 0.8 0.2 - 1.1 MG/DL    ALT 13 12 - 65 U/L    AST 14 (L) 15 - 37 U/L    Alk Phosphatase 157 (H) 50 - 136 U/L    Total Protein 6.5 6.3 - 8.2 g/dL    Albumin 3.0 (L) 3.2 - 4.6 g/dL    Globulin 3.5 2.8 - 4.5 g/dL    Albumin/Globulin Ratio 0.9 0.4 - 1.6     Hemoglobin and Hematocrit    Collection Time: 01/28/23  4:47 AM   Result Value Ref Range    Hemoglobin 9.6 (L) 11.7 - 15.4 g/dL Hematocrit 29.3 (L) 35.8 - 46.3 %   Magnesium    Collection Time: 01/28/23  4:47 AM   Result Value Ref Range    Magnesium 2.2 1.8 - 2.4 mg/dL       I have personally reviewed imaging studies:  Other Studies:  IR TUNNELED CVC PLACE WO SQ PORT/PUMP > 5 YEARS   Final Result   Successful insertion of a right-sided tunneled Hemodialysis   catheter, with tip in the expected location of the right atrium.      Plan:  The catheter is ready for immediate use.  Recommend suture removal in 2-3   weeks.  The patient has been transported directly to the hemodialysis unit for   hemodialysis today.   _______________________________________________________________      PROCEDURE SUMMARY:   - Venous access with ultrasound guidance   - Tunneled dialysis catheter insertion with fluoroscopic guidance      PROCEDURE DETAILS:      Pre-procedure   Consent:  Informed written and oral consent for the procedure was obtained after   explanation of benefits, risks (including, but not limitted to:  hemorrhage,   infection, pneumothorax, vascular injury) and alternatives.  The patient's   questions were answered to his/her satisfaction.  He/She stated understanding   and requested that we proceed.       Final verification:  A time-out identifying the patient and planned procedure   was performed.        Preparation (MIPS):  Maximal sterile barrier technique (including:  Sterile   Ultrasound probe cover, Sterile Ultrasound gel, cap, mask, sterile gown, sterile   gloves, sterile drape, hand hygiene, and 2% chlorhexidine cutaneous antisepsis)   was used.        Medical reason for site preparation exception (MIPS): Not applicable      Anesthesia/sedation   Level of anesthesia/sedation:  Moderate sedation (conscious sedation)   Anesthesia/sedation administered by:  Independent trained observer under   attending supervision with continuous monitoring of the patient?s level of   consciousness and physiologic status   Total intra-service sedation time  (minutes):  20      Access   Local anesthesia was administered. The vessel was sonographically evaluated and   determined to be patent. Real time ultrasound was used to visualize needle   entry into the vessel and a permanent image was stored. Vein accessed (QCDR): Right  Internal jugular vein   Internal jugular vein patency (QCDR): Patent or otherwise accessible on at   least one side   Access technique:  Micropuncture set with 21 gauge needle      Catheter placement   A small skin incision was made at the venous access site. A 2nd small skin   incision was made on the Ipsilateral chest wall. The catheter was tunneled   subcutaneously to the venous access site. The catheter was advanced via a   peel-away sheath into the vein under fluoroscopic guidance. Catheter tip   location was fluoroscopically verified and a permanent image was stored. Catheter placed:  Symetrex Long Term Hemodialysis Catheter 15.5 Fr   Catheter cuff-to-tip length (cm):  23   Catheter flush:  Heparin (1000 units/mL)      Closure   A sterile dressing or skin glue was applied. Access site closure technique:  Absorbable suture and tissue adhesive   Catheter securement technique:  Non-absorbable suture      Contrast   Contrast agent:  None   Contrast volume (mL):  0      Radiation Exposure   Reference Air Kerma (Ka,r) = 2 mGy   Dose Area Product/Kerma Area Product (DAP/GLORIA/PKA) = 50.66 cGy-cm2   Fluoroscopy Exposure Time = 0.3 minutes    Fluorographic Images = 1      Additional Details   Specimens removed:  None   Estimated blood loss (mL):  Less than 10   Standardized report:  [email or web address]      Attestation   Signer name: Izzy Tierney M.D. I attest that I personally performed the entire procedure. I reviewed the stored   images and agree with the report as written. Vascular duplex vein mapping upper bilateral   Final Result   1. Upper extremity vein mapping performed.  Veins are patent as are the brachial   arteries and radial arteries as measured in the [facility] fossa and wrist. All   measurements are on the PACs workstation. US RETROPERITONEAL LIMITED   Final Result      1. No hydronephrosis. 2. Incompletely characterized partially cystic lower pole left renal cortical   lesion measuring 2.8 cm in diameter. Consider routine follow-up evaluation with   renal mass protocol CT or MRI.           Current Meds:  Current Facility-Administered Medications   Medication Dose Route Frequency    mannitol 25 % injection 6.25 g  6.25 g IntraVENous PRN    oxyCODONE (ROXICODONE) immediate release tablet 5 mg  5 mg Oral Q4H PRN    aspirin chewable tablet 81 mg  81 mg Oral Daily    atorvastatin (LIPITOR) tablet 20 mg  20 mg Oral Every Other Day    DULoxetine (CYMBALTA) extended release capsule 60 mg  60 mg Oral BID    ferrous sulfate (IRON 325) tablet 325 mg  325 mg Oral Daily with breakfast    levothyroxine (SYNTHROID) tablet 125 mcg  125 mcg Oral QAM AC    NIFEdipine (PROCARDIA XL) extended release tablet 30 mg  30 mg Oral Daily    pantoprazole (PROTONIX) tablet 40 mg  40 mg Oral BID    Vitamin D (CHOLECALCIFEROL) tablet 5,000 Units  5,000 Units Oral Daily    sodium chloride flush 0.9 % injection 5-40 mL  5-40 mL IntraVENous 2 times per day    sodium chloride flush 0.9 % injection 5-40 mL  5-40 mL IntraVENous PRN    0.9 % sodium chloride infusion   IntraVENous PRN    ondansetron (ZOFRAN-ODT) disintegrating tablet 4 mg  4 mg Oral Q8H PRN    Or    ondansetron (ZOFRAN) injection 4 mg  4 mg IntraVENous Q6H PRN    polyethylene glycol (GLYCOLAX) packet 17 g  17 g Oral Daily PRN    acetaminophen (TYLENOL) tablet 650 mg  650 mg Oral Q6H PRN    Or    acetaminophen (TYLENOL) suppository 650 mg  650 mg Rectal Q6H PRN    tuberculin injection 5 Units  5 Units IntraDERmal Once    sodium bicarbonate 150 mEq in dextrose 5 % 1,000 mL infusion   IntraVENous Continuous    cefTRIAXone (ROCEPHIN) 1,000 mg in sodium chloride 0.9 % 50 mL IVPB (mini-bag)  1,000 mg IntraVENous Q24H    heparin (porcine) injection 5,000 Units  5,000 Units SubCUTAneous BID    metoprolol tartrate (LOPRESSOR) tablet 50 mg  50 mg Oral BID    labetalol (NORMODYNE;TRANDATE) injection 10 mg  10 mg IntraVENous Q6H PRN    hydrALAZINE (APRESOLINE) injection 10 mg  10 mg IntraVENous Q6H PRN    hydrALAZINE (APRESOLINE) tablet 25 mg  25 mg Oral 3 times per day       Signed:  RAFAEL Meza

## 2023-01-28 NOTE — DIALYSIS
TRANSFER IN - DIALYSIS    Received patient in dialysis unit  from [facility] (unit) for ordered procedure. Consent verified for renal replacement therapy. Procedure explained to patient, opportunity for Q&A provided. Call light given. Patient alert and vital signs stable. /94,  P72  , O2 via room air. Hemodialysis initiated using right perm catheter. Aspirated and flushed both ports without difficulty. Dressing clean, dry and intact. Machine settings per MD order. Will monitor during treatment.

## 2023-01-28 NOTE — DIALYSIS
TRANSFER OUT- DIALYSIS    Hemodialysis treatment completed. PT ran 2.5 hours, without complications. Patient alert and VS stable  /82  P 92       0 Kg removed. Flushed both ports with 10 mL of NS.  CVC dressing clean, dry, and intact, tego caps intact, curos caps placed. Meds given: Mannitol. Patient to 70 after dialysis.

## 2023-01-29 PROBLEM — I16.0 HYPERTENSIVE URGENCY: Status: RESOLVED | Noted: 2023-01-26 | Resolved: 2023-01-29

## 2023-01-29 PROBLEM — E87.20 METABOLIC ACIDOSIS: Status: RESOLVED | Noted: 2023-01-26 | Resolved: 2023-01-29

## 2023-01-29 PROBLEM — N10 ACUTE PYELONEPHRITIS: Status: RESOLVED | Noted: 2023-01-26 | Resolved: 2023-01-29

## 2023-01-29 LAB
ANION GAP SERPL CALC-SCNC: 7 MMOL/L (ref 2–11)
BACTERIA SPEC CULT: ABNORMAL
BACTERIA SPEC CULT: ABNORMAL
BASOPHILS # BLD: 0.1 K/UL (ref 0–0.2)
BASOPHILS NFR BLD: 1 % (ref 0–2)
BUN SERPL-MCNC: 19 MG/DL (ref 8–23)
CALCIUM SERPL-MCNC: 9.2 MG/DL (ref 8.3–10.4)
CHLORIDE SERPL-SCNC: 107 MMOL/L (ref 101–110)
CO2 SERPL-SCNC: 27 MMOL/L (ref 21–32)
CREAT SERPL-MCNC: 4 MG/DL (ref 0.6–1)
DIFFERENTIAL METHOD BLD: ABNORMAL
EOSINOPHIL # BLD: 0.6 K/UL (ref 0–0.8)
EOSINOPHIL NFR BLD: 9 % (ref 0.5–7.8)
ERYTHROCYTE [DISTWIDTH] IN BLOOD BY AUTOMATED COUNT: 13.4 % (ref 11.9–14.6)
GLUCOSE SERPL-MCNC: 108 MG/DL (ref 65–100)
HCT VFR BLD AUTO: 31.4 % (ref 35.8–46.3)
HGB BLD-MCNC: 10.2 G/DL (ref 11.7–15.4)
IMM GRANULOCYTES # BLD AUTO: 0 K/UL (ref 0–0.5)
IMM GRANULOCYTES NFR BLD AUTO: 0 % (ref 0–5)
LYMPHOCYTES # BLD: 1.8 K/UL (ref 0.5–4.6)
LYMPHOCYTES NFR BLD: 27 % (ref 13–44)
MAGNESIUM SERPL-MCNC: 1.8 MG/DL (ref 1.8–2.4)
MCH RBC QN AUTO: 29.6 PG (ref 26.1–32.9)
MCHC RBC AUTO-ENTMCNC: 32.5 G/DL (ref 31.4–35)
MCV RBC AUTO: 91 FL (ref 82–102)
MM INDURATION, POC: 0 MM (ref 0–5)
MONOCYTES # BLD: 0.6 K/UL (ref 0.1–1.3)
MONOCYTES NFR BLD: 10 % (ref 4–12)
NEUTS SEG # BLD: 3.7 K/UL (ref 1.7–8.2)
NEUTS SEG NFR BLD: 53 % (ref 43–78)
NRBC # BLD: 0 K/UL (ref 0–0.2)
PLATELET # BLD AUTO: 161 K/UL (ref 150–450)
PMV BLD AUTO: 9.3 FL (ref 9.4–12.3)
POTASSIUM SERPL-SCNC: 3.5 MMOL/L (ref 3.5–5.1)
PPD, POC: NEGATIVE
RBC # BLD AUTO: 3.45 M/UL (ref 4.05–5.2)
SERVICE CMNT-IMP: ABNORMAL
SODIUM SERPL-SCNC: 141 MMOL/L (ref 133–143)
WBC # BLD AUTO: 6.8 K/UL (ref 4.3–11.1)

## 2023-01-29 PROCEDURE — 6360000002 HC RX W HCPCS: Performed by: FAMILY MEDICINE

## 2023-01-29 PROCEDURE — 6360000002 HC RX W HCPCS: Performed by: INTERNAL MEDICINE

## 2023-01-29 PROCEDURE — 36415 COLL VENOUS BLD VENIPUNCTURE: CPT

## 2023-01-29 PROCEDURE — 80048 BASIC METABOLIC PNL TOTAL CA: CPT

## 2023-01-29 PROCEDURE — 6370000000 HC RX 637 (ALT 250 FOR IP): Performed by: PHYSICIAN ASSISTANT

## 2023-01-29 PROCEDURE — 6370000000 HC RX 637 (ALT 250 FOR IP): Performed by: RADIOLOGY

## 2023-01-29 PROCEDURE — 2580000003 HC RX 258: Performed by: FAMILY MEDICINE

## 2023-01-29 PROCEDURE — 85025 COMPLETE CBC W/AUTO DIFF WBC: CPT

## 2023-01-29 PROCEDURE — 1100000000 HC RM PRIVATE

## 2023-01-29 PROCEDURE — 83735 ASSAY OF MAGNESIUM: CPT

## 2023-01-29 PROCEDURE — 6370000000 HC RX 637 (ALT 250 FOR IP): Performed by: FAMILY MEDICINE

## 2023-01-29 RX ORDER — METOPROLOL TARTRATE 50 MG/1
50 TABLET, FILM COATED ORAL 2 TIMES DAILY
Qty: 60 TABLET | Refills: 0 | Status: SHIPPED | OUTPATIENT
Start: 2023-01-29

## 2023-01-29 RX ORDER — HYDRALAZINE HYDROCHLORIDE 50 MG/1
50 TABLET, FILM COATED ORAL EVERY 8 HOURS SCHEDULED
Status: DISCONTINUED | OUTPATIENT
Start: 2023-01-29 | End: 2023-01-30 | Stop reason: HOSPADM

## 2023-01-29 RX ORDER — LOSARTAN POTASSIUM 50 MG/1
50 TABLET ORAL DAILY
Status: DISCONTINUED | OUTPATIENT
Start: 2023-01-29 | End: 2023-01-30 | Stop reason: HOSPADM

## 2023-01-29 RX ADMIN — HYDRALAZINE HYDROCHLORIDE 10 MG: 20 INJECTION INTRAMUSCULAR; INTRAVENOUS at 02:40

## 2023-01-29 RX ADMIN — FERROUS SULFATE TAB 325 MG (65 MG ELEMENTAL FE) 325 MG: 325 (65 FE) TAB at 09:00

## 2023-01-29 RX ADMIN — METOPROLOL TARTRATE 50 MG: 50 TABLET, FILM COATED ORAL at 22:05

## 2023-01-29 RX ADMIN — METOPROLOL TARTRATE 50 MG: 50 TABLET, FILM COATED ORAL at 09:00

## 2023-01-29 RX ADMIN — SODIUM CHLORIDE, PRESERVATIVE FREE 10 ML: 5 INJECTION INTRAVENOUS at 09:03

## 2023-01-29 RX ADMIN — HEPARIN SODIUM 5000 UNITS: 5000 INJECTION INTRAVENOUS; SUBCUTANEOUS at 09:03

## 2023-01-29 RX ADMIN — HYDRALAZINE HYDROCHLORIDE 10 MG: 20 INJECTION INTRAMUSCULAR; INTRAVENOUS at 20:05

## 2023-01-29 RX ADMIN — CHOLECALCIFEROL TAB 25 MCG (1000 UNIT) 5000 UNITS: 25 TAB at 09:00

## 2023-01-29 RX ADMIN — HYDRALAZINE HYDROCHLORIDE 25 MG: 25 TABLET, FILM COATED ORAL at 06:14

## 2023-01-29 RX ADMIN — CEFTRIAXONE 1000 MG: 1 INJECTION, POWDER, FOR SOLUTION INTRAMUSCULAR; INTRAVENOUS at 14:19

## 2023-01-29 RX ADMIN — NIFEDIPINE 30 MG: 30 TABLET, EXTENDED RELEASE ORAL at 09:00

## 2023-01-29 RX ADMIN — OXYCODONE HYDROCHLORIDE 5 MG: 5 TABLET ORAL at 22:12

## 2023-01-29 RX ADMIN — HYDRALAZINE HYDROCHLORIDE 50 MG: 50 TABLET, FILM COATED ORAL at 14:19

## 2023-01-29 RX ADMIN — SODIUM CHLORIDE, PRESERVATIVE FREE 10 ML: 5 INJECTION INTRAVENOUS at 22:05

## 2023-01-29 RX ADMIN — HYDRALAZINE HYDROCHLORIDE 50 MG: 50 TABLET, FILM COATED ORAL at 22:04

## 2023-01-29 RX ADMIN — HEPARIN SODIUM 5000 UNITS: 5000 INJECTION INTRAVENOUS; SUBCUTANEOUS at 22:05

## 2023-01-29 RX ADMIN — DULOXETINE HYDROCHLORIDE 60 MG: 30 CAPSULE, DELAYED RELEASE ORAL at 22:05

## 2023-01-29 RX ADMIN — PANTOPRAZOLE SODIUM 40 MG: 40 TABLET, DELAYED RELEASE ORAL at 22:04

## 2023-01-29 RX ADMIN — PANTOPRAZOLE SODIUM 40 MG: 40 TABLET, DELAYED RELEASE ORAL at 09:00

## 2023-01-29 RX ADMIN — LEVOTHYROXINE SODIUM 125 MCG: 0.07 TABLET ORAL at 06:14

## 2023-01-29 RX ADMIN — DULOXETINE HYDROCHLORIDE 60 MG: 30 CAPSULE, DELAYED RELEASE ORAL at 09:00

## 2023-01-29 RX ADMIN — ASPIRIN 81 MG 81 MG: 81 TABLET ORAL at 09:00

## 2023-01-29 ASSESSMENT — PAIN SCALES - GENERAL
PAINLEVEL_OUTOF10: 6
PAINLEVEL_OUTOF10: 0
PAINLEVEL_OUTOF10: 0

## 2023-01-29 ASSESSMENT — PAIN DESCRIPTION - LOCATION: LOCATION: HEAD

## 2023-01-29 ASSESSMENT — PAIN DESCRIPTION - DESCRIPTORS: DESCRIPTORS: ACHING

## 2023-01-29 NOTE — PROGRESS NOTES
Patient relaxing in bed watching TV. Patient's family visited earlier today. Patient denies any pain and appears comfortable at this time. Call light within reach and patient instructed to call if assistance is needed.   Report to be given to oncoming RN 7p-7a

## 2023-01-29 NOTE — PROGRESS NOTES
RENAL H&P/CONSULT    Subjective:     Patient is a 71 y/o WF with  Chronic Kidney Disease stage 5 with obstructive uropathy due to urethral stricture notified the office today that she was too sick to attend the scheduled outpatient visit and was going to the ER because of progressive weakness. She also has HTN, hypothyroidism, generalized weakness, bilateral flank pain of 3-4 day duration associated with nausea/vomiting and diarrhea. She had been followed by [facility] and requested to change service as she was being seen by too many different providers. She reports that her blood pressure had been uncontrolled. She had not attended renal failure education yet. She has a suprapubic Lewis catheter in place and has several house pets indoors, including the bedroom.  She had previously refused dialysis, but realizes she may be too ill to stay off dialysis. She did not have dialysis access placement. Her son moved here recently and is interested to be a living related kidney donor. She vomited this am after taking her medications and in the ED her BP was initially 222/121. Creatinine is up to 6.8 from prior  baseline 4-5, was 4.32 in 11/2022 .  UA consistent with UTI.  She was treated with Labetalol, Zofran and IVF and we discussed that unless her renal function indices would markedly improve with medical therapy overnight she would be very likely to require dialysis in am.   1/27/23 - still much nausea and bad taste in mouth - BP is still high and we discussed administration of BP meds with RN - holding heparin and ASA due to tunneled HD catheter today - crea is not sufficiently improved to crocker off dialysis and he agrees to proceed with dialysis   1/28/23 - see HD Note  1/29/23 - feels much better after HD x 2, but develop fever - on antibiotics for UTI - she has been followed by Dr. Cruz for urology and consult has been placed by primary team - BP is still high - she has not had allergy to BP meds     Past Medical History:   Diagnosis Date    Arthritis     Chronic pain     arthritis knees    GERD (gastroesophageal reflux disease)     controlled with med    High cholesterol     Hypertension     Kidney disease     acute renal failure 7/2020 (hospitalized x 6 days)  Followed by Dr. Ck Monk, nephrologist      Liver disease     hep C,     Melanoma ([facility] [address]     RLE,     Psychiatric disorder     depression, treated with cymbalta    PUD (peptic ulcer disease)     Rheumatic fever     as a child    Thyroid disease     hypo      Past Surgical History:   Procedure Laterality Date    APPENDECTOMY      CATARACT REMOVAL  2019    bilateral, IOL implant    CHOLECYSTECTOMY      COLONOSCOPY  2015    IR TUNNELED CATHETER PLACEMENT GREATER THAN 5 YEARS  1/27/2023    IR TUNNELED CATHETER PLACEMENT GREATER THAN 5 YEARS 1/27/2023 Ayana Green MD [facility] RADIOLOGY SPECIALS    NEUROLOGICAL SURGERY      CERVICAL AND LUMBAR SX    ORTHOPEDIC SURGERY Left     KNEE SCOPE    UPPER GASTROINTESTINAL ENDOSCOPY      EGD    UROLOGICAL SURGERY  07/2020    cystoscopy, insertion berkowitz catheter (suprapubic)    UROLOGICAL SURGERY  2016    cystoscopy, urethral dilatation      Prior to Admission medications    Medication Sig Start Date End Date Taking?  Authorizing Provider   metoprolol tartrate (LOPRESSOR) 50 MG tablet Take 1 tablet by mouth 2 times daily 1/29/23  Yes RAFAEL Chang   DULoxetine (CYMBALTA) 60 MG extended release capsule Take 1 capsule by mouth 2 times daily 1/4/23   Mary Zuñiga MD   vitamin D (ERGOCALCIFEROL) 1.25 MG (15960 UT) CAPS capsule TAKE ONE CAPSULE BY MOUTH EVERY WEEK FOR 7 DOSES 9/12/22   Historical Provider, MD   diclofenac sodium (VOLTAREN) 1 % GEL Apply 2 g topically 2 times daily    Historical Provider, MD   NIFEdipine (PROCARDIA XL) 30 MG extended release tablet TAKE ONE TABLET BY MOUTH ONE TIME DAILY 10/4/22   Historical Provider, MD   cefdinir (OMNICEF) 300 MG capsule TAKE ONE CAPSULE BY MOUTH ONE TIME DAILY FOR 11 DOSES THEN TAKE ONE CAPSULE BY MOUTH EVERY 12 HOURS ON THE 12TH DAY THEN TAKE ONE CAPSULE BY 10/14/22   Historical Provider, MD   ferrous sulfate (IRON 325) 325 (65 Fe) MG tablet 1 tab(s) 7/28/15   Historical Provider, MD   acetaminophen (TYLENOL) 650 MG extended release tablet Take 650 mg by mouth every 8 hours as needed    Historical Provider, MD   gabapentin (NEURONTIN) 100 MG capsule Take 1 capsule by mouth daily for 60 days. 11/4/22 1/3/23  Sarah Reddy APRN - CNP   hydrALAZINE (APRESOLINE) 100 MG tablet Take 1 tablet by mouth in the morning and 1 tablet at noon and 1 tablet before bedtime.  7/27/22   Chente Pereira MD   Omega-3 Fatty Acids (FISH OIL) 1200 MG CAPS Take 1 capsule by mouth daily    Historical Provider, MD   ASPIRIN PO Take by mouth    Historical Provider, MD   atorvastatin (LIPITOR) 20 MG tablet Take 20 mg by mouth every other day 1/26/22   Ar Automatic Reconciliation   vitamin D3 (CHOLECALCIFEROL) 125 MCG (5000 UT) TABS tablet Take 5,000 Units by mouth daily    Ar Automatic Reconciliation   levothyroxine (SYNTHROID) 125 MCG tablet Take 125 mcg by mouth every morning (before breakfast) 3/29/22   Ar Automatic Reconciliation   pantoprazole (PROTONIX) 40 MG tablet Take 40 mg by mouth 2 times daily 2/9/22   Ar Automatic Reconciliation   sodium bicarbonate 650 MG tablet TAKE 1 TABLET BY MOUTH TWICE DAILY 11/4/21   Ar Automatic Reconciliation     No Known Allergies     Social History     Tobacco Use    Smoking status: Former     Packs/day: 0.50     Types: Cigarettes     Start date: 10/28/2019    Smokeless tobacco: Never    Tobacco comments:     Quit smoking: Quit 2019   Substance Use Topics    Alcohol use: No      Family History   Problem Relation Age of Onset    No Known Problems Mother     No Known Problems Father           Review of Systems    Constitutional: no fever, positive for anorexia and fatigue, improving with dialysis  Eyes: fair vision, negative  Ears, nose, mouth, throat, and face:fair hearing, negative  Respiratory: no asthma, negative  Cardiovascular:no chest pain, negative  Gastrointestinal:positive for diarrhea, nausea and vomiting, improving with dialysis   Genitourinary: no dysuria, suprapubic Lewis catheter in place  Hematologic/lymphatic: no bleeding tendency, she has not been treated with WILLIAM  Neurological: no seizures,  no new tremor  Behvioral/Psych: no psych hospitalization negative  Endocrine: no goiter, negative      Objective:       BP (!) 169/103   Pulse 73   Temp 97.5 °F (36.4 °C) (Oral)   Resp 18   Ht 5' 10\" (1.778 m)   Wt 148 lb 4.8 oz (67.3 kg)   SpO2 100%   BMI 21.28 kg/m²     01/29 0701 - 01/29 1900  In: 480 [P.O.:480]  Out: -   01/27 1901 - 01/29 0700  In: 510 [I.V.:10]  Out: 800 [Urine:300]    BP (!) 169/103   Pulse 73   Temp 97.5 °F (36.4 °C) (Oral)   Resp 18   Ht 5' 10\" (1.778 m)   Wt 148 lb 4.8 oz (67.3 kg)   SpO2 100%   BMI 21.28 kg/m²   General:  Alert, cooperative, mild distress, appears stated age. Head:  Normocephalic, without obvious abnormality, atraumatic. Eyes:  Conjunctivae/corneas clear. EOMs intact. Throat: Lips, mucosa, and tongue normal. Teeth and gums normal.   Neck: Supple, symmetrical, trachea midline, no adenopathy, no JVD. Lungs:   Clear to auscultation bilaterally. Heart:  Regular rate and rhythm, S1, S2 normal, no murmur,  rub or gallop. Abdomen:   Soft, non-tender. No masses,  No organomegaly. Suprapubic Lewis catheter in place. Extremities: Extremities normal, atraumatic, no cyanosis or edema. Skin: Skin color, texture, turgor normal. No rashes or lesions. Neurologic: Grossly intact. No asterixis.          Data Review:      Latest Reference Range & Units 7/22/20 05:46 7/23/20 04:40 8/14/20 09:07 6/16/21 14:37 4/29/22 14:04 1/26/23 11:47   Sodium 133 - 143 mmol/L 141 137 133 (L) 142 142 141   Potassium 3.5 - 5.1 mmol/L 3.8 3.3 (L) 4.7 5.2 3.7 4.7   Chloride 101 - 110 mmol/L 109 (H) 105 103 106 106 111 (H) CO2 21 - 32 mmol/L 20 (L) 19 (L) 20 (L) 16 (L) 18 (L) 19 (L)   BUN,BUNPL 8 - 23 MG/DL 82 (H) 76 (H) 48 (H) 40 (H) 39 (H) 54 (H)   Creatinine 0.6 - 1.0 MG/DL 9.35 (H) 7.80 (H) 4.98 (H) 3.76 (H) 3.83 (H) 6.80 (H)   Bun/Cre Ratio 12 - 28 NA    11 (L) 10 (L)    Anion Gap 2 - 11 mmol/L 12 13 10   11   Est, Glom Filt Rate >60 ml/min/1.73m2      6 (L)   EGFR IF NonAfrican American >59 mL/min/1.73 4 (L) 5 (L) 9 (L) 12 (L)     GFR  >59 mL/min/1.73 5 (L) 7 (L) 11 (L) 13 (L)     Glucose, Random 65 - 100 mg/dL 92 88 90 85 202 (H) 119 (H)   CALCIUM, SERUM, 004344 8.3 - 10.4 MG/DL 8.1 (L) 7.9 (L) 9.2 9.5 9.0 9.0   ALBUMIN/GLOBULIN RATIO 0.4 - 1.6        0.8   Total Protein 6.3 - 8.2 g/dL 6.5 6.1 (L)  7.2 7.1 8.3 (H)   (L): Data is abnormally low  (H): Data is abnormally high     Latest Reference Range & Units 7/21/20 06:43 7/22/20 05:46 7/23/20 04:40 8/14/20 09:07 6/16/21 14:37 4/29/22 14:04 1/26/23 11:47   WBC 4.3 - 11.1 K/uL 8.6 10.5 8.3 5.6 7.7 4.2 12.6 (H)   RBC 4.05 - 5.2 M/uL 2.97 (L) 2.76 (L) 2.55 (L) 3.05 (L) 4.23 4.09 4.02 (L)   Hemoglobin Quant 11.7 - 15.4 g/dL 9.0 (L) 8.0 (L) 7.6 (L) 9.0 (L) 12.4 12.0 12.0   Hematocrit 35.8 - 46.3 % 26.7 (L) 25.3 (L) 23.4 (L) 27.6 (L) 37.9 36.7 36.9   MCV 82 - 102 FL 89.9 91.7 91.8 90.5 90 90 91.8   MCH 26.1 - 32.9 PG 30.3 29.0 29.8 29.5 29.3 29.3 29.9   MCHC 31.4 - 35.0 g/dL 33.7 31.6 32.5 32.6 32.7 32.7 32.5   MPV 9.4 - 12.3 FL 10.1 10.0 10.2 9.0 (L)   9.6   RDW 11.9 - 14.6 % 14.0 14.1 13.6 13.1 12.5 13.4 13.7   Platelet Count 290 - 450 K/uL 171 194 185 277 290 246 273   (H): Data is abnormally high  (L): Data is abnormally low    Recent Results (from the past 24 hour(s))   Basic Metabolic Panel w/ Reflex to MG    Collection Time: 01/29/23  3:56 AM   Result Value Ref Range    Sodium 141 133 - 143 mmol/L    Potassium 3.5 3.5 - 5.1 mmol/L    Chloride 107 101 - 110 mmol/L    CO2 27 21 - 32 mmol/L    Anion Gap 7 2 - 11 mmol/L    Glucose 108 (H) 65 - 100 mg/dL    BUN 19 8 - 23 MG/DL    Creatinine 4.00 (H) 0.6 - 1.0 MG/DL    Est, Glom Filt Rate 11 (L) >60 ml/min/1.73m2    Calcium 9.2 8.3 - 10.4 MG/DL   CBC with Auto Differential    Collection Time: 01/29/23  3:56 AM   Result Value Ref Range    WBC 6.8 4.3 - 11.1 K/uL    RBC 3.45 (L) 4.05 - 5.2 M/uL    Hemoglobin 10.2 (L) 11.7 - 15.4 g/dL    Hematocrit 31.4 (L) 35.8 - 46.3 %    MCV 91.0 82 - 102 FL    MCH 29.6 26.1 - 32.9 PG    MCHC 32.5 31.4 - 35.0 g/dL    RDW 13.4 11.9 - 14.6 %    Platelets 855 382 - 894 K/uL    MPV 9.3 (L) 9.4 - 12.3 FL    nRBC 0.00 0.0 - 0.2 K/uL    Differential Type AUTOMATED      Seg Neutrophils 53 43 - 78 %    Lymphocytes 27 13 - 44 %    Monocytes 10 4.0 - 12.0 %    Eosinophils % 9 (H) 0.5 - 7.8 %    Basophils 1 0.0 - 2.0 %    Immature Granulocytes 0 0.0 - 5.0 %    Segs Absolute 3.7 1.7 - 8.2 K/UL    Absolute Lymph # 1.8 0.5 - 4.6 K/UL    Absolute Mono # 0.6 0.1 - 1.3 K/UL    Absolute Eos # 0.6 0.0 - 0.8 K/UL    Basophils Absolute 0.1 0.0 - 0.2 K/UL    Absolute Immature Granulocyte 0.0 0.0 - 0.5 K/UL   Magnesium    Collection Time: 01/29/23  3:56 AM   Result Value Ref Range    Magnesium 1.8 1.8 - 2.4 mg/dL   PLEASE READ & DOCUMENT PPD TEST IN 24 HRS    Collection Time: 01/29/23 12:50 PM   Result Value Ref Range    PPD, (POC) Negative Negative    mm Induration 0 0 - 5 mm       CXR;   No results found for this or any previous visit. KUB:  No results found for this or any previous visit. Renal US:  No results found for this or any previous visit.      CT Abdomen  Results for orders placed in visit on 08/31/21    CT ABDOMEN PELVIS WO CONTRAST           Principal Problem:    End stage renal disease ([address]  Active Problems:    Hyperlipidemia    Lower obstructive uropathy    Acquired hypothyroidism    Gastroesophageal reflux disease without esophagitis    Suprapubic catheter ([address]  Resolved Problems:    Acute kidney injury superimposed on CKD (HCC)    Metabolic acidosis    Acute pyelonephritis    Hypertensive urgency      Assessment:     New onset  End Stage Renal Disease - marked uremic symptoms with progression to ESRD from obstructive and hypertensive nephropathy  as manifested by N/V/diarrhea, weakness and anorexia over the last weeks  - a trial with medical therapy failed to achieve satisfactory results as crea improved only to 6.3 - tolerated tunneled HD catheter and had HD x two days in a row with much improvement in symptoms - plan HD again tomorrow  Accelerated hypertension - she was not able tolerate her outpatient antihypertensives today and reports even previously inadequate BP control - BP is still higher than desired, adjust antihypertensives with initiation of Losartan - will work on optimizing combined ARB and BB therapy and pare down other antihypertensives as tolerated   Metabolic acidosis -  bicarbonate infusion has been stopped with initiation of dialysis  Anemia - HB decreased as expected with IVF  Hyperlipidemia - treat with statin  Hypothyroidism - on replacement therapy   Urinary tract infection - treated with cephalosporins - urology consult has been placed       Plan:     As above     Annia Raymundo M.D.

## 2023-01-29 NOTE — PROGRESS NOTES
Hospitalist Progress Note   Admit Date:  2023 11:28 AM   Name:  Roula Valdes   Age:  81 y.o. Sex:  female  :  1950   MRN:  [id number]   Room:  /    Presenting Complaint: Hypertension, Flank Pain, and Emesis     Reason(s) for Admission: Acute on chronic renal insufficiency [N28.9, N18.9]  Acute kidney injury superimposed on CKD ([facility] [N17.9, N18.9]     Hospital Course:   Roula Valdes is a 81 y.o. female with medical history of urethral structure s/p suprapubic cath, stage IV now ESRD, hypothyroidism, HTN who presented with confusion, generalized weakness, and b/l flank pain along with n/v. Found to have UTI, worsening Cr now considered ESRD, along with hypertensive urgency and metabolic acidosis. Nephrology c/s, started on HD and antibiotics. Subjective & 24hr Events (23): Seen at bedside. No complaints. Asking about setting up appointments for her dialysis. Denies n/v/d, constipation, pain, chest pain, SOB. Usually gets monthly SP cath exchanges. Did have one this month but prior to treatment for UTI. Assessment & Plan:     Principal Problem:    End stage renal disease ([facility] [address] [facility]  - nephrology following, appreciate assistance; continue HD per their discretion  - metabolic acidosis, resolved - will d/c bicard drip    Hypertensive urgency, improving however BP still elevated  - Cont metoprolol and Procardia, I have increased her PO hydralazine to 50mg TID (home dose is 100mg BID)     Acute pyelonephritis  -Continue ceftriaxone, Ucx reviewed pan sensitive E. Coli CTX started , Bcx never collected  -Renal ultrasound:  Impression       1. No hydronephrosis. 2. Incompletely characterized partially cystic lower pole left renal cortical   lesion measuring 2.8 cm in diameter. Consider routine follow-up evaluation with   renal mass protocol CT or MRI.        Hx urethral stricture s/p chronic suprapubic catheter (HCC)  - will need exchange while inpt, I have consulted urology     GERD  -Continue PPI     EDMOND / anemia of chronic disease  -Continue home ferrous sulfate  - Continued decrease, repeat am labs     HLD  - Continue atorvastatin     Hypothyroidism  - Continue home synthroid      Anticipated discharge needs:      - will need dialysis set up at discharge, d/c likely 1/30 if cleared by nephrology    Diet:  ADULT DIET; Regular; Low Sodium (2 gm)  DVT PPx: heparin  Code status: Full Code    Hospital Problems:  Principal Problem:    End stage renal disease ([facility]  Active Problems:    Hyperlipidemia    Metabolic acidosis    Acute pyelonephritis    Hypertensive urgency    Lower obstructive uropathy    Acquired hypothyroidism    Gastroesophageal reflux disease without esophagitis    Suprapubic catheter ([facility] [facility]  Resolved Problems:    Acute kidney injury superimposed on CKD ([facility]      Objective:   Patient Vitals for the past 24 hrs:   Temp Pulse Resp BP SpO2   01/29/23 1111 98.2 °F (36.8 °C) 72 18 (!) 149/107 100 %   01/29/23 0729 99 °F (37.2 °C) 79 18 (!) 157/114 93 %   01/29/23 0328 99.1 °F (37.3 °C) 74 18 (!) 163/91 98 %   01/29/23 0236 -- 66 -- (!) 158/101 --   01/29/23 0018 99 °F (37.2 °C) 68 18 (!) 175/99 97 %   01/28/23 2127 -- 81 -- 132/80 --   01/28/23 1955 99.3 °F (37.4 °C) 81 18 132/80 95 %   01/28/23 1429 97.2 °F (36.2 °C) 72 19 (!) 176/96 99 %   01/28/23 1412 -- 65 18 (!) 142/92 --   01/28/23 1333 -- 65 -- (!) 175/108 --   01/28/23 1300 -- 65 -- (!) 166/104 --   01/28/23 1230 -- 65 -- (!) 165/101 --   01/28/23 1202 -- 69 -- 133/84 --   01/28/23 1148 -- 72 18 (!) 151/94 --       Oxygen Therapy  SpO2: 100 %  Pulse via Oximetry: 80 beats per minute  Pulse Oximeter Device Mode: Continuous  O2 Device: None (Room air)    Estimated body mass index is 21.28 kg/m² as calculated from the following:    Height as of this encounter: 5' 10\" (1.778 m). Weight as of this encounter: 148 lb 4.8 oz (67.3 kg).     Intake/Output Summary (Last 24 hours) at 1/29/2023 1144  Last data filed at 1/29/2023 0830  Gross per 24 hour   Intake 750 ml   Output 500 ml   Net 250 ml         Physical Exam:   Blood pressure (!) 149/107, pulse 72, temperature 98.2 °F (36.8 °C), temperature source Oral, resp. rate 18, height 5' 10\" (1.778 m), weight 148 lb 4.8 oz (67.3 kg), SpO2 100 %. General:    Well nourished. Head:  Normocephalic, atraumatic  Eyes:  Sclerae appear normal.  Pupils equally round. ENT:  Nares appear normal.  Moist oral mucosa  Neck:  No restricted ROM. Trachea midline   CV:   RRR. No m/r/g. No jugular venous distension. Lungs:   CTAB. No wheezing, rhonchi, or rales. Symmetric expansion. Abdomen:   Soft, nontender, nondistended. Extremities: No cyanosis or clubbing. No edema  Skin:     No rashes and normal coloration. Warm and dry. Neuro:  CN II-XII grossly intact. Psych:  Normal mood and affect.       I have personally reviewed labs and tests:  Recent Labs:  Recent Results (from the past 48 hour(s))   Comprehensive Metabolic Panel w/ Reflex to MG    Collection Time: 01/28/23  4:47 AM   Result Value Ref Range    Sodium 144 (H) 133 - 143 mmol/L    Potassium 3.3 (L) 3.5 - 5.1 mmol/L    Chloride 106 101 - 110 mmol/L    CO2 30 21 - 32 mmol/L    Anion Gap 8 2 - 11 mmol/L    Glucose 103 (H) 65 - 100 mg/dL    BUN 30 (H) 8 - 23 MG/DL    Creatinine 4.70 (H) 0.6 - 1.0 MG/DL    Est, Glom Filt Rate 9 (L) >60 ml/min/1.73m2    Calcium 8.8 8.3 - 10.4 MG/DL    Total Bilirubin 0.8 0.2 - 1.1 MG/DL    ALT 13 12 - 65 U/L    AST 14 (L) 15 - 37 U/L    Alk Phosphatase 157 (H) 50 - 136 U/L    Total Protein 6.5 6.3 - 8.2 g/dL    Albumin 3.0 (L) 3.2 - 4.6 g/dL    Globulin 3.5 2.8 - 4.5 g/dL    Albumin/Globulin Ratio 0.9 0.4 - 1.6     Hemoglobin and Hematocrit    Collection Time: 01/28/23  4:47 AM   Result Value Ref Range    Hemoglobin 9.6 (L) 11.7 - 15.4 g/dL    Hematocrit 29.3 (L) 35.8 - 46.3 %   Magnesium    Collection Time: 01/28/23  4:47 AM   Result Value Ref Range    Magnesium 2.2 1.8 - 2.4 mg/dL   Basic Metabolic Panel w/ Reflex to MG    Collection Time: 01/29/23  3:56 AM   Result Value Ref Range    Sodium 141 133 - 143 mmol/L    Potassium 3.5 3.5 - 5.1 mmol/L    Chloride 107 101 - 110 mmol/L    CO2 27 21 - 32 mmol/L    Anion Gap 7 2 - 11 mmol/L    Glucose 108 (H) 65 - 100 mg/dL    BUN 19 8 - 23 MG/DL    Creatinine 4.00 (H) 0.6 - 1.0 MG/DL    Est, Glom Filt Rate 11 (L) >60 ml/min/1.73m2    Calcium 9.2 8.3 - 10.4 MG/DL   CBC with Auto Differential    Collection Time: 01/29/23  3:56 AM   Result Value Ref Range    WBC 6.8 4.3 - 11.1 K/uL    RBC 3.45 (L) 4.05 - 5.2 M/uL    Hemoglobin 10.2 (L) 11.7 - 15.4 g/dL    Hematocrit 31.4 (L) 35.8 - 46.3 %    MCV 91.0 82 - 102 FL    MCH 29.6 26.1 - 32.9 PG    MCHC 32.5 31.4 - 35.0 g/dL    RDW 13.4 11.9 - 14.6 %    Platelets 718 760 - 061 K/uL    MPV 9.3 (L) 9.4 - 12.3 FL    nRBC 0.00 0.0 - 0.2 K/uL    Differential Type AUTOMATED      Seg Neutrophils 53 43 - 78 %    Lymphocytes 27 13 - 44 %    Monocytes 10 4.0 - 12.0 %    Eosinophils % 9 (H) 0.5 - 7.8 %    Basophils 1 0.0 - 2.0 %    Immature Granulocytes 0 0.0 - 5.0 %    Segs Absolute 3.7 1.7 - 8.2 K/UL    Absolute Lymph # 1.8 0.5 - 4.6 K/UL    Absolute Mono # 0.6 0.1 - 1.3 K/UL    Absolute Eos # 0.6 0.0 - 0.8 K/UL    Basophils Absolute 0.1 0.0 - 0.2 K/UL    Absolute Immature Granulocyte 0.0 0.0 - 0.5 K/UL   Magnesium    Collection Time: 01/29/23  3:56 AM   Result Value Ref Range    Magnesium 1.8 1.8 - 2.4 mg/dL       I have personally reviewed imaging studies:  Other Studies:  IR TUNNELED CVC PLACE WO SQ PORT/PUMP > 5 YEARS   Final Result   Successful insertion of a right-sided tunneled Hemodialysis   catheter, with tip in the expected location of the right atrium. Plan:  The catheter is ready for immediate use. Recommend suture removal in 2-3   weeks.   The patient has been transported directly to the hemodialysis unit for   hemodialysis today.   _______________________________________________________________      PROCEDURE SUMMARY:   - Venous access with ultrasound guidance   - Tunneled dialysis catheter insertion with fluoroscopic guidance      PROCEDURE DETAILS:      Pre-procedure   Consent:  Informed written and oral consent for the procedure was obtained after   explanation of benefits, risks (including, but not limitted to:  hemorrhage,   infection, pneumothorax, vascular injury) and alternatives. The patient's   questions were answered to his/her satisfaction. He/She stated understanding   and requested that we proceed. Final verification:  A time-out identifying the patient and planned procedure   was performed. Preparation (MIPS):  Maximal sterile barrier technique (including:  Sterile   Ultrasound probe cover, Sterile Ultrasound gel, cap, mask, sterile gown, sterile   gloves, sterile drape, hand hygiene, and 2% chlorhexidine cutaneous antisepsis)   was used. Medical reason for site preparation exception (MIPS): Not applicable      Anesthesia/sedation   Level of anesthesia/sedation:  Moderate sedation (conscious sedation)   Anesthesia/sedation administered by: Independent trained observer under   attending supervision with continuous monitoring of the patient?s level of   consciousness and physiologic status   Total intra-service sedation time (minutes):  20      Access   Local anesthesia was administered. The vessel was sonographically evaluated and   determined to be patent. Real time ultrasound was used to visualize needle   entry into the vessel and a permanent image was stored. Vein accessed (QCDR): Right  Internal jugular vein   Internal jugular vein patency (QCDR): Patent or otherwise accessible on at   least one side   Access technique:  Micropuncture set with 21 gauge needle      Catheter placement   A small skin incision was made at the venous access site. A 2nd small skin   incision was made on the Ipsilateral chest wall.   The catheter was tunneled   subcutaneously to the venous access site.  The catheter was advanced via a   peel-away sheath into the vein under fluoroscopic guidance. Catheter tip   location was fluoroscopically verified and a permanent image was stored. Catheter placed:  Symetrex Long Term Hemodialysis Catheter 15.5 Fr   Catheter cuff-to-tip length (cm):  23   Catheter flush:  Heparin (1000 units/mL)      Closure   A sterile dressing or skin glue was applied. Access site closure technique:  Absorbable suture and tissue adhesive   Catheter securement technique:  Non-absorbable suture      Contrast   Contrast agent:  None   Contrast volume (mL):  0      Radiation Exposure   Reference Air Kerma (Ka,r) = 2 mGy   Dose Area Product/Kerma Area Product (DAP/GLORIA/PKA) = 50.66 cGy-cm2   Fluoroscopy Exposure Time = 0.3 minutes    Fluorographic Images = 1      Additional Details   Specimens removed:  None   Estimated blood loss (mL):  Less than 10   Standardized report:  [email or web address]      Attestation   Signer name: Pedro Connolly M.D. I attest that I personally performed the entire procedure. I reviewed the stored   images and agree with the report as written. Vascular duplex vein mapping upper bilateral   Final Result   1. Upper extremity vein mapping performed. Veins are patent as are the brachial   arteries and radial arteries as measured in the [facility] [place] fossa and wrist. All   measurements are on the PACs workstation. US RETROPERITONEAL LIMITED   Final Result      1. No hydronephrosis. 2. Incompletely characterized partially cystic lower pole left renal cortical   lesion measuring 2.8 cm in diameter. Consider routine follow-up evaluation with   renal mass protocol CT or MRI.           Current Meds:  Current Facility-Administered Medications   Medication Dose Route Frequency    tuberculin injection 5 Units  5 Units IntraDERmal Once    oxyCODONE (ROXICODONE) immediate release tablet 5 mg  5 mg Oral Q4H PRN    aspirin chewable tablet 81 mg  81 mg Oral Daily    atorvastatin (LIPITOR) tablet 20 mg  20 mg Oral Every Other Day    DULoxetine (CYMBALTA) extended release capsule 60 mg  60 mg Oral BID    ferrous sulfate (IRON 325) tablet 325 mg  325 mg Oral Daily with breakfast    levothyroxine (SYNTHROID) tablet 125 mcg  125 mcg Oral QAM AC    NIFEdipine (PROCARDIA XL) extended release tablet 30 mg  30 mg Oral Daily    pantoprazole (PROTONIX) tablet 40 mg  40 mg Oral BID    Vitamin D (CHOLECALCIFEROL) tablet 5,000 Units  5,000 Units Oral Daily    sodium chloride flush 0.9 % injection 5-40 mL  5-40 mL IntraVENous 2 times per day    sodium chloride flush 0.9 % injection 5-40 mL  5-40 mL IntraVENous PRN    0.9 % sodium chloride infusion   IntraVENous PRN    ondansetron (ZOFRAN-ODT) disintegrating tablet 4 mg  4 mg Oral Q8H PRN    Or    ondansetron (ZOFRAN) injection 4 mg  4 mg IntraVENous Q6H PRN    polyethylene glycol (GLYCOLAX) packet 17 g  17 g Oral Daily PRN    acetaminophen (TYLENOL) tablet 650 mg  650 mg Oral Q6H PRN    Or    acetaminophen (TYLENOL) suppository 650 mg  650 mg Rectal Q6H PRN    cefTRIAXone (ROCEPHIN) 1,000 mg in sodium chloride 0.9 % 50 mL IVPB (mini-bag)  1,000 mg IntraVENous Q24H    heparin (porcine) injection 5,000 Units  5,000 Units SubCUTAneous BID    metoprolol tartrate (LOPRESSOR) tablet 50 mg  50 mg Oral BID    labetalol (NORMODYNE;TRANDATE) injection 10 mg  10 mg IntraVENous Q6H PRN    hydrALAZINE (APRESOLINE) injection 10 mg  10 mg IntraVENous Q6H PRN    hydrALAZINE (APRESOLINE) tablet 25 mg  25 mg Oral 3 times per day       Signed:  Vale Meigs, PA

## 2023-01-29 NOTE — PROGRESS NOTES
Patient relaxing in recliner playing on phone. Patient has walked down hallway independently with no difficulty. Patient denies any pain and appears comfortable at this time. Call light within reach and patient instructed to call if assistance is needed. Will continue to follow.

## 2023-01-29 NOTE — PROGRESS NOTES
Patient resting in bed.   NAD noted.  Respirations regular and unlabored on room air.   Will continue to monitor.   Safety measures in place.   Preparing BSR to oncoming RN.

## 2023-01-30 ENCOUNTER — TELEPHONE (OUTPATIENT)
Dept: INTERNAL MEDICINE CLINIC | Facility: CLINIC | Age: 73
End: 2023-01-30

## 2023-01-30 VITALS
BODY MASS INDEX: 21.23 KG/M2 | WEIGHT: 148.3 LBS | HEIGHT: 70 IN | HEART RATE: 69 BPM | OXYGEN SATURATION: 95 % | TEMPERATURE: 98.6 F | SYSTOLIC BLOOD PRESSURE: 143 MMHG | RESPIRATION RATE: 18 BRPM | DIASTOLIC BLOOD PRESSURE: 95 MMHG

## 2023-01-30 LAB
ANION GAP SERPL CALC-SCNC: 12 MMOL/L (ref 2–11)
BASOPHILS # BLD: 0.1 K/UL (ref 0–0.2)
BASOPHILS NFR BLD: 1 % (ref 0–2)
BUN SERPL-MCNC: 27 MG/DL (ref 8–23)
CALCIUM SERPL-MCNC: 9.6 MG/DL (ref 8.3–10.4)
CHLORIDE SERPL-SCNC: 107 MMOL/L (ref 101–110)
CO2 SERPL-SCNC: 22 MMOL/L (ref 21–32)
CREAT SERPL-MCNC: 5.4 MG/DL (ref 0.6–1)
DIFFERENTIAL METHOD BLD: ABNORMAL
EOSINOPHIL # BLD: 0.8 K/UL (ref 0–0.8)
EOSINOPHIL NFR BLD: 8 % (ref 0.5–7.8)
ERYTHROCYTE [DISTWIDTH] IN BLOOD BY AUTOMATED COUNT: 13.8 % (ref 11.9–14.6)
GLUCOSE SERPL-MCNC: 141 MG/DL (ref 65–100)
HCT VFR BLD AUTO: 35.2 % (ref 35.8–46.3)
HGB BLD-MCNC: 11.2 G/DL (ref 11.7–15.4)
IMM GRANULOCYTES # BLD AUTO: 0.1 K/UL (ref 0–0.5)
IMM GRANULOCYTES NFR BLD AUTO: 1 % (ref 0–5)
LYMPHOCYTES # BLD: 1.8 K/UL (ref 0.5–4.6)
LYMPHOCYTES NFR BLD: 19 % (ref 13–44)
MCH RBC QN AUTO: 29.9 PG (ref 26.1–32.9)
MCHC RBC AUTO-ENTMCNC: 31.8 G/DL (ref 31.4–35)
MCV RBC AUTO: 94.1 FL (ref 82–102)
MM INDURATION, POC: 0 MM (ref 0–5)
MONOCYTES # BLD: 0.8 K/UL (ref 0.1–1.3)
MONOCYTES NFR BLD: 8 % (ref 4–12)
NEUTS SEG # BLD: 6.1 K/UL (ref 1.7–8.2)
NEUTS SEG NFR BLD: 63 % (ref 43–78)
NRBC # BLD: 0 K/UL (ref 0–0.2)
PLATELET # BLD AUTO: 257 K/UL (ref 150–450)
PMV BLD AUTO: 10.2 FL (ref 9.4–12.3)
POTASSIUM SERPL-SCNC: 4.1 MMOL/L (ref 3.5–5.1)
PPD, POC: NEGATIVE
RBC # BLD AUTO: 3.74 M/UL (ref 4.05–5.2)
SODIUM SERPL-SCNC: 141 MMOL/L (ref 133–143)
WBC # BLD AUTO: 9.6 K/UL (ref 4.3–11.1)

## 2023-01-30 PROCEDURE — 6360000002 HC RX W HCPCS: Performed by: FAMILY MEDICINE

## 2023-01-30 PROCEDURE — 6370000000 HC RX 637 (ALT 250 FOR IP): Performed by: RADIOLOGY

## 2023-01-30 PROCEDURE — 6360000002 HC RX W HCPCS: Performed by: INTERNAL MEDICINE

## 2023-01-30 PROCEDURE — 6370000000 HC RX 637 (ALT 250 FOR IP): Performed by: FAMILY MEDICINE

## 2023-01-30 PROCEDURE — 2580000003 HC RX 258: Performed by: FAMILY MEDICINE

## 2023-01-30 PROCEDURE — 6370000000 HC RX 637 (ALT 250 FOR IP): Performed by: PHYSICIAN ASSISTANT

## 2023-01-30 PROCEDURE — 2500000003 HC RX 250 WO HCPCS: Performed by: FAMILY MEDICINE

## 2023-01-30 PROCEDURE — 85025 COMPLETE CBC W/AUTO DIFF WBC: CPT

## 2023-01-30 PROCEDURE — 80048 BASIC METABOLIC PNL TOTAL CA: CPT

## 2023-01-30 PROCEDURE — 8010000000 HC HEMODIALYSIS ACUTE INPT

## 2023-01-30 PROCEDURE — 36415 COLL VENOUS BLD VENIPUNCTURE: CPT

## 2023-01-30 PROCEDURE — 6370000000 HC RX 637 (ALT 250 FOR IP): Performed by: INTERNAL MEDICINE

## 2023-01-30 RX ORDER — LEVOTHYROXINE SODIUM 0.12 MG/1
TABLET ORAL
Qty: 90 TABLET | Refills: 1 | OUTPATIENT
Start: 2023-01-30

## 2023-01-30 RX ORDER — LOSARTAN POTASSIUM 50 MG/1
50 TABLET ORAL DAILY
Qty: 14 TABLET | Refills: 0 | Status: SHIPPED | OUTPATIENT
Start: 2023-01-31 | End: 2023-02-14

## 2023-01-30 RX ORDER — CEPHALEXIN 500 MG/1
500 CAPSULE ORAL
Qty: 2 CAPSULE | Refills: 0 | Status: SHIPPED | OUTPATIENT
Start: 2023-01-31 | End: 2023-02-02

## 2023-01-30 RX ORDER — HYDRALAZINE HYDROCHLORIDE 50 MG/1
50 TABLET, FILM COATED ORAL EVERY 8 HOURS SCHEDULED
Qty: 42 TABLET | Refills: 0 | Status: SHIPPED | OUTPATIENT
Start: 2023-01-30 | End: 2023-02-13

## 2023-01-30 RX ORDER — HEPARIN SODIUM 1000 [USP'U]/ML
5000 INJECTION, SOLUTION INTRAVENOUS; SUBCUTANEOUS PRN
Status: DISCONTINUED | OUTPATIENT
Start: 2023-01-30 | End: 2023-01-30 | Stop reason: HOSPADM

## 2023-01-30 RX ADMIN — DULOXETINE HYDROCHLORIDE 60 MG: 30 CAPSULE, DELAYED RELEASE ORAL at 12:18

## 2023-01-30 RX ADMIN — OXYCODONE HYDROCHLORIDE 5 MG: 5 TABLET ORAL at 04:00

## 2023-01-30 RX ADMIN — PANTOPRAZOLE SODIUM 40 MG: 40 TABLET, DELAYED RELEASE ORAL at 12:18

## 2023-01-30 RX ADMIN — ASPIRIN 81 MG 81 MG: 81 TABLET ORAL at 12:20

## 2023-01-30 RX ADMIN — HEPARIN SODIUM 5000 UNITS: 1000 INJECTION INTRAVENOUS; SUBCUTANEOUS at 07:13

## 2023-01-30 RX ADMIN — HEPARIN SODIUM 5000 UNITS: 5000 INJECTION INTRAVENOUS; SUBCUTANEOUS at 12:20

## 2023-01-30 RX ADMIN — ONDANSETRON 4 MG: 2 INJECTION INTRAMUSCULAR; INTRAVENOUS at 08:24

## 2023-01-30 RX ADMIN — HYDRALAZINE HYDROCHLORIDE 50 MG: 50 TABLET, FILM COATED ORAL at 06:07

## 2023-01-30 RX ADMIN — FERROUS SULFATE TAB 325 MG (65 MG ELEMENTAL FE) 325 MG: 325 (65 FE) TAB at 12:18

## 2023-01-30 RX ADMIN — LOSARTAN POTASSIUM 50 MG: 50 TABLET, FILM COATED ORAL at 12:18

## 2023-01-30 RX ADMIN — LEVOTHYROXINE SODIUM 125 MCG: 0.07 TABLET ORAL at 06:07

## 2023-01-30 RX ADMIN — CEFTRIAXONE 1000 MG: 1 INJECTION, POWDER, FOR SOLUTION INTRAMUSCULAR; INTRAVENOUS at 15:30

## 2023-01-30 RX ADMIN — NIFEDIPINE 30 MG: 30 TABLET, EXTENDED RELEASE ORAL at 12:19

## 2023-01-30 RX ADMIN — LABETALOL HYDROCHLORIDE 10 MG: 5 INJECTION INTRAVENOUS at 03:56

## 2023-01-30 RX ADMIN — ATORVASTATIN CALCIUM 20 MG: 20 TABLET, FILM COATED ORAL at 12:19

## 2023-01-30 RX ADMIN — SODIUM CHLORIDE, PRESERVATIVE FREE 10 ML: 5 INJECTION INTRAVENOUS at 12:21

## 2023-01-30 RX ADMIN — CHOLECALCIFEROL TAB 25 MCG (1000 UNIT) 5000 UNITS: 25 TAB at 12:18

## 2023-01-30 RX ADMIN — METOPROLOL TARTRATE 50 MG: 50 TABLET, FILM COATED ORAL at 12:19

## 2023-01-30 RX ADMIN — HYDRALAZINE HYDROCHLORIDE 50 MG: 50 TABLET, FILM COATED ORAL at 15:30

## 2023-01-30 ASSESSMENT — PAIN SCALES - GENERAL
PAINLEVEL_OUTOF10: 0
PAINLEVEL_OUTOF10: 6
PAINLEVEL_OUTOF10: 0

## 2023-01-30 ASSESSMENT — PAIN DESCRIPTION - LOCATION: LOCATION: HEAD

## 2023-01-30 ASSESSMENT — PAIN DESCRIPTION - DESCRIPTORS: DESCRIPTORS: ACHING

## 2023-01-30 NOTE — FLOWSHEET NOTE
01/29/23 1951   Vital Signs   Temp 98.6 °F (37 °C)   Temp Source Oral   Heart Rate 78   Resp 19   BP (!) 184/103   MAP (Calculated) 130   MAP (mmHg) 125   BP Location Left upper arm   Patient Position Sitting   Level of Consciousness 0   MEWS Score 1   Pain Assessment   Pain Assessment None - Denies Pain   Pain Level 0   Oxygen Therapy   SpO2 97 %   O2 Device None (Room air)     Hydralazine 20mg IV given for BP

## 2023-01-30 NOTE — FLOWSHEET NOTE
01/29/23 8133   Pain Assessment   Pain Assessment None - Denies Pain   Pain Level 0   Response to Pain Intervention Patient satisfied   Side Effects No reported side effects

## 2023-01-30 NOTE — FLOWSHEET NOTE
01/29/23 2212   Vital Signs   Resp 18   Pain Assessment   Pain Assessment 0-10   Pain Level 6   Patient's Stated Pain Goal 0 - No pain   Pain Location Head   Pain Descriptors Aching   Opioid-Induced Sedation   POSS Score 1     Roxicodone 5mg PO given

## 2023-01-30 NOTE — TELEPHONE ENCOUNTER
ISAK for end-stage renal disease, d/c 1/30/23. Hfu appt made for 2/3/23. [facility]) Physicians  Neurosurgery and Pain [facility], [place]  [address], [address] [facility] , [address] [facility], [address]: [phone]  F: [phone]      Kalee Rendon  ()    2022    Subjective:     Kalee Rendon is 78 y.o. female who complains today of:    Chief Complaint   Patient presents with    Follow-up     Lower Back Pain       HPI     Returns stating that her lower back is doing pretty well as is her right shoulder she is completing therapy on those 2 however she is starting noticed some pain and lack of movement movement in her left ankle waiting for her braces to be made  Allergies:  Latex, Enbucrilate, and Other    Past Medical History:   Diagnosis Date    Arthritis     Cancer ([address]     Depression     Hemorrhoids     Hypertension     Neuropathy      Past Surgical History:   Procedure Laterality Date    JOINT REPLACEMENT      KNEE SURGERY       Family History   Problem Relation Age of Onset    Arthritis Mother     Hypertension Mother     Arthritis Father     Hypertension Father      Social History     Socioeconomic History    Marital status:      Spouse name: Not on file    Number of children: Not on file    Years of education: Not on file    Highest education level: Not on file   Occupational History    Not on file   Tobacco Use    Smoking status: Former Smoker     Packs/day: 0.50     Years: 3.00     Pack years: 1.50     Types: Cigarettes     Start date:      Quit date:      Years since quittin.4    Smokeless tobacco: Never Used   Vaping Use    Vaping Use: Never used   Substance and Sexual Activity    Alcohol use:  Yes     Alcohol/week: 3.0 - 4.0 standard drinks     Types: 3 - 4 Glasses of wine per week    Drug use: Never    Sexual activity: Not on file   Other Topics Concern    Not on file   Social History Narrative    Not on file     Social Determinants of Health     Financial Resource Strain:     Difficulty of Paying Living Expenses: Not on file   Food Insecurity:     Worried About Running Out of Food in the Last Year: Not on file    Ran Out of Food in the Last Year: Not on file   Transportation Needs:     Lack of Transportation (Medical): Not on file    Lack of Transportation (Non-Medical):  Not on file   Physical Activity:     Days of Exercise per Week: Not on file    Minutes of Exercise per Session: Not on file   Stress:     Feeling of Stress : Not on file   Social Connections:     Frequency of Communication with Friends and Family: Not on file    Frequency of Social Gatherings with Friends and Family: Not on file    Attends Alevism Services: Not on file    Active Member of [address] [place] [address] De Correspondent or Organizations: Not on file    Attends Club or Organization Meetings: Not on file    Marital Status: Not on file   Intimate Partner Violence:     Fear of Current or Ex-Partner: Not on file    Emotionally Abused: Not on file    Physically Abused: Not on file    Sexually Abused: Not on file   Housing Stability:     Unable to Pay for Housing in the Last Year: Not on file    Number of [place] in the Last Year: Not on file    Unstable Housing in the Last Year: Not on file       Current Outpatient Medications on File Prior to Visit   Medication Sig Dispense Refill    calcium carbonate 600 MG TABS tablet Take 1 tablet by mouth 2 times daily as needed      Cholecalciferol (VITAMIN D) 10 MCG/ML LIQD       Omega-3 Fatty Acids (FISH OIL) 1000 MG CAPS       polyethylene glycol (MIRALAX) 17 g packet       lisinopril-hydroCHLOROthiazide (PRINZIDE;ZESTORETIC) 10-12.5 MG per tablet       hydroCHLOROthiazide (MICROZIDE) 12.5 MG capsule Take 12.5 mg by mouth daily      sertraline (ZOLOFT) 50 MG tablet       Multiple Vitamins-Minerals (OCUVITE EXTRA) TABS       omeprazole (PRILOSEC) 10 MG delayed release capsule Take 20 mg by mouth daily      simvastatin (ZOCOR) 20 MG tablet Take 20 mg by mouth daily (Patient not taking: Reported on 3/15/2022)       No current facility-administered medications on file prior to visit. Review of Systems   Constitutional: Negative for fever. HENT: Negative for hearing loss. Respiratory: Negative for shortness of breath. Gastrointestinal: Negative for constipation, diarrhea and nausea. Genitourinary: Negative for difficulty urinating. Musculoskeletal: Negative for back pain and neck pain. Skin: Negative for rash. Neurological: Negative for headaches. Hematological: Does not bruise/bleed easily. Psychiatric/Behavioral: Negative for sleep disturbance. Objective:     Vitals:  /86 (Site: Left Upper Arm)   Temp 97.1 °F (36.2 °C) (Infrared)   Ht 5' 7\" (1.702 m)   Wt 200 lb (90.7 kg)   BMI 31.32 kg/m² Pain Score:   1      Physical Exam  Vitals reviewed. Constitutional:       Appearance: Normal appearance. Skin:     General: Skin is warm and dry. Neurological:      Mental Status: She is alert. Ortho Exam   Examination lumbar spine shows good range of motion mild palpable tenderness L5-S1 no tension signs neurovascular muscular status is intact  Examination of the right shoulder shows near full range of motion no palpable tenderness equivocal supraspinatus sign   Examination of the left ankle reveals tenderness along the lateral aspect the neurovascular muscular status grossly intact there is some generalized edema of a chronic nature mediocre range of motion    Assessment:      Diagnosis Orders   1. Left ankle instability  Amb External Referral To Physical Therapy       Plan:   Is doing well with the back and shoulder at this point but I want her get a little therapy on her ankle to loosen that up a bit and prepared for the new AFOs that she is getting when I see her back when she has those made       No orders of the defined types were placed in this encounter.       Orders Placed This Encounter   Procedures    Amb External Referral To Physical Therapy     Referral Priority:   Routine     Referral Type:   Consult for Advice and Opinion     Referral Reason:   Patient Preference     Requested Specialty:   Physical Therapy     Number of Visits Requested:   1         Follow up:  Return in about 4 weeks (around 5/10/2022).     CADEN JOSEPH DO

## 2023-01-30 NOTE — DIALYSIS
TRANSFER OUT- DIALYSIS    Hemodialysis treatment completed. PT ran 3.5 hours, without complications. Patient alert and VS stable  /102  P 77       0 Kg removed. Flushed both ports with 10 mL of NS.  CVC dressing clean, dry, and intact, tego caps intact, curos caps placed. Meds given: zofran. RBCs given during dialysis: 0    Patient to 812 after dialysis.

## 2023-01-30 NOTE — FLOWSHEET NOTE
01/30/23 0327   Vital Signs   Temp 98.6 °F (37 °C)   Temp Source Oral   Heart Rate 74   Resp 19   BP (!) 155/110   MAP (Calculated) 125   MAP (mmHg) 123   BP Location Left upper arm   Patient Position Sitting   Oxygen Therapy   SpO2 98 %     Labetolol 10mg IV given for BP

## 2023-01-30 NOTE — DISCHARGE SUMMARY
Hospitalist Discharge Summary   Admit Date:  2023 11:28 AM   DC Note date: 2023  Name:  Xenia Agudelo   Age:  81 y.o. Sex:  female  :  1950   MRN:  [id number]   Room:  [facility]  PCP:  Mariana Mcgee MD    Presenting Complaint: Hypertension, Flank Pain, and Emesis     Initial Admission Diagnosis: Acute on chronic renal insufficiency [N28.9, N18.9]  Acute kidney injury superimposed on CKD ([address] [N17.9, N18.9]     Problem List for this Hospitalization (present on admission):    Principal Problem:    End stage renal disease ([address]  Active Problems:    Hyperlipidemia    Lower obstructive uropathy    Acquired hypothyroidism    Gastroesophageal reflux disease without esophagitis    Suprapubic catheter ([address]  Resolved Problems:    Acute kidney injury superimposed on CKD ([address]    Metabolic acidosis    Acute pyelonephritis    Hypertensive urgency      Hospital Course:  Patient is a 80 y/o female with medical history of urethral stricture s/p chronic suprapubic catheter, CKD IV, hypothyroidism, HTN who presented to ED with cc confusion, generalized weakness, bilateral flank pain of 3-4 day duration associated with nausea and vomiting. Patient follows both Urology and Nephrology. ED workup: afebrile, HR 81 /125  Labs notable for BUN/Cr 54/6.8 (baseline 4-5), CO2 19, Alk phos 194, AST 41, WBC 12.6. UA c/w UTI, initiated on ceftriaxone, Ucx pending. Patient received labetalol, zofran, and 1000 cc NS bolus. Nephrology evaluated in ED and requested hospitalist admission. Initiated on bicarb gtt for metabolic acidosis. Nephrology consult, new onset ESRD, placed line, HD runs were completed , , and . Bicarb gtt discontinued as acidosis has resolved. Outpatient HD slot has been obtained. Outpatient slot has been obtained at [facility] on MWF at 1015. Dr. Elmira Collet has cleared patient to discharge home today.  Anti-hypertensive agents were adjusted on discharge and will likely require some continued changes as outpatient. Can continue to monitor with dialysis. Ucx finalized with pan-S E. Coli. Renal ultrasound with no noted obstruction, consider follow-up with renal mass CT for cystic lower pole left renal cortical lesion. Patient completed 5-day course Ceftriaxone. Will continue renally dosed Keflex x 2 days on discharge noted to ensure dosing is taken following HD, this will complete 7-day total course. Leukocytosis has normalized. Blood cultures remain negative. No flank pain on discharge. Afebrile. Urology consultation. Chronic SPC exchanged 1/18. No further inpatient recommendations. Will follow-up outpatient as scheduled for exchanges. PT/OT with no needs on discharge. Patient is medically stable to discharge. Plan was discussed with patient, care team, Dr. Ligia Lozano, Dr. Devyn Ley. All questions answered. Instructions given to call a physician or return if any concerns. Disposition: Home  Diet: ADULT DIET; Regular; Low Sodium (2 gm); Low Potassium (Less than 3000 mg/day)  Code Status: Full Code    Follow Ups:   Follow-up Information     Mary Zuñiga MD Follow up in 1 week(s). Specialty: Internal Medicine  Why: Hospital Follow-up  Contact information:  [address]  [place] [address]  [phone]             Allie Castillo MD Follow up. Specialty: Nephrology  Why: As scheduled, first outpatient HD chair scheduled 2/1  Contact information:  [facility] [address]  [phone]                       Time spent in patient discharge and coordination 40 minutes.     Follow up labs/diagnostics: CBC / BMP with HD 2/1    Current Discharge Medication List        START taking these medications    Details   losartan (COZAAR) 50 MG tablet Take 1 tablet by mouth daily for 14 days  Qty: 14 tablet, Refills: 0      cephALEXin (KEFLEX) 500 MG capsule Take 1 capsule by mouth daily (before dinner) for 2 days Ensure Wednesday dose is taken after dialysis. Qty: 2 capsule, Refills: 0           CONTINUE these medications which have CHANGED    Details   hydrALAZINE (APRESOLINE) 50 MG tablet Take 1 tablet by mouth every 8 hours for 14 days  Qty: 42 tablet, Refills: 0      metoprolol tartrate (LOPRESSOR) 50 MG tablet Take 1 tablet by mouth 2 times daily  Qty: 60 tablet, Refills: 0           CONTINUE these medications which have NOT CHANGED    Details   DULoxetine (CYMBALTA) 60 MG extended release capsule Take 1 capsule by mouth 2 times daily  Qty: 180 capsule, Refills: 1    Associated Diagnoses: Depressive disorder      vitamin D (ERGOCALCIFEROL) 1.25 MG (87017 UT) CAPS capsule TAKE ONE CAPSULE BY MOUTH EVERY WEEK FOR 7 DOSES      diclofenac sodium (VOLTAREN) 1 % GEL Apply 2 g topically 2 times daily      NIFEdipine (PROCARDIA XL) 30 MG extended release tablet TAKE ONE TABLET BY MOUTH ONE TIME DAILY      ferrous sulfate (IRON 325) 325 (65 Fe) MG tablet 1 tab(s)      acetaminophen (TYLENOL) 650 MG extended release tablet Take 650 mg by mouth every 8 hours as needed      gabapentin (NEURONTIN) 100 MG capsule Take 1 capsule by mouth daily for 60 days.   Qty: 60 capsule, Refills: 1    Associated Diagnoses: Chronic radicular cervical pain      Omega-3 Fatty Acids (FISH OIL) 1200 MG CAPS Take 1 capsule by mouth daily      ASPIRIN PO Take by mouth      atorvastatin (LIPITOR) 20 MG tablet Take 20 mg by mouth every other day      vitamin D3 (CHOLECALCIFEROL) 125 MCG (5000 UT) TABS tablet Take 5,000 Units by mouth daily      levothyroxine (SYNTHROID) 125 MCG tablet Take 125 mcg by mouth every morning (before breakfast)      pantoprazole (PROTONIX) 40 MG tablet Take 40 mg by mouth 2 times daily      sodium bicarbonate 650 MG tablet TAKE 1 TABLET BY MOUTH TWICE DAILY           STOP taking these medications       cefdinir (OMNICEF) 300 MG capsule Comments:   Reason for Stopping:         Ibuprofen-diphenhydrAMINE HCl 200-25 MG CAPS Comments:   Reason for Stopping: budesonide (ENTOCORT EC) 3 MG extended release capsule Comments:   Reason for Stopping:               Some medications may have been reported old/obsolete and marked \"stop taking\" by the system; in reality pt was already off these meds; defer to outpatient or prescribing providers. Procedures done this admission:  * No surgery found *    Consults this admission:  IP CONSULT TO NEPHROLOGY  IP CONSULT TO CASE MANAGEMENT  IP CONSULT TO UROLOGY    Echocardiogram results:  08/22/22    TRANSTHORACIC ECHOCARDIOGRAM (TTE) COMPLETE (CONTRAST/BUBBLE/3D PRN) 08/23/2022  7:31 AM, 08/23/2022 12:00 AM (Final)    Interpretation Summary    Left Ventricle: Normal left ventricular systolic function with a visually estimated EF of 65 - 70%. Left ventricle size is normal. Moderately increased wall thickness. Normal wall motion. Normal diastolic function. Aortic Valve: Valve structure is normal. Mild sclerosis of the aortic valve cusp. Mild regurgitation with a centrally directed jet. Mitral Valve: Mild regurgitation. Tricuspid Valve: The estimated RVSP is 14 mmHg. Signed by: Aurelia Salazar MD on 8/23/2022  7:31 AM, Signed by: Unknown Provider Result on 8/23/2022 12:00 AM      Diagnostic Imaging/Tests:   IR TUNNELED CVC PLACE WO SQ PORT/PUMP > 5 YEARS    Result Date: 1/27/2023  Successful insertion of a right-sided tunneled Hemodialysis catheter, with tip in the expected location of the right atrium. Plan:  The catheter is ready for immediate use. Recommend suture removal in 2-3 weeks.   The patient has been transported directly to the hemodialysis unit for hemodialysis today. _______________________________________________________________ PROCEDURE SUMMARY: - Venous access with ultrasound guidance - Tunneled dialysis catheter insertion with fluoroscopic guidance PROCEDURE DETAILS: Pre-procedure Consent:  Informed written and oral consent for the procedure was obtained after explanation of benefits, risks (including, but not limitted to:  hemorrhage, infection, pneumothorax, vascular injury) and alternatives. The patient's questions were answered to his/her satisfaction. He/She stated understanding and requested that we proceed. Final verification:  A time-out identifying the patient and planned procedure was performed. Preparation (MIPS):  Maximal sterile barrier technique (including:  Sterile Ultrasound probe cover, Sterile Ultrasound gel, cap, mask, sterile gown, sterile gloves, sterile drape, hand hygiene, and 2% chlorhexidine cutaneous antisepsis) was used. Medical reason for site preparation exception (MIPS): Not applicable Anesthesia/sedation Level of anesthesia/sedation:  Moderate sedation (conscious sedation) Anesthesia/sedation administered by: Independent trained observer under attending supervision with continuous monitoring of the patient?s level of consciousness and physiologic status Total intra-service sedation time (minutes):  20 Access Local anesthesia was administered. The vessel was sonographically evaluated and determined to be patent. Real time ultrasound was used to visualize needle entry into the vessel and a permanent image was stored. Vein accessed (QCDR): Right  Internal jugular vein Internal jugular vein patency (QCDR): Patent or otherwise accessible on at least one side Access technique:  Micropuncture set with 21 gauge needle Catheter placement A small skin incision was made at the venous access site. A 2nd small skin incision was made on the Ipsilateral chest wall. The catheter was tunneled subcutaneously to the venous access site. The catheter was advanced via a peel-away sheath into the vein under fluoroscopic guidance. Catheter tip location was fluoroscopically verified and a permanent image was stored.  Catheter placed:  Symetrex Long Term Hemodialysis Catheter 15.5 Fr Catheter cuff-to-tip length (cm):  23 Catheter flush:  Heparin (1000 units/mL) Closure A sterile dressing or skin glue was applied. Access site closure technique:  Absorbable suture and tissue adhesive Catheter securement technique:  Non-absorbable suture Contrast Contrast agent:  None Contrast volume (mL):  0 Radiation Exposure Reference Air Kerma (Ka,r) = 2 mGy Dose Area Product/Kerma Area Product (DAP/GLORIA/PKA) = 50.66 cGy-cm2 Fluoroscopy Exposure Time = 0.3 minutes Fluorographic Images = 1 Additional Details Specimens removed:  None Estimated blood loss (mL):  Less than 10 Standardized report:  [email or web address] Attestation Signer name: Attila Mcconnell M.D. I attest that I personally performed the entire procedure. I reviewed the stored images and agree with the report as written. US RETROPERITONEAL LIMITED    Result Date: 1/27/2023  1. No hydronephrosis. 2. Incompletely characterized partially cystic lower pole left renal cortical lesion measuring 2.8 cm in diameter. Consider routine follow-up evaluation with renal mass protocol CT or MRI. Vascular duplex vein mapping upper bilateral    Result Date: 1/27/2023  1. Upper extremity vein mapping performed. Veins are patent as are the brachial arteries and radial arteries as measured in the [facility] fossa and wrist. All measurements are on the PACs workstation.        Labs: Results:       BMP, Mg, Phos Recent Labs     01/28/23 0447 01/29/23 0356 01/30/23 0422   * 141 141   K 3.3* 3.5 4.1    107 107   CO2 30 27 22   ANIONGAP 8 7 12*   BUN 30* 19 27*   CREATININE 4.70* 4.00* 5.40*   LABGLOM 9* 11* 8*   CALCIUM 8.8 9.2 9.6   GLUCOSE 103* 108* 141*   MG 2.2 1.8  --       CBC Recent Labs     01/28/23 0447 01/29/23 0356 01/30/23 0422   WBC  --  6.8 9.6   RBC  --  3.45* 3.74*   HGB 9.6* 10.2* 11.2*   HCT 29.3* 31.4* 35.2*   MCV  --  91.0 94.1   MCH  --  29.6 29.9   MCHC  --  32.5 31.8   RDW  --  13.4 13.8   PLT  --  161 257   MPV  --  9.3* 10.2   NRBC  --  0.00 0.00   SEGS  --  53 63   LYMPHOPCT  --  27 19   EOSRELPCT  --  9* 8* MONOPCT  --  10 8   BASOPCT  --  1 1   IMMGRAN  --  0 1   SEGSABS  --  3.7 6.1   LYMPHSABS  --  1.8 1.8   EOSABS  --  0.6 0.8   MONOSABS  --  0.6 0.8   BASOSABS  --  0.1 0.1   ABSIMMGRAN  --  0.0 0.1      LFT Recent Labs     01/28/23  0447   BILITOT 0.8   ALKPHOS 157*   AST 14*   ALT 13   PROT 6.5   LABALBU 3.0*   GLOB 3.5      Cardiac  No results found for: NTPROBNP, TROPHS   Coags Lab Results   Component Value Date/Time    PROTIME 13.5 01/26/2023 11:47 AM    INR 1.0 01/26/2023 11:47 AM    APTT 28.5 01/26/2023 11:47 AM      A1c No results found for: LABA1C, EAG   Lipids Lab Results   Component Value Date/Time    CHOL 218 04/29/2022 02:04 PM    LDLCALC 103 04/29/2022 02:04 PM    HDL 72 04/29/2022 02:04 PM    TRIG 254 04/29/2022 02:04 PM      Thyroid  No results found for: Shira Salt     Most Recent UA Lab Results   Component Value Date/Time    COLORU YELLOW/STRAW 01/26/2023 11:46 AM    APPEARANCE TURBID 01/26/2023 11:46 AM    SPECGRAV 1.020 01/26/2023 11:46 AM    LABPH 6.0 01/26/2023 11:46 AM    PROTEINU >300 01/26/2023 11:46 AM    GLUCOSEU 100 01/26/2023 11:46 AM    KETUA 15 01/26/2023 11:46 AM    BILIRUBINUR Negative 01/26/2023 11:46 AM    BILIRUBINUR Negative 06/16/2021 02:37 PM    BLOODU SMALL 01/26/2023 11:46 AM    UROBILINOGEN 0.2 01/26/2023 11:46 AM    NITRU Negative 01/26/2023 11:46 AM    LEUKOCYTESUR MODERATE 01/26/2023 11:46 AM    WBCUA >100 01/26/2023 11:46 AM    RBCUA 11-30 06/16/2021 02:37 PM    EPITHUA 3-5 01/26/2023 11:46 AM    BACTERIA 4+ 01/26/2023 11:46 AM    LABCAST 0-3 01/26/2023 11:46 AM        Recent Labs     01/26/23  1146   CULTURE >100,000 COLONIES/mL Escherichia coli*  10,000 to 50,000 COLONIES/mL MIXED SKIN MICHAEL ISOLATED       All Labs from Last 24 Hrs:  Recent Results (from the past 24 hour(s))   CBC with Auto Differential    Collection Time: 01/30/23  4:22 AM   Result Value Ref Range    WBC 9.6 4.3 - 11.1 K/uL    RBC 3.74 (L) 4.05 - 5.2 M/uL    Hemoglobin 11.2 (L) 11.7 - 15.4 g/dL    Hematocrit 35.2 (L) 35.8 - 46.3 %    MCV 94.1 82 - 102 FL    MCH 29.9 26.1 - 32.9 PG    MCHC 31.8 31.4 - 35.0 g/dL    RDW 13.8 11.9 - 14.6 %    Platelets 412 632 - 203 K/uL    MPV 10.2 9.4 - 12.3 FL    nRBC 0.00 0.0 - 0.2 K/uL    Differential Type AUTOMATED      Seg Neutrophils 63 43 - 78 %    Lymphocytes 19 13 - 44 %    Monocytes 8 4.0 - 12.0 %    Eosinophils % 8 (H) 0.5 - 7.8 %    Basophils 1 0.0 - 2.0 %    Immature Granulocytes 1 0.0 - 5.0 %    Segs Absolute 6.1 1.7 - 8.2 K/UL    Absolute Lymph # 1.8 0.5 - 4.6 K/UL    Absolute Mono # 0.8 0.1 - 1.3 K/UL    Absolute Eos # 0.8 0.0 - 0.8 K/UL    Basophils Absolute 0.1 0.0 - 0.2 K/UL    Absolute Immature Granulocyte 0.1 0.0 - 0.5 K/UL   Basic Metabolic Panel w/ Reflex to MG    Collection Time: 01/30/23  4:22 AM   Result Value Ref Range    Sodium 141 133 - 143 mmol/L    Potassium 4.1 3.5 - 5.1 mmol/L    Chloride 107 101 - 110 mmol/L    CO2 22 21 - 32 mmol/L    Anion Gap 12 (H) 2 - 11 mmol/L    Glucose 141 (H) 65 - 100 mg/dL    BUN 27 (H) 8 - 23 MG/DL    Creatinine 5.40 (H) 0.6 - 1.0 MG/DL    Est, Glom Filt Rate 8 (L) >60 ml/min/1.73m2    Calcium 9.6 8.3 - 10.4 MG/DL   PLEASE READ & DOCUMENT PPD TEST IN 48 HRS    Collection Time: 01/30/23  3:30 PM   Result Value Ref Range    PPD, (POC) Negative Negative    mm Induration 0 0 - 5 mm       No Known Allergies  Immunization History   Administered Date(s) Administered    COVID-19, MODERNA BLUE border, Primary or Immunocompromised, (age 12y+), IM, 100 mcg/0.5mL 05/14/2021, 06/01/2021    PPD Test 01/28/2023       Recent Vital Data:  Patient Vitals for the past 24 hrs:   Temp Pulse Resp BP SpO2   01/30/23 1542 98.6 °F (37 °C) 69 18 (!) 143/95 95 %   01/30/23 1105 99 °F (37.2 °C) 89 18 131/82 98 %   01/30/23 0952 -- 77 -- (!) 173/102 --   01/30/23 0930 -- 77 -- (!) 164/102 --   01/30/23 0900 -- 74 -- (!) 164/102 --   01/30/23 0830 -- 77 -- (!) 170/103 --   01/30/23 0800 -- -- -- (!) 151/125 --   01/30/23 0730 -- 74 -- (!) 171/114 --   01/30/23 0653 -- 76 -- (!) 156/98 --   01/30/23 0400 -- -- 18 (!) 132/110 --   01/30/23 0327 98.6 °F (37 °C) 74 19 (!) 155/110 98 %   01/30/23 0000 99 °F (37.2 °C) 79 18 (!) 158/93 98 %   01/29/23 2212 -- -- 18 -- --   01/29/23 2030 -- -- -- (!) 157/93 --   01/29/23 1951 98.6 °F (37 °C) 78 19 (!) 184/103 97 %       Oxygen Therapy  SpO2: 95 %  Pulse via Oximetry: 80 beats per minute  Pulse Oximeter Device Mode: Continuous  O2 Device: None (Room air)    Estimated body mass index is 21.28 kg/m² as calculated from the following:    Height as of this encounter: 5' 10\" (1.778 m). Weight as of this encounter: 148 lb 4.8 oz (67.3 kg). Intake/Output Summary (Last 24 hours) at 1/30/2023 1608  Last data filed at 1/30/2023 8922  Gross per 24 hour   Intake 740 ml   Output 500 ml   Net 240 ml         Physical Exam:  General:          Well nourished. Alert. No acute distress  Head:               Normocephalic, atraumatic  Eyes:               Sclerae appear normal.  Pupils equally round. ENT:                Nares appear normal.  Moist oral mucosa  Neck:               No restricted ROM. Trachea midline   CV:                  RRR. No m/r/g. No jugular venous distension. Lungs:             CTAB posterior. No wheezing, rhonchi, or rales. Symmetric expansion. Respirations even and unlabored on RA  Abdomen:        Soft, nontender, nondistended. Extremities:     No cyanosis or clubbing. No edema  Skin:                No rashes and normal coloration. Tattoos to back. Warm and dry. Neuro:             CN II-XII grossly intact. A&O x 3. No focal deficits. Psych:             Normal mood and affect.       Signed:  [facility]

## 2023-01-30 NOTE — DIALYSIS
TRANSFER IN - DIALYSIS    Received patient in dialysis unit  from [facility] (unit) for ordered procedure. Consent verified for renal replacement therapy. Procedure explained to patient, opportunity for Q&A provided. Call light given. Patient alert and vital signs stable. /98,  P76  , room air. Hemodialysis initiated using right chest catheter. Aspirated and flushed both ports without difficulty. Dressing clean, dry and intact. Machine settings per MD order. Heparin 100 unit bolus and 500 units/hr. Will monitor during treatment.

## 2023-01-30 NOTE — FLOWSHEET NOTE
01/30/23 0500   Pain Assessment   Pain Assessment None - Denies Pain   Pain Level 0   Response to Pain Intervention Patient satisfied   Side Effects No reported side effects

## 2023-01-30 NOTE — PROGRESS NOTES
RENAL H&P/CONSULT    Subjective:     Patient is a 82 y/o WF with  Chronic Kidney Disease stage 5 with obstructive uropathy due to urethral stricture notified the office today that she was too sick to attend the scheduled outpatient visit and was going to the ER because of progressive weakness. She also has HTN, hypothyroidism, generalized weakness, bilateral flank pain of 3-4 day duration associated with nausea/vomiting and diarrhea. She had been followed by [facility] and requested to change service as she was being seen by too many different providers. She reports that her blood pressure had been uncontrolled. She had not attended renal failure education yet. She has a suprapubic Lewis catheter in place and has several house pets indoors, including the bedroom. She had previously refused dialysis, but realizes she may be too ill to stay off dialysis. She did not have dialysis access placement. Her son moved here recently and is interested to be a living related kidney donor. She vomited this am after taking her medications and in the ED her BP was initially 222/121. Creatinine is up to 6.8 from prior  baseline 4-5, was 4.32 in 11/2022 . UA consistent with UTI.   She was treated with Labetalol, Zofran and IVF and we discussed that unless her renal function indices would markedly improve with medical therapy overnight she would be very likely to require dialysis in am.   1/27/23 - still much nausea and bad taste in mouth - BP is still high and we discussed administration of BP meds with RN - holding heparin and ASA due to tunneled HD catheter today - crea is not sufficiently improved to crocker off dialysis and he agrees to proceed with dialysis   1/28/23 - see HD Note  1/29/23 - feels much better after HD x 2, but develop fever - on antibiotics for UTI - she has been followed by Dr. Diana Horton for urology and consult has been placed by primary team - BP is still high - she has not had allergy to BP meds 1/30/23- alert/oriented, lying in bed, HD complete this AM, c/o nausea and HA, denies dyspnea or CP. Outpatient slot has been obtained at [facility] on MWF at 1015. Past Medical History:   Diagnosis Date    Arthritis     Chronic pain     arthritis knees    GERD (gastroesophageal reflux disease)     controlled with med    High cholesterol     Hypertension     Kidney disease     acute renal failure 7/2020 (hospitalized x 6 days)  Followed by Dr. Theron Eisenmenger, nephrologist      Liver disease     hep C,     Melanoma ([address]     RLE,     Psychiatric disorder     depression, treated with cymbalta    PUD (peptic ulcer disease)     Rheumatic fever     as a child    Thyroid disease     hypo      Past Surgical History:   Procedure Laterality Date    APPENDECTOMY      CATARACT REMOVAL  2019    bilateral, IOL implant    CHOLECYSTECTOMY      COLONOSCOPY  2015    IR TUNNELED CATHETER PLACEMENT GREATER THAN 5 YEARS  1/27/2023    IR TUNNELED CATHETER PLACEMENT GREATER THAN 5 YEARS 1/27/2023 Anand Kwan MD [facility] RADIOLOGY SPECIALS    NEUROLOGICAL SURGERY      CERVICAL AND LUMBAR SX    ORTHOPEDIC SURGERY Left     KNEE SCOPE    UPPER GASTROINTESTINAL ENDOSCOPY      EGD    UROLOGICAL SURGERY  07/2020    cystoscopy, insertion berkowitz catheter (suprapubic)    UROLOGICAL SURGERY  2016    cystoscopy, urethral dilatation      Prior to Admission medications    Medication Sig Start Date End Date Taking?  Authorizing Provider   metoprolol tartrate (LOPRESSOR) 50 MG tablet Take 1 tablet by mouth 2 times daily 1/29/23  Yes RAFAEL Day   DULoxetine (CYMBALTA) 60 MG extended release capsule Take 1 capsule by mouth 2 times daily 1/4/23   Mariana Mcgee MD   vitamin D (ERGOCALCIFEROL) 1.25 MG (45698 UT) CAPS capsule TAKE ONE CAPSULE BY MOUTH EVERY WEEK FOR 7 DOSES 9/12/22   Historical Provider, MD   diclofenac sodium (VOLTAREN) 1 % GEL Apply 2 g topically 2 times daily    Historical Provider, MD   NIFEdipine (PROCARDIA XL) 30 MG extended release tablet TAKE ONE TABLET BY MOUTH ONE TIME DAILY 10/4/22   Historical Provider, MD   cefdinir (OMNICEF) 300 MG capsule TAKE ONE CAPSULE BY MOUTH ONE TIME DAILY FOR 11 DOSES THEN TAKE ONE CAPSULE BY MOUTH EVERY 12 HOURS ON THE 12TH DAY THEN TAKE ONE CAPSULE BY 10/14/22   Historical Provider, MD   ferrous sulfate (IRON 325) 325 (65 Fe) MG tablet 1 tab(s) 7/28/15   Historical Provider, MD   acetaminophen (TYLENOL) 650 MG extended release tablet Take 650 mg by mouth every 8 hours as needed    Historical Provider, MD   gabapentin (NEURONTIN) 100 MG capsule Take 1 capsule by mouth daily for 60 days. 11/4/22 1/3/23  SAE Morris - CNP   hydrALAZINE (APRESOLINE) 100 MG tablet Take 1 tablet by mouth in the morning and 1 tablet at noon and 1 tablet before bedtime.  7/27/22   Crystal Yu MD   Omega-3 Fatty Acids (FISH OIL) 1200 MG CAPS Take 1 capsule by mouth daily    Historical Provider, MD   ASPIRIN PO Take by mouth    Historical Provider, MD   atorvastatin (LIPITOR) 20 MG tablet Take 20 mg by mouth every other day 1/26/22   Ar Automatic Reconciliation   vitamin D3 (CHOLECALCIFEROL) 125 MCG (5000 UT) TABS tablet Take 5,000 Units by mouth daily    Ar Automatic Reconciliation   levothyroxine (SYNTHROID) 125 MCG tablet Take 125 mcg by mouth every morning (before breakfast) 3/29/22   Ar Automatic Reconciliation   pantoprazole (PROTONIX) 40 MG tablet Take 40 mg by mouth 2 times daily 2/9/22   Ar Automatic Reconciliation   sodium bicarbonate 650 MG tablet TAKE 1 TABLET BY MOUTH TWICE DAILY 11/4/21   Ar Automatic Reconciliation     No Known Allergies     Social History     Tobacco Use    Smoking status: Former     Packs/day: 0.50     Types: Cigarettes     Start date: 10/28/2019    Smokeless tobacco: Never    Tobacco comments:     Quit smoking: Quit 2019   Substance Use Topics    Alcohol use: No      Family History   Problem Relation Age of Onset    No Known Problems Mother     No Known Problems Father Review of Systems    Constitutional: no fever, positive for anorexia and fatigue, improving with dialysis  Eyes: fair vision, negative  Ears, nose, mouth, throat, and face:fair hearing, negative  Respiratory: no asthma, negative  Cardiovascular:no chest pain, negative  Gastrointestinal:positive for diarrhea, nausea and vomiting, improving with dialysis   Genitourinary: no dysuria, suprapubic Lewis catheter in place  Hematologic/lymphatic: no bleeding tendency, she has not been treated with WILLIAM  Neurological: no seizures,  no new tremor  Behvioral/Psych: no psych hospitalization negative  Endocrine: no goiter, negative      Objective:       BP (!) 173/102   Pulse 77   Temp 98.6 °F (37 °C) (Oral)   Resp 18   Ht 5' 10\" (1.778 m)   Wt 148 lb 4.8 oz (67.3 kg)   SpO2 98%   BMI 21.28 kg/m²     01/30 0701 - 01/30 1900  In: 500   Out: 500   01/28 1901 - 01/30 0700  In: 730 [P.O.:720; I.V.:10]  Out: -     BP (!) 173/102   Pulse 77   Temp 98.6 °F (37 °C) (Oral)   Resp 18   Ht 5' 10\" (1.778 m)   Wt 148 lb 4.8 oz (67.3 kg)   SpO2 98%   BMI 21.28 kg/m²   General:  Alert, cooperative, no distress, appears stated age. Neck: Supple, symmetrical, trachea midline,  no JVD. Lungs:   Clear to auscultation bilaterally. Heart:  Regular rate and rhythm, S1, S2 normal, no murmur,  rub or gallop. Abdomen:   Soft, non-tender. No masses,  No organomegaly. Suprapubic Lewis catheter in place. Extremities: Extremities normal, atraumatic, no cyanosis or edema. Skin: Skin color, texture, turgor normal. No rashes or lesions. Neurologic: Grossly intact. No asterixis.          Data Review:      Latest Reference Range & Units 7/22/20 05:46 7/23/20 04:40 8/14/20 09:07 6/16/21 14:37 4/29/22 14:04 1/26/23 11:47   Sodium 133 - 143 mmol/L 141 137 133 (L) 142 142 141   Potassium 3.5 - 5.1 mmol/L 3.8 3.3 (L) 4.7 5.2 3.7 4.7   Chloride 101 - 110 mmol/L 109 (H) 105 103 106 106 111 (H)   CO2 21 - 32 mmol/L 20 (L) 19 (L) 20 (L) 16 (L) 18 (L) 19 (L)   BUN,BUNPL 8 - 23 MG/DL 82 (H) 76 (H) 48 (H) 40 (H) 39 (H) 54 (H)   Creatinine 0.6 - 1.0 MG/DL 9.35 (H) 7.80 (H) 4.98 (H) 3.76 (H) 3.83 (H) 6.80 (H)   Bun/Cre Ratio 12 - 28 NA    11 (L) 10 (L)    Anion Gap 2 - 11 mmol/L 12 13 10   11   Est, Glom Filt Rate >60 ml/min/1.73m2      6 (L)   EGFR IF NonAfrican American >59 mL/min/1.73 4 (L) 5 (L) 9 (L) 12 (L)     GFR  >59 mL/min/1.73 5 (L) 7 (L) 11 (L) 13 (L)     Glucose, Random 65 - 100 mg/dL 92 88 90 85 202 (H) 119 (H)   CALCIUM, SERUM, [id number] 8.3 - 10.4 MG/DL 8.1 (L) 7.9 (L) 9.2 9.5 9.0 9.0   ALBUMIN/GLOBULIN RATIO 0.4 - 1.6        0.8   Total Protein 6.3 - 8.2 g/dL 6.5 6.1 (L)  7.2 7.1 8.3 (H)   (L): Data is abnormally low  (H): Data is abnormally high     Latest Reference Range & Units 7/21/20 06:43 7/22/20 05:46 7/23/20 04:40 8/14/20 09:07 6/16/21 14:37 4/29/22 14:04 1/26/23 11:47   WBC 4.3 - 11.1 K/uL 8.6 10.5 8.3 5.6 7.7 4.2 12.6 (H)   RBC 4.05 - 5.2 M/uL 2.97 (L) 2.76 (L) 2.55 (L) 3.05 (L) 4.23 4.09 4.02 (L)   Hemoglobin Quant 11.7 - 15.4 g/dL 9.0 (L) 8.0 (L) 7.6 (L) 9.0 (L) 12.4 12.0 12.0   Hematocrit 35.8 - 46.3 % 26.7 (L) 25.3 (L) 23.4 (L) 27.6 (L) 37.9 36.7 36.9   MCV 82 - 102 FL 89.9 91.7 91.8 90.5 90 90 91.8   MCH 26.1 - 32.9 PG 30.3 29.0 29.8 29.5 29.3 29.3 29.9   MCHC 31.4 - 35.0 g/dL 33.7 31.6 32.5 32.6 32.7 32.7 32.5   MPV 9.4 - 12.3 FL 10.1 10.0 10.2 9.0 (L)   9.6   RDW 11.9 - 14.6 % 14.0 14.1 13.6 13.1 12.5 13.4 13.7   Platelet Count 510 - 450 K/uL 171 194 185 277 290 246 273   (H): Data is abnormally high  (L): Data is abnormally low    Recent Results (from the past 24 hour(s))   PLEASE READ & DOCUMENT PPD TEST IN 24 HRS    Collection Time: 01/29/23 12:50 PM   Result Value Ref Range    PPD, (POC) Negative Negative    mm Induration 0 0 - 5 mm   CBC with Auto Differential    Collection Time: 01/30/23  4:22 AM   Result Value Ref Range    WBC 9.6 4.3 - 11.1 K/uL    RBC 3.74 (L) 4.05 - 5.2 M/uL    Hemoglobin 11.2 (L) 11.7 - 15.4 g/dL    Hematocrit 35.2 (L) 35.8 - 46.3 %    MCV 94.1 82 - 102 FL    MCH 29.9 26.1 - 32.9 PG    MCHC 31.8 31.4 - 35.0 g/dL    RDW 13.8 11.9 - 14.6 %    Platelets 724 926 - 214 K/uL    MPV 10.2 9.4 - 12.3 FL    nRBC 0.00 0.0 - 0.2 K/uL    Differential Type AUTOMATED      Seg Neutrophils 63 43 - 78 %    Lymphocytes 19 13 - 44 %    Monocytes 8 4.0 - 12.0 %    Eosinophils % 8 (H) 0.5 - 7.8 %    Basophils 1 0.0 - 2.0 %    Immature Granulocytes 1 0.0 - 5.0 %    Segs Absolute 6.1 1.7 - 8.2 K/UL    Absolute Lymph # 1.8 0.5 - 4.6 K/UL    Absolute Mono # 0.8 0.1 - 1.3 K/UL    Absolute Eos # 0.8 0.0 - 0.8 K/UL    Basophils Absolute 0.1 0.0 - 0.2 K/UL    Absolute Immature Granulocyte 0.1 0.0 - 0.5 K/UL   Basic Metabolic Panel w/ Reflex to MG    Collection Time: 01/30/23  4:22 AM   Result Value Ref Range    Sodium 141 133 - 143 mmol/L    Potassium 4.1 3.5 - 5.1 mmol/L    Chloride 107 101 - 110 mmol/L    CO2 22 21 - 32 mmol/L    Anion Gap 12 (H) 2 - 11 mmol/L    Glucose 141 (H) 65 - 100 mg/dL    BUN 27 (H) 8 - 23 MG/DL    Creatinine 5.40 (H) 0.6 - 1.0 MG/DL    Est, Glom Filt Rate 8 (L) >60 ml/min/1.73m2    Calcium 9.6 8.3 - 10.4 MG/DL       CXR;   No results found for this or any previous visit. KUB:  No results found for this or any previous visit. Renal US:  No results found for this or any previous visit.      CT Abdomen  Results for orders placed in visit on 08/31/21    CT ABDOMEN PELVIS WO CONTRAST           Principal Problem:    End stage renal disease ([address]  Active Problems:    Hyperlipidemia    Lower obstructive uropathy    Acquired hypothyroidism    Gastroesophageal reflux disease without esophagitis    [address]rapubic catheter [address] [address]  Resolved Problems:    Acute kidney injury superimposed on CKD (HCC)    Metabolic acidosis    Acute pyelonephritis    Hypertensive urgency      Assessment:     New onset  End Stage Renal Disease - marked uremic symptoms with progression to ESRD from obstructive and hypertensive nephropathy  as manifested by N/V/diarrhea, weakness and anorexia over the last weeks  - a trial with medical therapy failed to achieve satisfactory results as crea improved only to 6.3 - tolerated tunneled HD catheter and had HD x two days in a row with much improvement in symptoms   - tolerated HD today, outpatient slot obtained at [facility] at 1015 on MWF  Accelerated hypertension - she was not able tolerate her outpatient antihypertensives today and reports even previously inadequate BP control - BP is still higher than desired, adjust antihypertensives with initiation of Losartan - will work on optimizing combined ARB and BB therapy and pare down other antihypertensives as tolerated   Metabolic acidosis -  bicarbonate infusion has been stopped with initiation of dialysis, CO2 stable, will m onitor  Anemia - likely related ESRD, on PO iron, at goal for CKD  Hyperlipidemia - treat with statin  Hypothyroidism - on replacement therapy   Urinary tract infection - treated with cephalosporins - urology consult has been placed       Plan:     As above     Maricruz Ervin [facility]

## 2023-01-30 NOTE — CARE COORDINATION
MSN, CM:  patient's information faxed to [facility] in [place] with approval date of MWF at 10:00. First tx to start on Wednesday Feb. 1st at 9:30. Patient informed of outpatient time slot. Case Management will continue to follow.

## 2023-01-30 NOTE — FLOWSHEET NOTE
01/30/23 0400   Vital Signs   Resp 18   Pain Assessment   Pain Assessment 0-10   Pain Level 6   Patient's Stated Pain Goal 0 - No pain   Pain Location Head   Pain Descriptors Aching   Opioid-Induced Sedation   POSS Score 1     Roxicodone 5mg PO given

## 2023-01-30 NOTE — DISCHARGE INSTRUCTIONS
Short-term Catheter for Hemodialysis: Care Instructions  Overview     To start hemodialysis (also called dialysis) right away, your doctor will insert a soft plastic tube into a vein. This tube will carry your blood to the dialysis machine. The tube is called a central vascular access device (CVAD), or a central line. It will be your vascular access until your permanent access is ready to use. If you have a kidney injury that can be healed, you may need dialysis only for a short time. But some people will need to have long-term dialysis. This includes people with chronic kidney disease. If you need long-term dialysis, it can take weeks or months for a permanent vascular access to be ready to use. You can use a CVAD until a permanent access site is ready. It will be placed in a large vein, usually in your chest, neck or arm. Or it may be in your groin. A few stitches will hold it in place. By learning how to care for your access, you will help avoid problems and get the best results from your dialysis treatments. Follow-up care is a key part of your treatment and safety. Be sure to make and go to all appointments, and call your doctor if you are having problems. It's also a good idea to know your test results and keep a list of the medicines you take. How can you care for yourself at home? Make sure the catheter is secured to your body and and isn't pulling. Avoid clothes that rub or pull on your catheter. Never use scissors or other sharp objects around your catheter. Don't bend or crimp your catheter. Ask your doctor or dialysis nurse when you can take a bath or shower. You may be able to do these things after the site heals or if you cover the site with a waterproof bandage. You can stay active while the catheter is in. But talk to your doctor about the kind of activities you want to do. Review emergency instructions with your dialysis team so you know what to do if your catheter comes out.   Keep your bandage and exit site dry and clean. Change a dirty or bloody bandage. Check the site every day for signs of infection. Always wash your hands before you touch your catheter. Keep the end of the catheter covered when it's not in use. Wear a mask during your dialysis treatments. It can keep you from breathing germs onto your catheter. When should you call for help? Call 911  anytime you think you may need emergency care. For example, call if:    The catheter comes out of your body. Call your doctor now or seek immediate medical care if:    You have signs of infection, such as: Increased pain, swelling, warmth, or redness around the area. Red streaks leading from the area. Pus draining from the area. A fever. You have liquid leaking from around the catheter. There are cracks or leaks in the tube. You have pain or swelling in your neck or arm. The line becomes clogged. Watch closely for changes in your health, and be sure to contact your doctor if you have any problems. Where can you learn more? Go to http://www.woods.com/ and enter C210 to learn more about \"Short-term Catheter for Hemodialysis: Care Instructions. \"  Current as of: May 4, 2022               Content Version: 13.5  © 2006-2022 [employer] Inc[employer]. Care instructions adapted under license by [facility] ([facility]). If you have questions about a medical condition or this instruction, always ask your healthcare professional. [place] [address] any warranty or liability for your use of this information.

## 2023-01-31 ENCOUNTER — CARE COORDINATION (OUTPATIENT)
Dept: CARE COORDINATION | Facility: CLINIC | Age: 73
End: 2023-01-31

## 2023-01-31 ENCOUNTER — TELEPHONE (OUTPATIENT)
Dept: INTERNAL MEDICINE CLINIC | Facility: CLINIC | Age: 73
End: 2023-01-31

## 2023-01-31 NOTE — CARE COORDINATION
Care Transitions Outreach Attempt    Call within 2 business days of discharge: Yes   Attempted to reach patient for transitions of care follow up. Unable to reach patient. Patient: Torres Carmona Patient : 1950 MRN: [id number]    Last Discharge  Street       Date Complaint Diagnosis Description Type Department Provider    23 Hypertension; Flank Pain; Emesis Acute on chronic renal insufficiency . .. ED to Hosp-Admission (Discharged) (ADMITTED) [id number] Raymundo Caldwell MD; Robert Thurman . .. Was this an external facility discharge? No Discharge Facility: [facility]    Noted following upcoming appointments from discharge chart review:   [facility] follow up appointment(s):   Future Appointments   Date Time Provider Chika Banks   2/3/2023  8:30 AM SAE Smalls - CNP [facility] AMB   8/3/2023  2:00 PM Mikala Epperson MD [id number] AMB     [facility] follow up appointment(s):     Hospital Course:  Patient is a 80 y/o female with medical history of urethral stricture s/p chronic suprapubic catheter, CKD IV, hypothyroidism, HTN who presented to ED with cc confusion, generalized weakness, bilateral flank pain of 3-4 day duration associated with nausea and vomiting. Patient follows both Urology and Nephrology. ED workup: afebrile, HR 81 /125  Labs notable for BUN/Cr 54/6.8 (baseline 4-5), CO2 19, Alk phos 194, AST 41, WBC 12.6. UA c/w UTI, initiated on ceftriaxone, Ucx pending. Patient received labetalol, zofran, and 1000 cc NS bolus. Nephrology evaluated in ED and requested hospitalist admission. Initiated on bicarb gtt for metabolic acidosis. Nephrology consult, new onset ESRD, placed line, HD runs were completed , , and . Bicarb gtt discontinued as acidosis has resolved. Outpatient HD slot has been obtained. Outpatient slot has been obtained at [facility] on MWF at 1015. Dr. Kayla Lr has cleared patient to discharge home today.  Anti-hypertensive agents were adjusted on discharge and will likely require some continued changes as outpatient. Can continue to monitor with dialysis. Ucx finalized with pan-S E. Coli. Renal ultrasound with no noted obstruction, consider follow-up with renal mass CT for cystic lower pole left renal cortical lesion. Patient completed 5-day course Ceftriaxone. Will continue renally dosed Keflex x 2 days on discharge noted to ensure dosing is taken following HD, this will complete 7-day total course. Leukocytosis has normalized. Blood cultures remain negative. No flank pain on discharge. Afebrile. Urology consultation. Chronic SPC exchanged 1/18. No further inpatient recommendations. Will follow-up outpatient as scheduled for exchanges. PT/OT with no needs on discharge. Patient is medically stable to discharge. Plan was discussed with patient, care team, Dr. Tamie Merritt, Dr. Socorro Ghotra. All questions answered. Instructions given to call a physician or return if any concerns. Disposition: Home  Diet: ADULT DIET; Regular; Low Sodium (2 gm); Low Potassium (Less than 3000 mg/day)  Code Status: Full Code     Follow Ups:   Follow-up Information     Andrew Denson MD Follow up in 1 week(s). Specialty: Internal Medicine  Why: Hospital Follow-up  Contact information:  [address]  [address]  [phone]     Phoebe Aguiar MD Follow up. Specialty: Nephrology  Why: As scheduled, first outpatient HD chair scheduled 2/1  Contact information:  [address] [facility] [address]  [phone]    [facility] outreach initial call. Left message, identified self, reason for call, return call contact information, Boogie Calloway  [facility].

## 2023-01-31 NOTE — PROGRESS NOTES
Physician Progress Note      Toni Guzmán  [facility] #:                  [id number]  :                       1950  ADMIT DATE:       2023 11:28 AM  [address] DATE:        2023 5:33 PM  RESPONDING  PROVIDER #:        Dominic Raines NP          QUERY TEXT:    Pt admitted with flank pain. Pt noted to have SIRS criteria with an infection   . If possible, please document in the progress notes and discharge summary if   you are evaluating and /or treating any of the following: The medical record reflects the following:  Risk Factors: UTI, chronic SP catheter, new ESRD, htn  Clinical Indicators: WBC 12.8, pulse 91, resp 22, ua--+4 bacteria, wbc >100,   mod LE, cr-6.8 , pt confused on admission. Treatment: IV rocephin, labs, cultures, xray, neph consult, essential home   meds. Options provided:  -- Sepsis, present on admission  -- Sepsis, present on admission, now resolved  -- Sepsis, developed following admission  -- Acute pyelonephritis  without Sepsis  -- Other - I will add my own diagnosis  -- Disagree - Not applicable / Not valid  -- Disagree - Clinically unable to determine / Unknown  -- Refer to Clinical Documentation Reviewer    PROVIDER RESPONSE TEXT:    This patient has Acute pyelonephritis without Sepsis. Query created by: Pedro Hong on 2023 11:35 AM      QUERY TEXT:    Pt admitted with UTI, Acute pyelonephritis. Pt noted to have a chronic   indwelling SP cath . If possible, please document in the progress notes and   discharge summary if you are evaluating and/or treating any of the following: The medical record reflects the following:  Risk Factors[de-identified] obstructive uropathy with uretheral stricture--chronic SP cath. ? new ESRD  Clinical Indicators: ua--+4 bact, wbc > 100, mod LE, SP cath in place. wbc   12.8  Treatment: IV  rocephin, labs, cultures, xray, essential home meds.   Options provided:  -- UTI/Acute pyelonephritis  due to suprapubic catheter  -- UTI/Acute pyelonephritis not due to suprapubic catheter  -- Other - I will add my own diagnosis  -- Disagree - Not applicable / Not valid  -- Disagree - Clinically unable to determine / Unknown  -- Refer to Clinical Documentation Reviewer    PROVIDER RESPONSE TEXT:    Unable to specify relation of suprapubic catheter to infection. Query created by: Ester Estrada on 1/30/2023 11:35 AM      QUERY TEXT:    Pt admitted with Acute pyelonephritis/UTI, new ESRD . Pt noted to have AMS on   admission. If possible, please document in the progress notes and discharge   summary if you are evaluating and / or treating any of the following: The medical record reflects the following:  Risk Factors: UTI, Acute pyelonephritis, new ESRD, HTN  Clinical Indicators: Bp-222/121, ua--+4 bacteria, wbc >100, mod LE, cr 6.8,   confusion away from baseline. chronic indwelling SP cath  Treatment: iv rocephin, labs, cultures, neph consult, bp control, essential   home meds,  Options provided:  -- Hypertensive encephalopathy  -- Metabolic encephalopathy  -- Septic encephalopathy  -- Toxic encephalopathy  -- Toxic metabolic encephalopathy  -- Delirium due to, Please specify cause.   -- Delirium  -- Other - I will add my own diagnosis  -- Disagree - Not applicable / Not valid  -- Disagree - Clinically unable to determine / Unknown  -- Refer to Clinical Documentation Reviewer    PROVIDER RESPONSE TEXT:    Secondary to uremia    Query created by: Ester Estrada on 1/30/2023 11:36 AM      Electronically signed by:  David Calderon NP 1/31/2023 4:28 PM

## 2023-01-31 NOTE — CARE COORDINATION
MSN, CM:  patient discharged with no services ordered or requested. Patient has new HD slot and information given to patient. Patient and family agree with this discharge plan. Patient has met all milestones for this admission. Family to transport patient home. 01/31/23 6356   Service Assessment   Patient Orientation Alert and [place] At/After Discharge   Services At/After Discharge None   Mode of Transport at Discharge Other (see comment)  (family to transport)   Confirm Follow Up Transport Family   Condition of Participation: Discharge Planning   The Patient and/or Patient Representative was provided with a Choice of Provider? Patient   The Patient and/Or Patient Representative agree with the Discharge Plan? Yes   Freedom of Choice list was provided with basic dialogue that supports the patient's individualized plan of care/goals, treatment preferences, and shares the quality data associated with the providers?   Yes

## 2023-02-01 ENCOUNTER — CARE COORDINATION (OUTPATIENT)
Dept: CARE COORDINATION | Facility: CLINIC | Age: 73
End: 2023-02-01

## 2023-02-01 NOTE — CARE COORDINATION
Care Transitions Outreach Attempt    Call within 2 business days of discharge: Yes   Attempted to reach patient for transitions of care follow up. Unable to reach patient. Patient: Edgardo Ho Patient : 1950 MRN: [id number]    Last Discharge  Street       Date Complaint Diagnosis Description Type Department Provider    23 Hypertension; Flank Pain; Emesis Acute on chronic renal insufficiency . .. ED to Hosp-Admission (Discharged) (ADMITTED) [id number] Azucena Bermudez MD; Maddie Shepherd . .. Was this an external facility discharge? No Discharge Facility: [facility]    Noted following upcoming appointments from discharge chart review:   [address] follow up appointment(s):   Future Appointments   Date Time Provider Chika Banks   2023  1:00 PM SAE Rene - CNP MAT [facility] AMB   8/3/2023  2:00 PM rIa Killian MD [id number] [facility] AMB     Non-[facility] follow up appointment(s): na    ISAK outreach call 2nd attempt. Left message, identified self, reason for call, return call contact information, Orlando Zaragoza [address].

## 2023-02-03 ENCOUNTER — CARE COORDINATION (OUTPATIENT)
Dept: CARE COORDINATION | Facility: CLINIC | Age: 73
End: 2023-02-03

## 2023-02-03 NOTE — CARE COORDINATION
ISAK outreach call, 3rd and final attempt was not successful. No further outreach indicated at this time.

## 2023-02-04 LAB
HCV AB S/CO SERPL IA: >11 S/CO RATIO (ref 0–0.9)
HCV RNA SERPL NAA+PROBE-ACNC: NORMAL IU/ML
INTERPRETATION: NORMAL
REF LAB TEST REF RANGE: NORMAL

## 2023-02-06 ENCOUNTER — TELEPHONE (OUTPATIENT)
Dept: INTERNAL MEDICINE CLINIC | Facility: CLINIC | Age: 73
End: 2023-02-06

## 2023-02-07 ENCOUNTER — TELEPHONE (OUTPATIENT)
Dept: VASCULAR SURGERY | Age: 73
End: 2023-02-07

## 2023-02-14 ENCOUNTER — OFFICE VISIT (OUTPATIENT)
Dept: VASCULAR SURGERY | Age: 73
End: 2023-02-14
Payer: MEDICARE

## 2023-02-14 ENCOUNTER — ANESTHESIA EVENT (OUTPATIENT)
Dept: SURGERY | Age: 73
End: 2023-02-14
Payer: MEDICARE

## 2023-02-14 DIAGNOSIS — N18.6 ESRD (END STAGE RENAL DISEASE) ON DIALYSIS (HCC): Primary | ICD-10-CM

## 2023-02-14 DIAGNOSIS — Z99.2 ESRD (END STAGE RENAL DISEASE) ON DIALYSIS (HCC): Primary | ICD-10-CM

## 2023-02-14 PROCEDURE — 99204 OFFICE O/P NEW MOD 45 MIN: CPT | Performed by: SURGERY

## 2023-02-14 PROCEDURE — 1123F ACP DISCUSS/DSCN MKR DOCD: CPT | Performed by: SURGERY

## 2023-02-14 RX ORDER — ALBUTEROL SULFATE 90 UG/1
2 AEROSOL, METERED RESPIRATORY (INHALATION) EVERY 6 HOURS PRN
COMMUNITY

## 2023-02-14 NOTE — PRE-PROCEDURE INSTRUCTIONS
Patient verified name and . Order for consent was found in EHR and matches case posting; patient verifies procedure. Type lB surgery, phone assessment complete. Orders was received. Labs per surgeon: none at present time  Labs per anesthesia protocol: POC Potassium    Patient answered medical/surgical history questions at their best of ability. All prior to admission medications documented in [facility]. Patient instructed to take the following medications the day of surgery according to anesthesia guidelines with a small sip of water: Metoprolol, Duloxetine, Nifedipine, Levothyroxine, and Protonix bring Albuterol Inhaler DOS     On the day before surgery please take Acetaminophen  before bed. You may substitute for Tylenol 650 mg. Hold all vitamins and NSAIDS prior to surgery. Prescription meds to hold:Losartan and Sodium Bicarbonate  Patient instructed on the following:    > Arrive at [facility] Main Entrance, time of arrival to be called the day before by 1700  > NPO after midnight, unless otherwise indicated, including gum, mints, and ice chips  > Responsible adult must drive patient to the hospital, stay during surgery, and patient will need supervision 24 hours after anesthesia  > Use antibacterial soap in shower the night before surgery and on the morning of surgery  > All piercings must be removed prior to arrival.    > Leave all valuables (money and jewelry) at home but bring insurance card and ID on DOS.   > You may be required to pay a deductible or co-pay on the day of your procedure. You can pre-pay by calling [phone] if your surgery is at the [facility] or [phone] if your surgery is at the [facility]. > Do not wear make-up, nail polish, lotions, cologne, perfumes, powders, or oil on skin. Artificial nails are not permitted.

## 2023-02-14 NOTE — PROGRESS NOTES
[address]   [address] [facility] FAX: [phone]    Jaquelin Arriaga  1950    No chief complaint on file. HPI   Ms. Jaquelin Arriaga is a 81y.o. year old female who follow-up evaluation for dialysis. Patient just restarted dialysis a week ago. Through right chest tunneled catheter she is right-hand dominant    Current Outpatient Medications   Medication Sig Dispense Refill    hydrALAZINE (APRESOLINE) 50 MG tablet Take 1 tablet by mouth every 8 hours for 14 days 42 tablet 0    losartan (COZAAR) 50 MG tablet Take 1 tablet by mouth daily for 14 days 14 tablet 0    metoprolol tartrate (LOPRESSOR) 50 MG tablet Take 1 tablet by mouth 2 times daily 60 tablet 0    DULoxetine (CYMBALTA) 60 MG extended release capsule Take 1 capsule by mouth 2 times daily 180 capsule 1    vitamin D (ERGOCALCIFEROL) 1.25 MG (40884 UT) CAPS capsule TAKE ONE CAPSULE BY MOUTH EVERY WEEK FOR 7 DOSES      diclofenac sodium (VOLTAREN) 1 % GEL Apply 2 g topically 2 times daily      NIFEdipine (PROCARDIA XL) 30 MG extended release tablet TAKE ONE TABLET BY MOUTH ONE TIME DAILY      ferrous sulfate (IRON 325) 325 (65 Fe) MG tablet 1 tab(s)      acetaminophen (TYLENOL) 650 MG extended release tablet Take 650 mg by mouth every 8 hours as needed      gabapentin (NEURONTIN) 100 MG capsule Take 1 capsule by mouth daily for 60 days.  60 capsule 1    Omega-3 Fatty Acids (FISH OIL) 1200 MG CAPS Take 1 capsule by mouth daily      ASPIRIN PO Take by mouth      atorvastatin (LIPITOR) 20 MG tablet Take 20 mg by mouth every other day      vitamin D3 (CHOLECALCIFEROL) 125 MCG (5000 UT) TABS tablet Take 5,000 Units by mouth daily      levothyroxine (SYNTHROID) 125 MCG tablet Take 125 mcg by mouth every morning (before breakfast)      pantoprazole (PROTONIX) 40 MG tablet Take 40 mg by mouth 2 times daily      sodium bicarbonate 650 MG tablet TAKE 1 TABLET BY MOUTH TWICE DAILY       No current facility-administered medications for this visit. No Known Allergies  Past Medical History:   Diagnosis Date    Arthritis     Chronic pain     arthritis knees    GERD (gastroesophageal reflux disease)     controlled with med    High cholesterol     Hypertension     Kidney disease     acute renal failure 7/2020 (hospitalized x 6 days)  Followed by Dr. Chintan Ferreira, nephrologist      Liver disease     hep C,     Melanoma ([facility] [address]     RLE,     Psychiatric disorder     depression, treated with cymbalta    PUD (peptic ulcer disease)     Rheumatic fever     as a child    Thyroid disease     hypo     Family History   Problem Relation Age of Onset    No Known Problems Mother     No Known Problems Father      Past Surgical History:   Procedure Laterality Date    APPENDECTOMY      CATARACT REMOVAL  2019    bilateral, IOL implant    CHOLECYSTECTOMY      COLONOSCOPY  2015    IR TUNNELED CATHETER PLACEMENT GREATER THAN 5 YEARS  1/27/2023    IR TUNNELED CATHETER PLACEMENT GREATER THAN 5 YEARS 1/27/2023 Danielle Smallwood MD [facility] RADIOLOGY SPECIALS    NEUROLOGICAL SURGERY      CERVICAL AND LUMBAR SX    ORTHOPEDIC SURGERY Left     KNEE SCOPE    UPPER GASTROINTESTINAL ENDOSCOPY      EGD    UROLOGICAL SURGERY  07/2020    cystoscopy, insertion berkowitz catheter (suprapubic)    UROLOGICAL SURGERY  2016    cystoscopy, urethral dilatation     Social History     Tobacco Use    Smoking status: Former     Packs/day: 0.50     Types: Cigarettes     Start date: 10/28/2019    Smokeless tobacco: Never    Tobacco comments:     Quit smoking: Quit 2019   Substance Use Topics    Alcohol use: No        Review of Systems  Constitutional: Negative for fever and chills. HENT: Negative for congestion and sore throat. Skin: Negative for rash and itching. Eyes: Negative for blurred vision and double vision. Respiratory: Negative for cough and shortness of breath. Cardiovascular: Negative for chest pain, palpitations, claudication and leg swelling. Gastrointestinal: Negative for nausea, vomiting and abdominal pain. Genitourinary: Negative for dysuria, hematuria and flank pain. Musculoskeletal: Negative for joint pain and falls. Neurological: Negative for dizziness, sensory change, focal weakness, loss of consciousness and headaches. PHYSICAL EXAM   [unfilled]     Constitutional: she appears well-developed. No distress. HENT: Hearing intact. Head: Atraumatic. Eyes: Pupils are equal, round, and reactive to light. Neck: Normal range of motion. Cardiovascular: Regular rhythm. Pulmonary/Chest: Effort normal and breath sounds normal. No respiratory distress. Abdominal: Soft. Bowel sounds are normal. she exhibits no distension. There is no tenderness. There is no guarding. No hernia. Musculoskeletal: Normal range of motion. Neurological: She is alert. CN II- XII grossly intact  Skin: Warm and dry. Vascular: Palpable brachial pulse      Imaging Results  Left medial vein 0.5 mm left brachial artery 0.5 mm    ASSESSMENT AND PLAN   Ms. Leonel Capellan is a 81y.o. year old female end-stage renal disease we will schedule patient for left brachiocephalic fistula placement. She is normally Monday Wednesday and Friday however due to timing we will do her tomorrow and have her dialysis after the surgery. I discussed with the patient the risk of been procedure. I spoke with patient's nephrologist Dr. Piter Rosas. Elements of this note have been dictated using speech recognition software. As a result, errors of speech recognition may have occurred. 50 minutes of time was spent on this consult with greater than 50% of time spent face-to-face with the patient discussing the disease process, education and treatment options.

## 2023-02-15 ENCOUNTER — HOSPITAL ENCOUNTER (OUTPATIENT)
Age: 73
Setting detail: OUTPATIENT SURGERY
Discharge: HOME | End: 2023-02-15
Attending: SURGERY | Admitting: SURGERY
Payer: MEDICARE

## 2023-02-15 ENCOUNTER — APPOINTMENT (OUTPATIENT)
Dept: ULTRASOUND IMAGING | Age: 73
End: 2023-02-15
Attending: SURGERY
Payer: MEDICARE

## 2023-02-15 ENCOUNTER — ANESTHESIA (OUTPATIENT)
Dept: SURGERY | Age: 73
End: 2023-02-15
Payer: MEDICARE

## 2023-02-15 VITALS
OXYGEN SATURATION: 96 % | DIASTOLIC BLOOD PRESSURE: 79 MMHG | RESPIRATION RATE: 16 BRPM | WEIGHT: 144.8 LBS | BODY MASS INDEX: 20.73 KG/M2 | SYSTOLIC BLOOD PRESSURE: 127 MMHG | HEIGHT: 70 IN | TEMPERATURE: 98.2 F | HEART RATE: 69 BPM

## 2023-02-15 DIAGNOSIS — N18.4 STAGE 4 CHRONIC KIDNEY DISEASE (HCC): Primary | ICD-10-CM

## 2023-02-15 PROBLEM — I73.9 PVD (PERIPHERAL VASCULAR DISEASE) WITH CLAUDICATION (HCC): Status: ACTIVE | Noted: 2023-02-15

## 2023-02-15 LAB — POTASSIUM BLD-SCNC: 4.4 MMOL/L (ref 3.5–5.1)

## 2023-02-15 PROCEDURE — 3600000014 HC SURGERY LEVEL 4 ADDTL 15MIN: Performed by: SURGERY

## 2023-02-15 PROCEDURE — 6370000000 HC RX 637 (ALT 250 FOR IP): Performed by: ANESTHESIOLOGY

## 2023-02-15 PROCEDURE — 7100000011 HC PHASE II RECOVERY - ADDTL 15 MIN: Performed by: SURGERY

## 2023-02-15 PROCEDURE — 3700000001 HC ADD 15 MINUTES (ANESTHESIA): Performed by: SURGERY

## 2023-02-15 PROCEDURE — 7100000010 HC PHASE II RECOVERY - FIRST 15 MIN: Performed by: SURGERY

## 2023-02-15 PROCEDURE — 84132 ASSAY OF SERUM POTASSIUM: CPT

## 2023-02-15 PROCEDURE — 2500000003 HC RX 250 WO HCPCS: Performed by: NURSE ANESTHETIST, CERTIFIED REGISTERED

## 2023-02-15 PROCEDURE — 3600000004 HC SURGERY LEVEL 4 BASE: Performed by: SURGERY

## 2023-02-15 PROCEDURE — 2580000003 HC RX 258: Performed by: NURSE ANESTHETIST, CERTIFIED REGISTERED

## 2023-02-15 PROCEDURE — 7100000000 HC PACU RECOVERY - FIRST 15 MIN: Performed by: SURGERY

## 2023-02-15 PROCEDURE — 2709999900 HC NON-CHARGEABLE SUPPLY: Performed by: SURGERY

## 2023-02-15 PROCEDURE — 6360000002 HC RX W HCPCS: Performed by: NURSE ANESTHETIST, CERTIFIED REGISTERED

## 2023-02-15 PROCEDURE — 7100000001 HC PACU RECOVERY - ADDTL 15 MIN: Performed by: SURGERY

## 2023-02-15 PROCEDURE — 6360000002 HC RX W HCPCS: Performed by: SURGERY

## 2023-02-15 PROCEDURE — 2500000003 HC RX 250 WO HCPCS: Performed by: SURGERY

## 2023-02-15 PROCEDURE — 3700000000 HC ANESTHESIA ATTENDED CARE: Performed by: SURGERY

## 2023-02-15 RX ORDER — OXYCODONE HYDROCHLORIDE 5 MG/1
5 TABLET ORAL
Status: COMPLETED | OUTPATIENT
Start: 2023-02-15 | End: 2023-02-15

## 2023-02-15 RX ORDER — ONDANSETRON 2 MG/ML
4 INJECTION INTRAMUSCULAR; INTRAVENOUS
Status: DISCONTINUED | OUTPATIENT
Start: 2023-02-15 | End: 2023-02-15 | Stop reason: HOSPADM

## 2023-02-15 RX ORDER — DEXAMETHASONE SODIUM PHOSPHATE 4 MG/ML
INJECTION, SOLUTION INTRA-ARTICULAR; INTRALESIONAL; INTRAMUSCULAR; INTRAVENOUS; SOFT TISSUE PRN
Status: DISCONTINUED | OUTPATIENT
Start: 2023-02-15 | End: 2023-02-15 | Stop reason: SDUPTHER

## 2023-02-15 RX ORDER — ACETAMINOPHEN 500 MG
1000 TABLET ORAL ONCE
Status: DISCONTINUED | OUTPATIENT
Start: 2023-02-15 | End: 2023-02-15 | Stop reason: HOSPADM

## 2023-02-15 RX ORDER — LABETALOL HYDROCHLORIDE 5 MG/ML
10 INJECTION, SOLUTION INTRAVENOUS
Status: DISCONTINUED | OUTPATIENT
Start: 2023-02-15 | End: 2023-02-15 | Stop reason: HOSPADM

## 2023-02-15 RX ORDER — SODIUM CHLORIDE, SODIUM LACTATE, POTASSIUM CHLORIDE, CALCIUM CHLORIDE 600; 310; 30; 20 MG/100ML; MG/100ML; MG/100ML; MG/100ML
INJECTION, SOLUTION INTRAVENOUS CONTINUOUS
Status: DISCONTINUED | OUTPATIENT
Start: 2023-02-15 | End: 2023-02-15 | Stop reason: HOSPADM

## 2023-02-15 RX ORDER — SODIUM CHLORIDE 9 MG/ML
INJECTION, SOLUTION INTRAVENOUS CONTINUOUS PRN
Status: DISCONTINUED | OUTPATIENT
Start: 2023-02-15 | End: 2023-02-15 | Stop reason: SDUPTHER

## 2023-02-15 RX ORDER — PROPOFOL 10 MG/ML
INJECTION, EMULSION INTRAVENOUS PRN
Status: DISCONTINUED | OUTPATIENT
Start: 2023-02-15 | End: 2023-02-15 | Stop reason: SDUPTHER

## 2023-02-15 RX ORDER — OXYCODONE HYDROCHLORIDE AND ACETAMINOPHEN 5; 325 MG/1; MG/1
1 TABLET ORAL EVERY 4 HOURS PRN
Qty: 30 TABLET | Refills: 0 | Status: SHIPPED | OUTPATIENT
Start: 2023-02-15 | End: 2023-02-20

## 2023-02-15 RX ORDER — LIDOCAINE HYDROCHLORIDE 10 MG/ML
1 INJECTION, SOLUTION INFILTRATION; PERINEURAL
Status: DISCONTINUED | OUTPATIENT
Start: 2023-02-15 | End: 2023-02-15 | Stop reason: HOSPADM

## 2023-02-15 RX ORDER — MIDAZOLAM HYDROCHLORIDE 2 MG/2ML
2 INJECTION, SOLUTION INTRAMUSCULAR; INTRAVENOUS
Status: DISCONTINUED | OUTPATIENT
Start: 2023-02-15 | End: 2023-02-15 | Stop reason: HOSPADM

## 2023-02-15 RX ORDER — HYDROMORPHONE HCL 110MG/55ML
0.5 PATIENT CONTROLLED ANALGESIA SYRINGE INTRAVENOUS EVERY 5 MIN PRN
Status: DISCONTINUED | OUTPATIENT
Start: 2023-02-15 | End: 2023-02-15 | Stop reason: HOSPADM

## 2023-02-15 RX ORDER — HEPARIN SODIUM 5000 [USP'U]/ML
INJECTION, SOLUTION INTRAVENOUS; SUBCUTANEOUS PRN
Status: DISCONTINUED | OUTPATIENT
Start: 2023-02-15 | End: 2023-02-15 | Stop reason: HOSPADM

## 2023-02-15 RX ORDER — LIDOCAINE HYDROCHLORIDE 20 MG/ML
INJECTION, SOLUTION EPIDURAL; INFILTRATION; INTRACAUDAL; PERINEURAL PRN
Status: DISCONTINUED | OUTPATIENT
Start: 2023-02-15 | End: 2023-02-15 | Stop reason: SDUPTHER

## 2023-02-15 RX ORDER — HYDROMORPHONE HCL 110MG/55ML
0.25 PATIENT CONTROLLED ANALGESIA SYRINGE INTRAVENOUS EVERY 5 MIN PRN
Status: DISCONTINUED | OUTPATIENT
Start: 2023-02-15 | End: 2023-02-15 | Stop reason: HOSPADM

## 2023-02-15 RX ORDER — PAPAVERINE HYDROCHLORIDE 30 MG/ML
INJECTION INTRAMUSCULAR; INTRAVENOUS PRN
Status: DISCONTINUED | OUTPATIENT
Start: 2023-02-15 | End: 2023-02-15 | Stop reason: HOSPADM

## 2023-02-15 RX ORDER — FENTANYL CITRATE 50 UG/ML
100 INJECTION, SOLUTION INTRAMUSCULAR; INTRAVENOUS
Status: DISCONTINUED | OUTPATIENT
Start: 2023-02-15 | End: 2023-02-15 | Stop reason: HOSPADM

## 2023-02-15 RX ORDER — BUPIVACAINE HYDROCHLORIDE 2.5 MG/ML
INJECTION, SOLUTION EPIDURAL; INFILTRATION; INTRACAUDAL PRN
Status: DISCONTINUED | OUTPATIENT
Start: 2023-02-15 | End: 2023-02-15 | Stop reason: HOSPADM

## 2023-02-15 RX ORDER — SODIUM CHLORIDE 0.9 % (FLUSH) 0.9 %
5-40 SYRINGE (ML) INJECTION EVERY 12 HOURS SCHEDULED
Status: DISCONTINUED | OUTPATIENT
Start: 2023-02-15 | End: 2023-02-15 | Stop reason: HOSPADM

## 2023-02-15 RX ORDER — SODIUM CHLORIDE 9 MG/ML
INJECTION, SOLUTION INTRAVENOUS PRN
Status: DISCONTINUED | OUTPATIENT
Start: 2023-02-15 | End: 2023-02-15 | Stop reason: HOSPADM

## 2023-02-15 RX ORDER — FENTANYL CITRATE 50 UG/ML
INJECTION, SOLUTION INTRAMUSCULAR; INTRAVENOUS PRN
Status: DISCONTINUED | OUTPATIENT
Start: 2023-02-15 | End: 2023-02-15 | Stop reason: SDUPTHER

## 2023-02-15 RX ORDER — ONDANSETRON 2 MG/ML
INJECTION INTRAMUSCULAR; INTRAVENOUS PRN
Status: DISCONTINUED | OUTPATIENT
Start: 2023-02-15 | End: 2023-02-15 | Stop reason: SDUPTHER

## 2023-02-15 RX ORDER — LIDOCAINE HYDROCHLORIDE 10 MG/ML
INJECTION, SOLUTION INFILTRATION; PERINEURAL PRN
Status: DISCONTINUED | OUTPATIENT
Start: 2023-02-15 | End: 2023-02-15 | Stop reason: HOSPADM

## 2023-02-15 RX ORDER — PROCHLORPERAZINE EDISYLATE 5 MG/ML
5 INJECTION INTRAMUSCULAR; INTRAVENOUS
Status: DISCONTINUED | OUTPATIENT
Start: 2023-02-15 | End: 2023-02-15 | Stop reason: HOSPADM

## 2023-02-15 RX ORDER — HYDRALAZINE HYDROCHLORIDE 20 MG/ML
10 INJECTION INTRAMUSCULAR; INTRAVENOUS
Status: DISCONTINUED | OUTPATIENT
Start: 2023-02-15 | End: 2023-02-15 | Stop reason: HOSPADM

## 2023-02-15 RX ORDER — HEPARIN SODIUM 1000 [USP'U]/ML
INJECTION, SOLUTION INTRAVENOUS; SUBCUTANEOUS PRN
Status: DISCONTINUED | OUTPATIENT
Start: 2023-02-15 | End: 2023-02-15 | Stop reason: SDUPTHER

## 2023-02-15 RX ORDER — SODIUM CHLORIDE 0.9 % (FLUSH) 0.9 %
5-40 SYRINGE (ML) INJECTION PRN
Status: DISCONTINUED | OUTPATIENT
Start: 2023-02-15 | End: 2023-02-15 | Stop reason: HOSPADM

## 2023-02-15 RX ADMIN — PHENYLEPHRINE HYDROCHLORIDE 50 MCG: 10 INJECTION INTRAVENOUS at 08:18

## 2023-02-15 RX ADMIN — FENTANYL CITRATE 25 MCG: 50 INJECTION, SOLUTION INTRAMUSCULAR; INTRAVENOUS at 08:01

## 2023-02-15 RX ADMIN — OXYCODONE 5 MG: 5 TABLET ORAL at 08:58

## 2023-02-15 RX ADMIN — Medication 2000 MG: at 07:14

## 2023-02-15 RX ADMIN — PHENYLEPHRINE HYDROCHLORIDE 100 MCG: 10 INJECTION INTRAVENOUS at 07:28

## 2023-02-15 RX ADMIN — ONDANSETRON 4 MG: 2 INJECTION INTRAMUSCULAR; INTRAVENOUS at 08:16

## 2023-02-15 RX ADMIN — PHENYLEPHRINE HYDROCHLORIDE 100 MCG: 10 INJECTION INTRAVENOUS at 07:05

## 2023-02-15 RX ADMIN — PROPOFOL 200 MG: 10 INJECTION, EMULSION INTRAVENOUS at 07:03

## 2023-02-15 RX ADMIN — DEXAMETHASONE SODIUM PHOSPHATE 4 MG: 4 INJECTION, SOLUTION INTRAMUSCULAR; INTRAVENOUS at 07:14

## 2023-02-15 RX ADMIN — SODIUM CHLORIDE: 900 INJECTION INTRAVENOUS at 06:48

## 2023-02-15 RX ADMIN — FENTANYL CITRATE 25 MCG: 50 INJECTION, SOLUTION INTRAMUSCULAR; INTRAVENOUS at 08:23

## 2023-02-15 RX ADMIN — HEPARIN SODIUM 5000 UNITS: 1000 INJECTION INTRAVENOUS; SUBCUTANEOUS at 07:42

## 2023-02-15 RX ADMIN — LIDOCAINE HYDROCHLORIDE 60 MG: 20 INJECTION, SOLUTION EPIDURAL; INFILTRATION; INTRACAUDAL; PERINEURAL at 07:03

## 2023-02-15 RX ADMIN — FENTANYL CITRATE 25 MCG: 50 INJECTION, SOLUTION INTRAMUSCULAR; INTRAVENOUS at 07:14

## 2023-02-15 RX ADMIN — FENTANYL CITRATE 25 MCG: 50 INJECTION, SOLUTION INTRAMUSCULAR; INTRAVENOUS at 08:37

## 2023-02-15 ASSESSMENT — PAIN SCALES - GENERAL: PAINLEVEL_OUTOF10: 4

## 2023-02-15 ASSESSMENT — PAIN - FUNCTIONAL ASSESSMENT: PAIN_FUNCTIONAL_ASSESSMENT: 0-10

## 2023-02-15 ASSESSMENT — PAIN DESCRIPTION - LOCATION: LOCATION: INCISION

## 2023-02-15 ASSESSMENT — PAIN DESCRIPTION - DESCRIPTORS: DESCRIPTORS: ACHING;SORE

## 2023-02-15 NOTE — ANESTHESIA POSTPROCEDURE EVALUATION
Department of Anesthesiology  Postprocedure Note    Patient: Vicky Iverson  MRN: [id number]  YOB: 1950  Date of evaluation: 2/15/2023      Procedure Summary     Date: 02/15/23 Room / Location: [facility] MAIN OR  / [facility] MAIN OR    Anesthesia Start: 3141 Anesthesia Stop: 7776    Procedure: LEFT BRACHIOCEPHALIC FISTULA CREATION (Left: Arm Lower) Diagnosis:       ESRD (end stage renal disease) ([facility] [address]      (ESRD (end stage renal disease) ([facility] [address] [N18.6])    Providers: Mary Shah MD Responsible Provider: Jason Villarreal DO    Anesthesia Type: general ASA Status: 3          Anesthesia Type: No value filed.     Zion Phase I: Zion Score: 10    Zion Phase II:        Anesthesia Post Evaluation    Patient location during evaluation: PACU  Level of consciousness: awake and alert  Airway patency: patent  Nausea & Vomiting: no nausea  Complications: no  Cardiovascular status: hemodynamically stable  Respiratory status: acceptable  Hydration status: euvolemic

## 2023-02-15 NOTE — BRIEF OP NOTE
[address]   [address] [facility]. [address]  [phone] FAX: [phone]    Brief Op Note Template Note    Pre-Op Diagnosis: ESRD (end stage renal disease) ([facility] [address] [N18.6]    Post-Op Diagnosis:  * No post-op diagnosis entered *    Procedures: Procedure(s) with comments:  LEFT BRACHIOCEPHALIC FISTULA CREATION - O.R.. 9 1ST CASE    Surgeon: Jasmin Cowan MD    Assistants: Surgeon(s):  Conrad Dukes MD      Anesthesia:  General     Findings: Left brachiocephalic fistula    Tourniquet Time:  * No tourniquets in log *    Estimated Blood Loss:               Specimens:            Implants:  * No implants in log *    Complications: None               Signed: Jasmin Cowan MD      Elements of this note have been dictated using speech recognition software. As a result, errors of speech recognition may have occurred.

## 2023-02-15 NOTE — DISCHARGE INSTRUCTIONS
INSTRUCTIONS FOR A-V FISTULA, GRAFT ACCESS, REVISION OR DECLOT    follow-up in my office in 2 weeks for postop check. She will continue dialysis through her right chest tunneled catheter. ACTIVITY  As tolerated and as directed by your doctor. Keep arm straight and raised above heart level for the next 24 hours. DIET  Clear liquids until no nausea or vomiting; then light diet for the first day. Advance to regular diet on second day, unless your doctor orders otherwise. If nausea and vomiting continues, call your doctor. PAIN  Take pain medication as directed by your doctor. Call your doctor if pain is NOT relieved by the medication. DO NOT take aspirin or blood thinners until directed by your doctor. MEDICATION INTERACTION:  During your procedure you potentially received a medication or medications which may reduce the effectiveness of oral contraceptives. Please consider other forms of contraception for 1 month following your procedure if you are currently using oral contraceptives as your primary form of birth control. In addition to this, we recommend continuing your oral contraceptive as prescribed, unless otherwise instructed by your physician, during this time    5300  Rd your dressing clean and dry  Can remove ace wrap the next day    CARE OF YOUR ACCESS  DO:  Keep access area clean with soap and water daily after dressing has been removed. Feel for the thrill daily. (by placing your fingers over the graft you will be able to feel a vibration (thrill) which means blood is flowing and the graft is working.)  DO NOT:  Wear tight sleeves, watches, belts or bracelets over graft. Carry heavy bags across the graft. Sleep on graft side. Let your blood pressure be taken on graft side. Let blood be drawn from your graft side. CALL YOUR DOCTOR IF  Excessive bleeding that does not stop after holding mild pressure over area. Temperature of 101 degrees F or above.   Redness, excessive swelling or bruising, and/or green or yellow, smelly discharge from incision   Loss of sensation-cold, white, or blue fingers or toes. After general anesthesia or intravenous sedation, for 24 hours or while taking prescription Narcotics:  Limit your activities  A responsible adult needs to be with you for the next 24 hours  Do not drive and operate hazardous machinery  Do not make important personal or business decisions  Do not drink alcoholic beverages  If you have not urinated within 8 hours after discharge, and you are experiencing discomfort from urinary retention, please go to the nearest ED. If you have sleep apnea and have a CPAP machine, please use it for all naps and sleeping. Please use caution when taking narcotics and any of your home medications that may cause drowsiness. *  Please give a list of your current medications to your Primary Care Provider. *  Please update this list whenever your medications are discontinued, doses are      changed, or new medications (including over-the-counter products) are added. *  Please carry medication information at all times in case of emergency situations. These are general instructions for a healthy lifestyle:  No smoking/ No tobacco products/ Avoid exposure to second hand smoke  Surgeon General's Warning:  Quitting smoking now greatly reduces serious risk to your health. Obesity, smoking, and sedentary lifestyle greatly increases your risk for illness  A healthy diet, regular physical exercise & weight monitoring are important for maintaining a healthy lifestyle    You may be retaining fluid if you have a history of heart failure or if you experience any of the following symptoms:  Weight gain of 3 pounds or more overnight or 5 pounds in a week, increased swelling in our hands or feet or shortness of breath while lying flat in bed. Please call your doctor as soon as you notice any of these symptoms; do not wait until your next office visit.

## 2023-02-15 NOTE — PROGRESS NOTES
Status post creation of left brachiocephalic fistula. Patient will follow-up in my office in 2 weeks for postop check. She will continue dialysis through her right chest tunneled catheter.     Lima Corona

## 2023-02-15 NOTE — ANESTHESIA PROCEDURE NOTES
Airway  Date/Time: 2/15/2023 7:04 AM  Urgency: elective    Airway not difficult    General Information and Staff    Patient location during procedure: OR  Resident/CRNA: SAE Wolf - CRNA  Performed: resident/CRNA     Indications and Patient Condition  Indications for airway management: anesthesia  Spontaneous ventilation: present  Sedation level: deep  Preoxygenated: yes  Patient position: sniffing  MILS not maintained throughout  Mask difficulty assessment: vent by bag mask    Final Airway Details  Final airway type: supraglottic airway      Successful airway: oropharyngeal  Size 4     Number of attempts at approach: 1  Ventilation between attempts: supraglottic airway  Number of other approaches attempted: 0    Additional Comments  SIVI with MDA at bedside; LMA placed by MRAK Tee; airway secured; lips/teeth unchanged; VSS    no

## 2023-02-15 NOTE — OP NOTE
[address] [facility]  OPERATIVE REPORT    Name:  Radha Ortiz  MR#:  [id number]  :  1950  ACCOUNT #:  [de-identified]  DATE OF SERVICE:  02/15/2023    CLINICAL SERVICE:  Vascular Surgery. PREOPERATIVE DIAGNOSIS:  End-stage renal disease needing long-term dialysis access. POSTOPERATIVE DIAGNOSIS:  End-stage renal disease needing long-term dialysis access. PROCEDURE PERFORMED:  Creation of left brachiocephalic fistula. SURGEON:  Keron Stephens MD    ASSISTANT:      ANESTHESIA:  General.    COMPLICATIONS:  None. SPECIMENS REMOVED:  None. IMPLANTS:      ESTIMATED BLOOD LOSS:      PROCEDURE IN DETAIL:  After getting informed consent, the patient was brought to the operating room, anesthesia was then induced. Preoperative antibiotics were given before the skin incision. The patient's left arm was then prepped and draped in a normal sterile fashion. Incision was made over the antecubital fossa. We dissected down through subcutaneous tissue where the median cubital vein was identified. We dissected it circumferentially. The distal branch was then suture ligated. We had a good lumen that measuring about 4-5 mm. The brachial artery was small. However, we dissected it underneath the fascia that controlled with vessel loops. We gave 5000 of heparin. An #11 blade was used to do arteriotomy. We extended with Shi scissors. We spatulated the vein and did end-to-side anastomosis using 6-0 Prolenes. Postprocedure, she had good Doppler signal in the hand and in the fistula. We are going to hold pressure for 5 minutes. We closed with 2-0 Vicryl, 3-0 Vicryls, and 4-0 Monocryl. The patient was extubated and taken to the PACU in stable condition.       Roselinda Homans, MD [id number]  D:  02/15/2023 8:47  T:  02/15/2023 14:44  JOB #:  [id number]

## 2023-02-15 NOTE — ANESTHESIA PRE PROCEDURE
Department of Anesthesiology  Preprocedure Note       Name:  Sonali Hernandez   Age:  81 y.o.  :  1950                                          MRN:  [id number]         Date:  2/15/2023      Surgeon: Malou Alex):  Reginaldo Bolanos MD    Procedure: Procedure(s):  LEFT AV FISTULA CREATION    Medications prior to admission:   Prior to Admission medications    Medication Sig Start Date End Date Taking?  Authorizing Provider   albuterol sulfate HFA (PROVENTIL;VENTOLIN;PROAIR) 108 (90 Base) MCG/ACT inhaler Inhale 2 puffs into the lungs every 6 hours as needed for Wheezing   Yes Historical Provider, MD   losartan (COZAAR) 50 MG tablet Take 1 tablet by mouth daily for 14 days 23  SAE Delcid CNP   metoprolol tartrate (LOPRESSOR) 50 MG tablet Take 1 tablet by mouth 2 times daily 23   Kieran Habermann, PA   DULoxetine (CYMBALTA) 60 MG extended release capsule Take 1 capsule by mouth 2 times daily 23   Charleen Salcido MD   diclofenac sodium (VOLTAREN) 1 % GEL Apply 2 g topically 4 times daily as needed    Historical Provider, MD   NIFEdipine (PROCARDIA XL) 30 MG extended release tablet TAKE ONE TABLET BY MOUTH ONE TIME DAILY 10/4/22   Historical Provider, MD   acetaminophen (TYLENOL) 650 MG extended release tablet Take 650 mg by mouth every 8 hours as needed    Historical Provider, MD   Omega-3 Fatty Acids (FISH OIL) 1200 MG CAPS Take 1 capsule by mouth daily    Historical Provider, MD   atorvastatin (LIPITOR) 20 MG tablet Take 20 mg by mouth at bedtime Every other night 22   Ar Automatic Reconciliation   vitamin D3 (CHOLECALCIFEROL) 125 MCG (5000 UT) TABS tablet Take 5,000 Units by mouth daily    Ar Automatic Reconciliation   levothyroxine (SYNTHROID) 125 MCG tablet Take 125 mcg by mouth every morning (before breakfast) 3/29/22   Ar Automatic Reconciliation   pantoprazole (PROTONIX) 40 MG tablet Take 40 mg by mouth 2 times daily 22   Ar Automatic Reconciliation   sodium bicarbonate 650 MG tablet TAKE 1 TABLET BY MOUTH TWICE DAILY with meals 11/4/21   Ar Automatic Reconciliation       Current medications:    Current Facility-Administered Medications   Medication Dose Route Frequency Provider Last Rate Last Admin    ceFAZolin (ANCEF) 2000 mg in sterile water 20 mL IV syringe  2,000 mg IntraVENous Once Casey Bills MD        lidocaine 1 % injection 1 mL  1 mL IntraDERmal Once PRN Jurgen Klein, DO        acetaminophen (TYLENOL) tablet 1,000 mg  1,000 mg Oral Once Jurgen Klein DO        fentaNYL (SUBLIMAZE) injection 100 mcg  100 mcg IntraVENous Once PRN Jurgen Klein, DO        lactated ringers IV soln infusion   IntraVENous Continuous Jurgen Klein DO        sodium chloride flush 0.9 % injection 5-40 mL  5-40 mL IntraVENous 2 times per day Jurgen Klein, DO        sodium chloride flush 0.9 % injection 5-40 mL  5-40 mL IntraVENous PRN Jurgen Eaton, DO        0.9 % sodium chloride infusion   IntraVENous PRN Jurgen Eaton, DO        midazolam (VERSED) PF injection 2 mg/2 mL  2 mg IntraVENous Once PRN Jurgen Eaton, DO           Allergies:  No Known Allergies    Problem List:    Patient Active Problem List   Diagnosis Code    Multiple joint pain M25.50    Anemia D64.9    Knee effusion M25.469    Lower obstructive uropathy N13.9    Hypertension I10    Acquired hypothyroidism E03.9    Gastroesophageal reflux disease without esophagitis K21.9    Primary osteoarthritis of left knee M17.12    Incisional hernia, without obstruction or gangrene K43.2    Suprapubic catheter (HCC) Z93.59    History of hepatitis C Z86.19    Stage 4 chronic kidney disease (HCC) N18.4    First degree AV block I44.0    CONKLIN (dyspnea on exertion) R06.09    Hyperlipidemia E78.5    Atypical chest pain R07.89    End stage renal disease (HCC) N18.6       Past Medical History:        Diagnosis Date    Arthritis     Chronic pain arthritis knees    GERD (gastroesophageal reflux disease)     controlled with med    High cholesterol     History of blood transfusion     Hypertension     Kidney disease     acute renal failure 7/2020 (hospitalized x 6 days)  Followed by Dr. Phoebe Carranza, nephrologist      Liver disease     hep C,     Melanoma ([facility] [address]     RLE,     Psychiatric disorder     depression, treated with cymbalta    PUD (peptic ulcer disease)     Rheumatic fever     as a child    Thyroid disease     hypo       Past Surgical History:        Procedure Laterality Date    APPENDECTOMY      CATARACT REMOVAL  2019    bilateral, IOL implant    CHOLECYSTECTOMY      COLONOSCOPY  2015    IR TUNNELED CATHETER PLACEMENT GREATER THAN 5 YEARS  1/27/2023    IR TUNNELED CATHETER PLACEMENT GREATER THAN 5 YEARS 1/27/2023 Jocelyn Rodriges MD [facility] SPECIALS    NEUROLOGICAL SURGERY      CERVICAL AND LUMBAR SX    ORTHOPEDIC SURGERY Left     KNEE SCOPE    UPPER GASTROINTESTINAL ENDOSCOPY      EGD    UROLOGICAL SURGERY  07/2020    cystoscopy, insertion berkowitz catheter (suprapubic)    UROLOGICAL SURGERY  2016    cystoscopy, urethral dilatation       Social History:    Social History     Tobacco Use    Smoking status: Former     Packs/day: 0.50     Types: Cigarettes     Start date: 10/28/2016    Smokeless tobacco: Never    Tobacco comments:     Quit smoking: Quit 2019   Substance Use Topics    Alcohol use:  No                                Counseling given: Not Answered  Tobacco comments: Quit smoking: Quit 2019      Vital Signs (Current):   Vitals:    02/14/23 1359 02/15/23 0553   BP:  (!) 145/70   Pulse:  77   Resp:  18   Temp:  98.1 °F (36.7 °C)   TempSrc:  Temporal   SpO2:  99%   Weight: 143 lb (64.9 kg) 144 lb 12.8 oz (65.7 kg)   Height: 5' 10\" (1.778 m) 5' 10\" (1.778 m)                                              BP Readings from Last 3 Encounters:   02/15/23 (!) 145/70   02/14/23 (!) 151/100   01/30/23 (!) 143/95       NPO Status: Time of last liquid consumption: 0000                        Time of last solid consumption: 0000                        Date of last liquid consumption: 02/14/23                        Date of last solid food consumption: 02/14/23    BMI:   Wt Readings from Last 3 Encounters:   02/15/23 144 lb 12.8 oz (65.7 kg)   02/14/23 140 lb (63.5 kg)   01/29/23 148 lb 4.8 oz (67.3 kg)     Body mass index is 20.78 kg/m². CBC:   Lab Results   Component Value Date/Time    WBC 9.6 01/30/2023 04:22 AM    RBC 3.74 01/30/2023 04:22 AM    HGB 11.2 01/30/2023 04:22 AM    HCT 35.2 01/30/2023 04:22 AM    MCV 94.1 01/30/2023 04:22 AM    RDW 13.8 01/30/2023 04:22 AM     01/30/2023 04:22 AM       CMP:   Lab Results   Component Value Date/Time     01/30/2023 04:22 AM    K 4.1 01/30/2023 04:22 AM     01/30/2023 04:22 AM    CO2 22 01/30/2023 04:22 AM    BUN 27 01/30/2023 04:22 AM    CREATININE 5.40 01/30/2023 04:22 AM    GFRAA 13 06/16/2021 02:37 PM    AGRATIO 1.6 04/29/2022 02:04 PM    LABGLOM 8 01/30/2023 04:22 AM    LABGLOM 12 04/29/2022 02:04 PM    GLUCOSE 141 01/30/2023 04:22 AM    PROT 6.5 01/28/2023 04:47 AM    CALCIUM 9.6 01/30/2023 04:22 AM    BILITOT 0.8 01/28/2023 04:47 AM    ALKPHOS 157 01/28/2023 04:47 AM    ALKPHOS 246 04/29/2022 02:04 PM    AST 14 01/28/2023 04:47 AM    ALT 13 01/28/2023 04:47 AM       POC Tests: No results for input(s): POCGLU, POCNA, POCK, POCCL, POCBUN, POCHEMO, POCHCT in the last 72 hours.     Coags:   Lab Results   Component Value Date/Time    PROTIME 13.5 01/26/2023 11:47 AM    INR 1.0 01/26/2023 11:47 AM    APTT 28.5 01/26/2023 11:47 AM       HCG (If Applicable): No results found for: PREGTESTUR, PREGSERUM, HCG, HCGQUANT     ABGs: No results found for: PHART, PO2ART, RUX4TRP, YLD4KDE, BEART, A1CGJUOF     Type & Screen (If Applicable):  No results found for: LABABO, LABRH    Drug/Infectious Status (If Applicable):  Lab Results   Component Value Date/Time    HEPCAB >11.0 01/27/2023 04:41 AM       COVID-19 Screening (If Applicable):   Lab Results   Component Value Date/Time    COVID19 Not detected 01/27/2023 10:34 AM    COVID19 Not Detected 08/05/2020 01:43 PM           Anesthesia Evaluation  Patient summary reviewed  Airway: Mallampati: I          Dental:      Comment: Chipped lower teeth noted    Pulmonary:Negative Pulmonary ROS and normal exam  breath sounds clear to auscultation                             Cardiovascular:  Exercise tolerance: good (>4 METS),   (+) hypertension:,         Rhythm: regular  Rate: normal                 ROS comment: Echo 5/2022      Left Ventricle: Normal left ventricular systolic function with a visually estimated EF of 65 - 70%. Left ventricle size is normal. Moderately increased wall thickness. Normal wall motion. Normal diastolic function.   Aortic Valve: Valve structure is normal. Mild sclerosis of the aortic valve cusp. Mild regurgitation with a centrally directed jet.   Mitral Valve: Mild regurgitation.   Tricuspid Valve: The estimated RVSP is 14 mmHg. Neuro/Psych:   (+) depression/anxiety             GI/Hepatic/Renal:   (+) GERD: well controlled, PUD, hepatitis: C, renal disease: ESRD and dialysis,          ROS comment: Hep C--treated 2009    ESRD dx last month. Last dialysis Monday. Endo/Other:    (+) hypothyroidism::., .                 Abdominal:             Vascular: negative vascular ROS. Other Findings:           Anesthesia Plan      general     ASA 3     (LMA)  Induction: intravenous. MIPS: Postoperative opioids intended and Prophylactic antiemetics administered. Anesthetic plan and risks discussed with patient and spouse.                         Shantell Velazquez DO   2/15/2023

## 2023-02-22 NOTE — OP NOTE
[facility]  OPERATIVE REPORT    Name:  Radha Hubbard  MR#:  [id number]  :  1950  ACCOUNT #:  [de-identified]  DATE OF SERVICE:  02/15/2023        CLINICAL SERVICE:  Vascular Surgery. PREOPERATIVE DIAGNOSIS:  End-stage renal disease needing long-term dialysis access. POSTOPERATIVE DIAGNOSIS:  End-stage renal disease needing long-term dialysis access. PROCEDURE PERFORMED:  Creation of left brachiocephalic fistula. SURGEON:  Judith Hopson. Dany Guzmán MD     ASSISTANT:       ANESTHESIA:  General.     COMPLICATIONS:  None. SPECIMENS REMOVED:  None. IMPLANTS:       ESTIMATED BLOOD LOSS:       PROCEDURE IN DETAIL:  After getting informed consent, the patient was brought to the operating room, anesthesia was then induced. Preoperative antibiotics were given before the skin incision. The patient's left arm was then prepped and draped in a normal sterile fashion. Incision was made over the antecubital fossa. We dissected down through subcutaneous tissue where the median cubital vein was identified. We dissected it circumferentially. The distal branch was then suture ligated. We had a good lumen that measuring about 4-5 mm. The brachial artery was small. However, we dissected it underneath the fascia that controlled with vessel loops. We gave 5000 of heparin. An #11 blade was used to do arteriotomy. We extended with Shi scissors. We spatulated the vein and did end-to-side anastomosis using 6-0 Prolenes. Postprocedure, she had good Doppler signal in the hand and in the fistula. We are going to hold pressure for 5 minutes. We closed with 2-0 Vicryl, 3-0 Vicryls, and 4-0 Monocryl. The patient was extubated and taken to the PACU in stable condition.     MD KALEB Smith/[id number]  D:  02/15/2023 8:47  T:  02/15/2023 14:44  JOB #:  [id number]

## 2023-02-28 ENCOUNTER — OFFICE VISIT (OUTPATIENT)
Dept: INTERNAL MEDICINE CLINIC | Facility: CLINIC | Age: 73
End: 2023-02-28
Payer: MEDICARE

## 2023-02-28 VITALS
HEART RATE: 83 BPM | SYSTOLIC BLOOD PRESSURE: 120 MMHG | OXYGEN SATURATION: 98 % | TEMPERATURE: 97.2 F | HEIGHT: 70 IN | WEIGHT: 144 LBS | BODY MASS INDEX: 20.62 KG/M2 | DIASTOLIC BLOOD PRESSURE: 80 MMHG

## 2023-02-28 DIAGNOSIS — N28.9 RENAL LESION: ICD-10-CM

## 2023-02-28 DIAGNOSIS — Z93.59 SUPRAPUBIC CATHETER (HCC): ICD-10-CM

## 2023-02-28 DIAGNOSIS — Z99.2 ESRD ON HEMODIALYSIS (HCC): ICD-10-CM

## 2023-02-28 DIAGNOSIS — E78.5 HYPERLIPIDEMIA, UNSPECIFIED HYPERLIPIDEMIA TYPE: ICD-10-CM

## 2023-02-28 DIAGNOSIS — N18.6 ESRD ON HEMODIALYSIS (HCC): ICD-10-CM

## 2023-02-28 DIAGNOSIS — E03.9 ACQUIRED HYPOTHYROIDISM: ICD-10-CM

## 2023-02-28 DIAGNOSIS — Z09 HOSPITAL DISCHARGE FOLLOW-UP: Primary | ICD-10-CM

## 2023-02-28 PROCEDURE — 1111F DSCHRG MED/CURRENT MED MERGE: CPT | Performed by: NURSE PRACTITIONER

## 2023-02-28 PROCEDURE — 99214 OFFICE O/P EST MOD 30 MIN: CPT | Performed by: NURSE PRACTITIONER

## 2023-02-28 PROCEDURE — 3074F SYST BP LT 130 MM HG: CPT | Performed by: NURSE PRACTITIONER

## 2023-02-28 PROCEDURE — 3079F DIAST BP 80-89 MM HG: CPT | Performed by: NURSE PRACTITIONER

## 2023-02-28 PROCEDURE — 1123F ACP DISCUSS/DSCN MKR DOCD: CPT | Performed by: NURSE PRACTITIONER

## 2023-02-28 RX ORDER — ASPRIN AND CAFFEINE 400; 32 MG/1; MG/1
2 TABLET ORAL PRN
COMMUNITY
End: 2023-02-28

## 2023-02-28 RX ORDER — BENZONATATE 100 MG/1
CAPSULE ORAL
COMMUNITY
Start: 2023-02-11 | End: 2023-02-28

## 2023-02-28 RX ORDER — ATORVASTATIN CALCIUM 20 MG/1
20 TABLET, FILM COATED ORAL NIGHTLY
Qty: 90 TABLET | Refills: 1 | Status: SHIPPED | OUTPATIENT
Start: 2023-02-28

## 2023-02-28 SDOH — ECONOMIC STABILITY: FOOD INSECURITY: WITHIN THE PAST 12 MONTHS, YOU WORRIED THAT YOUR FOOD WOULD RUN OUT BEFORE YOU GOT MONEY TO BUY MORE.: NEVER TRUE

## 2023-02-28 SDOH — ECONOMIC STABILITY: HOUSING INSECURITY
IN THE LAST 12 MONTHS, WAS THERE A TIME WHEN YOU DID NOT HAVE A STEADY PLACE TO SLEEP OR SLEPT IN A SHELTER (INCLUDING NOW)?: NO

## 2023-02-28 SDOH — ECONOMIC STABILITY: FOOD INSECURITY: WITHIN THE PAST 12 MONTHS, THE FOOD YOU BOUGHT JUST DIDN'T LAST AND YOU DIDN'T HAVE MONEY TO GET MORE.: NEVER TRUE

## 2023-02-28 SDOH — ECONOMIC STABILITY: INCOME INSECURITY: HOW HARD IS IT FOR YOU TO PAY FOR THE VERY BASICS LIKE FOOD, HOUSING, MEDICAL CARE, AND HEATING?: NOT HARD AT ALL

## 2023-02-28 ASSESSMENT — ENCOUNTER SYMPTOMS
NAUSEA: 0
CONSTIPATION: 0
VOMITING: 0
SORE THROAT: 0
WHEEZING: 0
ABDOMINAL PAIN: 0
DIARRHEA: 0
COUGH: 0
SHORTNESS OF BREATH: 0

## 2023-02-28 ASSESSMENT — PATIENT HEALTH QUESTIONNAIRE - PHQ9
2. FEELING DOWN, DEPRESSED OR HOPELESS: 0
1. LITTLE INTEREST OR PLEASURE IN DOING THINGS: 0
SUM OF ALL RESPONSES TO PHQ QUESTIONS 1-9: 0
SUM OF ALL RESPONSES TO PHQ QUESTIONS 1-9: 0
SUM OF ALL RESPONSES TO PHQ9 QUESTIONS 1 & 2: 0
SUM OF ALL RESPONSES TO PHQ QUESTIONS 1-9: 0
SUM OF ALL RESPONSES TO PHQ QUESTIONS 1-9: 0

## 2023-02-28 NOTE — PROGRESS NOTES
Post-Discharge Transitional Care Follow Up      Fernando León   YOB: 1950    Date of Office Visit:  2/28/2023  Date of Hospital Admission: 2/15/23  Date of Hospital Discharge: 2/15/23  Readmission Risk Score (high >=14%. Medium >=10%):Readmission Risk Score: 14.4      Care management risk score Rising risk (score 2-5) and Complex Care (Scores >=6): No Risk Score On File     Non face to face  following discharge, date last encounter closed (first attempt may have been earlier): *No documented post hospital discharge outreach found in the last 14 days     Call initiated 2 business days of discharge: *No response recorded in the last 14 days     Hospital discharge follow-up  -     DE DISCHARGE MEDS RECONCILED W/ CURRENT OUTPATIENT MED LIST    ESRD on hemodialysis ([facility] [address]  Continue HD and nephrology follow-up as directed. Renal lesion  Patient not interested in CT renal mass despite my recommendation. Plans to discuss with her urologist when she sees him. Suprapubic catheter [facility])  Due for exchange. She is awaiting a call back from urology about an appt. Medical Decision Making: moderate complexity  Return in about 3 months (around 5/28/2023). On this date 2/28/2023 I have spent 45 minutes reviewing previous notes, test results and face to face with the patient discussing the diagnosis and importance of compliance with the treatment plan as well as documenting on the day of the visit. Subjective:   HPI    Inpatient course: Discharge summary reviewed- see chart. Interval history/Current status:     Hospital Course:  Patient is a 82 y/o female with medical history of urethral stricture s/p chronic suprapubic catheter, CKD IV, hypothyroidism, HTN who presented to ED with cc confusion, generalized weakness, bilateral flank pain of 3-4 day duration associated with nausea and vomiting. Patient follows both Urology and Nephrology.       ED workup: afebrile, HR 81 /125  Labs notable for BUN/Cr 54/6.8 (baseline 4-5), CO2 19, Alk phos 194, AST 41, WBC 12.6. UA c/w UTI, initiated on ceftriaxone, Ucx pending. Patient received labetalol, zofran, and 1000 cc NS bolus. Nephrology evaluated in ED and requested hospitalist admission. Initiated on bicarb gtt for metabolic acidosis. Nephrology consult, new onset ESRD, placed line, HD runs were completed 1/27, 1/28, and 1/30. Bicarb gtt discontinued as acidosis has resolved. Outpatient HD slot has been obtained. Outpatient slot has been obtained at [facility] on MWF at 1015. Dr. Sarah Hendricks [address] [place] [address]) has cleared patient to discharge home today. Anti-hypertensive agents were adjusted on discharge and will likely require some continued changes as outpatient. Can continue to monitor with dialysis. Renal ultrasound with no noted obstruction, consider follow-up with renal mass CT for cystic lower pole left renal cortical lesion. Patient declines CT order today. Plans to discuss with her urologist. She is due to have suprapubic cath exchanged. She has called to schedule an appt. Awaiting a call back. Doing well with HD. Had left brachiocephalic fistula placed on 2/15/23.        Patient Active Problem List   Diagnosis    Multiple joint pain    Anemia    Knee effusion    Lower obstructive uropathy    Hypertension    Acquired hypothyroidism    Gastroesophageal reflux disease without esophagitis    Primary osteoarthritis of left knee    Incisional hernia, without obstruction or gangrene    Suprapubic catheter (HCC)    History of hepatitis C    Stage 4 chronic kidney disease (HCC)    First degree AV block    CONKLIN (dyspnea on exertion)    Hyperlipidemia    Atypical chest pain    End stage renal disease (HCC)    PVD (peripheral vascular disease) with claudication (HCC)       Medications listed as ordered at the time of discharge from hospital     Medication List            Accurate as of February 28, 2023 12:56 PM. If you have any questions, ask your nurse or doctor. CONTINUE taking these medications      acetaminophen 650 MG extended release tablet  Commonly known as: TYLENOL     albuterol sulfate  (90 Base) MCG/ACT inhaler  Commonly known as: PROVENTIL;VENTOLIN;PROAIR     atorvastatin 20 MG tablet  Commonly known as: LIPITOR  Take 1 tablet by mouth at bedtime Every other night     DULoxetine 60 MG extended release capsule  Commonly known as: Cymbalta  Take 1 capsule by mouth 2 times daily     Fish Oil 1200 MG Caps     levothyroxine 125 MCG tablet  Commonly known as: SYNTHROID     losartan 50 MG tablet  Commonly known as: COZAAR  Take 1 tablet by mouth daily for 14 days     metoprolol tartrate 50 MG tablet  Commonly known as: LOPRESSOR  Take 1 tablet by mouth 2 times daily     NIFEdipine 30 MG extended release tablet  Commonly known as: PROCARDIA XL     pantoprazole 40 MG tablet  Commonly known as: PROTONIX     sodium bicarbonate 650 MG tablet     vitamin D3 125 MCG (5000 UT) Tabs tablet  Commonly known as: CHOLECALCIFEROL               Where to Get Your Medications        These medications were sent to [id number], [address] [place] [address]. [id number] - P [phone] - F [phone]  [address] [place] [address]  [address] [place] [address]      Phone: [phone]   atorvastatin 20 MG tablet          Medications marked \"taking\" at this time  Outpatient Medications Marked as Taking for the 2/28/23 encounter (Office Visit) with SAE Vanessa CNP   Medication Sig Dispense Refill    atorvastatin (LIPITOR) 20 MG tablet Take 1 tablet by mouth at bedtime Every other night 90 tablet 1    albuterol sulfate HFA (PROVENTIL;VENTOLIN;PROAIR) 108 (90 Base) MCG/ACT inhaler Inhale 2 puffs into the lungs every 6 hours as needed for Wheezing      losartan (COZAAR) 50 MG tablet Take 1 tablet by mouth daily for 14 days 14 tablet 0    metoprolol tartrate (LOPRESSOR) 50 MG tablet Take 1 tablet by mouth 2 times daily 60 tablet 0    DULoxetine (CYMBALTA) 60 MG extended release capsule Take 1 capsule by mouth 2 times daily 180 capsule 1    NIFEdipine (PROCARDIA XL) 30 MG extended release tablet TAKE ONE TABLET BY MOUTH ONE TIME DAILY      acetaminophen (TYLENOL) 650 MG extended release tablet Take 650 mg by mouth every 8 hours as needed      Omega-3 Fatty Acids (FISH OIL) 1200 MG CAPS Take 1 capsule by mouth daily      vitamin D3 (CHOLECALCIFEROL) 125 MCG (5000 UT) TABS tablet Take 5,000 Units by mouth daily      levothyroxine (SYNTHROID) 125 MCG tablet Take 125 mcg by mouth every morning (before breakfast) Not taking it daily. pantoprazole (PROTONIX) 40 MG tablet Take 40 mg by mouth 2 times daily      sodium bicarbonate 650 MG tablet TAKE 1 TABLET BY MOUTH TWICE DAILY with meals          Medications patient taking as of now reconciled against medications ordered at time of hospital discharge: Yes    Review of Systems   Constitutional:  Negative for chills and fever. HENT:  Negative for congestion, ear pain and sore throat. Respiratory:  Negative for cough, shortness of breath and wheezing. Cardiovascular:  Negative for chest pain, palpitations and leg swelling. Gastrointestinal:  Negative for abdominal pain, constipation, diarrhea, nausea and vomiting. Genitourinary:  Negative for flank pain. Neurological:  Negative for dizziness, light-headedness and headaches. Objective:    /80 (Site: Right Upper Arm, Position: Sitting, Cuff Size: Large Adult)   Pulse 83   Temp 97.2 °F (36.2 °C) (Temporal)   Ht 5' 10\" (1.778 m)   Wt 144 lb (65.3 kg)   SpO2 98%   BMI 20.66 kg/m²     Physical Exam  Vitals and nursing note reviewed. Constitutional:       General: She is not in acute distress. Appearance: Normal appearance.    HENT:      Right Ear: Tympanic membrane, ear canal and external ear normal.      Left Ear: Tympanic membrane, ear canal and external ear normal.      Mouth/Throat:      Mouth: Mucous membranes are moist.      Pharynx: Oropharynx is clear. Cardiovascular:      Rate and Rhythm: Normal rate and regular rhythm. Pulmonary:      Effort: Pulmonary effort is normal. No respiratory distress. Breath sounds: Normal breath sounds. Chest:       Abdominal:      General: Bowel sounds are normal.      Palpations: Abdomen is soft. Musculoskeletal:        Arms:       Right lower leg: No edema. Left lower leg: No edema. Skin:     General: Skin is warm and dry. Neurological:      Mental Status: She is alert and oriented to person, place, and time. Psychiatric:         Mood and Affect: Mood normal.       An electronic signature was used to authenticate this note.   --Suresh Orr, SAE - CNP

## 2023-03-01 RX ORDER — PANTOPRAZOLE SODIUM 40 MG/1
TABLET, DELAYED RELEASE ORAL
Qty: 180 TABLET | Refills: 1 | OUTPATIENT
Start: 2023-03-01

## 2023-03-03 ENCOUNTER — NURSE ONLY (OUTPATIENT)
Dept: UROLOGY | Age: 73
End: 2023-03-03

## 2023-03-03 DIAGNOSIS — Z93.59 SUPRAPUBIC CATHETER (HCC): Primary | ICD-10-CM

## 2023-03-03 DIAGNOSIS — Z46.6 URINARY CATHETER CHANGE REQUIRED: ICD-10-CM

## 2023-03-03 NOTE — PROGRESS NOTES
Pt came in for catheter change. 18f silastic suprapubic catheter changed without incident. Patient tolerated procedure well.

## 2023-03-07 ENCOUNTER — OFFICE VISIT (OUTPATIENT)
Dept: UROLOGY | Age: 73
End: 2023-03-07
Payer: MEDICARE

## 2023-03-07 ENCOUNTER — OFFICE VISIT (OUTPATIENT)
Dept: VASCULAR SURGERY | Age: 73
End: 2023-03-07
Payer: MEDICARE

## 2023-03-07 VITALS
WEIGHT: 144 LBS | HEART RATE: 74 BPM | DIASTOLIC BLOOD PRESSURE: 98 MMHG | BODY MASS INDEX: 20.62 KG/M2 | SYSTOLIC BLOOD PRESSURE: 162 MMHG | OXYGEN SATURATION: 100 % | HEIGHT: 70 IN

## 2023-03-07 DIAGNOSIS — Z99.2 ESRD (END STAGE RENAL DISEASE) ON DIALYSIS (HCC): Primary | ICD-10-CM

## 2023-03-07 DIAGNOSIS — N30.01 ACUTE CYSTITIS WITH HEMATURIA: ICD-10-CM

## 2023-03-07 DIAGNOSIS — N35.92 STRICTURE OF FEMALE URETHRA, UNSPECIFIED STRICTURE TYPE: Primary | ICD-10-CM

## 2023-03-07 DIAGNOSIS — Z93.59 SUPRAPUBIC CATHETER (HCC): ICD-10-CM

## 2023-03-07 DIAGNOSIS — N18.6 ESRD (END STAGE RENAL DISEASE) ON DIALYSIS (HCC): Primary | ICD-10-CM

## 2023-03-07 LAB
BILIRUBIN, URINE, POC: NEGATIVE
BLOOD URINE, POC: NORMAL
GLUCOSE URINE, POC: NEGATIVE
KETONES, URINE, POC: NEGATIVE
LEUKOCYTE ESTERASE, URINE, POC: NORMAL
NITRITE, URINE, POC: NEGATIVE
PH, URINE, POC: 6 (ref 4.6–8)
PROTEIN,URINE, POC: 300
SPECIFIC GRAVITY, URINE, POC: 1.03 (ref 1–1.03)
URINALYSIS CLARITY, POC: NORMAL
URINALYSIS COLOR, POC: NORMAL
UROBILINOGEN, POC: NORMAL

## 2023-03-07 PROCEDURE — 99214 OFFICE O/P EST MOD 30 MIN: CPT | Performed by: NURSE PRACTITIONER

## 2023-03-07 PROCEDURE — 99213 OFFICE O/P EST LOW 20 MIN: CPT | Performed by: SURGERY

## 2023-03-07 PROCEDURE — 3080F DIAST BP >= 90 MM HG: CPT | Performed by: SURGERY

## 2023-03-07 PROCEDURE — 1123F ACP DISCUSS/DSCN MKR DOCD: CPT | Performed by: SURGERY

## 2023-03-07 PROCEDURE — 1123F ACP DISCUSS/DSCN MKR DOCD: CPT | Performed by: NURSE PRACTITIONER

## 2023-03-07 PROCEDURE — 81003 URINALYSIS AUTO W/O SCOPE: CPT | Performed by: NURSE PRACTITIONER

## 2023-03-07 PROCEDURE — 3077F SYST BP >= 140 MM HG: CPT | Performed by: SURGERY

## 2023-03-07 ASSESSMENT — ENCOUNTER SYMPTOMS: NAUSEA: 0

## 2023-03-07 NOTE — PROGRESS NOTES
[facility]  9 [facility] [address], [address] [phone]          Renetta Bernard  : 1950    No chief complaint on file. HPI     Renetta Bernard is a 81 y.o. female  with hx of urethral stricture disease and incomplete bladder emptying. She presented to ER per PCP advice based on abnormal blood work with a Cn of 15.5. In the ER she reported difficulty voiding and had long hx of this. She was only able to dribble urine. She said it had gotten progressively worse over the previous 2 days. Abdomen was distended. RNs attempted to place berkowitz but were unable to locate urethra. Hx of urethral stricture, last dilations in 2016 and 2016. S/P SP cath placement 2020. She originally plugged/unplugged but now keeps it open to bag. Had been having monthly cath change. She has had a couple episodes of cellulitis around SP.  NO issue currently. She is now on HD 3x/week (M, W, F). This has been a huge life change for her. She reports her urine output has decreased. She cont to have recurrence of UTI. Recently hospitalized again for this. She denies fever/chills today. She is having malodorous urine, increased urgency, and UUI. This is typical w UTI. She would really like to get rid of SP catheter. It is affecting her daily routine. She was having some ?vaginal bleeding. She reports having negative work up.       Past Medical History:   Diagnosis Date    Arthritis     Chronic pain     arthritis knees    GERD (gastroesophageal reflux disease)     controlled with med    High cholesterol     History of blood transfusion     Hypertension     Kidney disease     acute renal failure 2020 (hospitalized x 6 days)  Followed by Dr. Norm Salas, nephrologist      Liver disease     hep C,     Melanoma ([facility] [address]     RLE,     Psychiatric disorder     depression, treated with cymbalta    PUD (peptic ulcer disease)     Rheumatic fever     as a child Thyroid disease     hypo     Past Surgical History:   Procedure Laterality Date    APPENDECTOMY      CATARACT REMOVAL  2019    bilateral, IOL implant    CHOLECYSTECTOMY      COLONOSCOPY  2015    DIALYSIS FISTULA CREATION Left 2/15/2023    LEFT BRACHIOCEPHALIC FISTULA CREATION performed by Elsa Irizarry MD at [facility] MAIN OR    IR TUNNELED CATHETER PLACEMENT GREATER THAN 5 YEARS  1/27/2023    IR TUNNELED CATHETER PLACEMENT GREATER THAN 5 YEARS 1/27/2023 Carlie Young MD [facility] RADIOLOGY SPECIALS    NEUROLOGICAL SURGERY      CERVICAL AND LUMBAR SX    ORTHOPEDIC SURGERY Left     KNEE SCOPE    UPPER GASTROINTESTINAL ENDOSCOPY      EGD    UROLOGICAL SURGERY  07/2020    cystoscopy, insertion berkowitz catheter (suprapubic)    UROLOGICAL SURGERY  2016    cystoscopy, urethral dilatation     Current Outpatient Medications   Medication Sig Dispense Refill    atorvastatin (LIPITOR) 20 MG tablet Take 1 tablet by mouth at bedtime Every other night 90 tablet 1    albuterol sulfate HFA (PROVENTIL;VENTOLIN;PROAIR) 108 (90 Base) MCG/ACT inhaler Inhale 2 puffs into the lungs every 6 hours as needed for Wheezing      losartan (COZAAR) 50 MG tablet Take 1 tablet by mouth daily for 14 days 14 tablet 0    metoprolol tartrate (LOPRESSOR) 50 MG tablet Take 1 tablet by mouth 2 times daily 60 tablet 0    DULoxetine (CYMBALTA) 60 MG extended release capsule Take 1 capsule by mouth 2 times daily 180 capsule 1    NIFEdipine (PROCARDIA XL) 30 MG extended release tablet TAKE ONE TABLET BY MOUTH ONE TIME DAILY      acetaminophen (TYLENOL) 650 MG extended release tablet Take 650 mg by mouth every 8 hours as needed      Omega-3 Fatty Acids (FISH OIL) 1200 MG CAPS Take 1 capsule by mouth daily      vitamin D3 (CHOLECALCIFEROL) 125 MCG (5000 UT) TABS tablet Take 5,000 Units by mouth daily      levothyroxine (SYNTHROID) 125 MCG tablet Take 125 mcg by mouth every morning (before breakfast) Not taking it daily.       pantoprazole (PROTONIX) 40 MG tablet Take 40 mg by mouth 2 times daily      sodium bicarbonate 650 MG tablet TAKE 1 TABLET BY MOUTH TWICE DAILY with meals       No current facility-administered medications for this visit. No Known Allergies  Social History     Socioeconomic History    Marital status:      Spouse name: Not on file    Number of children: Not on file    Years of education: Not on file    Highest education level: Not on file   Occupational History    Not on file   Tobacco Use    Smoking status: Former     Packs/day: 0.50     Types: Cigarettes     Start date: 10/28/2016    Smokeless tobacco: Never    Tobacco comments:     Quit smoking: Quit 2019   Substance and Sexual Activity    Alcohol use: No    Drug use: Not Currently    Sexual activity: Not on file   Other Topics Concern    Not on file   Social History Narrative    Not on file     Social Determinants of Health     Financial Resource Strain: Low Risk     Difficulty of Paying Living Expenses: Not hard at all   Food Insecurity: No Food Insecurity    Worried About [address] in the Last Year: Never true    [address] [employer] in the Last Year: Never true   Transportation Needs: Unknown    Lack of Transportation (Medical): Not on file    Lack of Transportation (Non-Medical): No   Physical Activity: Not on file   Stress: Not on file   Social Connections: Not on file   Intimate Partner Violence: Not on file   Housing Stability: Unknown    Unable to Pay for Housing in the Last Year: Not on file    Number of Places Lived in the Last Year: Not on file    Unstable Housing in the Last Year: No     Family History   Problem Relation Age of Onset    No Known Problems Mother     No Known Problems Father        Review of Systems  Constitutional:   Negative for fever. GI:  Negative for nausea. Genitourinary: Positive for urgency, leakage w/ urge, frequent urination and incomplete emptying. Negative for flank pain.     Urinalysis  UA - Dipstick  Results for orders placed or performed in visit on 03/07/23   AMB POC URINALYSIS DIP STICK AUTO W/O MICRO   Result Value Ref Range    Color (UA POC)      Clarity (UA POC)      Glucose, Urine, POC Negative Negative    Bilirubin, Urine, POC Negative Negative    KETONES, Urine, POC Negative Negative    Specific Gravity, Urine, POC 1.030 1.001 - 1.035    Blood (UA POC) Moderate Negative    pH, Urine, POC 6.0 4.6 - 8.0    Protein, Urine,  Negative    Urobilinogen, POC 0.2 mg/dL     Nitrite, Urine, POC Negative Negative    Leukocyte Esterase, Urine, POC Small Negative       UA - Micro  WBC - 0  RBC - 0  Bacteria - 0  Epith - 0    PHYSICAL EXAM    General appearance - well appearing and in no distress  Mental status - alert, oriented to person, place, and time  Neck - supple, no significant adenopathy  Chest/Lung-  Quiet, even and easy respiratory effort without use of accessory muscles  Skin - normal coloration and turgor, no rashes      Assessment and Plan    ICD-10-CM    1. Stricture of female urethra, unspecified stricture type  N35.92 AMB POC URINALYSIS DIP STICK AUTO W/O MICRO      2. Suprapubic catheter (HCC)  Z93.59 AMB POC URINALYSIS DIP STICK AUTO W/O MICRO      3. Acute cystitis with hematuria  N30.01 AMB POC URINALYSIS DIP STICK AUTO W/O MICRO     Culture, Urine     Culture, Urine          Urethral stricture/SP cath- we discussed having her cap her bag for 4-6 hr per day to see if she can void from urethra. She will then place bag and record urine output. IF she truly is anuric, we could consider removing SP cath. I advised that we need to be careful w this since she is not able to have urethral catheter placed easily. She agrees w this plan. Acute UTI- urine overall appears normal. Repeat culture today. Will treat based on culture results and pt sx. Follow up pending lab results. Advised to contact sooner if needed. Madeline Ruffin is supervising physician today and he approves plan of care.

## 2023-03-07 NOTE — PROGRESS NOTES
[address]   [address] [facility] FAX: [phone]    Staci Sheets  : 1950    Chief Complaint:     History of Present Illness:   Patient follows up today for follow-up creation of left brachiocephalic fistula. Patient currently without the right chest tunneled catheter. CURRENT MEDICATIONS:  Current Outpatient Medications   Medication Sig Dispense Refill    atorvastatin (LIPITOR) 20 MG tablet Take 1 tablet by mouth at bedtime Every other night 90 tablet 1    albuterol sulfate HFA (PROVENTIL;VENTOLIN;PROAIR) 108 (90 Base) MCG/ACT inhaler Inhale 2 puffs into the lungs every 6 hours as needed for Wheezing      losartan (COZAAR) 50 MG tablet Take 1 tablet by mouth daily for 14 days 14 tablet 0    metoprolol tartrate (LOPRESSOR) 50 MG tablet Take 1 tablet by mouth 2 times daily 60 tablet 0    DULoxetine (CYMBALTA) 60 MG extended release capsule Take 1 capsule by mouth 2 times daily 180 capsule 1    NIFEdipine (PROCARDIA XL) 30 MG extended release tablet TAKE ONE TABLET BY MOUTH ONE TIME DAILY      acetaminophen (TYLENOL) 650 MG extended release tablet Take 650 mg by mouth every 8 hours as needed      Omega-3 Fatty Acids (FISH OIL) 1200 MG CAPS Take 1 capsule by mouth daily      vitamin D3 (CHOLECALCIFEROL) 125 MCG (5000 UT) TABS tablet Take 5,000 Units by mouth daily      levothyroxine (SYNTHROID) 125 MCG tablet Take 125 mcg by mouth every morning (before breakfast) Not taking it daily. pantoprazole (PROTONIX) 40 MG tablet Take 40 mg by mouth 2 times daily      sodium bicarbonate 650 MG tablet TAKE 1 TABLET BY MOUTH TWICE DAILY with meals       No current facility-administered medications for this visit.        Past Medical History:   Diagnosis Date    Arthritis     Chronic pain     arthritis knees    GERD (gastroesophageal reflux disease)     controlled with med    High cholesterol     History of blood transfusion     Hypertension     Kidney disease acute renal failure 7/2020 (hospitalized x 6 days)  Followed by Dr. Newton Shaffer, nephrologist      Liver disease     hep C,     Melanoma ([address]     RLE,     Psychiatric disorder     depression, treated with cymbalta    PUD (peptic ulcer disease)     Rheumatic fever     as a child    Thyroid disease     hypo       Physical Examination:   Height: 5' 10\" (1.778 m), Weight: 144 lb (65.3 kg), BP: (!) 162/98    Constitutional: she appears well-developed. No distress. HENT:   Head: Atraumatic. Eyes: Pupils are equal, round, and reactive to light. Neck: Normal range of motion. Cardiovascular: Regular rhythm. Pulmonary/Chest: Effort normal and breath sounds normal. No respiratory distress. Abdominal: Soft. Bowel sounds are normal. she exhibits no distension. There is no tenderness. There is no guarding. No hernia. Musculoskeletal: Normal range of motion. Neurological: She is alert. CN II- XII grossly intact   Vascular: Palpable thrill and bruit    Imaging:          Recommendations/Plans:   Ms. Keo Peter is a 81y.o. year old female status post creation of left radiocephalic fistula she will follow-up in 1 month with a duplex study. Efrain Molina MD    Elements of this note have been dictated using speech recognition software. As a result, errors of speech recognition may have occurred.     20 minutes of time was spent on this encounter including chart review, assessment, evaluation and coordination of patient care

## 2023-03-10 LAB
BACTERIA SPEC CULT: ABNORMAL
BACTERIA SPEC CULT: ABNORMAL
SERVICE CMNT-IMP: ABNORMAL

## 2023-03-10 RX ORDER — AMPICILLIN 500 MG/1
500 CAPSULE ORAL 2 TIMES DAILY
Qty: 14 CAPSULE | Refills: 0 | Status: SHIPPED | OUTPATIENT
Start: 2023-03-10 | End: 2023-03-17

## 2023-03-22 RX ORDER — LEVOTHYROXINE SODIUM 0.12 MG/1
TABLET ORAL
Qty: 90 TABLET | Refills: 1 | OUTPATIENT
Start: 2023-03-22

## 2023-03-24 RX ORDER — CEPHALEXIN 500 MG/1
500 CAPSULE ORAL 2 TIMES DAILY
Qty: 20 CAPSULE | Refills: 0 | Status: SHIPPED | OUTPATIENT
Start: 2023-03-24 | End: 2023-04-03

## 2023-03-31 ENCOUNTER — TELEPHONE (OUTPATIENT)
Dept: UROLOGY | Age: 73
End: 2023-03-31

## 2023-04-07 ENCOUNTER — PREP FOR PROCEDURE (OUTPATIENT)
Dept: VASCULAR SURGERY | Age: 73
End: 2023-04-07

## 2023-04-24 ENCOUNTER — PATIENT MESSAGE (OUTPATIENT)
Dept: INTERNAL MEDICINE CLINIC | Facility: CLINIC | Age: 73
End: 2023-04-24

## 2023-04-24 RX ORDER — LEVOTHYROXINE SODIUM 0.12 MG/1
125 TABLET ORAL
Qty: 30 TABLET | Status: CANCELLED | OUTPATIENT
Start: 2023-04-24

## 2023-04-24 NOTE — TELEPHONE ENCOUNTER
From: Jennifer Cooper  To: Kate Diallo  Sent: 4/24/2023 2:03 PM EDT  Subject: Syntroid    Please call Moe and FIX error. You called in for capsules and insurance wont pay. I was told at dialysis that its VERY IMPORTANT and the difference in cost is OVER a hundred dollars. Moe called but nobody responded.      PLEASE

## 2023-04-26 ENCOUNTER — PREP FOR PROCEDURE (OUTPATIENT)
Dept: VASCULAR SURGERY | Age: 73
End: 2023-04-26

## 2023-04-26 ENCOUNTER — NURSE ONLY (OUTPATIENT)
Dept: UROLOGY | Age: 73
End: 2023-04-26
Payer: MEDICARE

## 2023-04-26 ENCOUNTER — ANESTHESIA EVENT (OUTPATIENT)
Dept: SURGERY | Age: 73
End: 2023-04-26
Payer: MEDICARE

## 2023-04-26 DIAGNOSIS — Z93.59 SUPRAPUBIC CATHETER (HCC): Primary | ICD-10-CM

## 2023-04-26 DIAGNOSIS — Z46.6 URINARY CATHETER CHANGE REQUIRED: ICD-10-CM

## 2023-04-26 PROCEDURE — 51705 CHANGE OF BLADDER TUBE: CPT | Performed by: UROLOGY

## 2023-04-26 RX ORDER — CEFDINIR 300 MG/1
CAPSULE ORAL EVERY 12 HOURS
COMMUNITY
Start: 2023-03-25

## 2023-04-26 RX ORDER — FOLIC ACID/VIT B COMPLEX AND C 0.8 MG
1 TABLET ORAL EVERY EVENING
COMMUNITY
Start: 2023-03-28

## 2023-04-26 RX ORDER — LOSARTAN POTASSIUM 100 MG/1
100 TABLET ORAL NIGHTLY
Status: ON HOLD | COMMUNITY
Start: 2023-03-21 | End: 2023-04-27 | Stop reason: HOSPADM

## 2023-04-26 RX ORDER — CALCIUM ACETATE 667 MG/1
CAPSULE ORAL
COMMUNITY
Start: 2023-04-13

## 2023-04-26 NOTE — PERIOP NOTE
Patient verified name and . Order for consent NOT found in EHR at time of PAT visit. Unable to verify case posting against order; surgery verified by patient. Type 1B surgery, phone assessment complete. Orders not received. Labs per surgeon: none ordered  Labs per anesthesia protocol: K+, Hgb s/h for DOS    Patient answered medical/surgical history questions at their best of ability. All prior to admission medications documented in EPIC. Patient instructed to take the following medications the day of surgery according to anesthesia guidelines with a small sip of water: albuterol inhaler if needed and bring the inhaler to the hospital,  metoprolol, nifedipine, cefdinir, synthroid. On the day before surgery please take Acetaminophen 1000mg in the morning and then again before bed. You may substitute for Tylenol 650 mg. Hold all vitamins and supplements now for surgery and NSAIDS now for surgery. Prescription meds to hold: none    Patient instructed on the following:    > Arrive at main Entrance, time of arrival to be called the day before by 1700  > NPO after midnight, unless otherwise indicated, including gum, mints, and ice chips  > Responsible adult must drive patient to the hospital, stay during surgery, and patient will need supervision 24 hours after anesthesia  > Use antibacterial soap in shower the night before surgery and on the morning of surgery  > All piercings must be removed prior to arrival.    > Leave all valuables (money and jewelry) at home but bring insurance card and ID on DOS.   > You may be required to pay a deductible or co-pay on the day of your procedure. You can pre-pay by calling [phone] if your surgery is at the [facility] or [phone] if your surgery is at the [facility]. > Do not wear make-up, nail polish, lotions, cologne, perfumes, powders, or oil on skin. Artificial nails are not permitted.      Patient verbalized understanding

## 2023-04-27 ENCOUNTER — ANESTHESIA (OUTPATIENT)
Dept: SURGERY | Age: 73
End: 2023-04-27
Payer: MEDICARE

## 2023-04-27 ENCOUNTER — APPOINTMENT (OUTPATIENT)
Dept: INTERVENTIONAL RADIOLOGY/VASCULAR | Age: 73
End: 2023-04-27
Attending: SURGERY
Payer: MEDICARE

## 2023-04-27 ENCOUNTER — HOSPITAL ENCOUNTER (OUTPATIENT)
Age: 73
Setting detail: OUTPATIENT SURGERY
Discharge: HOME | End: 2023-04-27
Attending: SURGERY | Admitting: SURGERY
Payer: MEDICARE

## 2023-04-27 ENCOUNTER — APPOINTMENT (OUTPATIENT)
Dept: ULTRASOUND IMAGING | Age: 73
End: 2023-04-27
Attending: SURGERY
Payer: MEDICARE

## 2023-04-27 VITALS
DIASTOLIC BLOOD PRESSURE: 87 MMHG | WEIGHT: 150 LBS | RESPIRATION RATE: 14 BRPM | HEART RATE: 58 BPM | SYSTOLIC BLOOD PRESSURE: 133 MMHG | BODY MASS INDEX: 21.47 KG/M2 | TEMPERATURE: 98.4 F | OXYGEN SATURATION: 100 % | HEIGHT: 70 IN

## 2023-04-27 DIAGNOSIS — I73.9 PVD (PERIPHERAL VASCULAR DISEASE) WITH CLAUDICATION (HCC): Primary | ICD-10-CM

## 2023-04-27 LAB
ANION GAP SERPL CALC-SCNC: 5 MMOL/L (ref 2–11)
BUN SERPL-MCNC: 56 MG/DL (ref 8–23)
CALCIUM SERPL-MCNC: 8.1 MG/DL (ref 8.3–10.4)
CHLORIDE SERPL-SCNC: 101 MMOL/L (ref 101–110)
CO2 SERPL-SCNC: 29 MMOL/L (ref 21–32)
CREAT BLD-MCNC: 6.94 MG/DL (ref 0.8–1.5)
CREAT SERPL-MCNC: 6.6 MG/DL (ref 0.6–1)
GLUCOSE SERPL-MCNC: 89 MG/DL (ref 65–100)
HGB BLD-MCNC: 13.7 G/DL (ref 11.7–15.4)
POTASSIUM BLD-SCNC: 6.4 MMOL/L (ref 3.5–5.1)
POTASSIUM SERPL-SCNC: 4.7 MMOL/L (ref 3.5–5.1)
SODIUM SERPL-SCNC: 135 MMOL/L (ref 133–143)

## 2023-04-27 PROCEDURE — 6360000002 HC RX W HCPCS: Performed by: STUDENT IN AN ORGANIZED HEALTH CARE EDUCATION/TRAINING PROGRAM

## 2023-04-27 PROCEDURE — 76000 FLUOROSCOPY <1 HR PHYS/QHP: CPT

## 2023-04-27 PROCEDURE — 3600000002 HC SURGERY LEVEL 2 BASE: Performed by: SURGERY

## 2023-04-27 PROCEDURE — 7100000001 HC PACU RECOVERY - ADDTL 15 MIN: Performed by: SURGERY

## 2023-04-27 PROCEDURE — 2500000003 HC RX 250 WO HCPCS: Performed by: SURGERY

## 2023-04-27 PROCEDURE — 85018 HEMOGLOBIN: CPT

## 2023-04-27 PROCEDURE — 2720000010 HC SURG SUPPLY STERILE: Performed by: SURGERY

## 2023-04-27 PROCEDURE — 2580000003 HC RX 258: Performed by: ANESTHESIOLOGY

## 2023-04-27 PROCEDURE — 3600000012 HC SURGERY LEVEL 2 ADDTL 15MIN: Performed by: SURGERY

## 2023-04-27 PROCEDURE — 3700000001 HC ADD 15 MINUTES (ANESTHESIA): Performed by: SURGERY

## 2023-04-27 PROCEDURE — 6360000002 HC RX W HCPCS: Performed by: NURSE ANESTHETIST, CERTIFIED REGISTERED

## 2023-04-27 PROCEDURE — C1894 INTRO/SHEATH, NON-LASER: HCPCS | Performed by: SURGERY

## 2023-04-27 PROCEDURE — 36832 AV FISTULA REVISION OPEN: CPT | Performed by: SURGERY

## 2023-04-27 PROCEDURE — 6360000002 HC RX W HCPCS: Performed by: SURGERY

## 2023-04-27 PROCEDURE — 2709999900 HC NON-CHARGEABLE SUPPLY: Performed by: SURGERY

## 2023-04-27 PROCEDURE — 3700000000 HC ANESTHESIA ATTENDED CARE: Performed by: SURGERY

## 2023-04-27 PROCEDURE — C1768 GRAFT, VASCULAR: HCPCS | Performed by: SURGERY

## 2023-04-27 PROCEDURE — 80048 BASIC METABOLIC PNL TOTAL CA: CPT

## 2023-04-27 PROCEDURE — 6370000000 HC RX 637 (ALT 250 FOR IP): Performed by: ANESTHESIOLOGY

## 2023-04-27 PROCEDURE — 84132 ASSAY OF SERUM POTASSIUM: CPT

## 2023-04-27 PROCEDURE — 7100000000 HC PACU RECOVERY - FIRST 15 MIN: Performed by: SURGERY

## 2023-04-27 PROCEDURE — 7100000011 HC PHASE II RECOVERY - ADDTL 15 MIN: Performed by: SURGERY

## 2023-04-27 PROCEDURE — 6360000004 HC RX CONTRAST MEDICATION: Performed by: SURGERY

## 2023-04-27 PROCEDURE — 7100000010 HC PHASE II RECOVERY - FIRST 15 MIN: Performed by: SURGERY

## 2023-04-27 PROCEDURE — 2500000003 HC RX 250 WO HCPCS: Performed by: NURSE ANESTHETIST, CERTIFIED REGISTERED

## 2023-04-27 DEVICE — VASCURE FOR VASCULAR REPAIR IS INTENDED FOR USE AS A PATCH MATERIAL FOR REPAIR AND RECONSTRUCTION OF PERIPHERAL VASCULATURE INCLUDING THE CAROTID, RENAL, ILIAC, FEMORAL, AND TIBIAL BLOOD VESSELS. VASCURE FOR VASCULAR REPAIR MAY BE USED FOR PATCH CLOSURE OF VESSELS, AS A PLEDGET, OR FOR SUTURE LINE BUTTRESSING WHEN REPAIRING PERIPHERAL VESSELS.
Type: IMPLANTABLE DEVICE | Site: ARM | Status: FUNCTIONAL
Brand: VASCURE FOR VASCULAR REPAIR

## 2023-04-27 RX ORDER — OXYCODONE HYDROCHLORIDE AND ACETAMINOPHEN 5; 325 MG/1; MG/1
1 TABLET ORAL EVERY 4 HOURS PRN
Qty: 30 TABLET | Refills: 0 | Status: SHIPPED | OUTPATIENT
Start: 2023-04-27 | End: 2023-05-02

## 2023-04-27 RX ORDER — HEPARIN SODIUM 1000 [USP'U]/ML
INJECTION, SOLUTION INTRAVENOUS; SUBCUTANEOUS PRN
Status: DISCONTINUED | OUTPATIENT
Start: 2023-04-27 | End: 2023-04-27 | Stop reason: SDUPTHER

## 2023-04-27 RX ORDER — LIDOCAINE HYDROCHLORIDE 10 MG/ML
1 INJECTION, SOLUTION INFILTRATION; PERINEURAL
Status: DISCONTINUED | OUTPATIENT
Start: 2023-04-27 | End: 2023-04-27 | Stop reason: HOSPADM

## 2023-04-27 RX ORDER — SODIUM CHLORIDE 9 MG/ML
INJECTION, SOLUTION INTRAVENOUS PRN
Status: DISCONTINUED | OUTPATIENT
Start: 2023-04-27 | End: 2023-04-27 | Stop reason: HOSPADM

## 2023-04-27 RX ORDER — BUPIVACAINE HYDROCHLORIDE 2.5 MG/ML
INJECTION, SOLUTION EPIDURAL; INFILTRATION; INTRACAUDAL PRN
Status: DISCONTINUED | OUTPATIENT
Start: 2023-04-27 | End: 2023-04-27 | Stop reason: HOSPADM

## 2023-04-27 RX ORDER — PROTAMINE SULFATE 10 MG/ML
INJECTION, SOLUTION INTRAVENOUS PRN
Status: DISCONTINUED | OUTPATIENT
Start: 2023-04-27 | End: 2023-04-27 | Stop reason: SDUPTHER

## 2023-04-27 RX ORDER — SODIUM CHLORIDE, SODIUM LACTATE, POTASSIUM CHLORIDE, CALCIUM CHLORIDE 600; 310; 30; 20 MG/100ML; MG/100ML; MG/100ML; MG/100ML
INJECTION, SOLUTION INTRAVENOUS CONTINUOUS
Status: DISCONTINUED | OUTPATIENT
Start: 2023-04-27 | End: 2023-04-27 | Stop reason: HOSPADM

## 2023-04-27 RX ORDER — HEPARIN SODIUM 5000 [USP'U]/ML
INJECTION, SOLUTION INTRAVENOUS; SUBCUTANEOUS PRN
Status: DISCONTINUED | OUTPATIENT
Start: 2023-04-27 | End: 2023-04-27 | Stop reason: HOSPADM

## 2023-04-27 RX ORDER — SODIUM CHLORIDE 0.9 % (FLUSH) 0.9 %
5-40 SYRINGE (ML) INJECTION PRN
Status: DISCONTINUED | OUTPATIENT
Start: 2023-04-27 | End: 2023-04-27 | Stop reason: HOSPADM

## 2023-04-27 RX ORDER — HYDROMORPHONE HYDROCHLORIDE 2 MG/ML
0.25 INJECTION, SOLUTION INTRAMUSCULAR; INTRAVENOUS; SUBCUTANEOUS EVERY 5 MIN PRN
Status: DISCONTINUED | OUTPATIENT
Start: 2023-04-27 | End: 2023-04-27 | Stop reason: HOSPADM

## 2023-04-27 RX ORDER — MIDAZOLAM HYDROCHLORIDE 2 MG/2ML
2 INJECTION, SOLUTION INTRAMUSCULAR; INTRAVENOUS
Status: DISCONTINUED | OUTPATIENT
Start: 2023-04-27 | End: 2023-04-27 | Stop reason: HOSPADM

## 2023-04-27 RX ORDER — ONDANSETRON 2 MG/ML
INJECTION INTRAMUSCULAR; INTRAVENOUS PRN
Status: DISCONTINUED | OUTPATIENT
Start: 2023-04-27 | End: 2023-04-27 | Stop reason: SDUPTHER

## 2023-04-27 RX ORDER — ACETAMINOPHEN 500 MG
1000 TABLET ORAL ONCE
Status: COMPLETED | OUTPATIENT
Start: 2023-04-27 | End: 2023-04-27

## 2023-04-27 RX ORDER — DEXAMETHASONE SODIUM PHOSPHATE 4 MG/ML
INJECTION, SOLUTION INTRA-ARTICULAR; INTRALESIONAL; INTRAMUSCULAR; INTRAVENOUS; SOFT TISSUE PRN
Status: DISCONTINUED | OUTPATIENT
Start: 2023-04-27 | End: 2023-04-27 | Stop reason: SDUPTHER

## 2023-04-27 RX ORDER — HALOPERIDOL 5 MG/ML
1 INJECTION INTRAMUSCULAR
Status: DISCONTINUED | OUTPATIENT
Start: 2023-04-27 | End: 2023-04-27 | Stop reason: HOSPADM

## 2023-04-27 RX ORDER — OXYCODONE HYDROCHLORIDE 5 MG/1
5 TABLET ORAL
Status: DISCONTINUED | OUTPATIENT
Start: 2023-04-27 | End: 2023-04-27 | Stop reason: HOSPADM

## 2023-04-27 RX ORDER — EPHEDRINE SULFATE/0.9% NACL/PF 50 MG/5 ML
SYRINGE (ML) INTRAVENOUS PRN
Status: DISCONTINUED | OUTPATIENT
Start: 2023-04-27 | End: 2023-04-27 | Stop reason: SDUPTHER

## 2023-04-27 RX ORDER — ONDANSETRON 2 MG/ML
4 INJECTION INTRAMUSCULAR; INTRAVENOUS
Status: DISCONTINUED | OUTPATIENT
Start: 2023-04-27 | End: 2023-04-27 | Stop reason: HOSPADM

## 2023-04-27 RX ORDER — PROPOFOL 10 MG/ML
INJECTION, EMULSION INTRAVENOUS PRN
Status: DISCONTINUED | OUTPATIENT
Start: 2023-04-27 | End: 2023-04-27 | Stop reason: SDUPTHER

## 2023-04-27 RX ORDER — DEXTROSE MONOHYDRATE 100 MG/ML
INJECTION, SOLUTION INTRAVENOUS CONTINUOUS PRN
Status: DISCONTINUED | OUTPATIENT
Start: 2023-04-27 | End: 2023-04-27 | Stop reason: HOSPADM

## 2023-04-27 RX ORDER — SODIUM CHLORIDE 0.9 % (FLUSH) 0.9 %
5-40 SYRINGE (ML) INJECTION EVERY 12 HOURS SCHEDULED
Status: DISCONTINUED | OUTPATIENT
Start: 2023-04-27 | End: 2023-04-27 | Stop reason: HOSPADM

## 2023-04-27 RX ORDER — LIDOCAINE HYDROCHLORIDE 20 MG/ML
INJECTION, SOLUTION EPIDURAL; INFILTRATION; INTRACAUDAL; PERINEURAL PRN
Status: DISCONTINUED | OUTPATIENT
Start: 2023-04-27 | End: 2023-04-27 | Stop reason: SDUPTHER

## 2023-04-27 RX ORDER — FENTANYL CITRATE 50 UG/ML
INJECTION, SOLUTION INTRAMUSCULAR; INTRAVENOUS PRN
Status: DISCONTINUED | OUTPATIENT
Start: 2023-04-27 | End: 2023-04-27 | Stop reason: SDUPTHER

## 2023-04-27 RX ORDER — FENTANYL CITRATE 50 UG/ML
100 INJECTION, SOLUTION INTRAMUSCULAR; INTRAVENOUS
Status: DISCONTINUED | OUTPATIENT
Start: 2023-04-27 | End: 2023-04-27 | Stop reason: HOSPADM

## 2023-04-27 RX ADMIN — LIDOCAINE HYDROCHLORIDE 60 MG: 20 INJECTION, SOLUTION EPIDURAL; INFILTRATION; INTRACAUDAL; PERINEURAL at 13:40

## 2023-04-27 RX ADMIN — PROTAMINE SULFATE 10 MG: 10 INJECTION, SOLUTION INTRAVENOUS at 14:44

## 2023-04-27 RX ADMIN — HEPARIN SODIUM 5000 UNITS: 1000 INJECTION, SOLUTION INTRAVENOUS; SUBCUTANEOUS at 14:02

## 2023-04-27 RX ADMIN — Medication 10 MG: at 14:24

## 2023-04-27 RX ADMIN — ONDANSETRON 4 MG: 2 INJECTION INTRAMUSCULAR; INTRAVENOUS at 14:11

## 2023-04-27 RX ADMIN — Medication 10 MG: at 14:33

## 2023-04-27 RX ADMIN — Medication 2000 MG: at 13:50

## 2023-04-27 RX ADMIN — SODIUM CHLORIDE: 9 INJECTION, SOLUTION INTRAVENOUS at 12:41

## 2023-04-27 RX ADMIN — ACETAMINOPHEN 1000 MG: 500 TABLET, FILM COATED ORAL at 12:48

## 2023-04-27 RX ADMIN — FENTANYL CITRATE 25 MCG: 50 INJECTION, SOLUTION INTRAMUSCULAR; INTRAVENOUS at 13:39

## 2023-04-27 RX ADMIN — PROPOFOL 120 MG: 10 INJECTION, EMULSION INTRAVENOUS at 13:40

## 2023-04-27 RX ADMIN — DEXAMETHASONE SODIUM PHOSPHATE 4 MG: 4 INJECTION, SOLUTION INTRAMUSCULAR; INTRAVENOUS at 13:57

## 2023-04-27 ASSESSMENT — PAIN - FUNCTIONAL ASSESSMENT: PAIN_FUNCTIONAL_ASSESSMENT: 0-10

## 2023-04-27 ASSESSMENT — LIFESTYLE VARIABLES: SMOKING_STATUS: 1

## 2023-04-27 NOTE — ANESTHESIA PRE PROCEDURE
Department of Anesthesiology  Preprocedure Note       Name:  Amber Hurley   Age:  81 y.o.  :  1950                                          MRN:  [id number]         Date:  2023      Surgeon: Thu [place]):  Ilir Crain MD    Procedure: Procedure(s):  LEFT ARM FISTULAGRAM W/AV FISTULA REVISION AND LIGATION    Medications prior to admission:   Prior to Admission medications    Medication Sig Start Date End Date Taking?  Authorizing Provider   B Complex-C-Folic Acid (AMY-GER) TABS Take 1 tablet by mouth every evening 3/28/23   Historical Provider, MD   calcium acetate (PHOSLO) 667 MG CAPS capsule 3 times daily (with meals) 23   Historical Provider, MD   cefdinir (OMNICEF) 300 MG capsule every 12 hours 3/25/23   Historical Provider, MD   losartan (COZAAR) 100 MG tablet Take 1 tablet by mouth nightly 3/21/23   Historical Provider, MD   atorvastatin (LIPITOR) 20 MG tablet Take 1 tablet by mouth at bedtime Every other night 23   SAE Blackmon - CNP   albuterol sulfate HFA (PROVENTIL;VENTOLIN;PROAIR) 108 (90 Base) MCG/ACT inhaler Inhale 2 puffs into the lungs every 6 hours as needed for Wheezing    Historical Provider, MD   losartan (COZAAR) 50 MG tablet Take 1 tablet by mouth daily for 14 days  Patient not taking: Reported on 2023   SAE Rocha CNP   metoprolol tartrate (LOPRESSOR) 50 MG tablet Take 1 tablet by mouth 2 times daily 23   RAFAEL Cantu   DULoxetine (CYMBALTA) 60 MG extended release capsule Take 1 capsule by mouth 2 times daily  Patient taking differently: Take 1 capsule by mouth every evening 23   Dora Leyden, MD   NIFEdipine (PROCARDIA XL) 30 MG extended release tablet TAKE ONE TABLET BY MOUTH ONE TIME DAILY 10/4/22   Historical Provider, MD   acetaminophen (TYLENOL) 650 MG extended release tablet Take 1 tablet by mouth every 8 hours as needed    Historical Provider, MD   vitamin D3 (CHOLECALCIFEROL) 125 MCG (5000 UT) TABS tablet

## 2023-04-27 NOTE — ANESTHESIA PROCEDURE NOTES
Airway  Date/Time: 4/27/2023 1:43 PM  Urgency: elective    Airway not difficult    General Information and Staff    Patient location during procedure: OR  Resident/CRNA: SAE Nixon - CRNA  Performed: resident/CRNA     Indications and Patient Condition  Indications for airway management: anesthesia  Spontaneous ventilation: present  Sedation level: deep  Preoxygenated: yes  Patient position: sniffing  MILS not maintained throughout  Mask difficulty assessment: vent by bag mask    Final Airway Details  Final airway type: supraglottic airway      Successful airway: oropharyngeal  Size 4     Number of attempts at approach: 1  Ventilation between attempts: supraglottic airway  Number of other approaches attempted: 0    no

## 2023-04-27 NOTE — DISCHARGE INSTRUCTIONS
INSTRUCTIONS FOR A-V FISTULA, GRAFT ACCESS, REVISION OR DECLOT    ACTIVITY  As tolerated and as directed by your doctor. Keep arm straight and raised above heart level for the next 24 hours. DIET  Clear liquids until no nausea or vomiting; then light diet for the first day. Advance to regular diet on second day, unless your doctor orders otherwise. If nausea and vomiting continues, call your doctor. PAIN  Take pain medication as directed by your doctor. Call your doctor if pain is NOT relieved by the medication. DO NOT take aspirin or blood thinners until directed by your doctor. MEDICATION INTERACTION:  During your procedure you potentially received a medication or medications which may reduce the effectiveness of oral contraceptives. Please consider other forms of contraception for 1 month following your procedure if you are currently using oral contraceptives as your primary form of birth control. In addition to this, we recommend continuing your oral contraceptive as prescribed, unless otherwise instructed by your physician, during this time    5300  Rd your dressing clean and dry  Can remove ace wrap the next day    CARE OF YOUR ACCESS  DO:  Keep access area clean with soap and water daily after dressing has been removed. Feel for the thrill daily. (by placing your fingers over the graft you will be able to feel a vibration (thrill) which means blood is flowing and the graft is working.)  DO NOT:  Wear tight sleeves, watches, belts or bracelets over graft. Carry heavy bags across the graft. Sleep on graft side. Let your blood pressure be taken on graft side. Let blood be drawn from your graft side. CALL YOUR DOCTOR IF  Excessive bleeding that does not stop after holding mild pressure over area. Temperature of 101 degrees F or above.   Redness, excessive swelling or bruising, and/or green or yellow, smelly discharge from incision   Loss of sensation-cold, white, or blue fingers

## 2023-04-27 NOTE — PROGRESS NOTES
dizziness, sensory change, focal weakness, loss of consciousness and headaches. PHYSICAL EXAM   [unfilled]     Constitutional: she appears well-developed. No distress. HENT: Hearing intact. Head: Atraumatic. Eyes: Pupils are equal, round, and reactive to light. Neck: Normal range of motion. Cardiovascular: Regular rhythm. Pulmonary/Chest: Effort normal and breath sounds normal. No respiratory distress. Abdominal: Soft. Bowel sounds are normal. she exhibits no distension. There is no tenderness. There is no guarding. No hernia. Musculoskeletal: Normal range of motion. Neurological: She is alert. CN II- XII grossly intact  Skin: Warm and dry. Vascular: Palpable thrill and bruit      Imaging Results  Large lateral branch narrowing proximal portion of the fistula    ASSESSMENT AND PLAN   Ms. Riya Baker is a 81y.o. year old female end-stage renal disease with malfunctioning left radiocephalic fistula. We will schedule patient for revision with ligation of side branches and possible fistulogram    Elements of this note have been dictated using speech recognition software. As a result, errors of speech recognition may have occurred. 50 minutes of time was spent on this consult with greater than 50% of time spent face-to-face with the patient discussing the disease process, education and treatment options.

## 2023-04-27 NOTE — BRIEF OP NOTE
Patient status post revision of left radiocephalic fistula. Proximal fistula had hyperplasia sclerotic webs that had to be resected and patched. Completion fistulogram showed no evidence of any outflow stenosis.   Patient be discharged follow-up in 2 weeks

## 2023-04-27 NOTE — BRIEF OP NOTE
[address] FAX: [phone]    Brief Op Note Template Note    Pre-Op Diagnosis: PVD (peripheral vascular disease) ([facility] [address] [I73.9]    Post-Op Diagnosis:  * No post-op diagnosis entered *    Procedures: Procedure(s):  LEFT ARM FISTULAGRAM W/ AV FISTULA REVISION AND LIGATION    Surgeon: Elsa Lowe MD    Assistants: Surgeon(s):  Efraín Mills MD      Anesthesia:  General     Findings: Patch angioplasty    Tourniquet Time:  * No tourniquets in log *    Estimated Blood Loss:               Specimens:            Implants:    Implant Name Type Inv. Item Serial No.  Lot No. LRB No. Used Action   PATCH CAR TISS REP [employer] 1 X 10 - CM - ORDER BY EACH - [id number]  PATCH CAR TISS  [facility] 1 X [facility] [employer] [facility]- [id number] Left 1 Implanted       Complications: None               Signed: Elsa Lowe MD      Elements of this note have been dictated using speech recognition software. As a result, errors of speech recognition may have occurred.

## 2023-04-27 NOTE — PERIOP NOTE
Pt and  given discharge instructions at bedside, both verbalize understanding. All questions answered.

## 2023-04-28 NOTE — ANESTHESIA POSTPROCEDURE EVALUATION
Department of Anesthesiology  Postprocedure Note    Patient: Anil Rainey  MRN: [id number]  YOB: 1950  Date of evaluation: 4/27/2023      Procedure Summary     Date: 04/27/23 Room / Location: [facility] MAIN OR 12 / [facility] MAIN OR    Anesthesia Start: 1329 Anesthesia Stop: 1519    Procedure: LEFT ARM FISTULAGRAM W/ AV FISTULA REVISION AND LIGATION (Left: Arm Lower) Diagnosis:       PVD (peripheral vascular disease) ([facility] [address]      (PVD (peripheral vascular disease) ([facility] [address] [I73.9])    Providers: Kelley Raymundo MD Responsible Provider: Joshua Villalpando MD    Anesthesia Type: General ASA Status: 3          Anesthesia Type: General    Zion Phase I: Zion Score: 10    Zion Phase II: Zion Score: 10      Anesthesia Post Evaluation    Patient location during evaluation: PACU  Patient participation: complete - patient participated  Level of consciousness: awake and alert  Airway patency: patent  Nausea: well controlled. Complications: no  Cardiovascular status: acceptable.   Respiratory status: acceptable  Hydration status: stable

## 2023-04-28 NOTE — OP NOTE
patient was extubated and returned to the PACU in stable condition.         Luis Daniel Dickerson MD      [id number]  D:  04/27/2023 14:57  T:  04/28/2023 1:56  JOB #:  [id number]

## 2023-05-19 ENCOUNTER — OFFICE VISIT (OUTPATIENT)
Dept: VASCULAR SURGERY | Age: 73
End: 2023-05-19

## 2023-05-19 VITALS
SYSTOLIC BLOOD PRESSURE: 168 MMHG | HEART RATE: 70 BPM | OXYGEN SATURATION: 98 % | BODY MASS INDEX: 21.15 KG/M2 | HEIGHT: 70 IN | WEIGHT: 147.7 LBS | DIASTOLIC BLOOD PRESSURE: 87 MMHG

## 2023-05-19 DIAGNOSIS — N18.6 END STAGE RENAL DISEASE (HCC): Primary | ICD-10-CM

## 2023-05-19 NOTE — PROGRESS NOTES
DATE OF VISIT: 5/19/2023      HPI  Kaleb Weaver is a 81 y.o. female follows up for her surgery f/u to 1. Revision of left brachiocephalic fistula with patch angioplasty with proximal anastomosis. 2.  Completion venogram by Dr. Juan Tejeda on 4/27/23. She currently runs on HD MWF via a right chest catheter.        MEDICAL HISTORY:   Past Medical History:   Diagnosis Date    Anemia     Arthritis     Chronic pain     arthritis knees    Cough     reported constant since 10/22    ESRD (end stage renal disease) on dialysis [facility])     M,W,F- [facility]    GERD (gastroesophageal reflux disease)     controlled with med    High cholesterol     History of blood transfusion 2022    Hypertension     medication    Kidney disease     acute renal failure 7/2020 (hospitalized x 6 days)  Followed by Dr. Mukul Dickinson, nephrologist      Liver disease     hep C, treated in 2009    Melanoma ([facility] [address]     RLE,     Psychiatric disorder     depression, treated with cymbalta    PUD (peptic ulcer disease)     PVD (peripheral vascular disease) with claudication ([facility] [address]     Dr. Juan Tejeda follows    Rheumatic fever     as a child    Thyroid disease     hypothyroid, synthroid    UTI (urinary tract infection)     suprapubic catherter, on antibiotics 4/23/23         SURGICAL HISTORY:   Past Surgical History:   Procedure Laterality Date    APPENDECTOMY      CATARACT REMOVAL  2019    bilateral, IOL implant    CHOLECYSTECTOMY      COLONOSCOPY  2015    DIALYSIS FISTULA CREATION Left 2/15/2023    LEFT BRACHIOCEPHALIC FISTULA CREATION performed by Mónica Rodriguez MD at [address] [place] [address] Left 4/27/2023    LEFT ARM FISTULAGRAM W/ AV FISTULA REVISION AND LIGATION performed by Mónica Rodriguez MD at [facility] MAIN OR    IR TUNNELED CATHETER PLACEMENT GREATER THAN 5 YEARS  1/27/2023    IR TUNNELED CATHETER PLACEMENT GREATER THAN 5 YEARS 1/27/2023 Philippa Boas, MD [facility] RADIOLOGY SPECIALS    NEUROLOGICAL SURGERY      [facility] [address] [facility]

## 2023-05-31 ENCOUNTER — TELEPHONE (OUTPATIENT)
Dept: INTERNAL MEDICINE CLINIC | Facility: CLINIC | Age: 73
End: 2023-05-31

## 2023-05-31 DIAGNOSIS — M81.0 POSTMENOPAUSAL BONE LOSS: Primary | ICD-10-CM

## 2023-05-31 NOTE — TELEPHONE ENCOUNTER
Pt says she was never contacted about getting her bone density. The order has  and pt would like a new order. Please advise. Thank you.

## 2023-06-06 ENCOUNTER — OFFICE VISIT (OUTPATIENT)
Dept: VASCULAR SURGERY | Age: 73
End: 2023-06-06
Payer: MEDICARE

## 2023-06-06 DIAGNOSIS — N18.6 ESRD (END STAGE RENAL DISEASE) ON DIALYSIS (HCC): Primary | ICD-10-CM

## 2023-06-06 DIAGNOSIS — Z99.2 ESRD (END STAGE RENAL DISEASE) ON DIALYSIS (HCC): Primary | ICD-10-CM

## 2023-06-06 PROCEDURE — 1123F ACP DISCUSS/DSCN MKR DOCD: CPT | Performed by: SURGERY

## 2023-06-06 PROCEDURE — 99213 OFFICE O/P EST LOW 20 MIN: CPT | Performed by: SURGERY

## 2023-06-06 NOTE — PROGRESS NOTES
using speech recognition software. As a result, errors of speech recognition may have occurred.     20 minutes of time was spent on this encounter including chart review, assessment, evaluation and coordination of patient care

## 2023-06-07 ENCOUNTER — NURSE ONLY (OUTPATIENT)
Dept: UROLOGY | Age: 73
End: 2023-06-07
Payer: MEDICARE

## 2023-06-07 DIAGNOSIS — Z46.6 URINARY CATHETER CHANGE REQUIRED: ICD-10-CM

## 2023-06-07 DIAGNOSIS — N39.0 URINARY TRACT INFECTION WITHOUT HEMATURIA, SITE UNSPECIFIED: ICD-10-CM

## 2023-06-07 DIAGNOSIS — Z93.59 SUPRAPUBIC CATHETER (HCC): Primary | ICD-10-CM

## 2023-06-07 PROCEDURE — 51705 CHANGE OF BLADDER TUBE: CPT | Performed by: NURSE PRACTITIONER

## 2023-06-07 RX ORDER — METHENAMINE HIPPURATE 1000 MG/1
1 TABLET ORAL DAILY
Qty: 90 TABLET | Refills: 3 | Status: SHIPPED | OUTPATIENT
Start: 2023-06-07

## 2023-06-07 NOTE — PROGRESS NOTES
Patient had concerns of UTI. Repeat urine culture today after replacement of new cath. Discussed w Dr Jahaira Mendoza. Recommend trying daily Hiprex 1 gm PO daily to help reduce UTI.     Seema Asencio, SAE - CNP

## 2023-06-07 NOTE — PROGRESS NOTES
Pt came in for catheter change. 18f silastic suprapubic catheter changed without incident. Patient tolerated procedure well. Urine culture collected from new catheter and sent to lab, per request of Jaydon Hurtado

## 2023-06-11 LAB
BACTERIA SPEC CULT: ABNORMAL
BACTERIA SPEC CULT: ABNORMAL
SERVICE CMNT-IMP: ABNORMAL

## 2023-06-14 RX ORDER — PANTOPRAZOLE SODIUM 40 MG/1
TABLET, DELAYED RELEASE ORAL
Qty: 180 TABLET | Refills: 1 | OUTPATIENT
Start: 2023-06-14

## 2023-06-14 RX ORDER — LEVOTHYROXINE SODIUM 0.12 MG/1
TABLET ORAL
Qty: 90 TABLET | Refills: 1 | OUTPATIENT
Start: 2023-06-14

## 2023-07-26 ENCOUNTER — NURSE ONLY (OUTPATIENT)
Dept: UROLOGY | Age: 73
End: 2023-07-26
Payer: MEDICARE

## 2023-07-26 DIAGNOSIS — F32.A DEPRESSIVE DISORDER: ICD-10-CM

## 2023-07-26 DIAGNOSIS — Z93.59 SUPRAPUBIC CATHETER (HCC): Primary | ICD-10-CM

## 2023-07-26 DIAGNOSIS — Z46.6 URINARY CATHETER CHANGE REQUIRED: ICD-10-CM

## 2023-07-26 PROCEDURE — 51705 CHANGE OF BLADDER TUBE: CPT | Performed by: NURSE PRACTITIONER

## 2023-07-26 RX ORDER — DULOXETIN HYDROCHLORIDE 60 MG/1
60 CAPSULE, DELAYED RELEASE ORAL 2 TIMES DAILY
Qty: 180 CAPSULE | Refills: 1 | Status: SHIPPED | OUTPATIENT
Start: 2023-07-26

## 2023-08-04 ENCOUNTER — OFFICE VISIT (OUTPATIENT)
Dept: VASCULAR SURGERY | Age: 73
End: 2023-08-04
Payer: MEDICARE

## 2023-08-04 VITALS
BODY MASS INDEX: 21.76 KG/M2 | HEIGHT: 70 IN | HEART RATE: 67 BPM | WEIGHT: 152 LBS | SYSTOLIC BLOOD PRESSURE: 149 MMHG | OXYGEN SATURATION: 98 % | DIASTOLIC BLOOD PRESSURE: 88 MMHG

## 2023-08-04 DIAGNOSIS — I73.9 PVD (PERIPHERAL VASCULAR DISEASE) WITH CLAUDICATION (HCC): Primary | ICD-10-CM

## 2023-08-04 PROCEDURE — 3079F DIAST BP 80-89 MM HG: CPT | Performed by: SURGERY

## 2023-08-04 PROCEDURE — 99213 OFFICE O/P EST LOW 20 MIN: CPT | Performed by: SURGERY

## 2023-08-04 PROCEDURE — 1123F ACP DISCUSS/DSCN MKR DOCD: CPT | Performed by: SURGERY

## 2023-08-04 PROCEDURE — 3077F SYST BP >= 140 MM HG: CPT | Performed by: SURGERY

## 2023-08-04 NOTE — PROGRESS NOTES
[address]   [address] FAX: [phone]    Deejay Cortez  : 1950    Chief Complaint:     History of Present Illness:   Patient follows up today for status post creation of left right cephalic fistula. Postoperatively she was found to have a high-grade outflow stenosis. CURRENT MEDICATIONS:  Current Outpatient Medications   Medication Sig Dispense Refill    DULoxetine (CYMBALTA) 60 MG extended release capsule Take 1 capsule by mouth 2 times daily 180 capsule 1    B Complex-C-Folic Acid (AMY-GER) TABS Take 1 tablet by mouth every evening      calcium acetate (PHOSLO) 667 MG CAPS capsule 3 times daily (with meals)      atorvastatin (LIPITOR) 20 MG tablet Take 1 tablet by mouth at bedtime Every other night 90 tablet 1    albuterol sulfate HFA (PROVENTIL;VENTOLIN;PROAIR) 108 (90 Base) MCG/ACT inhaler Inhale 2 puffs into the lungs every 6 hours as needed for Wheezing      metoprolol tartrate (LOPRESSOR) 50 MG tablet Take 1 tablet by mouth 2 times daily 60 tablet 0    acetaminophen (TYLENOL) 650 MG extended release tablet Take 1 tablet by mouth every 8 hours as needed      vitamin D3 (CHOLECALCIFEROL) 125 MCG (5000 UT) TABS tablet Take 1 tablet by mouth daily      levothyroxine (SYNTHROID) 125 MCG tablet Take 1 tablet by mouth every morning (before breakfast) Not taking it daily.       pantoprazole (PROTONIX) 40 MG tablet Take 2 tablets by mouth every evening      sodium bicarbonate 650 MG tablet TAKE 1 TABLET BY MOUTH TWICE DAILY with meals      methenamine (HIPREX) 1 g tablet Take 1 tablet by mouth daily 90 tablet 3    NIFEdipine (PROCARDIA XL) 60 MG extended release tablet TAKE 1 TABLET BY MOUTH DAILY (Patient not taking: Reported on 2023)      cefdinir (OMNICEF) 300 MG capsule every 12 hours      NIFEdipine (PROCARDIA XL) 30 MG extended release tablet TAKE ONE TABLET BY MOUTH ONE TIME DAILY (Patient not taking: Reported on 2023)
Improved

## 2023-08-05 ENCOUNTER — HOSPITAL ENCOUNTER (INPATIENT)
Age: 73
LOS: 6 days | Discharge: HOME | DRG: 252 | End: 2023-08-11
Attending: EMERGENCY MEDICINE | Admitting: INTERNAL MEDICINE
Payer: MEDICARE

## 2023-08-05 ENCOUNTER — APPOINTMENT (OUTPATIENT)
Dept: GENERAL RADIOLOGY | Age: 73
DRG: 252 | End: 2023-08-05
Payer: MEDICARE

## 2023-08-05 DIAGNOSIS — E87.70 HYPERVOLEMIA, UNSPECIFIED HYPERVOLEMIA TYPE: Primary | ICD-10-CM

## 2023-08-05 DIAGNOSIS — R53.1 GENERALIZED WEAKNESS: ICD-10-CM

## 2023-08-05 DIAGNOSIS — Z99.2 PERITONEAL DIALYSIS STATUS (HCC): ICD-10-CM

## 2023-08-05 PROBLEM — J81.1 PULMONARY EDEMA: Status: ACTIVE | Noted: 2023-08-05

## 2023-08-05 PROBLEM — R06.00 DYSPNEA: Status: ACTIVE | Noted: 2023-08-05

## 2023-08-05 PROBLEM — D64.9 ANEMIA: Status: ACTIVE | Noted: 2020-07-16

## 2023-08-05 LAB
ALBUMIN SERPL-MCNC: 3.1 G/DL (ref 3.2–4.6)
ALBUMIN/GLOB SERPL: 0.8 (ref 0.4–1.6)
ALP SERPL-CCNC: 120 U/L (ref 50–136)
ALT SERPL-CCNC: 16 U/L (ref 12–65)
ANION GAP SERPL CALC-SCNC: 8 MMOL/L (ref 2–11)
AST SERPL-CCNC: 15 U/L (ref 15–37)
BASOPHILS # BLD: 0.1 K/UL (ref 0–0.2)
BASOPHILS NFR BLD: 1 % (ref 0–2)
BILIRUB SERPL-MCNC: 0.4 MG/DL (ref 0.2–1.1)
BUN SERPL-MCNC: 68 MG/DL (ref 8–23)
CALCIUM SERPL-MCNC: 8.7 MG/DL (ref 8.3–10.4)
CHLORIDE SERPL-SCNC: 108 MMOL/L (ref 101–110)
CO2 SERPL-SCNC: 24 MMOL/L (ref 21–32)
CREAT SERPL-MCNC: 10 MG/DL (ref 0.6–1)
DIFFERENTIAL METHOD BLD: ABNORMAL
EKG ATRIAL RATE: 75 BPM
EKG DIAGNOSIS: NORMAL
EKG P AXIS: 70 DEGREES
EKG P-R INTERVAL: 232 MS
EKG Q-T INTERVAL: 394 MS
EKG QRS DURATION: 102 MS
EKG QTC CALCULATION (BAZETT): 439 MS
EKG R AXIS: 50 DEGREES
EKG T AXIS: 86 DEGREES
EKG VENTRICULAR RATE: 75 BPM
EOSINOPHIL # BLD: 0.2 K/UL (ref 0–0.8)
EOSINOPHIL NFR BLD: 2 % (ref 0.5–7.8)
ERYTHROCYTE [DISTWIDTH] IN BLOOD BY AUTOMATED COUNT: 16 % (ref 11.9–14.6)
GLOBULIN SER CALC-MCNC: 3.9 G/DL (ref 2.8–4.5)
GLUCOSE SERPL-MCNC: 105 MG/DL (ref 65–100)
HAV IGM SER QL: NONREACTIVE
HBV CORE IGM SER QL: NONREACTIVE
HBV SURFACE AG SER QL: NONREACTIVE
HCT VFR BLD AUTO: 24.2 % (ref 35.8–46.3)
HCV AB SER QL: REACTIVE
HGB BLD-MCNC: 7.5 G/DL (ref 11.7–15.4)
IMM GRANULOCYTES # BLD AUTO: 0.1 K/UL (ref 0–0.5)
IMM GRANULOCYTES NFR BLD AUTO: 0 % (ref 0–5)
LYMPHOCYTES # BLD: 0.8 K/UL (ref 0.5–4.6)
LYMPHOCYTES NFR BLD: 7 % (ref 13–44)
MCH RBC QN AUTO: 31.3 PG (ref 26.1–32.9)
MCHC RBC AUTO-ENTMCNC: 31 G/DL (ref 31.4–35)
MCV RBC AUTO: 100.8 FL (ref 82–102)
MONOCYTES # BLD: 1.1 K/UL (ref 0.1–1.3)
MONOCYTES NFR BLD: 10 % (ref 4–12)
NEUTS SEG # BLD: 8.9 K/UL (ref 1.7–8.2)
NEUTS SEG NFR BLD: 80 % (ref 43–78)
NRBC # BLD: 0 K/UL (ref 0–0.2)
PLATELET # BLD AUTO: 243 K/UL (ref 150–450)
PMV BLD AUTO: 9.1 FL (ref 9.4–12.3)
POTASSIUM SERPL-SCNC: 4.4 MMOL/L (ref 3.5–5.1)
PROT SERPL-MCNC: 7 G/DL (ref 6.3–8.2)
RBC # BLD AUTO: 2.4 M/UL (ref 4.05–5.2)
SODIUM SERPL-SCNC: 140 MMOL/L (ref 133–143)
TROPONIN I SERPL HS-MCNC: 26.1 PG/ML (ref 0–14)
WBC # BLD AUTO: 11.2 K/UL (ref 4.3–11.1)

## 2023-08-05 PROCEDURE — 85025 COMPLETE CBC W/AUTO DIFF WBC: CPT

## 2023-08-05 PROCEDURE — 1100000000 HC RM PRIVATE

## 2023-08-05 PROCEDURE — 6370000000 HC RX 637 (ALT 250 FOR IP): Performed by: INTERNAL MEDICINE

## 2023-08-05 PROCEDURE — 93005 ELECTROCARDIOGRAM TRACING: CPT | Performed by: EMERGENCY MEDICINE

## 2023-08-05 PROCEDURE — 99285 EMERGENCY DEPT VISIT HI MDM: CPT

## 2023-08-05 PROCEDURE — 2580000003 HC RX 258: Performed by: INTERNAL MEDICINE

## 2023-08-05 PROCEDURE — 93010 ELECTROCARDIOGRAM REPORT: CPT | Performed by: INTERNAL MEDICINE

## 2023-08-05 PROCEDURE — 6370000000 HC RX 637 (ALT 250 FOR IP): Performed by: EMERGENCY MEDICINE

## 2023-08-05 PROCEDURE — 80074 ACUTE HEPATITIS PANEL: CPT

## 2023-08-05 PROCEDURE — 80053 COMPREHEN METABOLIC PANEL: CPT

## 2023-08-05 PROCEDURE — 84484 ASSAY OF TROPONIN QUANT: CPT

## 2023-08-05 PROCEDURE — 71045 X-RAY EXAM CHEST 1 VIEW: CPT

## 2023-08-05 PROCEDURE — 96374 THER/PROPH/DIAG INJ IV PUSH: CPT

## 2023-08-05 PROCEDURE — 6360000002 HC RX W HCPCS: Performed by: EMERGENCY MEDICINE

## 2023-08-05 PROCEDURE — 90935 HEMODIALYSIS ONE EVALUATION: CPT

## 2023-08-05 RX ORDER — CALCIUM ACETATE 667 MG/1
667 TABLET ORAL
Status: DISCONTINUED | OUTPATIENT
Start: 2023-08-05 | End: 2023-08-11 | Stop reason: HOSPADM

## 2023-08-05 RX ORDER — DULOXETIN HYDROCHLORIDE 60 MG/1
120 CAPSULE, DELAYED RELEASE ORAL DAILY
Status: DISCONTINUED | OUTPATIENT
Start: 2023-08-06 | End: 2023-08-07

## 2023-08-05 RX ORDER — ONDANSETRON 4 MG/1
4 TABLET, ORALLY DISINTEGRATING ORAL EVERY 8 HOURS PRN
Status: DISCONTINUED | OUTPATIENT
Start: 2023-08-05 | End: 2023-08-11 | Stop reason: HOSPADM

## 2023-08-05 RX ORDER — SODIUM CHLORIDE 0.9 % (FLUSH) 0.9 %
5-40 SYRINGE (ML) INJECTION PRN
Status: DISCONTINUED | OUTPATIENT
Start: 2023-08-05 | End: 2023-08-11 | Stop reason: HOSPADM

## 2023-08-05 RX ORDER — MAGNESIUM HYDROXIDE/ALUMINUM HYDROXICE/SIMETHICONE 120; 1200; 1200 MG/30ML; MG/30ML; MG/30ML
30 SUSPENSION ORAL
Status: COMPLETED | OUTPATIENT
Start: 2023-08-05 | End: 2023-08-05

## 2023-08-05 RX ORDER — DULOXETIN HYDROCHLORIDE 30 MG/1
60 CAPSULE, DELAYED RELEASE ORAL 2 TIMES DAILY
Status: DISCONTINUED | OUTPATIENT
Start: 2023-08-05 | End: 2023-08-05

## 2023-08-05 RX ORDER — SODIUM CHLORIDE 0.9 % (FLUSH) 0.9 %
5-40 SYRINGE (ML) INJECTION EVERY 12 HOURS SCHEDULED
Status: DISCONTINUED | OUTPATIENT
Start: 2023-08-05 | End: 2023-08-11 | Stop reason: HOSPADM

## 2023-08-05 RX ORDER — ATORVASTATIN CALCIUM 20 MG/1
20 TABLET, FILM COATED ORAL NIGHTLY
Status: DISCONTINUED | OUTPATIENT
Start: 2023-08-05 | End: 2023-08-11 | Stop reason: HOSPADM

## 2023-08-05 RX ORDER — MORPHINE SULFATE 4 MG/ML
4 INJECTION INTRAVENOUS ONCE
Status: COMPLETED | OUTPATIENT
Start: 2023-08-05 | End: 2023-08-05

## 2023-08-05 RX ORDER — ACETAMINOPHEN 325 MG/1
650 TABLET ORAL EVERY 6 HOURS PRN
Status: DISCONTINUED | OUTPATIENT
Start: 2023-08-05 | End: 2023-08-11 | Stop reason: HOSPADM

## 2023-08-05 RX ORDER — SODIUM CHLORIDE 9 MG/ML
INJECTION, SOLUTION INTRAVENOUS PRN
Status: DISCONTINUED | OUTPATIENT
Start: 2023-08-05 | End: 2023-08-11 | Stop reason: HOSPADM

## 2023-08-05 RX ORDER — ONDANSETRON 2 MG/ML
4 INJECTION INTRAMUSCULAR; INTRAVENOUS EVERY 6 HOURS PRN
Status: DISCONTINUED | OUTPATIENT
Start: 2023-08-05 | End: 2023-08-11 | Stop reason: HOSPADM

## 2023-08-05 RX ORDER — POLYETHYLENE GLYCOL 3350 17 G/17G
17 POWDER, FOR SOLUTION ORAL DAILY PRN
Status: DISCONTINUED | OUTPATIENT
Start: 2023-08-05 | End: 2023-08-11 | Stop reason: HOSPADM

## 2023-08-05 RX ORDER — METOPROLOL TARTRATE 50 MG/1
50 TABLET, FILM COATED ORAL 2 TIMES DAILY
Status: DISCONTINUED | OUTPATIENT
Start: 2023-08-05 | End: 2023-08-11 | Stop reason: HOSPADM

## 2023-08-05 RX ORDER — ACETAMINOPHEN 650 MG/1
650 SUPPOSITORY RECTAL EVERY 6 HOURS PRN
Status: DISCONTINUED | OUTPATIENT
Start: 2023-08-05 | End: 2023-08-11 | Stop reason: HOSPADM

## 2023-08-05 RX ADMIN — MORPHINE SULFATE 4 MG: 4 INJECTION INTRAVENOUS at 10:49

## 2023-08-05 RX ADMIN — METOPROLOL TARTRATE 50 MG: 50 TABLET, FILM COATED ORAL at 21:19

## 2023-08-05 RX ADMIN — ATORVASTATIN CALCIUM 20 MG: 20 TABLET, FILM COATED ORAL at 21:19

## 2023-08-05 RX ADMIN — CHOLECALCIFEROL TAB 125 MCG (5000 UNIT) 5000 UNITS: 125 TAB at 18:05

## 2023-08-05 RX ADMIN — ALUMINUM HYDROXIDE, MAGNESIUM HYDROXIDE, AND SIMETHICONE 30 ML: 200; 200; 20 SUSPENSION ORAL at 10:49

## 2023-08-05 RX ADMIN — ACETAMINOPHEN 650 MG: 325 TABLET ORAL at 18:05

## 2023-08-05 RX ADMIN — CALCIUM ACETATE 667 MG: 667 TABLET ORAL at 18:05

## 2023-08-05 RX ADMIN — SODIUM CHLORIDE, PRESERVATIVE FREE 10 ML: 5 INJECTION INTRAVENOUS at 21:20

## 2023-08-05 ASSESSMENT — PAIN - FUNCTIONAL ASSESSMENT: PAIN_FUNCTIONAL_ASSESSMENT: 0-10

## 2023-08-05 ASSESSMENT — PAIN SCALES - GENERAL
PAINLEVEL_OUTOF10: 3
PAINLEVEL_OUTOF10: 10
PAINLEVEL_OUTOF10: 6
PAINLEVEL_OUTOF10: 0

## 2023-08-05 ASSESSMENT — PAIN DESCRIPTION - LOCATION: LOCATION: CHEST

## 2023-08-05 ASSESSMENT — PAIN DESCRIPTION - ORIENTATION: ORIENTATION: MID

## 2023-08-05 ASSESSMENT — ENCOUNTER SYMPTOMS
ABDOMINAL PAIN: 0
BACK PAIN: 0
HEARTBURN: 1
TROUBLE SWALLOWING: 0

## 2023-08-05 ASSESSMENT — PAIN DESCRIPTION - DESCRIPTORS: DESCRIPTORS: DISCOMFORT

## 2023-08-05 NOTE — ACP (ADVANCE CARE PLANNING)
[facility] Hospitalist Service  At the heart of better care     Advance Care Planning   Admit Date:  2023  9:46 AM   Name:  Mike Grayson   Age:  81 y.o. Sex:  female  :  1950   MRN:  [id number]   Room:  [place] [address]    Mike Grayson has capacity to make her own decisions:   Yes    If pt unable to make decisions, POA/surrogate decision maker:  Spouse, Ken Paris     Other people present: This physician     Patient / surrogate decision-maker directed code status:  DNR/DNI    Other ACP topics discussed, if applicable:   Treatment of pulmonary edema     Patient or surrogate consented to discussion of the current conditions, workup, management plans, prognosis, and the risk for further deterioration. Time spent: 19 minutes in direct discussion.       Signed:  Anne Bryant MD

## 2023-08-05 NOTE — H&P
Hospitalist History and Physical   Admit Date:  2023  9:46 AM   Name:  Parminder Ann   Age:  81 y.o. Sex:  female  :  1950   MRN:  [id number]   Room:  [facility]/    Presenting/Chief Complaint: Shortness of breath today  Reason(s) for Admission: Dyspnea [R06.00]     History of Present Illness:   Parminder Ann is a 81 y.o. female with medical history of   Hypertension  End-stage renal disease on dialysis. Patient has been on dialysis since 2023. She still has minimum amount of urine. Hyperlipidemia  Peripheral vascular disease  Anemia  Acquired hypothyroidism  Gastroesophageal reflux disease  Hepatitis C infection    who presented with shortness of breath for the past couple of days prior to admission. She has been on dialysis since 2023. She has AV fistula, but it is not workable with dialysis access. So she has tunnel catheter in the right IJ area. Patient missed dialysis since the last week that she had it. Normally she is on dialysis every  and Saturday. She claimed the reason for missing dialysis is that \"struggling with it\". What she meant is that mentally she is thinking about not continuing dialysis treatment. She has no fever. She has no cough. No sick contacts. No diarrhea. No dysuria. She came to emergency room and was found to have dyspnea with pulmonary edema. Blood pressure was initially elevated, but improved after she is more resting. Potassium is normal.  BUN and creatinine is high. Hospitalist service is requested to admit the patient. Assessment & Plan:     Principal Problem:    Dyspnea    Pulmonary edema    End stage renal disease ([address])  Plan:   Admit to medical floor. IV access   Monitor symptoms and oxygenation PRN. May need oxygen supplementation. Consult nephrology service. Patient may need HD today. Avoid excessive salt or fluid intake.      Active Problems:    Hyperlipidemia  Plan:

## 2023-08-05 NOTE — DIALYSIS
TRANSFER IN - DIALYSIS    Received patient in dialysis unit  from ED (unit) for ordered procedure. Consent verified for renal replacement therapy. Procedure explained to patient, opportunity for Q&A provided. Call light given. Patient alert and vital signs stable. /105,  P71  , room air. Hemodialysis initiated using right chest cathter. Aspirated and flushed both ports without difficulty. Dressing clean, dry and intact. Machine settings per MD order. Heparin 0 unit bolus and 0 units/hr. Will monitor during treatment.

## 2023-08-05 NOTE — PROGRESS NOTES
Hourly rounding completed during shift. All needs met at this time. Medicated per [facility] for mild pain. Bed L/L with call bell in reach. Report to be given to oncoming RN.

## 2023-08-05 NOTE — ED PROVIDER NOTES
Emergency Department Provider Note       PCP: Jonas Maier MD   Age: 81 y.o. Sex: female     DISPOSITION Decision To Admit 08/05/2023 11:14:04 AM       ICD-10-CM    1. Hypervolemia, unspecified hypervolemia type  E87.70           Medical Decision Making     Complexity of Problems Addressed:  1 or more acute illnesses that pose a threat to life or bodily function. Data Reviewed and Analyzed:  Category 1:   I independently ordered and reviewed each unique test.         Category 2:   I independently ordered and interpreted the ED EKG in the absence of a Cardiologist.    Rate: 75  EKG Interpretation: EKG Interpretation: sinus rhythm, no evidence of arrhythmia and no acute changes  ST Segments: Normal ST segments - NO STEMI      Category 3: Discussion of management or test interpretation. Nicki Hood is a 81 y.o. female who presents to the Emergency Department with chief complaint of  No chief complaint on file. Patient is a 29-year-old female who presents with shortness of breath for the last 2 days as well as midsternal chest pain and SOB. Patient's last dialysis was on Saturday and on Tuesday she went for dialysis and a water line broke at the dialysis center and she chose not to get her dialysis elsewhere on Tuesday and Thursday. Patient was scheduled for dialysis at 10:30 AM today but came here because of shortness of breath. Patient states her chest pain is sternal in nature and has been present for the last 24 hours and constant since 8 PM last night. Patient states she does have esophageal problems including GERD. The history is provided by the patient.    Chest Pain  Pain location:  Substernal area  Pain quality: aching and dull    Pain radiates to:  Does not radiate  Pain severity:  Mild  Onset quality:  Gradual  Duration:  24 hours  Timing:  Constant  Progression:  Waxing and waning  Chronicity:  Recurrent  Context: breathing    Relieved by:  Nothing  Worsened by:
I have reviewed and confirmed nurses' notes for patient's medications, allergies, medical history, and surgical history.

## 2023-08-05 NOTE — ED TRIAGE NOTES
Pt to ED in wheelchair for chest pain that began last night. Pt endorses associated shortness of breath and difficulty taking a deep breath. Pt states midsternal chest pain. Pt is a dialysis pt and last dialysis appt was last Saturday. Pt was supposed to get dialysis today at 1030. Pt states hx of esophageal ulcers and states taking matti seltzer all week with no relief.

## 2023-08-05 NOTE — DIALYSIS
TRANSFER OUT- DIALYSIS    Hemodialysis treatment completed. PT ran 3.5 hours, without complications. Patient alert and VS stable  B.vs       3 Kg removed. Flushed both ports with 10 mL of NS.  CVC dressing clean, dry, and intact, tego caps intact, curos caps placed. Meds given: 0.    RBCs given during dialysis: 0    Patient to 634 after dialysis.

## 2023-08-06 LAB
ANION GAP SERPL CALC-SCNC: 7 MMOL/L (ref 2–11)
BUN SERPL-MCNC: 39 MG/DL (ref 8–23)
CALCIUM SERPL-MCNC: 9.2 MG/DL (ref 8.3–10.4)
CHLORIDE SERPL-SCNC: 103 MMOL/L (ref 101–110)
CO2 SERPL-SCNC: 27 MMOL/L (ref 21–32)
CREAT SERPL-MCNC: 6.9 MG/DL (ref 0.6–1)
GLUCOSE SERPL-MCNC: 170 MG/DL (ref 65–100)
POTASSIUM SERPL-SCNC: 4.1 MMOL/L (ref 3.5–5.1)
SODIUM SERPL-SCNC: 137 MMOL/L (ref 133–143)
TROPONIN I SERPL HS-MCNC: 40.4 PG/ML (ref 0–14)

## 2023-08-06 PROCEDURE — 6370000000 HC RX 637 (ALT 250 FOR IP): Performed by: FAMILY MEDICINE

## 2023-08-06 PROCEDURE — 84484 ASSAY OF TROPONIN QUANT: CPT

## 2023-08-06 PROCEDURE — 6370000000 HC RX 637 (ALT 250 FOR IP): Performed by: INTERNAL MEDICINE

## 2023-08-06 PROCEDURE — 80048 BASIC METABOLIC PNL TOTAL CA: CPT

## 2023-08-06 PROCEDURE — 36415 COLL VENOUS BLD VENIPUNCTURE: CPT

## 2023-08-06 PROCEDURE — 6360000002 HC RX W HCPCS: Performed by: INTERNAL MEDICINE

## 2023-08-06 PROCEDURE — 1100000000 HC RM PRIVATE

## 2023-08-06 PROCEDURE — 2580000003 HC RX 258: Performed by: INTERNAL MEDICINE

## 2023-08-06 RX ADMIN — METOPROLOL TARTRATE 50 MG: 50 TABLET, FILM COATED ORAL at 08:39

## 2023-08-06 RX ADMIN — CHOLECALCIFEROL TAB 125 MCG (5000 UNIT) 5000 UNITS: 125 TAB at 08:39

## 2023-08-06 RX ADMIN — CALCIUM ACETATE 667 MG: 667 TABLET ORAL at 12:32

## 2023-08-06 RX ADMIN — CALCIUM ACETATE 667 MG: 667 TABLET ORAL at 17:52

## 2023-08-06 RX ADMIN — SODIUM CHLORIDE, PRESERVATIVE FREE 10 ML: 5 INJECTION INTRAVENOUS at 08:39

## 2023-08-06 RX ADMIN — CALCIUM ACETATE 667 MG: 667 TABLET ORAL at 08:38

## 2023-08-06 RX ADMIN — ACETAMINOPHEN 650 MG: 325 TABLET ORAL at 12:32

## 2023-08-06 RX ADMIN — LEVOTHYROXINE SODIUM 125 MCG: 0.07 TABLET ORAL at 05:21

## 2023-08-06 RX ADMIN — EPOETIN ALFA-EPBX 6000 UNITS: 3000 INJECTION, SOLUTION INTRAVENOUS; SUBCUTANEOUS at 12:35

## 2023-08-06 RX ADMIN — METOPROLOL TARTRATE 50 MG: 50 TABLET, FILM COATED ORAL at 21:22

## 2023-08-06 RX ADMIN — ATORVASTATIN CALCIUM 20 MG: 20 TABLET, FILM COATED ORAL at 21:22

## 2023-08-06 RX ADMIN — DULOXETINE HYDROCHLORIDE 120 MG: 60 CAPSULE, DELAYED RELEASE ORAL at 08:38

## 2023-08-06 RX ADMIN — SODIUM CHLORIDE, PRESERVATIVE FREE 10 ML: 5 INJECTION INTRAVENOUS at 21:00

## 2023-08-06 ASSESSMENT — PAIN SCALES - GENERAL
PAINLEVEL_OUTOF10: 0

## 2023-08-06 ASSESSMENT — PAIN DESCRIPTION - LOCATION: LOCATION: HEAD

## 2023-08-06 ASSESSMENT — PAIN DESCRIPTION - DESCRIPTORS: DESCRIPTORS: ACHING

## 2023-08-06 ASSESSMENT — PAIN SCALES - WONG BAKER: WONGBAKER_NUMERICALRESPONSE: 0

## 2023-08-06 NOTE — PROGRESS NOTES
Granulocyte 0.1 0.0 - 0.5 K/UL   CMP    Collection Time: 08/05/23 10:08 AM   Result Value Ref Range    Sodium 140 133 - 143 mmol/L    Potassium 4.4 3.5 - 5.1 mmol/L    Chloride 108 101 - 110 mmol/L    CO2 24 21 - 32 mmol/L    Anion Gap 8 2 - 11 mmol/L    Glucose 105 (H) 65 - 100 mg/dL    BUN 68 (H) 8 - 23 MG/DL    Creatinine 10.00 (H) 0.6 - 1.0 MG/DL    Est, Glom Filt Rate 4 (L) >60 ml/min/1.73m2    Calcium 8.7 8.3 - 10.4 MG/DL    Total Bilirubin 0.4 0.2 - 1.1 MG/DL    ALT 16 12 - 65 U/L    AST 15 15 - 37 U/L    Alk Phosphatase 120 50 - 136 U/L    Total Protein 7.0 6.3 - 8.2 g/dL    Albumin 3.1 (L) 3.2 - 4.6 g/dL    Globulin 3.9 2.8 - 4.5 g/dL    Albumin/Globulin Ratio 0.8 0.4 - 1.6     Troponin    Collection Time: 08/05/23 10:08 AM   Result Value Ref Range    Troponin, High Sensitivity 26.1 (H) 0 - 14 pg/mL   Hepatitis Panel, Acute    Collection Time: 08/05/23  1:00 PM   Result Value Ref Range    Hep A IgM NONREACTIVE NR      Hep B Core Ab, IgM NONREACTIVE NR      Hepatitis B Surface Ag NONREACTIVE NR      Hepatitis C Ab REACTIVE (A) NR     Basic Metabolic Panel w/ Reflex to MG    Collection Time: 08/06/23  9:28 AM   Result Value Ref Range    Sodium 137 133 - 143 mmol/L    Potassium 4.1 3.5 - 5.1 mmol/L    Chloride 103 101 - 110 mmol/L    CO2 27 21 - 32 mmol/L    Anion Gap 7 2 - 11 mmol/L    Glucose 170 (H) 65 - 100 mg/dL    BUN 39 (H) 8 - 23 MG/DL    Creatinine 6.90 (H) 0.6 - 1.0 MG/DL    Est, Glom Filt Rate 6 (L) >60 ml/min/1.73m2    Calcium 9.2 8.3 - 10.4 MG/DL   Troponin    Collection Time: 08/06/23  9:28 AM   Result Value Ref Range    Troponin, High Sensitivity 40.4 (H) 0 - 14 pg/mL       Current Meds:  Current Facility-Administered Medications   Medication Dose Route Frequency    epoetin sean-epbx (RETACRIT) injection 6,000 Units  6,000 Units SubCUTAneous Q7 Days    sodium chloride flush 0.9 % injection 5-40 mL  5-40 mL IntraVENous 2 times per day    sodium chloride flush 0.9 % injection 5-40 mL  5-40 mL

## 2023-08-06 NOTE — PROGRESS NOTES
If we can be of any service to you during your stay please let us know      Marily Brar     [phone]

## 2023-08-06 NOTE — CARE COORDINATION
Pt chart reviewed for discharge planning. CM met with pt at bedside, verified demographic information/ health insurance. Pt lives with spouse in a one level home, is independent with ADLs, ambulates with a cane or walker when needed, and has decrease driving in the community. PCP was confirmed and no outside services in the home at this time. CM will follow pt plan of care and assist with supportive care referrals pending pt clinical progress. Please consult case management if specific needs arise. 08/06/23 1101   Service Assessment   Patient Orientation Alert and Oriented   Cognition Alert   History Provided By Patient   Primary Caregiver Self   Support Systems Spouse/Significant Other   Patient's Healthcare Decision Maker is: Legal Next of Kin   PCP Verified by CM Yes   Last Visit to PCP Within last 6 months   Prior Functional Level Independent in ADLs/IADLs   Current Functional Level Independent in ADLs/IADLs   Can patient return to prior living arrangement Yes   Ability to make needs known: Good   Family able to assist with home care needs: Yes   Would you like for me to discuss the discharge plan with any other family members/significant others, and if so, who? Yes  Irwin Benton, Spouse)   Financial Resources Medicare  ([employer])   Community Resources None   Social/Functional History   Lives With Spouse   Type of [address] [place] [address] One level   Home Equipment Cane;Walker, standard   Receives Help From Family   ADL Assistance Independent   Active  Yes  (Pt states not as much.)   Occupation Retired   Discharge Planning   Type of [address] [place] [address] Prior To Admission None   Potential Assistance Needed N/A   DME Ordered?  No   Potential Assistance Purchasing Medications No   Type of Home Care Services None   Patient expects to be discharged to: House   One/Two Story Residence One story   Condition of Participation: Discharge Planning   The Plan for Transition of Care is related to the following treatment goals: Pt will return home at discharge.

## 2023-08-06 NOTE — PLAN OF CARE
Problem: Discharge Planning  Goal: Discharge to home or other facility with appropriate resources  Outcome: Progressing     Problem: Safety - Adult  Goal: Free from fall injury  Outcome: Progressing     Problem: Pain  Goal: Verbalizes/displays adequate comfort level or baseline comfort level  Outcome: Progressing  Flowsheets (Taken 8/5/2023 2015)  Verbalizes/displays adequate comfort level or baseline comfort level:   Encourage patient to monitor pain and request assistance   Assess pain using appropriate pain scale   Administer analgesics based on type and severity of pain and evaluate response

## 2023-08-07 ENCOUNTER — PREP FOR PROCEDURE (OUTPATIENT)
Dept: VASCULAR SURGERY | Age: 73
End: 2023-08-07

## 2023-08-07 LAB
ANION GAP SERPL CALC-SCNC: 12 MMOL/L (ref 2–11)
BASOPHILS # BLD: 0.1 K/UL (ref 0–0.2)
BASOPHILS NFR BLD: 1 % (ref 0–2)
BUN SERPL-MCNC: 46 MG/DL (ref 8–23)
CALCIUM SERPL-MCNC: 9.2 MG/DL (ref 8.3–10.4)
CHLORIDE SERPL-SCNC: 104 MMOL/L (ref 101–110)
CO2 SERPL-SCNC: 23 MMOL/L (ref 21–32)
CREAT SERPL-MCNC: 7.7 MG/DL (ref 0.6–1)
DIFFERENTIAL METHOD BLD: ABNORMAL
EOSINOPHIL # BLD: 0.3 K/UL (ref 0–0.8)
EOSINOPHIL NFR BLD: 6 % (ref 0.5–7.8)
ERYTHROCYTE [DISTWIDTH] IN BLOOD BY AUTOMATED COUNT: 14.8 % (ref 11.9–14.6)
FERRITIN SERPL-MCNC: 205 NG/ML (ref 8–388)
GLUCOSE SERPL-MCNC: 93 MG/DL (ref 65–100)
HCT VFR BLD AUTO: 26.5 % (ref 35.8–46.3)
HGB BLD-MCNC: 8.2 G/DL (ref 11.7–15.4)
IMM GRANULOCYTES # BLD AUTO: 0 K/UL (ref 0–0.5)
IMM GRANULOCYTES NFR BLD AUTO: 0 % (ref 0–5)
IRON SATN MFR SERPL: 11 %
IRON SERPL-MCNC: 27 UG/DL (ref 35–150)
LYMPHOCYTES # BLD: 1.5 K/UL (ref 0.5–4.6)
LYMPHOCYTES NFR BLD: 26 % (ref 13–44)
MCH RBC QN AUTO: 31.4 PG (ref 26.1–32.9)
MCHC RBC AUTO-ENTMCNC: 30.9 G/DL (ref 31.4–35)
MCV RBC AUTO: 101.5 FL (ref 82–102)
MONOCYTES # BLD: 0.7 K/UL (ref 0.1–1.3)
MONOCYTES NFR BLD: 13 % (ref 4–12)
NEUTS SEG # BLD: 3.2 K/UL (ref 1.7–8.2)
NEUTS SEG NFR BLD: 54 % (ref 43–78)
NRBC # BLD: 0 K/UL (ref 0–0.2)
PLATELET # BLD AUTO: 222 K/UL (ref 150–450)
PMV BLD AUTO: 9.3 FL (ref 9.4–12.3)
POTASSIUM SERPL-SCNC: 4.2 MMOL/L (ref 3.5–5.1)
RBC # BLD AUTO: 2.61 M/UL (ref 4.05–5.2)
SODIUM SERPL-SCNC: 139 MMOL/L (ref 133–143)
TIBC SERPL-MCNC: 251 UG/DL (ref 250–450)
WBC # BLD AUTO: 5.8 K/UL (ref 4.3–11.1)

## 2023-08-07 PROCEDURE — 6370000000 HC RX 637 (ALT 250 FOR IP): Performed by: NURSE PRACTITIONER

## 2023-08-07 PROCEDURE — 83550 IRON BINDING TEST: CPT

## 2023-08-07 PROCEDURE — 97530 THERAPEUTIC ACTIVITIES: CPT

## 2023-08-07 PROCEDURE — 1100000000 HC RM PRIVATE

## 2023-08-07 PROCEDURE — 2580000003 HC RX 258: Performed by: INTERNAL MEDICINE

## 2023-08-07 PROCEDURE — 85025 COMPLETE CBC W/AUTO DIFF WBC: CPT

## 2023-08-07 PROCEDURE — 6360000002 HC RX W HCPCS: Performed by: HOSPITALIST

## 2023-08-07 PROCEDURE — 82728 ASSAY OF FERRITIN: CPT

## 2023-08-07 PROCEDURE — 36415 COLL VENOUS BLD VENIPUNCTURE: CPT

## 2023-08-07 PROCEDURE — 6370000000 HC RX 637 (ALT 250 FOR IP): Performed by: INTERNAL MEDICINE

## 2023-08-07 PROCEDURE — 6370000000 HC RX 637 (ALT 250 FOR IP): Performed by: FAMILY MEDICINE

## 2023-08-07 PROCEDURE — 80048 BASIC METABOLIC PNL TOTAL CA: CPT

## 2023-08-07 PROCEDURE — 83540 ASSAY OF IRON: CPT

## 2023-08-07 PROCEDURE — 97165 OT EVAL LOW COMPLEX 30 MIN: CPT

## 2023-08-07 PROCEDURE — 97161 PT EVAL LOW COMPLEX 20 MIN: CPT

## 2023-08-07 RX ORDER — LOSARTAN POTASSIUM 50 MG/1
50 TABLET ORAL DAILY
Status: DISCONTINUED | OUTPATIENT
Start: 2023-08-07 | End: 2023-08-11 | Stop reason: HOSPADM

## 2023-08-07 RX ORDER — HYDRALAZINE HYDROCHLORIDE 20 MG/ML
10 INJECTION INTRAMUSCULAR; INTRAVENOUS ONCE
Status: COMPLETED | OUTPATIENT
Start: 2023-08-07 | End: 2023-08-07

## 2023-08-07 RX ORDER — FERROUS SULFATE 325(65) MG
325 TABLET ORAL 2 TIMES DAILY WITH MEALS
Status: DISCONTINUED | OUTPATIENT
Start: 2023-08-07 | End: 2023-08-11 | Stop reason: HOSPADM

## 2023-08-07 RX ORDER — DULOXETIN HYDROCHLORIDE 30 MG/1
60 CAPSULE, DELAYED RELEASE ORAL DAILY
Status: DISCONTINUED | OUTPATIENT
Start: 2023-08-08 | End: 2023-08-11 | Stop reason: HOSPADM

## 2023-08-07 RX ADMIN — CALCIUM ACETATE 667 MG: 667 TABLET ORAL at 09:13

## 2023-08-07 RX ADMIN — METOPROLOL TARTRATE 50 MG: 50 TABLET, FILM COATED ORAL at 09:14

## 2023-08-07 RX ADMIN — LEVOTHYROXINE SODIUM 125 MCG: 0.07 TABLET ORAL at 05:05

## 2023-08-07 RX ADMIN — CALCIUM ACETATE 667 MG: 667 TABLET ORAL at 12:05

## 2023-08-07 RX ADMIN — DULOXETINE HYDROCHLORIDE 120 MG: 60 CAPSULE, DELAYED RELEASE ORAL at 09:14

## 2023-08-07 RX ADMIN — SODIUM CHLORIDE, PRESERVATIVE FREE 10 ML: 5 INJECTION INTRAVENOUS at 09:14

## 2023-08-07 RX ADMIN — METOPROLOL TARTRATE 50 MG: 50 TABLET, FILM COATED ORAL at 20:42

## 2023-08-07 RX ADMIN — CHOLECALCIFEROL TAB 125 MCG (5000 UNIT) 5000 UNITS: 125 TAB at 09:14

## 2023-08-07 RX ADMIN — SODIUM CHLORIDE, PRESERVATIVE FREE 10 ML: 5 INJECTION INTRAVENOUS at 20:43

## 2023-08-07 RX ADMIN — CALCIUM ACETATE 667 MG: 667 TABLET ORAL at 17:10

## 2023-08-07 RX ADMIN — FERROUS SULFATE TAB 325 MG (65 MG ELEMENTAL FE) 325 MG: 325 (65 FE) TAB at 17:10

## 2023-08-07 RX ADMIN — HYDRALAZINE HYDROCHLORIDE 10 MG: 20 INJECTION INTRAMUSCULAR; INTRAVENOUS at 06:17

## 2023-08-07 RX ADMIN — LOSARTAN POTASSIUM 50 MG: 50 TABLET, FILM COATED ORAL at 09:14

## 2023-08-07 RX ADMIN — FERROUS SULFATE TAB 325 MG (65 MG ELEMENTAL FE) 325 MG: 325 (65 FE) TAB at 12:05

## 2023-08-07 RX ADMIN — POLYETHYLENE GLYCOL 3350 17 G: 17 POWDER, FOR SOLUTION ORAL at 09:19

## 2023-08-07 RX ADMIN — ATORVASTATIN CALCIUM 20 MG: 20 TABLET, FILM COATED ORAL at 20:42

## 2023-08-07 ASSESSMENT — PAIN SCALES - GENERAL: PAINLEVEL_OUTOF10: 0

## 2023-08-07 NOTE — THERAPY EVALUATION
ACUTE OCCUPATIONAL THERAPY GOALS:   (Developed with and agreed upon by patient and/or caregiver.)  1. Patient will complete lower body bathing and dressing with MODIFIED INDEPENDENCE and adaptive equipment as needed. 2. Patient will complete toileting with MODIFIED INDEPENDENCE. 3. Patient will complete grooming ADL standing at sink with MODIFIED INDEPENDENCE.  4. Patient will tolerate 25 minutes of OT treatment with 1-2 rest breaks to increase activity tolerance for ADLs. 5. Patient will complete functional transfers with MODIFIED INDEPENDENCE and adaptive equipment as needed. 6. Patient will tolerate 10 minutes BUE exercises to increase strength for safe, functional transfers. Timeframe: 7 visits       OCCUPATIONAL THERAPY Initial Assessment, Daily Note, and PM       OT Visit Days: 1  Acknowledge Orders  Time  OT Charge Capture  Rehab Caseload Tracker      Harshad Morales is a 81 y.o. female   PRIMARY DIAGNOSIS: Dyspnea  Dyspnea [R06.00]  Hypervolemia, unspecified hypervolemia type [E87.70]  Procedure(s) (LRB):  LEFT ARM FISTULAGRAM (Left)     Reason for Referral: Generalized Muscle Weakness (M62.81)  Difficulty in walking, Not elsewhere classified (R26.2)  History of falling (Z91.81)  Dizziness and Giddiness (R42)  Inpatient: Payor: Kimberly García / Plan: Brianne Diggs PPO / Product Type: Medicare /     ASSESSMENT:     REHAB RECOMMENDATIONS:   Recommendation to date pending progress:  Setting:  Home Health Therapy    Equipment:    To Be Determined  Pt reports difficulty getting in/out of tub, could potentially benefit from tub transfer bench     ASSESSMENT:  Ms. Kimberly Centeno is a 81year old female who presents with worsening SOB and pulmonary edema after missing dialysis. She typically receives HD T/TH/Sat. At baseline she lives with her  and performs mobility MI using either a rollator or a cane. She reports pain in her hips/knees typically limits her overall mobility.  She

## 2023-08-07 NOTE — PROGRESS NOTES
Physician Progress Note      Wilver Singh  CSN #:                  [id number]  :                       1950  ADMIT DATE:       2023 9:46 AM  DISCH DATE:  RESPONDING  PROVIDER #:        Makenzie Anguiano MD          QUERY TEXT:    Pt presented  with c/o shortness of breath. Pt noted to have ESRD and reported   missing dialysis. Documentation of pulmonary edema. If possible, please   document in the progress notes and discharge summary if you are evaluating   and/or treating any of the following: The medical record reflects the following:  Risk Factors: ESRD, missed dialysis  Clinical Indicators: Documented pulmonary edema due to missed dialysis  Treatment: Dialysis. Nephrology consult. Options provided:  -- Acute pulmonary edema  -- Other - I will add my own diagnosis  -- Disagree - Not applicable / Not valid  -- Disagree - Clinically unable to determine / Unknown  -- Refer to Clinical Documentation Reviewer    PROVIDER RESPONSE TEXT:    This patient has acute pulmonary edema.     Query created by: Deejay Madrid on 2023 9:31 AM      Electronically signed by:  Makenzie Anguiano MD 2023 10:19 AM

## 2023-08-07 NOTE — PROGRESS NOTES
Hourly rounds performed during shift. Bed locked and lowered. Call light in reach. All needs met at this time. Bedside shift report given to oncoming nurse.

## 2023-08-07 NOTE — PROGRESS NOTES
Hospitalist Progress Note   Admit Date:  2023  9:46 AM   Name:  Keerthi Ochoa   Age:  81 y.o. Sex:  female  :  1950   MRN:  [id number]   Room:  [facility]    Presenting/Chief Complaint: No chief complaint on file. Reason(s) for Admission: Dyspnea [R06.00]  Hypervolemia, unspecified hypervolemia type [E87.70]     Hospital Course:   Please refer to the admission H&P for details of presentation. In summary, Keerthi Ochoa is a 81 y.o. female with medical history significant for ESRD on dialysis, hypertension, hyperlipidemia, PVD, anemia, hypothyroidism, GERD, hepatitis C infection who presented with shortness of breath for past several days. Patient reports that she had missed dialysis for a week. Nephrology was consulted and patient underwent dialysis on . Subjective/24 hr Events (23) : Patient is seen and examined at bedside. No acute events reported overnight by nursing staff. Patient sitting up in bed. Reports improvement with her shortness of breath. Currently on room air without any respiratory distress. Blood pressure noted to be elevated today. Discussed with patient regarding her BP medications and her medication will be adjusted. Patient denies fever, chills, chest pains, n/v, abdominal pain. Review of Systems: 10 point review of systems is otherwise negative with the exception of the elements mentioned above. Assessment & Plan:   Dyspnea and Pulmonary edema due to missed dialysis  ESRD on HD  - Nephrology following. Status post dialysis on .  - Continue with dialysis per nephrology  - Not hypoxic. Monitor for O2 need. - General surgery requested as patient is interested in changing her hemodialysis to peritoneal dialysis. Hyperlipidemia : Continue with statin. Anemia: Likely due to chronic kidney disease. Nephrology has started patient on p.o. iron as well as Epoetin. HTN: BP elevated today. Restart losartan.  Continue RRR.  No m/r/g. No jugular venous distension. Tunneled cath on right chest wall  Lungs:   CTAB. No wheezing, rhonchi, or rales. Symmetric expansion. Abdomen:   Soft, nontender, nondistended. Extremities: No cyanosis or clubbing. No edema  Skin:     No rashes and normal coloration. Warm and dry. Neuro:  CN II-XII grossly intact. Psych:  Normal mood and affect.       I have personally reviewed labs and tests:  Recent Labs:  Recent Results (from the past 48 hour(s))   Hepatitis Panel, Acute    Collection Time: 08/05/23  1:00 PM   Result Value Ref Range    Hep A IgM NONREACTIVE NR      Hep B Core Ab, IgM NONREACTIVE NR      Hepatitis B Surface Ag NONREACTIVE NR      Hepatitis C Ab REACTIVE (A) NR     Basic Metabolic Panel w/ Reflex to MG    Collection Time: 08/06/23  9:28 AM   Result Value Ref Range    Sodium 137 133 - 143 mmol/L    Potassium 4.1 3.5 - 5.1 mmol/L    Chloride 103 101 - 110 mmol/L    CO2 27 21 - 32 mmol/L    Anion Gap 7 2 - 11 mmol/L    Glucose 170 (H) 65 - 100 mg/dL    BUN 39 (H) 8 - 23 MG/DL    Creatinine 6.90 (H) 0.6 - 1.0 MG/DL    Est, Glom Filt Rate 6 (L) >60 ml/min/1.73m2    Calcium 9.2 8.3 - 10.4 MG/DL   Troponin    Collection Time: 08/06/23  9:28 AM   Result Value Ref Range    Troponin, High Sensitivity 40.4 (H) 0 - 14 pg/mL   Transferrin Saturation    Collection Time: 08/07/23  6:50 AM   Result Value Ref Range    Iron 27 (L) 35 - 150 ug/dL    TIBC 251 250 - 450 ug/dL    TRANSFERRIN SATURATION 11 (L) >20 %   Ferritin    Collection Time: 08/07/23  6:50 AM   Result Value Ref Range    Ferritin 205 8 - 388 NG/ML   Basic Metabolic Panel w/ Reflex to MG    Collection Time: 08/07/23  6:50 AM   Result Value Ref Range    Sodium 139 133 - 143 mmol/L    Potassium 4.2 3.5 - 5.1 mmol/L    Chloride 104 101 - 110 mmol/L    CO2 23 21 - 32 mmol/L    Anion Gap 12 (H) 2 - 11 mmol/L    Glucose 93 65 - 100 mg/dL    BUN 46 (H) 8 - 23 MG/DL    Creatinine 7.70 (H) 0.6 - 1.0 MG/DL    Est, Glom Filt Rate 5

## 2023-08-08 ENCOUNTER — ANESTHESIA EVENT (OUTPATIENT)
Dept: SURGERY | Age: 73
End: 2023-08-08
Payer: MEDICARE

## 2023-08-08 PROBLEM — E87.70 HYPERVOLEMIA: Status: ACTIVE | Noted: 2023-08-08

## 2023-08-08 PROCEDURE — 97530 THERAPEUTIC ACTIVITIES: CPT

## 2023-08-08 PROCEDURE — 97110 THERAPEUTIC EXERCISES: CPT

## 2023-08-08 PROCEDURE — 6370000000 HC RX 637 (ALT 250 FOR IP): Performed by: INTERNAL MEDICINE

## 2023-08-08 PROCEDURE — 99222 1ST HOSP IP/OBS MODERATE 55: CPT | Performed by: NURSE PRACTITIONER

## 2023-08-08 PROCEDURE — 2580000003 HC RX 258: Performed by: INTERNAL MEDICINE

## 2023-08-08 PROCEDURE — 1100000000 HC RM PRIVATE

## 2023-08-08 PROCEDURE — 6370000000 HC RX 637 (ALT 250 FOR IP): Performed by: NURSE PRACTITIONER

## 2023-08-08 PROCEDURE — 90935 HEMODIALYSIS ONE EVALUATION: CPT

## 2023-08-08 RX ORDER — NIFEDIPINE 30 MG/1
30 TABLET, EXTENDED RELEASE ORAL DAILY
Status: DISCONTINUED | OUTPATIENT
Start: 2023-08-08 | End: 2023-08-09

## 2023-08-08 RX ADMIN — CALCIUM ACETATE 667 MG: 667 TABLET ORAL at 16:40

## 2023-08-08 RX ADMIN — ATORVASTATIN CALCIUM 20 MG: 20 TABLET, FILM COATED ORAL at 20:06

## 2023-08-08 RX ADMIN — FERROUS SULFATE TAB 325 MG (65 MG ELEMENTAL FE) 325 MG: 325 (65 FE) TAB at 16:40

## 2023-08-08 RX ADMIN — CHOLECALCIFEROL TAB 125 MCG (5000 UNIT) 5000 UNITS: 125 TAB at 08:43

## 2023-08-08 RX ADMIN — METOPROLOL TARTRATE 50 MG: 50 TABLET, FILM COATED ORAL at 20:06

## 2023-08-08 RX ADMIN — SODIUM CHLORIDE, PRESERVATIVE FREE 10 ML: 5 INJECTION INTRAVENOUS at 20:06

## 2023-08-08 RX ADMIN — CALCIUM ACETATE 667 MG: 667 TABLET ORAL at 08:43

## 2023-08-08 RX ADMIN — POLYETHYLENE GLYCOL 3350 17 G: 17 POWDER, FOR SOLUTION ORAL at 11:56

## 2023-08-08 RX ADMIN — METOPROLOL TARTRATE 50 MG: 50 TABLET, FILM COATED ORAL at 08:44

## 2023-08-08 RX ADMIN — NIFEDIPINE 30 MG: 30 TABLET, EXTENDED RELEASE ORAL at 16:40

## 2023-08-08 RX ADMIN — DULOXETINE HYDROCHLORIDE 60 MG: 30 CAPSULE, DELAYED RELEASE ORAL at 08:43

## 2023-08-08 RX ADMIN — SODIUM CHLORIDE, PRESERVATIVE FREE 10 ML: 5 INJECTION INTRAVENOUS at 08:44

## 2023-08-08 RX ADMIN — LOSARTAN POTASSIUM 50 MG: 50 TABLET, FILM COATED ORAL at 08:43

## 2023-08-08 RX ADMIN — LEVOTHYROXINE SODIUM 125 MCG: 0.07 TABLET ORAL at 05:27

## 2023-08-08 ASSESSMENT — LIFESTYLE VARIABLES: SMOKING_STATUS: 1

## 2023-08-08 NOTE — PROGRESS NOTES
exercises. She is a little unsteady but reports she has a swim spa that she uses at home and does use either her cane or rollator. She was given a HEP of the exercises she performed. Will continue with POC. SUBJECTIVE:   Ms. Emili Berger states, \"I don't know about that 2 wheel walker. \"     Social/Functional Lives With: Spouse  Type of Home: House  Home Layout: One level  Home Equipment: [employer], standard  Receives Help From: Family  ADL Assistance: Independent  Active : Yes (Pt states not as much.)  Occupation: Retired  OBJECTIVE:     PAIN: VITALS / O2: PRECAUTION / [place] / [place]:   Pre Treatment: 0         Post Treatment: 0 Vitals        Oxygen    None    RESTRICTIONS/PRECAUTIONS:        MOBILITY: I Mod I S SBA CGA Min Mod Max Total  NT x2 Comments:   Bed Mobility    Rolling [] [] [] [] [] [] [] [] [] [] []    Supine to Sit [] [] [] [] [] [] [] [] [] [] []    Scooting [] [] [] [] [] [] [] [] [] [] []    Sit to Supine [] [] [] [] [] [] [] [] [] [] []    Transfers    Sit to Stand [] [] [] [] [] [] [] [] [] [] []    Bed to Chair [] [] [] [] [] [] [] [] [] [] []    Stand to Sit [] [] [] [] [] [] [] [] [] [] []     [] [] [] [] [] [] [] [] [] [] []    I=Independent, Mod I=Modified Independent, S=Supervision, SBA=Standby Assistance, CGA=Contact Guard Assistance,   Min=Minimal Assistance, Mod=Moderate Assistance, Max=Maximal Assistance, Total=Total Assistance, NT=Not Tested    BALANCE: Good Fair+ Fair Fair- Poor NT Comments   Sitting Static [] [] [] [] [] []    Sitting Dynamic [] [] [] [] [] []              Standing Static [] [] [] [] [] []    Standing Dynamic [] [] [] [] [] []      GAIT: I Mod I S SBA CGA Min Mod Max Total  NT x2 Comments:   Level of Assistance [] [] [] [] [x] [] [] [] [] [] []    Distance 250 feet    DME Hand held assist    Gait Quality Decreased rashad , Decreased step clearance, Decreased step length, Narrow base of support, and Trunk sway increased    Weightbearing Status      Stairs I=Independent, Mod I=Modified Independent, S=Supervision, SBA=Standby Assistance, CGA=Contact Guard Assistance,   Min=Minimal Assistance, Mod=Moderate Assistance, Max=Maximal Assistance, Total=Total Assistance, NT=Not Tested    PLAN:   [address]: 3 times/week for duration of hospital stay or until stated goals are met, whichever comes first.    TREATMENT:   TREATMENT:   Therapeutic Activity (15 Minutes): Therapeutic activity included Ambulation on level ground and Standing balance to improve functional Activity tolerance, Balance, Coordination, and Mobility. Therapeutic Exercise (8 Minutes): Therapeutic exercises noted below to improve functional activity tolerance, AROM, and strength.      TREATMENT GRID:   Date:  8/8/23 Date:   Date:     Activity/Exercise Parameters Parameters Parameters   Standing heel raises X 10 B     Standing hip flexion X 7 B     Standing hip abd X 7 B     Standing hip ext X 7 B                           AFTER TREATMENT PRECAUTIONS: Bed/Chair Locked, Call light within reach, Chair, Needs within reach, and RN notified    INTERDISCIPLINARY COLLABORATION:  RN/ PCT and PT/ PTA    EDUCATION:      TIME IN/OUT:  Time In: 0955  Time Out: 1018  Minutes: 23    LUCIANA MARTINEZ PTA

## 2023-08-08 NOTE — PROGRESS NOTES
Hourly rounds in progress. Denies needs or pain at this time. Bed in low locked position. Call light within reach. Will continue to monitor and give report to oncoming day shift nurse.

## 2023-08-08 NOTE — PROGRESS NOTES
Perlita Smith  Admission Date: 8/5/2023         [address] [facility] Progress Note: 8/8/2023    Follow-up for: ESRD    The patient's chart is reviewed and the patient is discussed with the staff. Subjective:   Pt seen and examined on HD, right  Qb, UF 3500, tolerating UF, SOB much improved.      ROS:  Gen - no fever, no chills, appetite unchanged  CV - no chest pain, no palpitation  Lung - shortness of breath- better, no cough  Abd - no tenderness, no nausea/vomiting, no diarrhea  Ext - no edema    Current Facility-Administered Medications   Medication Dose Route Frequency    losartan (COZAAR) tablet 50 mg  50 mg Oral Daily    DULoxetine (CYMBALTA) extended release capsule 60 mg  60 mg Oral Daily    ferrous sulfate (IRON 325) tablet 325 mg  325 mg Oral BID WC    epoetin sean-epbx (RETACRIT) injection 6,000 Units  6,000 Units SubCUTAneous Q7 Days    sodium chloride flush 0.9 % injection 5-40 mL  5-40 mL IntraVENous 2 times per day    sodium chloride flush 0.9 % injection 5-40 mL  5-40 mL IntraVENous PRN    0.9 % sodium chloride infusion   IntraVENous PRN    ondansetron (ZOFRAN-ODT) disintegrating tablet 4 mg  4 mg Oral Q8H PRN    Or    ondansetron (ZOFRAN) injection 4 mg  4 mg IntraVENous Q6H PRN    polyethylene glycol (GLYCOLAX) packet 17 g  17 g Oral Daily PRN    acetaminophen (TYLENOL) tablet 650 mg  650 mg Oral Q6H PRN    Or    acetaminophen (TYLENOL) suppository 650 mg  650 mg Rectal Q6H PRN    atorvastatin (LIPITOR) tablet 20 mg  20 mg Oral Nightly    calcium acetate (PHOSLO) tablet 667 mg  667 mg Oral TID WC    levothyroxine (SYNTHROID) tablet 125 mcg  125 mcg Oral QAM AC    metoprolol tartrate (LOPRESSOR) tablet 50 mg  50 mg Oral BID    vitamin D3 (CHOLECALCIFEROL) tablet 5,000 Units  5,000 Units Oral Daily         Objective:     Vitals:    08/08/23 0532 08/08/23 0721 08/08/23 1102 08/08/23 1211   BP: (!) 175/97 (!) 154/90 (!) 170/94 (!) 174/107   Pulse: 71 66 67 66

## 2023-08-08 NOTE — PROGRESS NOTES
Hospitalist Progress Note   Admit Date:  2023  9:46 AM   Name:  Chai Travis   Age:  81 y.o. Sex:  female  :  1950   MRN:  [id number]   Room:  [facility]note 3]    Presenting/Chief Complaint: No chief complaint on file. Reason(s) for Admission: Dyspnea [R06.00]  Hypervolemia, unspecified hypervolemia type [E87.70]     Hospital Course:   Please refer to the admission H&P for details of presentation. In summary, Chai Travis is a 81 y.o. female with medical history significant for ESRD on dialysis, hypertension, hyperlipidemia, PVD, anemia, hypothyroidism, GERD, hepatitis C infection who presented with shortness of breath for past several days. Patient reports that she had missed dialysis for a week. Nephrology was consulted and patient underwent dialysis on . Subjective/24 hr Events (23) : Patient is seen and examined at bedside. No acute events reported overnight by nursing staff. Patient sitting in her recliner. On RA. No acute complaints. SOB has improved. Agreeable to fistolugram and will be performed by Vascular tomorrow AM.    Patient denies fever, chills, chest pains, n/v, abdominal pain. Review of Systems: 10 point review of systems is otherwise negative with the exception of the elements mentioned above. Assessment & Plan:   Dyspnea and Pulmonary edema due to missed dialysis  ESRD on HD  - Nephrology following. Continue with dialysis per nephrology  - Not hypoxic. Monitor for O2 need. - General surgery planned for PD catheter placement on . She can be discharged and planned as outpt. - Vascular surgery planned for fistulogram tomorrow. Hyperlipidemia : Continue with statin. Anemia: Likely due to chronic kidney disease. Nephrology has started patient on p.o. iron as well as Epoetin. HTN: BP still elevated today. Restart nifedipine XL. Continue with losartan and metoprolol.     Acquired hypothyroidism: Resume Levothyroxine

## 2023-08-08 NOTE — ANESTHESIA PRE PROCEDURE
Department of Anesthesiology  Preprocedure Note       Name:  Ronald Landry   Age:  81 y.o.  :  1950                                          MRN:  [id number]         Date:  2023      Surgeon: Cherelle Dong):  Levan Scheuermann, MD Marcheta Sleek, DO    Procedure: Procedure(s):  LEFT ARM FISTULAGRAM  LAPAROSCOPY DIAGNOSTIC PD CATHETER INSERTION    Medications prior to admission:   Prior to Admission medications    Medication Sig Start Date End Date Taking?  Authorizing Provider   DULoxetine (CYMBALTA) 60 MG extended release capsule Take 1 capsule by mouth 2 times daily 23   Radha Pedersen MD   methenamine (HIPREX) 1 g tablet Take 1 tablet by mouth daily 23   SAE Hamilton CNP   NIFEdipine (PROCARDIA XL) 60 MG extended release tablet TAKE 1 TABLET BY MOUTH DAILY  Patient not taking: Reported on 2023   Historical Provider, MD EARL Complex-C-Folic Acid (AMY-GER) TABS Take 1 tablet by mouth every evening 3/28/23   Historical Provider, MD   calcium acetate (PHOSLO) 667 MG CAPS capsule 3 times daily (with meals) 23   Historical Provider, MD   cefdinir (OMNICEF) 300 MG capsule every 12 hours 3/25/23   Historical Provider, MD   atorvastatin (LIPITOR) 20 MG tablet Take 1 tablet by mouth at bedtime Every other night 23   SAE Gant CNP   albuterol sulfate HFA (PROVENTIL;VENTOLIN;PROAIR) 108 (90 Base) MCG/ACT inhaler Inhale 2 puffs into the lungs every 6 hours as needed for Wheezing    Historical Provider, MD   metoprolol tartrate (LOPRESSOR) 50 MG tablet Take 1 tablet by mouth 2 times daily 23   RAFAEL Penn   NIFEdipine (PROCARDIA XL) 30 MG extended release tablet TAKE ONE TABLET BY MOUTH ONE TIME DAILY  Patient not taking: Reported on 2023 10/4/22   Historical Provider, MD   acetaminophen (TYLENOL) 650 MG extended release tablet Take 1 tablet by mouth every 8 hours as needed    Historical Provider, MD   vitamin D3 (CHOLECALCIFEROL) 125

## 2023-08-08 NOTE — SIGNIFICANT EVENT
General Surgery  Unable to coordinate mutually agreed upon OR time between Dr John Beth and Dr Barbara Harrison for combined procedure tomorrow. Dr John Beth to push PD catheter placement to Friday 8/11/23. Pt made aware. NPO orders changed.       Michael Smith, APRN - CNP

## 2023-08-08 NOTE — DIALYSIS
TRANSFER IN - DIALYSIS    Received patient in dialysis unit  from [facility] (unit) for ordered procedure. Consent verified for renal replacement therapy. Procedure explained to patient, opportunity for Q&A provided. Call light given. Patient a/ox3 and vital signs stable. /107,  P66  , 0L O2 via RA. Hemodialysis initiated using R CVC. Aspirated and flushed both ports without difficulty. Dressing clean, dry and intact. Machine settings per MD order. Heparin 0 unit bolus and 0 units/hr. Will monitor during treatment.

## 2023-08-08 NOTE — PERIOP NOTE
Called [facility] 6th floor staff to advise of the pt's scheduled procedure tomorrow. Preop arrival time of 0815 provided. Understanding verbalized.

## 2023-08-09 ENCOUNTER — ANESTHESIA (OUTPATIENT)
Dept: SURGERY | Age: 73
End: 2023-08-09
Payer: MEDICARE

## 2023-08-09 ENCOUNTER — APPOINTMENT (OUTPATIENT)
Dept: GENERAL RADIOLOGY | Age: 73
DRG: 252 | End: 2023-08-09
Payer: MEDICARE

## 2023-08-09 PROBLEM — Z99.2 ESRD (END STAGE RENAL DISEASE) ON DIALYSIS (HCC): Status: ACTIVE | Noted: 2023-01-27

## 2023-08-09 LAB
ANION GAP SERPL CALC-SCNC: 6 MMOL/L (ref 2–11)
BASOPHILS # BLD: 0.1 K/UL (ref 0–0.2)
BASOPHILS NFR BLD: 1 % (ref 0–2)
BUN SERPL-MCNC: 36 MG/DL (ref 8–23)
CALCIUM SERPL-MCNC: 9.5 MG/DL (ref 8.3–10.4)
CHLORIDE SERPL-SCNC: 103 MMOL/L (ref 101–110)
CO2 SERPL-SCNC: 26 MMOL/L (ref 21–32)
CREAT SERPL-MCNC: 6.2 MG/DL (ref 0.6–1)
DIFFERENTIAL METHOD BLD: ABNORMAL
EOSINOPHIL # BLD: 0.4 K/UL (ref 0–0.8)
EOSINOPHIL NFR BLD: 7 % (ref 0.5–7.8)
ERYTHROCYTE [DISTWIDTH] IN BLOOD BY AUTOMATED COUNT: 14.3 % (ref 11.9–14.6)
GLUCOSE SERPL-MCNC: 92 MG/DL (ref 65–100)
HCT VFR BLD AUTO: 26.5 % (ref 35.8–46.3)
HGB BLD-MCNC: 8.2 G/DL (ref 11.7–15.4)
IMM GRANULOCYTES # BLD AUTO: 0 K/UL (ref 0–0.5)
IMM GRANULOCYTES NFR BLD AUTO: 0 % (ref 0–5)
INR PPP: 1
LYMPHOCYTES # BLD: 1.3 K/UL (ref 0.5–4.6)
LYMPHOCYTES NFR BLD: 26 % (ref 13–44)
MCH RBC QN AUTO: 31.2 PG (ref 26.1–32.9)
MCHC RBC AUTO-ENTMCNC: 30.9 G/DL (ref 31.4–35)
MCV RBC AUTO: 100.8 FL (ref 82–102)
MONOCYTES # BLD: 0.5 K/UL (ref 0.1–1.3)
MONOCYTES NFR BLD: 10 % (ref 4–12)
NEUTS SEG # BLD: 2.8 K/UL (ref 1.7–8.2)
NEUTS SEG NFR BLD: 56 % (ref 43–78)
NRBC # BLD: 0 K/UL (ref 0–0.2)
PLATELET # BLD AUTO: 244 K/UL (ref 150–450)
PMV BLD AUTO: 9.1 FL (ref 9.4–12.3)
POTASSIUM SERPL-SCNC: 4 MMOL/L (ref 3.5–5.1)
PROTHROMBIN TIME: 13.5 SEC (ref 12.6–14.3)
RBC # BLD AUTO: 2.63 M/UL (ref 4.05–5.2)
SODIUM SERPL-SCNC: 135 MMOL/L (ref 133–143)
WBC # BLD AUTO: 5 K/UL (ref 4.3–11.1)

## 2023-08-09 PROCEDURE — C1768 GRAFT, VASCULAR: HCPCS | Performed by: SURGERY

## 2023-08-09 PROCEDURE — C1769 GUIDE WIRE: HCPCS | Performed by: SURGERY

## 2023-08-09 PROCEDURE — 85025 COMPLETE CBC W/AUTO DIFF WBC: CPT

## 2023-08-09 PROCEDURE — C1874 STENT, COATED/COV W/DEL SYS: HCPCS

## 2023-08-09 PROCEDURE — 85610 PROTHROMBIN TIME: CPT

## 2023-08-09 PROCEDURE — C1894 INTRO/SHEATH, NON-LASER: HCPCS | Performed by: SURGERY

## 2023-08-09 PROCEDURE — 1100000000 HC RM PRIVATE

## 2023-08-09 PROCEDURE — 7100000000 HC PACU RECOVERY - FIRST 15 MIN: Performed by: SURGERY

## 2023-08-09 PROCEDURE — 6370000000 HC RX 637 (ALT 250 FOR IP): Performed by: NURSE PRACTITIONER

## 2023-08-09 PROCEDURE — 2580000003 HC RX 258: Performed by: INTERNAL MEDICINE

## 2023-08-09 PROCEDURE — 3700000000 HC ANESTHESIA ATTENDED CARE: Performed by: SURGERY

## 2023-08-09 PROCEDURE — 7100000001 HC PACU RECOVERY - ADDTL 15 MIN: Performed by: SURGERY

## 2023-08-09 PROCEDURE — 6370000000 HC RX 637 (ALT 250 FOR IP): Performed by: INTERNAL MEDICINE

## 2023-08-09 PROCEDURE — C1725 CATH, TRANSLUMIN NON-LASER: HCPCS | Performed by: SURGERY

## 2023-08-09 PROCEDURE — 80048 BASIC METABOLIC PNL TOTAL CA: CPT

## 2023-08-09 PROCEDURE — 2500000003 HC RX 250 WO HCPCS

## 2023-08-09 PROCEDURE — 2500000003 HC RX 250 WO HCPCS: Performed by: SURGERY

## 2023-08-09 PROCEDURE — 36415 COLL VENOUS BLD VENIPUNCTURE: CPT

## 2023-08-09 PROCEDURE — 6360000002 HC RX W HCPCS: Performed by: SURGERY

## 2023-08-09 PROCEDURE — 3600000012 HC SURGERY LEVEL 2 ADDTL 15MIN: Performed by: SURGERY

## 2023-08-09 PROCEDURE — 2709999900 HC NON-CHARGEABLE SUPPLY: Performed by: SURGERY

## 2023-08-09 PROCEDURE — 6360000002 HC RX W HCPCS: Performed by: ANESTHESIOLOGY

## 2023-08-09 PROCEDURE — 3600000002 HC SURGERY LEVEL 2 BASE: Performed by: SURGERY

## 2023-08-09 PROCEDURE — 6360000002 HC RX W HCPCS: Performed by: NURSE ANESTHETIST, CERTIFIED REGISTERED

## 2023-08-09 PROCEDURE — 3700000001 HC ADD 15 MINUTES (ANESTHESIA): Performed by: SURGERY

## 2023-08-09 PROCEDURE — 6360000002 HC RX W HCPCS

## 2023-08-09 RX ORDER — SODIUM CHLORIDE 0.9 % (FLUSH) 0.9 %
5-40 SYRINGE (ML) INJECTION EVERY 12 HOURS SCHEDULED
Status: DISCONTINUED | OUTPATIENT
Start: 2023-08-09 | End: 2023-08-09 | Stop reason: HOSPADM

## 2023-08-09 RX ORDER — PROCHLORPERAZINE EDISYLATE 5 MG/ML
5 INJECTION INTRAMUSCULAR; INTRAVENOUS
Status: DISCONTINUED | OUTPATIENT
Start: 2023-08-09 | End: 2023-08-09 | Stop reason: HOSPADM

## 2023-08-09 RX ORDER — LIDOCAINE HYDROCHLORIDE 10 MG/ML
1 INJECTION, SOLUTION INFILTRATION; PERINEURAL
Status: DISCONTINUED | OUTPATIENT
Start: 2023-08-09 | End: 2023-08-09 | Stop reason: HOSPADM

## 2023-08-09 RX ORDER — NIFEDIPINE 30 MG/1
60 TABLET, EXTENDED RELEASE ORAL DAILY
Status: DISCONTINUED | OUTPATIENT
Start: 2023-08-10 | End: 2023-08-11 | Stop reason: HOSPADM

## 2023-08-09 RX ORDER — OXYCODONE HYDROCHLORIDE 5 MG/1
5 TABLET ORAL
Status: DISCONTINUED | OUTPATIENT
Start: 2023-08-09 | End: 2023-08-09 | Stop reason: HOSPADM

## 2023-08-09 RX ORDER — LIDOCAINE HYDROCHLORIDE 10 MG/ML
INJECTION, SOLUTION INFILTRATION; PERINEURAL PRN
Status: DISCONTINUED | OUTPATIENT
Start: 2023-08-09 | End: 2023-08-09 | Stop reason: ALTCHOICE

## 2023-08-09 RX ORDER — PROPOFOL 10 MG/ML
INJECTION, EMULSION INTRAVENOUS PRN
Status: DISCONTINUED | OUTPATIENT
Start: 2023-08-09 | End: 2023-08-09 | Stop reason: SDUPTHER

## 2023-08-09 RX ORDER — LIDOCAINE HYDROCHLORIDE 20 MG/ML
INJECTION, SOLUTION EPIDURAL; INFILTRATION; INTRACAUDAL; PERINEURAL PRN
Status: DISCONTINUED | OUTPATIENT
Start: 2023-08-09 | End: 2023-08-09 | Stop reason: SDUPTHER

## 2023-08-09 RX ORDER — SODIUM CHLORIDE 9 MG/ML
INJECTION, SOLUTION INTRAVENOUS CONTINUOUS
Status: DISCONTINUED | OUTPATIENT
Start: 2023-08-09 | End: 2023-08-09 | Stop reason: HOSPADM

## 2023-08-09 RX ORDER — HYDRALAZINE HYDROCHLORIDE 10 MG/1
25 TABLET, FILM COATED ORAL EVERY 8 HOURS PRN
Status: DISCONTINUED | OUTPATIENT
Start: 2023-08-09 | End: 2023-08-11 | Stop reason: HOSPADM

## 2023-08-09 RX ORDER — HEPARIN SODIUM 1000 [USP'U]/ML
INJECTION, SOLUTION INTRAVENOUS; SUBCUTANEOUS PRN
Status: DISCONTINUED | OUTPATIENT
Start: 2023-08-09 | End: 2023-08-09 | Stop reason: SDUPTHER

## 2023-08-09 RX ORDER — ONDANSETRON 2 MG/ML
INJECTION INTRAMUSCULAR; INTRAVENOUS PRN
Status: DISCONTINUED | OUTPATIENT
Start: 2023-08-09 | End: 2023-08-09 | Stop reason: SDUPTHER

## 2023-08-09 RX ORDER — NIFEDIPINE 10 MG/1
20 CAPSULE ORAL ONCE
Status: DISCONTINUED | OUTPATIENT
Start: 2023-08-09 | End: 2023-08-09

## 2023-08-09 RX ORDER — MIDAZOLAM HYDROCHLORIDE 2 MG/2ML
2 INJECTION, SOLUTION INTRAMUSCULAR; INTRAVENOUS
Status: COMPLETED | OUTPATIENT
Start: 2023-08-09 | End: 2023-08-09

## 2023-08-09 RX ORDER — SODIUM CHLORIDE 0.9 % (FLUSH) 0.9 %
5-40 SYRINGE (ML) INJECTION PRN
Status: DISCONTINUED | OUTPATIENT
Start: 2023-08-09 | End: 2023-08-09 | Stop reason: HOSPADM

## 2023-08-09 RX ORDER — FENTANYL CITRATE 50 UG/ML
100 INJECTION, SOLUTION INTRAMUSCULAR; INTRAVENOUS
Status: DISCONTINUED | OUTPATIENT
Start: 2023-08-09 | End: 2023-08-09 | Stop reason: HOSPADM

## 2023-08-09 RX ORDER — HYDROMORPHONE HYDROCHLORIDE 2 MG/ML
0.5 INJECTION, SOLUTION INTRAMUSCULAR; INTRAVENOUS; SUBCUTANEOUS EVERY 5 MIN PRN
Status: DISCONTINUED | OUTPATIENT
Start: 2023-08-09 | End: 2023-08-09 | Stop reason: HOSPADM

## 2023-08-09 RX ORDER — SODIUM CHLORIDE 9 MG/ML
INJECTION, SOLUTION INTRAVENOUS PRN
Status: DISCONTINUED | OUTPATIENT
Start: 2023-08-09 | End: 2023-08-09 | Stop reason: HOSPADM

## 2023-08-09 RX ADMIN — CALCIUM ACETATE 667 MG: 667 TABLET ORAL at 07:48

## 2023-08-09 RX ADMIN — CALCIUM ACETATE 667 MG: 667 TABLET ORAL at 16:52

## 2023-08-09 RX ADMIN — ATORVASTATIN CALCIUM 20 MG: 20 TABLET, FILM COATED ORAL at 20:19

## 2023-08-09 RX ADMIN — SODIUM CHLORIDE: 9 INJECTION, SOLUTION INTRAVENOUS at 13:07

## 2023-08-09 RX ADMIN — CHOLECALCIFEROL TAB 125 MCG (5000 UNIT) 5000 UNITS: 125 TAB at 07:48

## 2023-08-09 RX ADMIN — SODIUM CHLORIDE, PRESERVATIVE FREE 10 ML: 5 INJECTION INTRAVENOUS at 07:49

## 2023-08-09 RX ADMIN — DULOXETINE HYDROCHLORIDE 60 MG: 30 CAPSULE, DELAYED RELEASE ORAL at 07:48

## 2023-08-09 RX ADMIN — Medication 2000 MG: at 13:27

## 2023-08-09 RX ADMIN — LOSARTAN POTASSIUM 50 MG: 50 TABLET, FILM COATED ORAL at 07:48

## 2023-08-09 RX ADMIN — PROPOFOL 200 MG: 10 INJECTION, EMULSION INTRAVENOUS at 13:20

## 2023-08-09 RX ADMIN — NIFEDIPINE 30 MG: 30 TABLET, EXTENDED RELEASE ORAL at 07:48

## 2023-08-09 RX ADMIN — MIDAZOLAM HYDROCHLORIDE 2 MG: 1 INJECTION, SOLUTION INTRAMUSCULAR; INTRAVENOUS at 12:47

## 2023-08-09 RX ADMIN — HEPARIN SODIUM 5000 UNITS: 1000 INJECTION, SOLUTION INTRAVENOUS; SUBCUTANEOUS at 13:44

## 2023-08-09 RX ADMIN — METOPROLOL TARTRATE 50 MG: 50 TABLET, FILM COATED ORAL at 20:19

## 2023-08-09 RX ADMIN — FERROUS SULFATE TAB 325 MG (65 MG ELEMENTAL FE) 325 MG: 325 (65 FE) TAB at 16:52

## 2023-08-09 RX ADMIN — LIDOCAINE HYDROCHLORIDE 100 MG: 20 INJECTION, SOLUTION EPIDURAL; INFILTRATION; INTRACAUDAL; PERINEURAL at 13:20

## 2023-08-09 RX ADMIN — LEVOTHYROXINE SODIUM 125 MCG: 0.07 TABLET ORAL at 05:18

## 2023-08-09 RX ADMIN — METOPROLOL TARTRATE 50 MG: 50 TABLET, FILM COATED ORAL at 07:48

## 2023-08-09 RX ADMIN — ONDANSETRON 4 MG: 2 INJECTION INTRAMUSCULAR; INTRAVENOUS at 14:05

## 2023-08-09 RX ADMIN — SODIUM CHLORIDE, PRESERVATIVE FREE 10 ML: 5 INJECTION INTRAVENOUS at 20:19

## 2023-08-09 RX ADMIN — PHENYLEPHRINE HYDROCHLORIDE 100 MCG: 0.1 INJECTION, SOLUTION INTRAVENOUS at 13:28

## 2023-08-09 ASSESSMENT — PAIN - FUNCTIONAL ASSESSMENT: PAIN_FUNCTIONAL_ASSESSMENT: 0-10

## 2023-08-09 NOTE — PERIOP NOTE
TRANSFER - OUT REPORT:    Verbal report given to [facility], RN on Carley Peabody  being transferred to [address] [place] [address] for routine progression of patient care       Report consisted of patient's Situation, Background, Assessment and   Recommendations(SBAR). Information from the following report(s) Nurse Handoff Report, Adult Overview, Surgery Report, and Recent Results was reviewed with the receiving nurse. Lines:   Peripheral IV 08/05/23 Right Antecubital (Active)   Site Assessment Clean, dry & intact 08/09/23 1415   Line Status Flushed 08/09/23 1415   Line Care Connections checked and tightened 08/09/23 1043   Phlebitis Assessment No symptoms 08/09/23 1415   Infiltration Assessment 0 08/09/23 1415   Alcohol Cap Used Yes 08/09/23 1043   Dressing Status Clean, dry & intact 08/09/23 1415   Dressing Type Transparent 08/09/23 1415       Tunneled Hemodialysis Catheter Right Subclavian (Active)       Tunneled Hemodialysis Catheter Right Subclavian (Active)   Continued need for line? Yes 08/09/23 0748   Site Assessment Clean, dry & intact 08/09/23 0748   CVC Lumen Status Infusing 08/08/23 1213   Venous Lumen Status Capped 08/09/23 0748   Arterial Lumen Status Capped 08/09/23 0748   Alcohol Cap Used Yes 08/09/23 0748   Line Care Connections checked and tightened; Chlorhexidine wipes; Line pulled back;Ports disinfected 08/08/23 2347   Dressing Type Bacteriocidal;Transparent 08/09/23 0748   Date of Last Dressing Change 08/05/23 08/09/23 0748   Dressing Status Clean, dry & intact 08/09/23 0748        Opportunity for questions and clarification was provided.

## 2023-08-09 NOTE — ANESTHESIA PROCEDURE NOTES
Airway  Date/Time: 8/9/2023 1:23 PM  Urgency: elective    Airway not difficult    General Information and Staff    Patient location during procedure: OR  Performed: resident/CRNA     Indications and Patient Condition  Indications for airway management: anesthesia  Spontaneous ventilation: present  Sedation level: deep  Preoxygenated: yes  Patient position: sniffing  MILS not maintained throughout  Mask difficulty assessment: not attempted    Final Airway Details  Final airway type: supraglottic airway      Successful airway: oropharyngeal  Size 4     Number of attempts at approach: 1  Ventilation between attempts: supraglottic airway  Number of other approaches attempted: 0    Additional Comments  Lips and dentition remain same  no

## 2023-08-09 NOTE — CARE COORDINATION
Patient chart reviewed for continued stay. Patient for fistulagram today with Vascular Surgery. Patient will likely remain inpatient until Friday, 8/11/23 when her PD cath will be placed. Will continue to follow patient's plan of care and assist further with supportive care needs as appropriate.

## 2023-08-09 NOTE — PROGRESS NOTES
[address]   [address] [facility]. [address] [place] [address]  FAX: [phone]    Brief Op Note Template Note    Pre-Op Diagnosis: PVD (peripheral vascular disease) ([address]) [I73.9]    Post-Op Diagnosis:  * No post-op diagnosis entered *    Procedures: Procedure(s) with comments:  LEFT ARM FISTULAGRAM - O.R. 9    Surgeon: Dashawn Bishop MD    Assistants: Surgeon(s):  Dashawn Bishop MD      Anesthesia:  General     Findings: Outflow stenosis balloon angioplasty with 8 x 39/post 59 mm    Tourniquet Time:  * No tourniquets in log *    Estimated Blood Loss:               Specimens:             Implants:    Implant Name Type Inv. Item Serial No.  Lot No. LRB No. Used Action   iCast 8mm x 38mm x 80cm   [id number] [employer]  Left 1 Implanted       Complications: None               Signed: Dashawn Bishop MD      Elements of this note have been dictated using speech recognition software. As a result, errors of speech recognition may have occurred.

## 2023-08-09 NOTE — PROGRESS NOTES
Unable to complete PD catheter at the same time as fistulogram today due to scheduling. I will plan to do this on Friday. If the patient is discharged before then we can do this as an outpatient setting.

## 2023-08-09 NOTE — PROGRESS NOTES
Physical Therapy Note:    Attempted to see patient for physical therapy treatment session. At time of attempt patient off the floor. Will follow and re-attempt as schedule permits/patient available.     Thank you,  Harris Bennett, PT, DPT

## 2023-08-09 NOTE — ANESTHESIA POSTPROCEDURE EVALUATION
Department of Anesthesiology  Postprocedure Note    Patient: Ulisses Staples  MRN: [id number]  YOB: 1950  Date of evaluation: 8/9/2023      Procedure Summary     Date: 08/09/23 Room / Location: [facility] MAIN OR 09 / [facility] MAIN OR    Anesthesia Start: 8890 Anesthesia Stop: 3288    Procedure: LEFT ARM FISTULAGRAM (Left: Arm Upper) Diagnosis:       PVD (peripheral vascular disease) ([address])      (PVD (peripheral vascular disease) ([address]) [I73.9])    Providers: Jaimee Irene MD Responsible Provider: Reyna Villa DO    Anesthesia Type: general ASA Status: 3          Anesthesia Type: No value filed.     Zion Phase I: Zion Score: 10    Zion Phase II:        Anesthesia Post Evaluation    Patient location during evaluation: PACU  Level of consciousness: awake and alert  Airway patency: patent  Nausea & Vomiting: no nausea  Complications: no  Cardiovascular status: hemodynamically stable  Respiratory status: acceptable  Hydration status: euvolemic  Pain management: satisfactory to patient

## 2023-08-09 NOTE — PERIOP NOTE
TRANSFER - IN REPORT:    Verbal report received from [facility] on [facility]  being received from 6th floor for ordered procedure      Report consisted of patient's Situation, Background, Assessment and   Recommendations(SBAR). Information from the following report(s) Nurse Handoff Report and MAR was reviewed with the receiving nurse. Opportunity for questions and clarification was provided. Assessment completed upon patient's arrival to unit and care assumed.

## 2023-08-09 NOTE — OP NOTE
[facility]  OPERATIVE REPORT    Name:  Debra Velazquez  MR#:  [id number]  :  1950  ACCOUNT #:  [de-identified]  DATE OF SERVICE:  2023      CLINICAL SERVICE:  Vascular Surgery. PREOPERATIVE DIAGNOSIS:  Malfunctioning left brachiocephalic fistula. POSTOPERATIVE DIAGNOSIS:  Malfunctioning left brachiocephalic fistula. PROCEDURES:  1. Ultrasound-guided access of left brachiocephalic fistula. 2.  Fistulogram with balloon angioplasty and stenting of the outflow cephalic vein with a 8 x 39 iCAST stent, postdilated to 9 with a 9 x 40. SURGEON:  Juany Valentin MD    ASSISTANT:      ANESTHESIA:  Sedation. COMPLICATIONS:  .    SPECIMENS REMOVED:  .    IMPLANTS:  .    ESTIMATED BLOOD LOSS:  .    OPERATIVE FINDING:  The fistula was patent, outflow of cephalic vein was patent. The distal cephalic vein in the subclavian junction there was a narrowing. The central veins were patent. PROCEDURE IN DETAIL:  After getting informed consent, the patient was brought to the operating room. Anesthesia was then induced. Preop antibiotics were given before skin incision. The patient's left arm was prepped and draped in normal sterile fashion. Ultrasound used to identify the fistula with a micropuncture. We accessed, upsized to a 7-Tongan sheath over a 0.035 starter wire. We did digital subtraction which showed the outflow of cephalic vein up to the arch was patent. There was a significant narrowing there. We gave 5000 heparin. I then brought into the field an 8 x 38 iCAST balloon and postdilated to 12 which would get us to 8.5 and I postdilated that also with a 9 x 40 balloon to get a 9 mm diameter. Completion venogram showed that the stent was patent and distal vein patent. We removed the sheath and closed with Monocryl. The patient was extubated and taken to the PACU in stable condition.       Juany Valentin MD      [id number]  D:  2023 14:28  T:

## 2023-08-09 NOTE — PROGRESS NOTES
Occupational Therapy Note:    Attempted to see patient this AM for occupational therapy treatment  session. Patient is off of the floor for a procedure. Will follow and re-attempt as schedule permits/patient available.  Thank you,    KELLEY Rosales    Rehab Caseload Tracker

## 2023-08-10 ENCOUNTER — ANESTHESIA EVENT (OUTPATIENT)
Dept: SURGERY | Age: 73
End: 2023-08-10
Payer: MEDICARE

## 2023-08-10 PROCEDURE — 1100000000 HC RM PRIVATE

## 2023-08-10 PROCEDURE — 90935 HEMODIALYSIS ONE EVALUATION: CPT

## 2023-08-10 PROCEDURE — 6370000000 HC RX 637 (ALT 250 FOR IP): Performed by: NURSE PRACTITIONER

## 2023-08-10 PROCEDURE — 2580000003 HC RX 258: Performed by: INTERNAL MEDICINE

## 2023-08-10 PROCEDURE — 6370000000 HC RX 637 (ALT 250 FOR IP): Performed by: FAMILY MEDICINE

## 2023-08-10 PROCEDURE — 6370000000 HC RX 637 (ALT 250 FOR IP): Performed by: INTERNAL MEDICINE

## 2023-08-10 PROCEDURE — 03743DZ DILATION OF LEFT SUBCLAVIAN ARTERY WITH INTRALUMINAL DEVICE, PERCUTANEOUS APPROACH: ICD-10-PCS | Performed by: SURGERY

## 2023-08-10 RX ORDER — TRAZODONE HYDROCHLORIDE 50 MG/1
25 TABLET ORAL NIGHTLY PRN
Status: DISCONTINUED | OUTPATIENT
Start: 2023-08-10 | End: 2023-08-11 | Stop reason: HOSPADM

## 2023-08-10 RX ADMIN — METOPROLOL TARTRATE 50 MG: 50 TABLET, FILM COATED ORAL at 19:56

## 2023-08-10 RX ADMIN — FERROUS SULFATE TAB 325 MG (65 MG ELEMENTAL FE) 325 MG: 325 (65 FE) TAB at 16:22

## 2023-08-10 RX ADMIN — CALCIUM ACETATE 667 MG: 667 TABLET ORAL at 16:22

## 2023-08-10 RX ADMIN — LOSARTAN POTASSIUM 50 MG: 50 TABLET, FILM COATED ORAL at 10:33

## 2023-08-10 RX ADMIN — TRAZODONE HYDROCHLORIDE 25 MG: 50 TABLET ORAL at 21:54

## 2023-08-10 RX ADMIN — ATORVASTATIN CALCIUM 20 MG: 20 TABLET, FILM COATED ORAL at 19:57

## 2023-08-10 RX ADMIN — FERROUS SULFATE TAB 325 MG (65 MG ELEMENTAL FE) 325 MG: 325 (65 FE) TAB at 11:11

## 2023-08-10 RX ADMIN — METOPROLOL TARTRATE 50 MG: 50 TABLET, FILM COATED ORAL at 10:33

## 2023-08-10 RX ADMIN — LEVOTHYROXINE SODIUM 125 MCG: 0.07 TABLET ORAL at 05:11

## 2023-08-10 RX ADMIN — NIFEDIPINE 60 MG: 30 TABLET, EXTENDED RELEASE ORAL at 10:33

## 2023-08-10 RX ADMIN — CALCIUM ACETATE 667 MG: 667 TABLET ORAL at 11:11

## 2023-08-10 RX ADMIN — SODIUM CHLORIDE, PRESERVATIVE FREE 10 ML: 5 INJECTION INTRAVENOUS at 10:35

## 2023-08-10 RX ADMIN — CHOLECALCIFEROL TAB 125 MCG (5000 UNIT) 5000 UNITS: 125 TAB at 10:33

## 2023-08-10 RX ADMIN — DULOXETINE HYDROCHLORIDE 60 MG: 30 CAPSULE, DELAYED RELEASE ORAL at 10:33

## 2023-08-10 ASSESSMENT — LIFESTYLE VARIABLES: SMOKING_STATUS: 1

## 2023-08-10 ASSESSMENT — PAIN SCALES - GENERAL: PAINLEVEL_OUTOF10: 0

## 2023-08-10 ASSESSMENT — ENCOUNTER SYMPTOMS: SHORTNESS OF BREATH: 1

## 2023-08-10 NOTE — PROGRESS NOTES
Hospitalist Progress Note   Admit Date:  2023  9:46 AM   Name:  Natan Archuleta   Age:  81 y.o. Sex:  female  :  1950   MRN:  [id number]   Room:  [facility]    Presenting/Chief Complaint: No chief complaint on file. Reason(s) for Admission: Dyspnea [R06.00]  Hypervolemia, unspecified hypervolemia type [E87.70]     Hospital Course:   Please refer to the admission H&P for details of presentation. In summary, Natan Archuleta is a 81 y.o. female with medical history significant for ESRD on dialysis, hypertension, hyperlipidemia, PVD, anemia, hypothyroidism, GERD, hepatitis C infection who presented with shortness of breath for past several days. Patient reports that she had missed dialysis for a week. Nephrology was consulted and patient underwent dialysis on . Patient respiratory symptoms have improved. Patient would like to change her hemodialysis to peritoneal dialysis and therefore surgery was consulted. Plan for PD catheter placement on  with Dr. Leslee Ram. Vascular surgery was also consulted and plan for fistulogram today. Subjective/24 hr Events (08/10/23) : No acute events overnight. No additional complaints at this time. Denies any Shortness of breath. Slept: Well    Eating: Well    Drinking: Well    Stooling: Well    Voiding: Well    Pain: None    ROS  Pertinent positives:  As outlined above    Pertinent negatives:  Chest Pain, Shortness of Breath, Nausea, Vomiting, Abdominal pain, Diarrhea, Constipation, Dysuria, and Urinary frequency    Assessment & Plan:   Dyspnea and Pulmonary edema due to missed dialysis  ESRD on HD  - Dyspnea resolved s/p dialysis  - Plan to trial peritoneal dialysis as she is struggling with HD compliance  - Nephrology following. Continue with dialysis per nephrology  - Not hypoxic. Monitor for O2 need.   - General surgery planned for PD catheter placement on .    - Vascular surgery planned for fistulogram 9.1 (L) 9.4 - 12.3 FL    nRBC 0.00 0.0 - 0.2 K/uL    Differential Type AUTOMATED      Neutrophils % 56 43 - 78 %    Lymphocytes % 26 13 - 44 %    Monocytes % 10 4.0 - 12.0 %    Eosinophils % 7 0.5 - 7.8 %    Basophils % 1 0.0 - 2.0 %    Immature Granulocytes 0 0.0 - 5.0 %    Neutrophils Absolute 2.8 1.7 - 8.2 K/UL    Lymphocytes Absolute 1.3 0.5 - 4.6 K/UL    Monocytes Absolute 0.5 0.1 - 1.3 K/UL    Eosinophils Absolute 0.4 0.0 - 0.8 K/UL    Basophils Absolute 0.1 0.0 - 0.2 K/UL    Absolute Immature Granulocyte 0.0 0.0 - 0.5 K/UL   Basic Metabolic Panel w/ Reflex to MG    Collection Time: 08/09/23  6:06 AM   Result Value Ref Range    Sodium 135 133 - 143 mmol/L    Potassium 4.0 3.5 - 5.1 mmol/L    Chloride 103 101 - 110 mmol/L    CO2 26 21 - 32 mmol/L    Anion Gap 6 2 - 11 mmol/L    Glucose 92 65 - 100 mg/dL    BUN 36 (H) 8 - 23 MG/DL    Creatinine 6.20 (H) 0.6 - 1.0 MG/DL    Est, Glom Filt Rate 7 (L) >60 ml/min/1.73m2    Calcium 9.5 8.3 - 10.4 MG/DL   Protime-INR    Collection Time: 08/09/23  6:06 AM   Result Value Ref Range    Protime 13.5 12.6 - 14.3 sec    INR 1.0         Current Meds:  Current Facility-Administered Medications   Medication Dose Route Frequency    NIFEdipine (PROCARDIA XL) extended release tablet 60 mg  60 mg Oral Daily    hydrALAZINE (APRESOLINE) tablet 25 mg  25 mg Oral Q8H PRN    [START ON 8/11/2023] ceFAZolin (ANCEF) 2000 mg in sterile water 20 mL IV syringe  2,000 mg IntraVENous On Call to OR    losartan (COZAAR) tablet 50 mg  50 mg Oral Daily    DULoxetine (CYMBALTA) extended release capsule 60 mg  60 mg Oral Daily    ferrous sulfate (IRON 325) tablet 325 mg  325 mg Oral BID WC    epoetin sean-epbx (RETACRIT) injection 6,000 Units  6,000 Units SubCUTAneous Q7 Days    sodium chloride flush 0.9 % injection 5-40 mL  5-40 mL IntraVENous 2 times per day    sodium chloride flush 0.9 % injection 5-40 mL  5-40 mL IntraVENous PRN    0.9 % sodium chloride infusion   IntraVENous PRN    ondansetron

## 2023-08-10 NOTE — PLAN OF CARE
Problem: Discharge Planning  Goal: Discharge to home or other facility with appropriate resources  8/10/2023 0815 by Chantal Estrada RN  Outcome: Progressing  8/9/2023 2024 by Franco Lopez RN  Outcome: Progressing  Flowsheets (Taken 8/9/2023 0748 by Corey Teresa RN)  Discharge to home or other facility with appropriate resources:   Identify barriers to discharge with patient and caregiver   Arrange for needed discharge resources and transportation as appropriate   Identify discharge learning needs (meds, wound care, etc)     Problem: Safety - Adult  Goal: Free from fall injury  8/10/2023 0815 by Chantal Estrada RN  Outcome: Progressing  8/9/2023 2024 by Franco Lopez RN  Outcome: Progressing  Flowsheets  Taken 8/9/2023 1902 by Franco Lopez RN  Free From Fall Injury: Instruct family/caregiver on patient safety  Taken 8/9/2023 0748 by Corey Teresa RN  Free From Fall Injury: Instruct family/caregiver on patient safety     Problem: Pain  Goal: Verbalizes/displays adequate comfort level or baseline comfort level  8/10/2023 0815 by Chantal Estrada RN  Outcome: Progressing  8/9/2023 2024 by Franco Lopez RN  Outcome: Progressing

## 2023-08-10 NOTE — ANESTHESIA PRE PROCEDURE
the last 72 hours. Coags:   Lab Results   Component Value Date/Time    PROTIME 13.5 08/09/2023 06:06 AM    INR 1.0 08/09/2023 06:06 AM    APTT 28.5 01/26/2023 11:47 AM       HCG (If Applicable): No results found for: PREGTESTUR, PREGSERUM, HCG, HCGQUANT     ABGs: No results found for: PHART, PO2ART, RSO0BCT, YMY1DLB, BEART, F1CGAWBX     Type & Screen (If Applicable):  No results found for: LABABO, LABRH    Drug/Infectious Status (If Applicable):  Lab Results   Component Value Date/Time    HEPCAB REACTIVE 08/05/2023 01:00 PM       COVID-19 Screening (If Applicable):   Lab Results   Component Value Date/Time    COVID19 Not detected 01/27/2023 10:34 AM    COVID19 Not Detected 08/05/2020 01:43 PM           Anesthesia Evaluation  Patient summary reviewed no history of anesthetic complications:   Airway: Mallampati: II  TM distance: >3 FB   Neck ROM: full  Mouth opening: > = 3 FB   Dental:    (+) poor dentition  Comment: Chip lower right    Pulmonary: breath sounds clear to auscultation  (+) shortness of breath (Resolved after dialysis):  current smoker                          ROS comment: Trace pleural effusion vs atelectasis   Cardiovascular:  Exercise tolerance: good (>4 METS),   (+) hypertension:, hyperlipidemia      ECG reviewed  Rhythm: regular  Rate: normal  Echocardiogram reviewed         Beta Blocker:  Dose within 24 Hrs      ROS comment: On beta blocker    Echo 8/2022 showed LVEF 65-70%, mild AI and mild MR     Neuro/Psych:   (+) psychiatric history: stable with treatmentdepression/anxiety              ROS comment: Daily marijuana GI/Hepatic/Renal:   (+) GERD: well controlled, PUD, hepatitis (s/p treatment ): C, liver disease:, renal disease (HD T,r,sa via right subclavian port; Last HD 8/10/2023): ESRD,           Endo/Other:    (+) hypothyroidism::., .                  ROS comment: Anemia hgb 8.2 Abdominal:             Vascular:           Other Findings:             Anesthesia Plan      general     ASA 3

## 2023-08-10 NOTE — PROGRESS NOTES
Patient seen and examined no acute issues overnight. Patient status post fistulogram with balloon angioplasty and stent of outflow stenosis. From vascular standpoint okay to use access today. No follow-up is needed.     Chandrika Lopez

## 2023-08-10 NOTE — DIALYSIS
TRANSFER IN - DIALYSIS    Received patient in dialysis unit  from [facility] (unit) for ordered procedure. Consent verified for renal replacement therapy. Procedure explained to patient, opportunity for Q&A provided. Call light given. Patient a/ox3 and vital signs stable. /85,  P70  , 0L O2 via RA. Hemodialysis initiated using R CVC. Aspirated and flushed both ports without difficulty. Dressing clean, dry and intact. Machine settings per MD order. Heparin 0 unit bolus and 0 units/hr. Will monitor during treatment.

## 2023-08-10 NOTE — PROGRESS NOTES
Keerthi Ochoa  Admission Date: 8/5/2023         [address] [place] [facility] Progress Note: 8/10/2023    Follow-up for: ESRD    The patient's chart is reviewed and the patient is discussed with the staff. Subjective:   Pt seen and examined on HD. Qb 350, UF 2500mL. Requested to wait one more day to stick her fistula. Going for PD catheter placement tomorrow.     ROS:  Gen - no fever, no chills, appetite unchanged  CV - no chest pain, no palpitation  Lung - shortness of breath- better, no cough  Abd - no tenderness, no nausea/vomiting, no diarrhea  Ext - no edema    Current Facility-Administered Medications   Medication Dose Route Frequency    NIFEdipine (PROCARDIA XL) extended release tablet 60 mg  60 mg Oral Daily    hydrALAZINE (APRESOLINE) tablet 25 mg  25 mg Oral Q8H PRN    [START ON 8/11/2023] ceFAZolin (ANCEF) 2000 mg in sterile water 20 mL IV syringe  2,000 mg IntraVENous On Call to OR    losartan (COZAAR) tablet 50 mg  50 mg Oral Daily    DULoxetine (CYMBALTA) extended release capsule 60 mg  60 mg Oral Daily    ferrous sulfate (IRON 325) tablet 325 mg  325 mg Oral BID WC    epoetin sean-epbx (RETACRIT) injection 6,000 Units  6,000 Units SubCUTAneous Q7 Days    sodium chloride flush 0.9 % injection 5-40 mL  5-40 mL IntraVENous 2 times per day    sodium chloride flush 0.9 % injection 5-40 mL  5-40 mL IntraVENous PRN    0.9 % sodium chloride infusion   IntraVENous PRN    ondansetron (ZOFRAN-ODT) disintegrating tablet 4 mg  4 mg Oral Q8H PRN    Or    ondansetron (ZOFRAN) injection 4 mg  4 mg IntraVENous Q6H PRN    polyethylene glycol (GLYCOLAX) packet 17 g  17 g Oral Daily PRN    acetaminophen (TYLENOL) tablet 650 mg  650 mg Oral Q6H PRN    Or    acetaminophen (TYLENOL) suppository 650 mg  650 mg Rectal Q6H PRN    atorvastatin (LIPITOR) tablet 20 mg  20 mg Oral Nightly    calcium acetate (PHOSLO) tablet 667 mg  667 mg Oral TID WC    levothyroxine (SYNTHROID) tablet 125 mcg  125 mcg Oral

## 2023-08-10 NOTE — PROGRESS NOTES
Physical Therapy Note:    Attempted to see patient this AM for physical therapy treatment  session. Patient was off the floor for HD. Will follow and re-attempt as schedule permits/patient available.  Thank you,    Silvestre Shi [facility]     Rehab Caseload Tracker

## 2023-08-10 NOTE — PROGRESS NOTES
Occupational Therapy Note:    Attempted to see patient this AM for occupational therapy treatment  session. Patient off the floor to HD. Will follow and re-attempt as schedule permits/patient available.  Thank you,    KELLEY Silva    Rehab Caseload Tracker

## 2023-08-10 NOTE — DIALYSIS
TRANSFER OUT- DIALYSIS    Hemodialysis treatment completed. PT ran 3 hours, without complications. (Ordered for 3.5 hrs, pt requested to finish 30 mins early)    Patient a/ox3 and VS stable  /88  P 88       2 Kg removed. Flushed both ports with 10 mL of NS.  CVC dressing clean, dry, and intact, tego caps intact, curos caps placed. Meds given: 0.    RBCs given during dialysis: 0    Patient to 634 after dialysis.

## 2023-08-11 ENCOUNTER — ANESTHESIA (OUTPATIENT)
Dept: SURGERY | Age: 73
End: 2023-08-11
Payer: MEDICARE

## 2023-08-11 VITALS
WEIGHT: 141.5 LBS | TEMPERATURE: 97.5 F | OXYGEN SATURATION: 95 % | BODY MASS INDEX: 20.26 KG/M2 | RESPIRATION RATE: 16 BRPM | DIASTOLIC BLOOD PRESSURE: 54 MMHG | HEIGHT: 70 IN | HEART RATE: 62 BPM | SYSTOLIC BLOOD PRESSURE: 148 MMHG

## 2023-08-11 PROBLEM — R06.00 DYSPNEA: Status: RESOLVED | Noted: 2023-08-05 | Resolved: 2023-08-11

## 2023-08-11 PROBLEM — J81.1 PULMONARY EDEMA: Status: RESOLVED | Noted: 2023-08-05 | Resolved: 2023-08-11

## 2023-08-11 PROBLEM — E87.70 HYPERVOLEMIA: Status: RESOLVED | Noted: 2023-08-08 | Resolved: 2023-08-11

## 2023-08-11 LAB
ANION GAP SERPL CALC-SCNC: 7 MMOL/L (ref 2–11)
BASOPHILS # BLD: 0.1 K/UL (ref 0–0.2)
BASOPHILS NFR BLD: 2 % (ref 0–2)
BUN SERPL-MCNC: 31 MG/DL (ref 8–23)
CALCIUM SERPL-MCNC: 10.3 MG/DL (ref 8.3–10.4)
CHLORIDE SERPL-SCNC: 105 MMOL/L (ref 101–110)
CO2 SERPL-SCNC: 24 MMOL/L (ref 21–32)
CREAT SERPL-MCNC: 6.1 MG/DL (ref 0.6–1)
DIFFERENTIAL METHOD BLD: ABNORMAL
EOSINOPHIL # BLD: 0.4 K/UL (ref 0–0.8)
EOSINOPHIL NFR BLD: 9 % (ref 0.5–7.8)
ERYTHROCYTE [DISTWIDTH] IN BLOOD BY AUTOMATED COUNT: 14.3 % (ref 11.9–14.6)
GLUCOSE SERPL-MCNC: 103 MG/DL (ref 65–100)
HCT VFR BLD AUTO: 28.4 % (ref 35.8–46.3)
HGB BLD-MCNC: 8.7 G/DL (ref 11.7–15.4)
IMM GRANULOCYTES # BLD AUTO: 0 K/UL (ref 0–0.5)
IMM GRANULOCYTES NFR BLD AUTO: 0 % (ref 0–5)
LYMPHOCYTES # BLD: 0.9 K/UL (ref 0.5–4.6)
LYMPHOCYTES NFR BLD: 20 % (ref 13–44)
MAGNESIUM SERPL-MCNC: 2.5 MG/DL (ref 1.8–2.4)
MCH RBC QN AUTO: 31 PG (ref 26.1–32.9)
MCHC RBC AUTO-ENTMCNC: 30.6 G/DL (ref 31.4–35)
MCV RBC AUTO: 101.1 FL (ref 82–102)
MONOCYTES # BLD: 0.5 K/UL (ref 0.1–1.3)
MONOCYTES NFR BLD: 10 % (ref 4–12)
NEUTS SEG # BLD: 2.7 K/UL (ref 1.7–8.2)
NEUTS SEG NFR BLD: 59 % (ref 43–78)
NRBC # BLD: 0 K/UL (ref 0–0.2)
PHOSPHATE SERPL-MCNC: 4.6 MG/DL (ref 2.3–3.7)
PLATELET # BLD AUTO: 267 K/UL (ref 150–450)
PMV BLD AUTO: 8.7 FL (ref 9.4–12.3)
POTASSIUM SERPL-SCNC: 4.4 MMOL/L (ref 3.5–5.1)
RBC # BLD AUTO: 2.81 M/UL (ref 4.05–5.2)
SODIUM SERPL-SCNC: 136 MMOL/L (ref 133–143)
WBC # BLD AUTO: 4.5 K/UL (ref 4.3–11.1)

## 2023-08-11 PROCEDURE — 85025 COMPLETE CBC W/AUTO DIFF WBC: CPT

## 2023-08-11 PROCEDURE — 6360000002 HC RX W HCPCS: Performed by: ANESTHESIOLOGY

## 2023-08-11 PROCEDURE — 2500000003 HC RX 250 WO HCPCS: Performed by: NURSE ANESTHETIST, CERTIFIED REGISTERED

## 2023-08-11 PROCEDURE — 84100 ASSAY OF PHOSPHORUS: CPT

## 2023-08-11 PROCEDURE — 83735 ASSAY OF MAGNESIUM: CPT

## 2023-08-11 PROCEDURE — 2720000010 HC SURG SUPPLY STERILE: Performed by: STUDENT IN AN ORGANIZED HEALTH CARE EDUCATION/TRAINING PROGRAM

## 2023-08-11 PROCEDURE — 3700000001 HC ADD 15 MINUTES (ANESTHESIA): Performed by: STUDENT IN AN ORGANIZED HEALTH CARE EDUCATION/TRAINING PROGRAM

## 2023-08-11 PROCEDURE — 0WQF4ZZ REPAIR ABDOMINAL WALL, PERCUTANEOUS ENDOSCOPIC APPROACH: ICD-10-PCS | Performed by: STUDENT IN AN ORGANIZED HEALTH CARE EDUCATION/TRAINING PROGRAM

## 2023-08-11 PROCEDURE — 6370000000 HC RX 637 (ALT 250 FOR IP): Performed by: INTERNAL MEDICINE

## 2023-08-11 PROCEDURE — C1752 CATH,HEMODIALYSIS,SHORT-TERM: HCPCS | Performed by: STUDENT IN AN ORGANIZED HEALTH CARE EDUCATION/TRAINING PROGRAM

## 2023-08-11 PROCEDURE — 2500000003 HC RX 250 WO HCPCS: Performed by: STUDENT IN AN ORGANIZED HEALTH CARE EDUCATION/TRAINING PROGRAM

## 2023-08-11 PROCEDURE — 3700000000 HC ANESTHESIA ATTENDED CARE: Performed by: STUDENT IN AN ORGANIZED HEALTH CARE EDUCATION/TRAINING PROGRAM

## 2023-08-11 PROCEDURE — 6370000000 HC RX 637 (ALT 250 FOR IP): Performed by: ANESTHESIOLOGY

## 2023-08-11 PROCEDURE — 49592 RPR AA HRN 1ST < 3 NCR/STRN: CPT | Performed by: STUDENT IN AN ORGANIZED HEALTH CARE EDUCATION/TRAINING PROGRAM

## 2023-08-11 PROCEDURE — 2580000003 HC RX 258: Performed by: INTERNAL MEDICINE

## 2023-08-11 PROCEDURE — 6360000002 HC RX W HCPCS: Performed by: NURSE PRACTITIONER

## 2023-08-11 PROCEDURE — 7100000000 HC PACU RECOVERY - FIRST 15 MIN: Performed by: STUDENT IN AN ORGANIZED HEALTH CARE EDUCATION/TRAINING PROGRAM

## 2023-08-11 PROCEDURE — 3600000014 HC SURGERY LEVEL 4 ADDTL 15MIN: Performed by: STUDENT IN AN ORGANIZED HEALTH CARE EDUCATION/TRAINING PROGRAM

## 2023-08-11 PROCEDURE — 80048 BASIC METABOLIC PNL TOTAL CA: CPT

## 2023-08-11 PROCEDURE — 6360000002 HC RX W HCPCS: Performed by: STUDENT IN AN ORGANIZED HEALTH CARE EDUCATION/TRAINING PROGRAM

## 2023-08-11 PROCEDURE — 36415 COLL VENOUS BLD VENIPUNCTURE: CPT

## 2023-08-11 PROCEDURE — 0WHG43Z INSERTION OF INFUSION DEVICE INTO PERITONEAL CAVITY, PERCUTANEOUS ENDOSCOPIC APPROACH: ICD-10-PCS | Performed by: STUDENT IN AN ORGANIZED HEALTH CARE EDUCATION/TRAINING PROGRAM

## 2023-08-11 PROCEDURE — 49421 INS TUN IP CATH FOR DIAL OPN: CPT | Performed by: STUDENT IN AN ORGANIZED HEALTH CARE EDUCATION/TRAINING PROGRAM

## 2023-08-11 PROCEDURE — 3600000004 HC SURGERY LEVEL 4 BASE: Performed by: STUDENT IN AN ORGANIZED HEALTH CARE EDUCATION/TRAINING PROGRAM

## 2023-08-11 PROCEDURE — 2709999900 HC NON-CHARGEABLE SUPPLY: Performed by: STUDENT IN AN ORGANIZED HEALTH CARE EDUCATION/TRAINING PROGRAM

## 2023-08-11 PROCEDURE — 5A1D70Z PERFORMANCE OF URINARY FILTRATION, INTERMITTENT, LESS THAN 6 HOURS PER DAY: ICD-10-PCS | Performed by: STUDENT IN AN ORGANIZED HEALTH CARE EDUCATION/TRAINING PROGRAM

## 2023-08-11 PROCEDURE — 6360000002 HC RX W HCPCS: Performed by: NURSE ANESTHETIST, CERTIFIED REGISTERED

## 2023-08-11 PROCEDURE — 2580000003 HC RX 258: Performed by: ANESTHESIOLOGY

## 2023-08-11 RX ORDER — BUPIVACAINE HYDROCHLORIDE 2.5 MG/ML
INJECTION, SOLUTION EPIDURAL; INFILTRATION; INTRACAUDAL PRN
Status: DISCONTINUED | OUTPATIENT
Start: 2023-08-11 | End: 2023-08-11 | Stop reason: HOSPADM

## 2023-08-11 RX ORDER — ACETAMINOPHEN 500 MG
1000 TABLET ORAL ONCE
Status: COMPLETED | OUTPATIENT
Start: 2023-08-11 | End: 2023-08-11

## 2023-08-11 RX ORDER — LIDOCAINE HYDROCHLORIDE 20 MG/ML
INJECTION, SOLUTION EPIDURAL; INFILTRATION; INTRACAUDAL; PERINEURAL PRN
Status: DISCONTINUED | OUTPATIENT
Start: 2023-08-11 | End: 2023-08-11 | Stop reason: SDUPTHER

## 2023-08-11 RX ORDER — FERROUS SULFATE 325(65) MG
325 TABLET ORAL 2 TIMES DAILY WITH MEALS
Qty: 30 TABLET | Refills: 3 | Status: SHIPPED | OUTPATIENT
Start: 2023-08-11

## 2023-08-11 RX ORDER — SODIUM CHLORIDE, SODIUM LACTATE, POTASSIUM CHLORIDE, CALCIUM CHLORIDE 600; 310; 30; 20 MG/100ML; MG/100ML; MG/100ML; MG/100ML
INJECTION, SOLUTION INTRAVENOUS CONTINUOUS
Status: DISCONTINUED | OUTPATIENT
Start: 2023-08-11 | End: 2023-08-11 | Stop reason: HOSPADM

## 2023-08-11 RX ORDER — HYDROMORPHONE HYDROCHLORIDE 2 MG/ML
0.5 INJECTION, SOLUTION INTRAMUSCULAR; INTRAVENOUS; SUBCUTANEOUS EVERY 5 MIN PRN
Status: DISCONTINUED | OUTPATIENT
Start: 2023-08-11 | End: 2023-08-11 | Stop reason: HOSPADM

## 2023-08-11 RX ORDER — PROCHLORPERAZINE EDISYLATE 5 MG/ML
5 INJECTION INTRAMUSCULAR; INTRAVENOUS
Status: DISCONTINUED | OUTPATIENT
Start: 2023-08-11 | End: 2023-08-11 | Stop reason: HOSPADM

## 2023-08-11 RX ORDER — LOSARTAN POTASSIUM 50 MG/1
50 TABLET ORAL DAILY
Qty: 30 TABLET | Refills: 3 | Status: SHIPPED | OUTPATIENT
Start: 2023-08-12

## 2023-08-11 RX ORDER — ONDANSETRON 2 MG/ML
4 INJECTION INTRAMUSCULAR; INTRAVENOUS
Status: DISCONTINUED | OUTPATIENT
Start: 2023-08-11 | End: 2023-08-11 | Stop reason: HOSPADM

## 2023-08-11 RX ORDER — SODIUM CHLORIDE 0.9 % (FLUSH) 0.9 %
5-40 SYRINGE (ML) INJECTION PRN
Status: DISCONTINUED | OUTPATIENT
Start: 2023-08-11 | End: 2023-08-11 | Stop reason: HOSPADM

## 2023-08-11 RX ORDER — NEOSTIGMINE METHYLSULFATE 1 MG/ML
INJECTION, SOLUTION INTRAVENOUS PRN
Status: DISCONTINUED | OUTPATIENT
Start: 2023-08-11 | End: 2023-08-11 | Stop reason: SDUPTHER

## 2023-08-11 RX ORDER — SODIUM CHLORIDE 0.9 % (FLUSH) 0.9 %
5-40 SYRINGE (ML) INJECTION EVERY 12 HOURS SCHEDULED
Status: DISCONTINUED | OUTPATIENT
Start: 2023-08-11 | End: 2023-08-11 | Stop reason: HOSPADM

## 2023-08-11 RX ORDER — OXYCODONE HYDROCHLORIDE 5 MG/1
5 TABLET ORAL
Status: COMPLETED | OUTPATIENT
Start: 2023-08-11 | End: 2023-08-11

## 2023-08-11 RX ORDER — GLYCOPYRROLATE 0.2 MG/ML
INJECTION INTRAMUSCULAR; INTRAVENOUS PRN
Status: DISCONTINUED | OUTPATIENT
Start: 2023-08-11 | End: 2023-08-11 | Stop reason: SDUPTHER

## 2023-08-11 RX ORDER — LIDOCAINE HYDROCHLORIDE 10 MG/ML
1 INJECTION, SOLUTION INFILTRATION; PERINEURAL
Status: DISCONTINUED | OUTPATIENT
Start: 2023-08-11 | End: 2023-08-11 | Stop reason: HOSPADM

## 2023-08-11 RX ORDER — SODIUM CHLORIDE 9 MG/ML
INJECTION, SOLUTION INTRAVENOUS PRN
Status: DISCONTINUED | OUTPATIENT
Start: 2023-08-11 | End: 2023-08-11 | Stop reason: HOSPADM

## 2023-08-11 RX ORDER — ROCURONIUM BROMIDE 10 MG/ML
INJECTION, SOLUTION INTRAVENOUS PRN
Status: DISCONTINUED | OUTPATIENT
Start: 2023-08-11 | End: 2023-08-11 | Stop reason: SDUPTHER

## 2023-08-11 RX ORDER — DEXAMETHASONE SODIUM PHOSPHATE 4 MG/ML
INJECTION, SOLUTION INTRA-ARTICULAR; INTRALESIONAL; INTRAMUSCULAR; INTRAVENOUS; SOFT TISSUE PRN
Status: DISCONTINUED | OUTPATIENT
Start: 2023-08-11 | End: 2023-08-11 | Stop reason: SDUPTHER

## 2023-08-11 RX ORDER — PROPOFOL 10 MG/ML
INJECTION, EMULSION INTRAVENOUS PRN
Status: DISCONTINUED | OUTPATIENT
Start: 2023-08-11 | End: 2023-08-11 | Stop reason: SDUPTHER

## 2023-08-11 RX ORDER — MIDAZOLAM HYDROCHLORIDE 2 MG/2ML
2 INJECTION, SOLUTION INTRAMUSCULAR; INTRAVENOUS
Status: COMPLETED | OUTPATIENT
Start: 2023-08-11 | End: 2023-08-11

## 2023-08-11 RX ORDER — ONDANSETRON 2 MG/ML
INJECTION INTRAMUSCULAR; INTRAVENOUS PRN
Status: DISCONTINUED | OUTPATIENT
Start: 2023-08-11 | End: 2023-08-11 | Stop reason: SDUPTHER

## 2023-08-11 RX ORDER — OXYCODONE HYDROCHLORIDE 5 MG/1
5 TABLET ORAL EVERY 8 HOURS PRN
Qty: 9 TABLET | Refills: 0 | Status: SHIPPED | OUTPATIENT
Start: 2023-08-11 | End: 2023-08-14

## 2023-08-11 RX ADMIN — SODIUM CHLORIDE, PRESERVATIVE FREE 10 ML: 5 INJECTION INTRAVENOUS at 04:58

## 2023-08-11 RX ADMIN — ROCURONIUM BROMIDE 35 MG: 50 INJECTION, SOLUTION INTRAVENOUS at 12:15

## 2023-08-11 RX ADMIN — LEVOTHYROXINE SODIUM 125 MCG: 0.07 TABLET ORAL at 05:00

## 2023-08-11 RX ADMIN — LIDOCAINE HYDROCHLORIDE 100 MG: 20 INJECTION, SOLUTION EPIDURAL; INFILTRATION; INTRACAUDAL; PERINEURAL at 12:15

## 2023-08-11 RX ADMIN — GLYCOPYRROLATE 0.4 MG: 0.2 INJECTION INTRAMUSCULAR; INTRAVENOUS at 12:49

## 2023-08-11 RX ADMIN — OXYCODONE HYDROCHLORIDE 5 MG: 5 TABLET ORAL at 13:17

## 2023-08-11 RX ADMIN — ONDANSETRON 4 MG: 2 INJECTION INTRAMUSCULAR; INTRAVENOUS at 12:27

## 2023-08-11 RX ADMIN — SODIUM CHLORIDE: 9 INJECTION, SOLUTION INTRAVENOUS at 10:44

## 2023-08-11 RX ADMIN — NIFEDIPINE 60 MG: 30 TABLET, EXTENDED RELEASE ORAL at 08:35

## 2023-08-11 RX ADMIN — PROPOFOL 120 MG: 10 INJECTION, EMULSION INTRAVENOUS at 12:15

## 2023-08-11 RX ADMIN — Medication 3 MG: at 12:49

## 2023-08-11 RX ADMIN — ACETAMINOPHEN 1000 MG: 500 TABLET, FILM COATED ORAL at 10:43

## 2023-08-11 RX ADMIN — MIDAZOLAM HYDROCHLORIDE 2 MG: 1 INJECTION, SOLUTION INTRAMUSCULAR; INTRAVENOUS at 10:44

## 2023-08-11 RX ADMIN — SODIUM CHLORIDE, PRESERVATIVE FREE 10 ML: 5 INJECTION INTRAVENOUS at 08:47

## 2023-08-11 RX ADMIN — METOPROLOL TARTRATE 50 MG: 50 TABLET, FILM COATED ORAL at 08:35

## 2023-08-11 RX ADMIN — DEXAMETHASONE SODIUM PHOSPHATE 4 MG: 4 INJECTION, SOLUTION INTRAMUSCULAR; INTRAVENOUS at 12:27

## 2023-08-11 RX ADMIN — Medication 2000 MG: at 12:27

## 2023-08-11 ASSESSMENT — PAIN DESCRIPTION - LOCATION: LOCATION: HEAD

## 2023-08-11 ASSESSMENT — PAIN - FUNCTIONAL ASSESSMENT
PAIN_FUNCTIONAL_ASSESSMENT: 0-10
PAIN_FUNCTIONAL_ASSESSMENT: 0-10

## 2023-08-11 ASSESSMENT — PAIN SCALES - GENERAL: PAINLEVEL_OUTOF10: 5

## 2023-08-11 NOTE — PROGRESS NOTES
Occupational Therapy Note:    Attempted to see patient this AM for occupational therapy treatment  session. Patient off the floor for a procedure. Will follow and re-attempt as schedule permits/patient available.  Thank you,    KELLEY Negrete    Rehab Caseload Tracker

## 2023-08-11 NOTE — PROGRESS NOTES
Patient seen and examined. No acute issues overnight. Clinically patient is left upper arm fistula is patent with thrill and bruit. From vascular standpoint access can be used. Vascular will sign off please call with questions or concerns.     Tawny Barrow

## 2023-08-11 NOTE — ANESTHESIA POSTPROCEDURE EVALUATION
Department of Anesthesiology  Postprocedure Note    Patient: Namrata Barrera  MRN: [id number]  YOB: 1950  Date of evaluation: 8/11/2023      Procedure Summary     Date: 08/11/23 Room / Location: [facility] MAIN OR  / [facility] MAIN OR    Anesthesia Start: 6474 Anesthesia Stop: 1303    Procedure: CATHETER INSERTION PERITONEAL DIALYSIS LAPAROSCOPIC/ OPEN INCISIONAL HERNIA REPAIR (Abdomen) Diagnosis:       ESRD (end stage renal disease) ([address])      (ESRD (end stage renal disease) ([address]) [N18.6])    Providers: Gwyn Mckeon DO Responsible Provider: Hector Bautista MD    Anesthesia Type: general ASA Status: 3          Anesthesia Type: No value filed.     Zion Phase I: Zion Score: 9    Zion Phase II:        Anesthesia Post Evaluation    Patient location during evaluation: PACU  Patient participation: complete - patient participated  Level of consciousness: awake and alert  Airway patency: patent  Nausea & Vomiting: no nausea  Cardiovascular status: hemodynamically stable  Respiratory status: acceptable  Hydration status: euvolemic  Pain management: adequate and satisfactory to patient

## 2023-08-11 NOTE — DISCHARGE INSTRUCTIONS
DISCHARGE INSTRUCTIONS    Please call our office for a follow-up appointment in 1-2 week(s). Driving: No driving while taking pain medications    Activity: No strenous activity. Slowly advance as tolerated. No lifting greater than 20lbs. Diet:  Slowly advance as tolerated. Increase your fluids and fiber, as pain medications may cause constipation. No alcoholic beverages. Bathing: You may shower. No tub baths for 5 days. Dressing: If outer dressing, remove in 24 hours. Leave steri strips intact. Other:  Ice to incision as needed for pain. If drains present, empty and record    output daily. Call Dr. Benjamin Spine if you have:  ? Temperature greater than 100.4  ? Persistent nausea and vomiting  ? Severe uncontrolled pain  ? Redness, tenderness, or signs of infection (pain, swelling, redness, odor or green/yellow discharge around the site)  ? Difficulty breathing, headache or visual disturbances  ? Hives  ? Persistent dizziness or light-headedness  ? Extreme fatigue  ? Any other questions or concerns you may have after discharge    In an emergency, call 911 or go to an Emergency Department at [facility] or [facility].    It is important to bring a complete, current list of your medications to any medical appointments or hospitalizations. I understand and acknowledge receipt of the above instructions.         _____________________________                                                                                                                                        Patient or Guardian Signature                                                         Date/Time      ______________________________                                                                                                                                      [id number] or R.N.'s Signature                                                        Date/Time      The discharge instructions have been reviewed with the

## 2023-08-11 NOTE — PLAN OF CARE
Problem: Discharge Planning  Goal: Discharge to home or other facility with appropriate resources  8/11/2023 1536 by Alina Fernandez RN  Outcome: Adequate for Discharge  8/11/2023 1536 by Alina Fernandez RN  Outcome: Progressing     Problem: Safety - Adult  Goal: Free from fall injury  8/11/2023 1536 by Alina Fernandez RN  Outcome: Adequate for Discharge  8/11/2023 1536 by Alina Fernandez RN  Outcome: Progressing     Problem: Pain  Goal: Verbalizes/displays adequate comfort level or baseline comfort level  8/11/2023 1536 by Alina Fernandez RN  Outcome: Adequate for Discharge  8/11/2023 1536 by Alina Fernandez RN  Outcome: Progressing     Problem: Skin/Tissue Integrity  Goal: Absence of new skin breakdown  Description: 1. Monitor for areas of redness and/or skin breakdown  2. Assess vascular access sites hourly  3. Every 4-6 hours minimum:  Change oxygen saturation probe site  4. Every 4-6 hours:  If on nasal continuous positive airway pressure, respiratory therapy assess nares and determine need for appliance change or resting period.   8/11/2023 1536 by Alina Fernandez RN  Outcome: Adequate for Discharge  8/11/2023 1536 by Alina Fernandez RN  Outcome: Progressing

## 2023-08-11 NOTE — ANESTHESIA PROCEDURE NOTES
Airway  Date/Time: 8/11/2023 12:19 PM  Urgency: elective    Airway not difficult    General Information and Staff    Patient location during procedure: OR  Resident/CRNA: Daniel Christensen APRN - CRNA  Performed: resident/CRNA     Indications and Patient Condition  Indications for airway management: anesthesia  Sedation level: deep  Preoxygenated: yes  Patient position: sniffing  MILS maintained throughout  Mask difficulty assessment: vent by bag mask + OA or adjuvant +/- NMBA    Final Airway Details  Final airway type: endotracheal airway      Successful airway: ETT  Cuffed: yes   Successful intubation technique: video laryngoscopy  Facilitating devices/methods: intubating stylet  Endotracheal tube insertion site: oral  Blade: Nelson  Blade size: #3  ETT size (mm): 7.0  Cormack-Lehane Classification: grade I - full view of glottis  Placement verified by: chest auscultation and capnometry   Measured from: teeth  ETT to teeth (cm): 22  Number of attempts at approach: 1  Ventilation between attempts: bag mask  Number of other approaches attempted: 0    Additional Comments  Very anterior Cords , Recommend edmonds 3  no

## 2023-08-11 NOTE — OP NOTE
Diagnostic Laparoscopy, insertion of PD catheter Note    Name:  Keerthi Ochoa Date/Time of Admission: 2023  9:46 AM  CSN: [id number]  Attending Provider: Luisana Stokes DO  MRN:  [id number]  : 1950 81 y.o. Pre-operative Diagnosis: ESRD, incarcerated incisional hernia    Post-operative Diagnosis: same    Procedure:  Diagnostic laparoscopy, insertion of PD catheter, open incarcerated incisional hernia 1cm    Surgeon: Sammi Gambino DO    Anesthesia: General endotracheal anesthesia    Specimen: none    INDICATION: This patient presents with chronic abdominal pain and will undergo diagnostic laparoscopy. The risks, benefits, complications, treatment options, and expected outcomes were discussed with the patient. The possibilities of reaction to medication, pulmonary aspiration, perforation of viscus, bleeding, recurrent infection, the need for additional procedures, failure to diagnose a condition, the possible need to convert to an open procedure, and creating a complication requiring transfusion or operation were discussed with the patient. The patient and/or family concurred with the proposed plan, giving informed consent. DESCRIPTION OF PROCEDURE: Patient is correctly identified in preoperative holding area. Consent was confirmed and placed on the chart. Preoperative antibiotics were administered per SCIP protocol. The patient was then taken back to the operative suite and placed in the supine position. General anesthesia was performed per anesthesia via an endotracheal tube. The patient's abdomen was then prepped and draped in the usual sterile fashion. A timeout was performed and all members of the team that were present were in agreement. The procedure began by locating a spot approximately 2 fingerbreadths below the patient's left subcostal margin here this area was locally anesthetized.   Next using a 15 blade scalpel and incision was made through skin down subcutaneous

## 2023-08-11 NOTE — DISCHARGE SUMMARY
Hospitalist Discharge Summary   Admit Date:  2023  9:46 AM   DC Note date: 2023  Name:  Kev Trivedi   Age:  81 y.o. Sex:  female  :  1950   MRN:  [id number]   Room:  [facility]  PCP:  Elmira Callahan MD    Presenting Complaint: No chief complaint on file. Initial Admission Diagnosis: Dyspnea [R06.00]  Hypervolemia, unspecified hypervolemia type [E87.70]     Problem List for this Hospitalization (present on admission):    Principal Problem:    Dyspnea  Active Problems:    Hyperlipidemia    PVD (peripheral vascular disease) with claudication (HCC)    Anemia    Hypertension    Acquired hypothyroidism    Gastroesophageal reflux disease without esophagitis    History of hepatitis C    ESRD (end stage renal disease) on dialysis ([address])    Pulmonary edema    Hypervolemia  Resolved Problems:    * No resolved hospital problems. [facility] Course: In summary, Kev Trivedi is a 81 y.o. female with medical history significant for ESRD on dialysis, hypertension, hyperlipidemia, PVD, anemia, hypothyroidism, GERD, hepatitis C infection who presented with shortness of breath for past several days. Patient reports that she had missed dialysis for a week. Nephrology was consulted and patient underwent dialysis on . Patient respiratory symptoms resolved thereafter. Patient would like to change her hemodialysis to peritoneal dialysis and therefore surgery was consulted. Plan for PD catheter placement on  with Dr. Shai Oden. Fistulogram was completed while inpatient and operational, vascular surgery signed off and stated patient was stable for discharge. General surgery stated patient was good for discharge on . Nephrology was also okay with patient discharge as well. On the day of discharge patient was discharged in her baseline state of health, hemodynamically stable and electrolytes at her baseline.  Her hemoglobin has been downtrending as of lately for which (PROVENTIL;VENTOLIN;PROAIR) 108 (90 Base) MCG/ACT inhaler Inhale 2 puffs into the lungs every 6 hours as needed for Wheezing      metoprolol tartrate (LOPRESSOR) 50 MG tablet Take 1 tablet by mouth 2 times daily  Qty: 60 tablet, Refills: 0      NIFEdipine (PROCARDIA XL) 30 MG extended release tablet TAKE ONE TABLET BY MOUTH ONE TIME DAILY      acetaminophen (TYLENOL) 650 MG extended release tablet Take 1 tablet by mouth every 8 hours as needed      vitamin D3 (CHOLECALCIFEROL) 125 MCG (5000 UT) TABS tablet Take 1 tablet by mouth daily      levothyroxine (SYNTHROID) 125 MCG tablet Take 1 tablet by mouth every morning (before breakfast) Not taking it daily. pantoprazole (PROTONIX) 40 MG tablet Take 2 tablets by mouth every evening      sodium bicarbonate 650 MG tablet TAKE 1 TABLET BY MOUTH TWICE DAILY with meals             Some medications may have been reported old/obsolete and marked \"stop taking\" by the system; in reality pt was already off these meds; defer to outpatient or prescribing providers. Procedures done this admission:  Procedure(s):  CATHETER INSERTION PERITONEAL DIALYSIS LAPAROSCOPIC/ OPEN INCISIONAL HERNIA REPAIR    Consults this admission:  IP CONSULT TO NEPHROLOGY  IP CONSULT TO GENERAL SURGERY    Echocardiogram results:  08/22/22    TRANSTHORACIC ECHOCARDIOGRAM (TTE) COMPLETE (CONTRAST/BUBBLE/3D PRN) 08/23/2022  7:31 AM, 08/23/2022 12:00 AM (Final)    Interpretation Summary    Left Ventricle: Normal left ventricular systolic function with a visually estimated EF of 65 - 70%. Left ventricle size is normal. Moderately increased wall thickness. Normal wall motion. Normal diastolic function. Aortic Valve: Valve structure is normal. Mild sclerosis of the aortic valve cusp. Mild regurgitation with a centrally directed jet. Mitral Valve: Mild regurgitation. Tricuspid Valve: The estimated RVSP is 14 mmHg. Signed by:  Bruna Barboza MD on 8/23/2022  7:31 AM, Signed by: Mee Mi

## 2023-08-11 NOTE — PLAN OF CARE
Problem: Discharge Planning  Goal: Discharge to home or other facility with appropriate resources  8/10/2023 2141 by Phyllis Leon RN  Outcome: Progressing  8/10/2023 0815 by Sarah James RN  Outcome: Progressing     Problem: Safety - Adult  Goal: Free from fall injury  8/10/2023 2141 by Phyllis Leno RN  Outcome: Progressing  8/10/2023 0815 by Sarah James RN  Outcome: Progressing     Problem: Pain  Goal: Verbalizes/displays adequate comfort level or baseline comfort level  8/10/2023 2141 by Phyllis Leon RN  Outcome: Progressing  8/10/2023 0815 by Sarah James RN  Outcome: Progressing

## 2023-08-14 ENCOUNTER — CARE COORDINATION (OUTPATIENT)
Dept: CARE COORDINATION | Facility: CLINIC | Age: 73
End: 2023-08-14

## 2023-08-15 NOTE — CARE COORDINATION
further questions or concerns at this time. Were discharge instructions available to patient? Yes. Reviewed appropriate site of care based on symptoms and resources available to patient including: PCP  Specialist  Urgent care clinics  When to call 911. The patient agrees to contact the PCP office for questions related to their healthcare. Advance Care Planning:   Does patient have an Advance Directive:  no ACP docs . Medication reconciliation was performed with patient, who verbalizes understanding of administration of home medications. Medications reviewed: yes    Was patient discharged with a pulse oximeter? no    Non-face-to-face services provided:  Obtained and reviewed discharge summary and/or continuity of care documents    Offered patient enrollment in the Remote Patient Monitoring (RPM) program for in-home monitoring: NA.    Care Transitions 24 Hour Call    Do you have a copy of your discharge instructions?: Yes  Do you have all of your prescriptions and are they filled?: Yes  Care Transitions Interventions         Discussed follow-up appointments. If no appointment was previously scheduled, appointment scheduling offered: Yes. Is follow up appointment scheduled within 7 days of discharge? Needs PCP appointment. Follow Up  Future Appointments   Date Time Provider [address] [place] [address]   8/23/2023  3:00 PM [id number] INJECTION/CATH [id number] G[facility] AMB       Care Transition Nurse provided contact information. Plan for follow-up call in 5-7 days based on severity of symptoms and risk factors. Plan for next call:  follow up appointments.     Radhames Bryan RN

## 2023-08-21 ENCOUNTER — TELEPHONE (OUTPATIENT)
Dept: INTERNAL MEDICINE CLINIC | Facility: CLINIC | Age: 73
End: 2023-08-21

## 2023-08-22 ENCOUNTER — CARE COORDINATION (OUTPATIENT)
Dept: CARE COORDINATION | Facility: CLINIC | Age: 73
End: 2023-08-22

## 2023-08-22 NOTE — CARE COORDINATION
[facility] Care Transitions Follow Up Call    Patient Current Location:  Home: [address] [place] [address] Avenue [address]    Care Transition Nurse contacted the patient by telephone to follow up after admission on 2023. Verified name and  with patient as identifiers. Patient: Agnes Chinchilla  Patient : 1950   MRN: [id number]  Reason for Admission: Dyspnea  Discharge Date: 23 RARS: Readmission Risk Score: 19.5      Needs to be reviewed by the provider   Additional needs identified to be addressed with provider: No  none             Method of communication with provider: none. Patient has a history of : Hyperlipidemia, PVD, Anemia, Hypertension, Acquired hypothyroidism, Gastroesophageal reflux disease without esophagitis, History of hepatitis C, ESRD (end stage renal disease) on dialysis, Pulmonary edema, Hypervolemia. Reports doing ok. Declines appointment to see surgeon. Encouraged to call PCP to schedule an appointment, voices understanding. States that she is attending dialysis. Per notes in Epic, when patient went to the ED she had not attended dialysis for a week. Addressed changes since last contact:  none  Discussed follow-up appointments. If no appointment was previously scheduled, appointment scheduling offered: Yes. Is follow up appointment scheduled within 7 days of discharge? No.    Follow Up  Future Appointments   Date Time Provider [address]   2023  3:00 PM [id number] INJECTION/CATH [id number] G[facility] AMB   2023  3:30 PM SAE De NP CSA [facility] AMB   2023  3:00 PM SAE Gómez CNP MAT [facility] AMB     Non-[facility] follow up appointment(s): Nephro. Care Transition Nurse reviewed discharge instructions, medical action plan, and red flags with patient and discussed any barriers to care and/or understanding of plan of care after discharge.  Discussed appropriate site of care based on symptoms and resources available to patient including:

## 2023-08-23 ENCOUNTER — NURSE ONLY (OUTPATIENT)
Dept: UROLOGY | Age: 73
End: 2023-08-23

## 2023-08-23 DIAGNOSIS — Z46.6 URINARY CATHETER CHANGE REQUIRED: ICD-10-CM

## 2023-08-23 DIAGNOSIS — Z93.59 SUPRAPUBIC CATHETER (HCC): Primary | ICD-10-CM

## 2023-09-19 ENCOUNTER — CARE COORDINATION (OUTPATIENT)
Dept: CARE COORDINATION | Facility: CLINIC | Age: 73
End: 2023-09-19

## 2023-09-19 NOTE — CARE COORDINATION
Care Transitions Outreach Attempt    Call within 2 business days of discharge: Yes   Attempted to reach patient for transitions of care follow up. Unable to reach patient. Patient: Isis Best Patient : 1950 MRN: [id number]    Last Discharge [address]       Date Complaint Diagnosis Description Type Department Provider    23 Chest Pain. Hypervolemia, unspecified hypervolemia type . .. ED to Hosp-Admission (Discharged) (ADMITTED) [id number] Matilde Grant DO; Nisreen Duncan. .. Was this an external facility discharge?  No Discharge Facility: [facility]    Noted following upcoming appointments from discharge chart review:   [facility] follow up appointment(s):   Future Appointments   Date Time Provider [address]   2023  2:30 PM [id number] INJECTION/CATH [id number] [facility] AMB     Non-[facility] follow up appointment(s): none noted

## 2023-09-20 ENCOUNTER — NURSE ONLY (OUTPATIENT)
Dept: UROLOGY | Age: 73
End: 2023-09-20
Payer: MEDICARE

## 2023-09-20 ENCOUNTER — TELEPHONE (OUTPATIENT)
Dept: UROLOGY | Age: 73
End: 2023-09-20

## 2023-09-20 DIAGNOSIS — Z93.59 SUPRAPUBIC CATHETER (HCC): Primary | ICD-10-CM

## 2023-09-20 DIAGNOSIS — Z46.6 URINARY CATHETER CHANGE REQUIRED: ICD-10-CM

## 2023-09-20 PROCEDURE — 51702 INSERT TEMP BLADDER CATH: CPT | Performed by: UROLOGY

## 2023-09-20 NOTE — TELEPHONE ENCOUNTER
Pt needs to be scheduled for cath change 5 weeks from today, please schedule due to block on your schedule

## 2023-09-20 NOTE — PROGRESS NOTES
Pt came in for catheter change. [address] silicone coated catheter used to change suprapubic catheter, due to Silastic catheters being out of stock. Catheter changed without incident. Patient tolerated procedure well.

## 2023-09-21 ENCOUNTER — CARE COORDINATION (OUTPATIENT)
Dept: CARE COORDINATION | Facility: CLINIC | Age: 73
End: 2023-09-21

## 2023-09-26 NOTE — TELEPHONE ENCOUNTER
Left message on voice mail for patient, requesting they return our call if appointment made does not work.

## 2023-10-08 ENCOUNTER — APPOINTMENT (OUTPATIENT)
Dept: CT IMAGING | Age: 73
DRG: 314 | End: 2023-10-08
Payer: MEDICARE

## 2023-10-08 ENCOUNTER — APPOINTMENT (OUTPATIENT)
Dept: GENERAL RADIOLOGY | Age: 73
DRG: 314 | End: 2023-10-08
Payer: MEDICARE

## 2023-10-08 ENCOUNTER — HOSPITAL ENCOUNTER (INPATIENT)
Age: 73
LOS: 1 days | Discharge: LEFT BEFORE DISCHARGE | DRG: 314 | End: 2023-10-08
Attending: EMERGENCY MEDICINE | Admitting: FAMILY MEDICINE
Payer: MEDICARE

## 2023-10-08 VITALS
OXYGEN SATURATION: 94 % | DIASTOLIC BLOOD PRESSURE: 72 MMHG | RESPIRATION RATE: 20 BRPM | TEMPERATURE: 97.5 F | SYSTOLIC BLOOD PRESSURE: 139 MMHG | HEART RATE: 77 BPM

## 2023-10-08 DIAGNOSIS — Z99.2 ESRD ON DIALYSIS (HCC): Primary | ICD-10-CM

## 2023-10-08 DIAGNOSIS — R06.02 SHORTNESS OF BREATH: ICD-10-CM

## 2023-10-08 DIAGNOSIS — N18.6 ESRD ON DIALYSIS (HCC): Primary | ICD-10-CM

## 2023-10-08 PROBLEM — R09.89 PULMONARY CONGESTION: Status: ACTIVE | Noted: 2023-10-08

## 2023-10-08 LAB
ALBUMIN SERPL-MCNC: 3.5 G/DL (ref 3.2–4.6)
ALBUMIN/GLOB SERPL: 0.9 (ref 0.4–1.6)
ALP SERPL-CCNC: 155 U/L (ref 50–136)
ALT SERPL-CCNC: 14 U/L (ref 12–65)
ANION GAP SERPL CALC-SCNC: 9 MMOL/L (ref 2–11)
AST SERPL-CCNC: 20 U/L (ref 15–37)
BASOPHILS # BLD: 0.1 K/UL (ref 0–0.2)
BASOPHILS NFR BLD: 1 % (ref 0–2)
BILIRUB SERPL-MCNC: 0.5 MG/DL (ref 0.2–1.1)
BUN SERPL-MCNC: 67 MG/DL (ref 8–23)
CALCIUM SERPL-MCNC: 9.2 MG/DL (ref 8.3–10.4)
CHLORIDE SERPL-SCNC: 111 MMOL/L (ref 101–110)
CO2 SERPL-SCNC: 24 MMOL/L (ref 21–32)
CREAT SERPL-MCNC: 9.9 MG/DL (ref 0.6–1)
DIFFERENTIAL METHOD BLD: ABNORMAL
EOSINOPHIL # BLD: 0.6 K/UL (ref 0–0.8)
EOSINOPHIL NFR BLD: 6 % (ref 0.5–7.8)
ERYTHROCYTE [DISTWIDTH] IN BLOOD BY AUTOMATED COUNT: 13 % (ref 11.9–14.6)
GLOBULIN SER CALC-MCNC: 4.1 G/DL (ref 2.8–4.5)
GLUCOSE SERPL-MCNC: 92 MG/DL (ref 65–100)
HCT VFR BLD AUTO: 36 % (ref 35.8–46.3)
HGB BLD-MCNC: 11.3 G/DL (ref 11.7–15.4)
IMM GRANULOCYTES # BLD AUTO: 0 K/UL (ref 0–0.5)
IMM GRANULOCYTES NFR BLD AUTO: 0 % (ref 0–5)
LYMPHOCYTES # BLD: 1.4 K/UL (ref 0.5–4.6)
LYMPHOCYTES NFR BLD: 14 % (ref 13–44)
MCH RBC QN AUTO: 30.9 PG (ref 26.1–32.9)
MCHC RBC AUTO-ENTMCNC: 31.4 G/DL (ref 31.4–35)
MCV RBC AUTO: 98.4 FL (ref 82–102)
MONOCYTES # BLD: 0.6 K/UL (ref 0.1–1.3)
MONOCYTES NFR BLD: 6 % (ref 4–12)
NEUTS SEG # BLD: 7.3 K/UL (ref 1.7–8.2)
NEUTS SEG NFR BLD: 73 % (ref 43–78)
NRBC # BLD: 0 K/UL (ref 0–0.2)
NT PRO BNP: ABNORMAL PG/ML (ref 5–125)
PLATELET # BLD AUTO: 205 K/UL (ref 150–450)
PMV BLD AUTO: 9.1 FL (ref 9.4–12.3)
POTASSIUM SERPL-SCNC: 4.8 MMOL/L (ref 3.5–5.1)
PROT SERPL-MCNC: 7.6 G/DL (ref 6.3–8.2)
RBC # BLD AUTO: 3.66 M/UL (ref 4.05–5.2)
SODIUM SERPL-SCNC: 144 MMOL/L (ref 133–143)
TROPONIN I SERPL HS-MCNC: 30.4 PG/ML (ref 0–14)
TROPONIN I SERPL HS-MCNC: 45 PG/ML (ref 0–14)
WBC # BLD AUTO: 10 K/UL (ref 4.3–11.1)

## 2023-10-08 PROCEDURE — 1100000000 HC RM PRIVATE

## 2023-10-08 PROCEDURE — 6370000000 HC RX 637 (ALT 250 FOR IP): Performed by: PHYSICIAN ASSISTANT

## 2023-10-08 PROCEDURE — 70450 CT HEAD/BRAIN W/O DYE: CPT

## 2023-10-08 PROCEDURE — 80053 COMPREHEN METABOLIC PANEL: CPT

## 2023-10-08 PROCEDURE — 6360000002 HC RX W HCPCS: Performed by: PHYSICIAN ASSISTANT

## 2023-10-08 PROCEDURE — 85025 COMPLETE CBC W/AUTO DIFF WBC: CPT

## 2023-10-08 PROCEDURE — 99285 EMERGENCY DEPT VISIT HI MDM: CPT

## 2023-10-08 PROCEDURE — 84484 ASSAY OF TROPONIN QUANT: CPT

## 2023-10-08 PROCEDURE — 83880 ASSAY OF NATRIURETIC PEPTIDE: CPT

## 2023-10-08 PROCEDURE — 71046 X-RAY EXAM CHEST 2 VIEWS: CPT

## 2023-10-08 PROCEDURE — 96374 THER/PROPH/DIAG INJ IV PUSH: CPT

## 2023-10-08 RX ORDER — CALCIUM ACETATE 667 MG/1
3 TABLET ORAL
Status: DISCONTINUED | OUTPATIENT
Start: 2023-10-08 | End: 2023-10-08 | Stop reason: HOSPADM

## 2023-10-08 RX ORDER — FUROSEMIDE 10 MG/ML
60 INJECTION INTRAMUSCULAR; INTRAVENOUS ONCE
Status: DISCONTINUED | OUTPATIENT
Start: 2023-10-08 | End: 2023-10-08 | Stop reason: HOSPADM

## 2023-10-08 RX ORDER — LEVOTHYROXINE SODIUM 125 UG/1
125 TABLET ORAL
Status: DISCONTINUED | OUTPATIENT
Start: 2023-10-09 | End: 2023-10-08 | Stop reason: HOSPADM

## 2023-10-08 RX ORDER — POTASSIUM CHLORIDE 7.45 MG/ML
10 INJECTION INTRAVENOUS PRN
Status: DISCONTINUED | OUTPATIENT
Start: 2023-10-08 | End: 2023-10-08

## 2023-10-08 RX ORDER — OXYCODONE HYDROCHLORIDE 5 MG/1
5 TABLET ORAL ONCE
Status: DISCONTINUED | OUTPATIENT
Start: 2023-10-08 | End: 2023-10-08 | Stop reason: HOSPADM

## 2023-10-08 RX ORDER — DULOXETIN HYDROCHLORIDE 60 MG/1
60 CAPSULE, DELAYED RELEASE ORAL DAILY
Status: DISCONTINUED | OUTPATIENT
Start: 2023-10-09 | End: 2023-10-08 | Stop reason: HOSPADM

## 2023-10-08 RX ORDER — FERROUS SULFATE 325(65) MG
325 TABLET ORAL 2 TIMES DAILY WITH MEALS
Status: DISCONTINUED | OUTPATIENT
Start: 2023-10-08 | End: 2023-10-08 | Stop reason: HOSPADM

## 2023-10-08 RX ORDER — ALBUTEROL SULFATE 5 MG/ML
2.5 SOLUTION RESPIRATORY (INHALATION) EVERY 6 HOURS PRN
Status: DISCONTINUED | OUTPATIENT
Start: 2023-10-08 | End: 2023-10-08 | Stop reason: HOSPADM

## 2023-10-08 RX ORDER — METOPROLOL TARTRATE 50 MG
50 TABLET ORAL 2 TIMES DAILY
Status: DISCONTINUED | OUTPATIENT
Start: 2023-10-08 | End: 2023-10-08 | Stop reason: HOSPADM

## 2023-10-08 RX ORDER — MAGNESIUM HYDROXIDE/ALUMINUM HYDROXICE/SIMETHICONE 120; 1200; 1200 MG/30ML; MG/30ML; MG/30ML
30 SUSPENSION ORAL EVERY 6 HOURS PRN
Status: DISCONTINUED | OUTPATIENT
Start: 2023-10-08 | End: 2023-10-08 | Stop reason: HOSPADM

## 2023-10-08 RX ORDER — VITAMIN B COMPLEX
5000 TABLET ORAL DAILY
Status: DISCONTINUED | OUTPATIENT
Start: 2023-10-09 | End: 2023-10-08 | Stop reason: HOSPADM

## 2023-10-08 RX ORDER — NIFEDIPINE 30 MG/1
60 TABLET, EXTENDED RELEASE ORAL DAILY
Status: DISCONTINUED | OUTPATIENT
Start: 2023-10-09 | End: 2023-10-08 | Stop reason: HOSPADM

## 2023-10-08 RX ORDER — ACETAMINOPHEN 325 MG/1
650 TABLET ORAL
Status: COMPLETED | OUTPATIENT
Start: 2023-10-08 | End: 2023-10-08

## 2023-10-08 RX ORDER — POLYETHYLENE GLYCOL 3350 17 G/17G
17 POWDER, FOR SOLUTION ORAL DAILY PRN
Status: DISCONTINUED | OUTPATIENT
Start: 2023-10-08 | End: 2023-10-08 | Stop reason: HOSPADM

## 2023-10-08 RX ORDER — MAGNESIUM SULFATE IN WATER 40 MG/ML
2000 INJECTION, SOLUTION INTRAVENOUS PRN
Status: DISCONTINUED | OUTPATIENT
Start: 2023-10-08 | End: 2023-10-08

## 2023-10-08 RX ORDER — HYDRALAZINE HYDROCHLORIDE 20 MG/ML
10 INJECTION INTRAMUSCULAR; INTRAVENOUS ONCE
Status: COMPLETED | OUTPATIENT
Start: 2023-10-08 | End: 2023-10-08

## 2023-10-08 RX ORDER — SODIUM CHLORIDE 0.9 % (FLUSH) 0.9 %
5-40 SYRINGE (ML) INJECTION PRN
Status: DISCONTINUED | OUTPATIENT
Start: 2023-10-08 | End: 2023-10-08 | Stop reason: HOSPADM

## 2023-10-08 RX ORDER — FUROSEMIDE 10 MG/ML
INJECTION INTRAMUSCULAR; INTRAVENOUS
Status: DISCONTINUED
Start: 2023-10-08 | End: 2023-10-08 | Stop reason: WASHOUT

## 2023-10-08 RX ORDER — SODIUM CHLORIDE 9 MG/ML
INJECTION, SOLUTION INTRAVENOUS PRN
Status: DISCONTINUED | OUTPATIENT
Start: 2023-10-08 | End: 2023-10-08 | Stop reason: HOSPADM

## 2023-10-08 RX ORDER — SODIUM BICARBONATE 650 MG/1
650 TABLET ORAL 2 TIMES DAILY
Status: DISCONTINUED | OUTPATIENT
Start: 2023-10-08 | End: 2023-10-08 | Stop reason: HOSPADM

## 2023-10-08 RX ORDER — FAMOTIDINE 20 MG/1
10 TABLET, FILM COATED ORAL DAILY PRN
Status: DISCONTINUED | OUTPATIENT
Start: 2023-10-08 | End: 2023-10-08 | Stop reason: HOSPADM

## 2023-10-08 RX ORDER — POTASSIUM CHLORIDE 1500 MG/1
40 TABLET, EXTENDED RELEASE ORAL PRN
Status: DISCONTINUED | OUTPATIENT
Start: 2023-10-08 | End: 2023-10-08

## 2023-10-08 RX ORDER — PANTOPRAZOLE SODIUM 40 MG/1
40 TABLET, DELAYED RELEASE ORAL EVERY EVENING
Status: DISCONTINUED | OUTPATIENT
Start: 2023-10-08 | End: 2023-10-08 | Stop reason: HOSPADM

## 2023-10-08 RX ORDER — LOSARTAN POTASSIUM 50 MG/1
50 TABLET ORAL DAILY
Status: DISCONTINUED | OUTPATIENT
Start: 2023-10-09 | End: 2023-10-08 | Stop reason: HOSPADM

## 2023-10-08 RX ORDER — ONDANSETRON 4 MG/1
4 TABLET, ORALLY DISINTEGRATING ORAL EVERY 8 HOURS PRN
Status: DISCONTINUED | OUTPATIENT
Start: 2023-10-08 | End: 2023-10-08 | Stop reason: HOSPADM

## 2023-10-08 RX ORDER — SODIUM CHLORIDE 0.9 % (FLUSH) 0.9 %
5-40 SYRINGE (ML) INJECTION EVERY 12 HOURS SCHEDULED
Status: DISCONTINUED | OUTPATIENT
Start: 2023-10-08 | End: 2023-10-08 | Stop reason: HOSPADM

## 2023-10-08 RX ORDER — ONDANSETRON 2 MG/ML
4 INJECTION INTRAMUSCULAR; INTRAVENOUS EVERY 6 HOURS PRN
Status: DISCONTINUED | OUTPATIENT
Start: 2023-10-08 | End: 2023-10-08 | Stop reason: HOSPADM

## 2023-10-08 RX ORDER — ACETAMINOPHEN 325 MG/1
650 TABLET ORAL EVERY 6 HOURS PRN
Status: DISCONTINUED | OUTPATIENT
Start: 2023-10-08 | End: 2023-10-08 | Stop reason: HOSPADM

## 2023-10-08 RX ORDER — ATORVASTATIN CALCIUM 40 MG/1
20 TABLET, FILM COATED ORAL
Status: DISCONTINUED | OUTPATIENT
Start: 2023-10-08 | End: 2023-10-08 | Stop reason: HOSPADM

## 2023-10-08 RX ORDER — FOLIC ACID/VIT B COMPLEX AND C 0.8 MG
1 TABLET ORAL EVERY EVENING
Status: DISCONTINUED | OUTPATIENT
Start: 2023-10-08 | End: 2023-10-08 | Stop reason: HOSPADM

## 2023-10-08 RX ORDER — BISACODYL 10 MG
10 SUPPOSITORY, RECTAL RECTAL DAILY PRN
Status: DISCONTINUED | OUTPATIENT
Start: 2023-10-08 | End: 2023-10-08 | Stop reason: HOSPADM

## 2023-10-08 RX ADMIN — ACETAMINOPHEN 650 MG: 325 TABLET ORAL at 13:45

## 2023-10-08 RX ADMIN — HYDRALAZINE HYDROCHLORIDE 10 MG: 20 INJECTION, SOLUTION INTRAMUSCULAR; INTRAVENOUS at 13:39

## 2023-10-08 ASSESSMENT — PAIN DESCRIPTION - LOCATION: LOCATION: HEAD

## 2023-10-08 ASSESSMENT — ENCOUNTER SYMPTOMS
SHORTNESS OF BREATH: 1
ABDOMINAL PAIN: 0

## 2023-10-08 ASSESSMENT — PAIN SCALES - GENERAL: PAINLEVEL_OUTOF10: 5

## 2023-10-08 ASSESSMENT — LIFESTYLE VARIABLES
HOW MANY STANDARD DRINKS CONTAINING ALCOHOL DO YOU HAVE ON A TYPICAL DAY: PATIENT DOES NOT DRINK
HOW OFTEN DO YOU HAVE A DRINK CONTAINING ALCOHOL: NEVER

## 2023-10-08 NOTE — ED TRIAGE NOTES
Patient reports- difficulty breathing, elevated BP, home dialysis patient started last week but the site stopped working and have not been able dialyze.

## 2023-10-08 NOTE — ED NOTES
Pt left AMA, the risk and benefits were explained to pt, provide, nurse witnessed, and patient signed the AMA form. After risk and benefits of pt leaving was explained pt still intended to leave.      Janel Arroyo RN  10/08/23 1464

## 2023-10-08 NOTE — ACP (ADVANCE CARE PLANNING)
[facility] Hospital[facility]  At the heart of better care     Advance Care Planning   Admit Date:  10/8/2023 12:09 PM   Name:  Jsoh Bennett   Age:  72 y.o. Sex:  female  :  1950   MRN:  [id number]   Room:  [place] [address]    Josh Bennett has capacity to make her own decisions:   Yes      Patient / surrogate decision-maker directed code status:  DNR/DNI      Patient or surrogate consented to discussion of the current conditions, workup, management plans, prognosis, and the risk for further deterioration. Time spent: 17 minutes in direct discussion.       Signed:  Nu Coburn DO

## 2023-10-08 NOTE — H&P
Head:  Normocephalic, atraumatic  Eyes:  Sclerae appear normal.  Pupils equally round. ENT:  Nares appear normal.  Moist oral mucosa  Neck:  No restricted ROM. Trachea midline   CV:   RRR. No m/r/g. No jugular venous distension. Lungs: Inspiratory crackles at lower bases with inspiratory rhonchi throughout   Abdomen:   Soft, nontender, nondistended. No erythema to PD site. Extremities: No cyanosis or clubbing. No edema  Skin:     Scabs noted to left posterior leg. Neuro:  CN II-XII grossly intact.       Orders Placed This Encounter   Medications    acetaminophen (TYLENOL) tablet 650 mg    hydrALAZINE (APRESOLINE) injection 10 mg    NIFEdipine (PROCARDIA XL) extended release tablet 60 mg    atorvastatin (LIPITOR) tablet 20 mg    irvin-lisa tablet 1 tablet    calcium acetate (PHOSLO) capsule 667 mg    DULoxetine (CYMBALTA) extended release capsule 60 mg    ferrous sulfate (IRON 325) tablet 325 mg    levothyroxine (SYNTHROID) tablet 125 mcg    losartan (COZAAR) tablet 50 mg    metoprolol tartrate (LOPRESSOR) tablet 50 mg    pantoprazole (PROTONIX) tablet 40 mg    sodium bicarbonate tablet 650 mg    vitamin D3 (CHOLECALCIFEROL) tablet 5,000 Units    albuterol (PROVENTIL) nebulizer solution 2.5 mg     Order Specific Question:   Initiate RT Bronchodilator Protocol     Answer:   Yes - Inpatient Protocol    sodium chloride flush 0.9 % injection 5-40 mL    sodium chloride flush 0.9 % injection 5-40 mL    0.9 % sodium chloride infusion    OR Linked Order Group     potassium chloride (KLOR-CON M) extended release tablet 40 mEq     potassium bicarb-citric acid (EFFER-K) effervescent tablet 40 mEq     potassium chloride 10 mEq/100 mL IVPB (Peripheral Line)    potassium chloride 10 mEq/100 mL IVPB (Peripheral Line)    magnesium sulfate 2000 mg in 50 mL IVPB premix    OR Linked Order Group     ondansetron (ZOFRAN-ODT) disintegrating tablet 4 mg     ondansetron (ZOFRAN) injection 4 mg    polyethylene glycol (GLYCOLAX)

## 2023-10-08 NOTE — DISCHARGE SUMMARY
Hospitalist Discharge Summary   Admit Date:  10/8/2023 12:09 PM   DC Note date: 10/8/2023  Name:  Ruth Yun   Age:  72 y.o. Sex:  female  :  1950   MRN:  [id number]   Room:  [place] [address]  PCP:  Vianey Jiang MD    Presenting Complaint: Hypertension and Respiratory Distress     Initial Admission Diagnosis: Pulmonary congestion [R09.89]     Problem List for this Hospitalization (present on admission):    Principal Problem:    Pulmonary congestion  Active Problems:    CONKLIN (dyspnea on exertion)    Hyperlipidemia    PVD (peripheral vascular disease) with claudication ([address])    Hypertension    Acquired hypothyroidism    History of hepatitis C    ESRD (end stage renal disease) on dialysis [facility])  Resolved Problems:    * No resolved hospital problems. [facility] Course:    Please see HP from 10/8.  Patient has left AMA    Current Discharge Medication List        CONTINUE these medications which have NOT CHANGED    Details   losartan (COZAAR) 50 MG tablet Take 1 tablet by mouth daily  Qty: 30 tablet, Refills: 3      ferrous sulfate (IRON 325) 325 (65 Fe) MG tablet Take 1 tablet by mouth 2 times daily (with meals)  Qty: 30 tablet, Refills: 3      DULoxetine (CYMBALTA) 60 MG extended release capsule Take 1 capsule by mouth 2 times daily  Qty: 180 capsule, Refills: 1    Associated Diagnoses: Depressive disorder      NIFEdipine (PROCARDIA XL) 60 MG extended release tablet TAKE 1 TABLET BY MOUTH DAILY      B Complex-C-Folic Acid (AMY-GER) TABS Take 1 tablet by mouth every evening      calcium acetate (PHOSLO) 667 MG CAPS capsule 3 times daily (with meals)      atorvastatin (LIPITOR) 20 MG tablet Take 1 tablet by mouth at bedtime Every other night  Qty: 90 tablet, Refills: 1    Associated Diagnoses: Hyperlipidemia, unspecified hyperlipidemia type      albuterol sulfate HFA (PROVENTIL;VENTOLIN;PROAIR) 108 (90 Base) MCG/ACT inhaler Inhale 2 puffs into the lungs every 6 hours as needed for Wheezing

## 2023-10-09 NOTE — ED PROVIDER NOTES
Emergency Department Provider Note       PCP: Fercho Carpio MD   Age: 72 y.o. Sex: female     [address] 10/08/2023 04:31:34 PM       ICD-10-CM    1. ESRD on dialysis ([address])  N18.6     Z99.2       2. Shortness of breath  R06.02           Medical Decision Making     Complexity of Problems Addressed:  1 or more acute illnesses that pose a threat to life or bodily function. Data Reviewed and Analyzed:   I independently ordered and reviewed each unique test.  I reviewed external records: provider visit note from PCP. I interpreted the X-rays mild pulmonary venous congestion, agree with radiologist interpretation. Discussion of management or test interpretation. 72-year-old female presenting to the emergency department today for shortness of breath, hypertension, headache. Patient noted to be hypertensive on arrival.  She was given a dose of hydralazine with resolution. She has been doing peritoneal dialysis at home, states feels like she is fluid overloaded. Patient did have 2 episodes of hypoxia at 87 and 89% on room air but otherwise no signs of acute respiratory distress. Her troponin was initially slightly elevated at 45, repeat was 30, I believe this is due to her chronic kidney failure and without delta change I do not suspect ACS. Mild f pulmonary congestion noted on chest x-ray. Patient's creatinine is 9, looks like her baseline is about 6-7, I believe she needs dialysis. Patient stated to me she does not feel like her peritoneal dialysis is working and is requesting admission. I discussed with the hospitalist who accepts patient for admission. The patient was admitted and I have discussed patient management with the admitting provider. Risk of Complications and/or Morbidity of Patient Management:  Shared medical decision making was utilized in creating the patients health plan today.     ED Course as of 10/08/23 2101   Sun Oct 08, 2023   1220 I discussed with Dr. Katja Alvarez

## 2023-10-13 ENCOUNTER — TELEPHONE (OUTPATIENT)
Dept: INTERNAL MEDICINE CLINIC | Facility: CLINIC | Age: 73
End: 2023-10-13

## 2023-10-16 RX ORDER — CIPROFLOXACIN 500 MG/1
500 TABLET, FILM COATED ORAL DAILY
Qty: 7 TABLET | Refills: 0 | Status: SHIPPED | OUTPATIENT
Start: 2023-10-16 | End: 2023-10-23

## 2023-10-16 NOTE — TELEPHONE ENCOUNTER
Pt seen in [facility] ED 10/08, it is up to the pt to call for follow up.

## 2023-10-25 ENCOUNTER — NURSE ONLY (OUTPATIENT)
Dept: UROLOGY | Age: 73
End: 2023-10-25
Payer: MEDICARE

## 2023-10-25 DIAGNOSIS — Z93.59 SUPRAPUBIC CATHETER (HCC): Primary | ICD-10-CM

## 2023-10-25 DIAGNOSIS — Z46.6 URINARY CATHETER CHANGE REQUIRED: ICD-10-CM

## 2023-10-25 PROCEDURE — 51702 INSERT TEMP BLADDER CATH: CPT | Performed by: NURSE PRACTITIONER

## 2023-11-29 ENCOUNTER — NURSE ONLY (OUTPATIENT)
Dept: UROLOGY | Age: 73
End: 2023-11-29
Payer: MEDICARE

## 2023-11-29 DIAGNOSIS — Z93.59 SUPRAPUBIC CATHETER (HCC): Primary | ICD-10-CM

## 2023-11-29 DIAGNOSIS — Z46.6 URINARY CATHETER CHANGE REQUIRED: ICD-10-CM

## 2023-11-29 PROCEDURE — 51705 CHANGE OF BLADDER TUBE: CPT | Performed by: NURSE PRACTITIONER

## 2023-12-08 ENCOUNTER — TELEPHONE (OUTPATIENT)
Dept: UROLOGY | Age: 73
End: 2023-12-08

## 2023-12-14 ENCOUNTER — OFFICE VISIT (OUTPATIENT)
Dept: UROLOGY | Age: 73
End: 2023-12-14
Payer: MEDICARE

## 2023-12-14 DIAGNOSIS — N39.0 URINARY TRACT INFECTION WITHOUT HEMATURIA, SITE UNSPECIFIED: Primary | ICD-10-CM

## 2023-12-14 DIAGNOSIS — N39.0 URINARY TRACT INFECTION WITHOUT HEMATURIA, SITE UNSPECIFIED: ICD-10-CM

## 2023-12-14 LAB
BILIRUBIN, URINE, POC: NEGATIVE
BLOOD URINE, POC: NORMAL
GLUCOSE URINE, POC: NEGATIVE
KETONES, URINE, POC: NEGATIVE
LEUKOCYTE ESTERASE, URINE, POC: NORMAL
NITRITE, URINE, POC: NEGATIVE
PH, URINE, POC: 7 (ref 4.6–8)
PROTEIN,URINE, POC: 300
SPECIFIC GRAVITY, URINE, POC: 1.02 (ref 1–1.03)
URINALYSIS CLARITY, POC: NORMAL
URINALYSIS COLOR, POC: NORMAL
UROBILINOGEN, POC: NORMAL

## 2023-12-14 PROCEDURE — 81003 URINALYSIS AUTO W/O SCOPE: CPT | Performed by: NURSE PRACTITIONER

## 2023-12-14 RX ORDER — CIPROFLOXACIN 500 MG/1
500 TABLET, FILM COATED ORAL DAILY
Qty: 7 TABLET | Refills: 0 | Status: SHIPPED | OUTPATIENT
Start: 2023-12-14 | End: 2023-12-21

## 2023-12-18 LAB
BACTERIA SPEC CULT: ABNORMAL
SERVICE CMNT-IMP: ABNORMAL

## 2024-01-12 ENCOUNTER — TELEPHONE (OUTPATIENT)
Dept: INTERNAL MEDICINE CLINIC | Facility: CLINIC | Age: 74
End: 2024-01-12

## 2024-01-12 NOTE — TELEPHONE ENCOUNTER
----- Message from Shaunna Moisés sent at 1/12/2024 11:30 AM EST -----  Subject: Message to Provider    QUESTIONS  Information for Provider? Pt returned Michelle moss, please contact Pt   back.  ---------------------------------------------------------------------------  --------------  CALL BACK INFO  [phone]; OK to leave message on voicemail  ---------------------------------------------------------------------------  --------------  SCRIPT ANSWERS  undefined

## 2024-01-12 NOTE — TELEPHONE ENCOUNTER
Pt is scheduled for a VV on 1/19 to discuss ultrasound results.    Wear ace wrap for support. Apply ice pack to area of pain 4-6 times per day. May alternate with warm compresses. Take Tylenol or Naproxen as needed for pain. Use crutches for at least 3 days to keep weight off of right leg. If still having pain after 5-7 days, follow-up with your PCP for recheck.

## 2024-01-17 ENCOUNTER — NURSE ONLY (OUTPATIENT)
Dept: UROLOGY | Age: 74
End: 2024-01-17
Payer: MEDICARE

## 2024-01-17 DIAGNOSIS — Z93.59 SUPRAPUBIC CATHETER (HCC): Primary | ICD-10-CM

## 2024-01-17 PROCEDURE — 51705 CHANGE OF BLADDER TUBE: CPT | Performed by: UROLOGY

## 2024-01-19 ENCOUNTER — TELEMEDICINE (OUTPATIENT)
Dept: INTERNAL MEDICINE CLINIC | Facility: CLINIC | Age: 74
End: 2024-01-19
Payer: MEDICARE

## 2024-01-19 DIAGNOSIS — Z87.891 HISTORY OF SMOKING: ICD-10-CM

## 2024-01-19 DIAGNOSIS — Z86.19 HISTORY OF HEPATITIS C: ICD-10-CM

## 2024-01-19 DIAGNOSIS — Z23 NEED FOR SHINGLES VACCINE: ICD-10-CM

## 2024-01-19 DIAGNOSIS — I73.9 PVD (PERIPHERAL VASCULAR DISEASE) WITH CLAUDICATION (HCC): ICD-10-CM

## 2024-01-19 DIAGNOSIS — E03.9 ACQUIRED HYPOTHYROIDISM: ICD-10-CM

## 2024-01-19 DIAGNOSIS — N18.6 ESRD (END STAGE RENAL DISEASE) (HCC): Primary | ICD-10-CM

## 2024-01-19 DIAGNOSIS — E78.5 HYPERLIPIDEMIA, UNSPECIFIED HYPERLIPIDEMIA TYPE: ICD-10-CM

## 2024-01-19 DIAGNOSIS — Z93.59 SUPRAPUBIC CATHETER (HCC): ICD-10-CM

## 2024-01-19 PROBLEM — N18.4 STAGE 4 CHRONIC KIDNEY DISEASE (HCC): Status: RESOLVED | Noted: 2022-05-16 | Resolved: 2024-01-19

## 2024-01-19 PROBLEM — Z99.2 ESRD (END STAGE RENAL DISEASE) ON DIALYSIS (HCC): Status: RESOLVED | Noted: 2023-01-27 | Resolved: 2024-01-19

## 2024-01-19 PROCEDURE — 1123F ACP DISCUSS/DSCN MKR DOCD: CPT | Performed by: INTERNAL MEDICINE

## 2024-01-19 PROCEDURE — 99214 OFFICE O/P EST MOD 30 MIN: CPT | Performed by: INTERNAL MEDICINE

## 2024-01-19 RX ORDER — ZOSTER VACCINE RECOMBINANT, ADJUVANTED 50 MCG/0.5
0.5 KIT INTRAMUSCULAR SEE ADMIN INSTRUCTIONS
Qty: 0.5 ML | Refills: 0 | Status: SHIPPED | OUTPATIENT
Start: 2024-01-19 | End: 2024-07-17

## 2024-01-19 ASSESSMENT — ENCOUNTER SYMPTOMS: RESPIRATORY NEGATIVE: 1

## 2024-01-19 NOTE — PROGRESS NOTES
[facility]      2024    Patient Name: Wilda Camara  :  1950    Subjective:    Chief Complaint:  Chief Complaint   Patient presents with    Follow-up         HPI I was at home while conducting this encounter.    Consent:  She and/or her healthcare decision maker is aware that this patient-initiated Telehealth encounter is a billable service, with coverage as determined by her insurance carrier. She is aware that she may receive a bill and has provided verbal consent to proceed: Yes    This virtual visit was conducted via [place]. Pursuant to the emergency declaration under the Krishnamurthy Act and the National Emergencies Act, 1135 waiver authority and the Coronavirus Preparedness and Response Supplemental Appropriations Act, this Virtual  Visit was conducted to reduce the patient's risk of exposure to COVID-19 and provide continuity of care for an established patient. Services were provided through a video synchronous discussion virtually to substitute for in-person clinic visit.  Due to this being a TeleHealth evaluation, many elements of the physical examination are unable to be assessed.     Total Time: minutes: 11-20 minutes.       Patient evaluated for f/u; she has ESRD and has peritoneal dialysis at home, which she started in July; she had a renal US 23 and wanted to review results; results were reviewed with her in detail;   She is still smoking, but is trying to cut back; she has hx of hepatitis C, completed treatment years ago, was told she had cirrhosis in ; she has seen Dr. Galicia in the past; ;     Past Medical History:   Diagnosis Date    Anemia     Arthritis     Chronic pain     arthritis knees    Cough     reported constant since 10/22    ESRD (end stage renal disease) on dialysis (HCC)     M,W,F- [facility]    GERD (gastroesophageal reflux disease)     controlled with med    High cholesterol     History of blood transfusion     Hypertension

## 2024-04-03 ENCOUNTER — NURSE ONLY (OUTPATIENT)
Dept: UROLOGY | Age: 74
End: 2024-04-03
Payer: MEDICARE

## 2024-04-03 DIAGNOSIS — Z93.59 SUPRAPUBIC CATHETER (HCC): Primary | ICD-10-CM

## 2024-04-03 DIAGNOSIS — Z46.6 URINARY CATHETER CHANGE REQUIRED: ICD-10-CM

## 2024-04-03 PROCEDURE — 51702 INSERT TEMP BLADDER CATH: CPT | Performed by: NURSE PRACTITIONER

## 2024-05-08 ENCOUNTER — NURSE ONLY (OUTPATIENT)
Dept: UROLOGY | Age: 74
End: 2024-05-08
Payer: MEDICARE

## 2024-05-08 DIAGNOSIS — Z46.6 URINARY CATHETER CHANGE REQUIRED: ICD-10-CM

## 2024-05-08 DIAGNOSIS — Z93.59 SUPRAPUBIC CATHETER (HCC): Primary | ICD-10-CM

## 2024-05-08 PROCEDURE — 51705 CHANGE OF BLADDER TUBE: CPT | Performed by: NURSE PRACTITIONER

## 2024-05-20 RX ORDER — CIPROFLOXACIN 500 MG/1
500 TABLET, FILM COATED ORAL DAILY
Qty: 7 TABLET | Refills: 0 | OUTPATIENT
Start: 2024-05-20 | End: 2024-05-27

## 2024-05-20 RX ORDER — CIPROFLOXACIN 500 MG/1
500 TABLET, FILM COATED ORAL DAILY
Qty: 7 TABLET | Refills: 0 | Status: SHIPPED | OUTPATIENT
Start: 2024-05-20 | End: 2024-05-27

## 2024-06-17 RX ORDER — METHENAMINE HIPPURATE 1000 MG/1
1 TABLET ORAL DAILY
Qty: 90 TABLET | Refills: 3 | Status: SHIPPED | OUTPATIENT
Start: 2024-06-17

## 2024-07-02 ENCOUNTER — HOSPICE ADMISSION (OUTPATIENT)
Dept: HOSPICE | Facility: HOSPICE | Age: 74
End: 2024-07-02
Payer: MEDICARE

## 2024-07-03 ENCOUNTER — HOME CARE VISIT (OUTPATIENT)
Dept: HOSPICE | Facility: HOSPICE | Age: 74
End: 2024-07-03
Payer: MEDICARE

## 2024-07-03 VITALS
OXYGEN SATURATION: 97 % | RESPIRATION RATE: 18 BRPM | DIASTOLIC BLOOD PRESSURE: 115 MMHG | TEMPERATURE: 99.8 F | HEIGHT: 68 IN | HEART RATE: 88 BPM | BODY MASS INDEX: 22.13 KG/M2 | WEIGHT: 146 LBS | SYSTOLIC BLOOD PRESSURE: 199 MMHG

## 2024-07-03 PROCEDURE — 0651 HSPC ROUTINE HOME CARE

## 2024-07-03 PROCEDURE — G0299 HHS/HOSPICE OF RN EA 15 MIN: HCPCS

## 2024-07-03 ASSESSMENT — ENCOUNTER SYMPTOMS
PAIN LOCATION - PAIN QUALITY: ACID
STOOL DESCRIPTION: LIQUID
SKIN LESIONS: 1
DIARRHEA: 1

## 2024-07-04 ENCOUNTER — HOME CARE VISIT (OUTPATIENT)
Dept: SCHEDULING | Facility: HOME HEALTH | Age: 74
End: 2024-07-04
Payer: MEDICARE

## 2024-07-04 VITALS — HEART RATE: 88 BPM | SYSTOLIC BLOOD PRESSURE: 128 MMHG | DIASTOLIC BLOOD PRESSURE: 68 MMHG | RESPIRATION RATE: 16 BRPM

## 2024-07-04 PROCEDURE — 0651 HSPC ROUTINE HOME CARE

## 2024-07-04 PROCEDURE — G0299 HHS/HOSPICE OF RN EA 15 MIN: HCPCS

## 2024-07-04 ASSESSMENT — ENCOUNTER SYMPTOMS
STOOL DESCRIPTION: MUSHY
SKIN LESIONS: 1

## 2024-07-04 NOTE — HOSPICE
Greeted at door by spouse Héctor. Pt sitting up on couch in living room area of home. She denied pain but did complain of nausea. I educated them on using Ondansetron before she tried to eat and they both verbalized understanding. I sta and listened to pt talk about her life and her . I explained what may happen as she gets sicker and both the pt and Héctor voiced understanding.Assessment completed, see ROS sections. Education done, see POC. Héctor knows to call [facility] with any acute needs or concerns.

## 2024-07-04 NOTE — HOSPICE
Wilda Camara, 73 year old female patient admitted to [facility] on July 3, 2024 with the primary diagnosis of ESRD. Unrelated: Cirrhosis, Hep C, hypothyroidism, depression, chronic pain, gastric ulcer, HLD, DM, esophageal cancer. Pt admitted to hospital on 6/29 after spouse found her to have altered LOC.  Pt had elected to stop PD approximately 2 weeks prior.  Pt had been admitted to hospice on 6/28 and had taken all of her comfort medications in an apparent suicide attempt.  Pt was seen by psych who determined that pt is not suicidal and did not meet criteria for commitment.  Pt was refused readmission to previous hospice due to safety concerns.  Discussed with hospice provider who agreed to accept pt to [facility] with conditions.  Pt and spouse agreed to the conditions.  Pt reaffirms that she does not want to continue PD at this time. Patient is very animated during visit. Thoughts jump from place to place. It is unclear how accurate information is. She has no complaints of pain at this time. She does have acid reflux that often causes nausea and vomiting. New orders today for Protonix, Zofran and Haldol. She states she stopped all her medications weeks ago, including psych medications. She has multiple skin irritations over arms and legs from scratching. New orders today for Atarax. I also encouraged patient to use an oatmeal based body wash to help with itching. She has five dogs that live with her inside her home.  seems overwhelmed with patient. Her son lives in a camper outside the house. No DME needed at this time. Medications to be delivered by [facility]: Protonix, atarax, hospice comfort pack to be locked in safe, Zofran, haldol, tylenol, coreg, losartan. No supplies needed at this time. RN educated patient and family on hospice process and philosophy, medication management, pain control, skin care and protection, fall precautions, home safety, and strategies on infection prevention. Patient's

## 2024-07-05 ENCOUNTER — HOME CARE VISIT (OUTPATIENT)
Dept: SCHEDULING | Facility: HOME HEALTH | Age: 74
End: 2024-07-05
Payer: MEDICARE

## 2024-07-05 ENCOUNTER — HOME CARE VISIT (OUTPATIENT)
Dept: HOSPICE | Facility: HOSPICE | Age: 74
End: 2024-07-05
Payer: MEDICARE

## 2024-07-05 PROCEDURE — G0155 HHCP-SVS OF CSW,EA 15 MIN: HCPCS

## 2024-07-05 PROCEDURE — 0651 HSPC ROUTINE HOME CARE

## 2024-07-06 PROCEDURE — 0651 HSPC ROUTINE HOME CARE

## 2024-07-07 PROCEDURE — 0651 HSPC ROUTINE HOME CARE

## 2024-07-08 ENCOUNTER — HOME CARE VISIT (OUTPATIENT)
Dept: SCHEDULING | Facility: HOME HEALTH | Age: 74
End: 2024-07-08
Payer: MEDICARE

## 2024-07-08 PROCEDURE — 0651 HSPC ROUTINE HOME CARE

## 2024-07-08 PROCEDURE — G0155 HHCP-SVS OF CSW,EA 15 MIN: HCPCS

## 2024-07-09 ENCOUNTER — HOME CARE VISIT (OUTPATIENT)
Dept: SCHEDULING | Facility: HOME HEALTH | Age: 74
End: 2024-07-09
Payer: MEDICARE

## 2024-07-09 VITALS — DIASTOLIC BLOOD PRESSURE: 88 MMHG | RESPIRATION RATE: 16 BRPM | HEART RATE: 88 BPM | SYSTOLIC BLOOD PRESSURE: 156 MMHG

## 2024-07-09 PROCEDURE — 0651 HSPC ROUTINE HOME CARE

## 2024-07-09 PROCEDURE — G0155 HHCP-SVS OF CSW,EA 15 MIN: HCPCS

## 2024-07-09 PROCEDURE — G0299 HHS/HOSPICE OF RN EA 15 MIN: HCPCS

## 2024-07-09 ASSESSMENT — ENCOUNTER SYMPTOMS
SKIN LESIONS: 1
CONSTIPATION: 1

## 2024-07-09 NOTE — HOSPICE
I entered the home and Dr. Wray was already in the living room talking with  pt., sister and Pt aimee. Pt is sitting up in  with feet elevated. Pt continues to have urine coming out of her suprapubic catheter, it is a bit hazy and straw colored. Pt denies pain. Dr. Wray made some medication changes.. Add Tylenol up to 6 a day, D/C Losartan. Assessment completed, see ROS sections. Education done, see POC. Pt and spouse know to call [facility] with any acute needs or concerns.

## 2024-07-10 ENCOUNTER — HOME CARE VISIT (OUTPATIENT)
Dept: SCHEDULING | Facility: HOME HEALTH | Age: 74
End: 2024-07-10
Payer: MEDICARE

## 2024-07-10 ENCOUNTER — HOME CARE VISIT (OUTPATIENT)
Dept: HOSPICE | Facility: HOSPICE | Age: 74
End: 2024-07-10
Payer: MEDICARE

## 2024-07-10 PROCEDURE — 2500000001 HSPC NON INJECTABLE MED

## 2024-07-10 PROCEDURE — G0155 HHCP-SVS OF CSW,EA 15 MIN: HCPCS

## 2024-07-10 PROCEDURE — 0651 HSPC ROUTINE HOME CARE

## 2024-07-11 ENCOUNTER — HOME CARE VISIT (OUTPATIENT)
Dept: SCHEDULING | Facility: HOME HEALTH | Age: 74
End: 2024-07-11
Payer: MEDICARE

## 2024-07-11 PROCEDURE — 0651 HSPC ROUTINE HOME CARE

## 2024-07-12 PROCEDURE — 0651 HSPC ROUTINE HOME CARE

## 2024-07-13 PROCEDURE — 0651 HSPC ROUTINE HOME CARE

## 2024-07-14 PROCEDURE — 0651 HSPC ROUTINE HOME CARE

## 2024-07-15 ENCOUNTER — HOME CARE VISIT (OUTPATIENT)
Dept: HOSPICE | Facility: HOSPICE | Age: 74
End: 2024-07-15
Payer: MEDICARE

## 2024-07-15 PROCEDURE — 0651 HSPC ROUTINE HOME CARE

## 2024-07-15 NOTE — HOSPICE
Initial SWA completed with pt and  Héctor. Pt presents as aaox4 with calm and pleasant affect. Pt denies pain or discomfort and is able to ambulate independently with walker. Pt reports she has some confusion and hallucinations, mostly at night. Deepti and Héctor have been together since the 70s and pt reports they have a close and connected relationship, although they do argue quite a bit. Pt reports especially recently she has felt strain in her family relationships due to her decision to stop dialysis and start hospice. Pt's son lives in [facility] in the back yard and he is very angry, per pt. Pt becomes tearful when discussing her relationship with her son and reports her most important goal at this time is to repair her relationship with him. Active listening and counseling support provided. No other SW needs voiced at this time.  arrives toward end of visit and SW makes plan to return next week.

## 2024-07-16 ENCOUNTER — HOME CARE VISIT (OUTPATIENT)
Dept: SCHEDULING | Facility: HOME HEALTH | Age: 74
End: 2024-07-16
Payer: MEDICARE

## 2024-07-16 PROCEDURE — 0651 HSPC ROUTINE HOME CARE

## 2024-07-17 PROCEDURE — 0651 HSPC ROUTINE HOME CARE

## 2024-07-18 ENCOUNTER — HOME CARE VISIT (OUTPATIENT)
Dept: SCHEDULING | Facility: HOME HEALTH | Age: 74
End: 2024-07-18
Payer: MEDICARE

## 2024-07-18 ENCOUNTER — HOME CARE VISIT (OUTPATIENT)
Dept: HOSPICE | Facility: HOSPICE | Age: 74
End: 2024-07-18
Payer: MEDICARE

## 2024-07-18 VITALS
DIASTOLIC BLOOD PRESSURE: 109 MMHG | TEMPERATURE: 98.4 F | RESPIRATION RATE: 19 BRPM | HEART RATE: 78 BPM | SYSTOLIC BLOOD PRESSURE: 171 MMHG | OXYGEN SATURATION: 93 %

## 2024-07-18 PROCEDURE — G0299 HHS/HOSPICE OF RN EA 15 MIN: HCPCS

## 2024-07-18 PROCEDURE — 0651 HSPC ROUTINE HOME CARE

## 2024-07-18 ASSESSMENT — ENCOUNTER SYMPTOMS
DYSPNEA ACTIVITY LEVEL: AFTER AMBULATING LESS THAN 10 FT
SKIN LESIONS: 1
PAIN LOCATION - PAIN QUALITY: ACHING

## 2024-07-19 PROCEDURE — 0651 HSPC ROUTINE HOME CARE

## 2024-07-19 PROCEDURE — 2500000001 HSPC NON INJECTABLE MED

## 2024-07-19 NOTE — HOSPICE
Routine visit made. RN was greeted at the door by Wilda's spouse Lucy. Wilda was seen seated on the couch awake, alert and fully oriented visit. Wilda stated she has been feeling more weak this week and having increased shortness of breath. Wilda was breathing regularly but would get short of breath while speaking or walking short distances. oxygen ordered 2l/min PRN, [facility] to deliver tomorrow. Both Wilda and lucy shared separately that they have been arguing more and stated this week has been difficult. RN listened to their concerns and encouraged them both have patience and give each other space when able to do it safely when they are feeling frustrated and overwhelmed. Wilda stated understanding and shared she felt better just being about to vent her feelings during visit. Wilda's blood pressure was elevated during visit, she had not taken her coreg the past couple of days. RN instructed to take coreg now during visit, which she did. Wilda continues to have itching which is relieved by atarax and cream. nausea relieved with Zofran. While Wilda was in the back room, Lucy asked about what medications still needed to be delivered and the comfort pack, stated he had not received it. RN reviewed medications no medications needed for refill other than Zofran, RN will refill through [facility]. RN spoke with  RN concerning comfort pack who would follow up with Lucy. suprapubic catheter draining yellow, hazy urine. No supplies needed at this time. Lucy and Wilda aware to call [facility] at anytime for any questions, concerns or changes. Referring Physician (Optional): Nicole Glischinski, PA

## 2024-07-20 PROCEDURE — 0651 HSPC ROUTINE HOME CARE

## 2024-07-21 PROCEDURE — 0651 HSPC ROUTINE HOME CARE

## 2024-07-22 ENCOUNTER — HOME CARE VISIT (OUTPATIENT)
Dept: HOSPICE | Facility: HOSPICE | Age: 74
End: 2024-07-22
Payer: MEDICARE

## 2024-07-22 ENCOUNTER — HOME CARE VISIT (OUTPATIENT)
Dept: SCHEDULING | Facility: HOME HEALTH | Age: 74
End: 2024-07-22
Payer: MEDICARE

## 2024-07-22 VITALS — RESPIRATION RATE: 22 BRPM | SYSTOLIC BLOOD PRESSURE: 126 MMHG | DIASTOLIC BLOOD PRESSURE: 88 MMHG | HEART RATE: 84 BPM

## 2024-07-22 PROCEDURE — G0299 HHS/HOSPICE OF RN EA 15 MIN: HCPCS

## 2024-07-22 PROCEDURE — 0651 HSPC ROUTINE HOME CARE

## 2024-07-22 ASSESSMENT — ENCOUNTER SYMPTOMS
INDIGESTION: 1
COUGH CHARACTERISTICS: PRODUCTIVE
STOOL DESCRIPTION: MUSHY
SKIN LESIONS: 1
COUGH: 1

## 2024-07-22 NOTE — HOSPICE
Greeted at door by spouse Héctor. Pt sitting in recliner with legs up. Pt is A& O X 4. 02 in place at 2 litrs via nasal canula. Ot breathing is geeting worse, see ROS.I did have pt take some of her liquid Haldol for nausea while I was there due to her complaints of nausea. Alot of education given as well as emotional support for both the pt and the spouse. Assessment completed, see ROS sections. Education done, see POC. Pt and spouse Héctor know to call [facility] with any acute needs or concerns.

## 2024-07-23 PROCEDURE — 0651 HSPC ROUTINE HOME CARE

## 2024-07-24 PROCEDURE — 0651 HSPC ROUTINE HOME CARE

## 2024-07-25 ENCOUNTER — HOME CARE VISIT (OUTPATIENT)
Dept: HOSPICE | Facility: HOSPICE | Age: 74
End: 2024-07-25
Payer: MEDICARE

## 2024-07-25 ENCOUNTER — HOME CARE VISIT (OUTPATIENT)
Dept: SCHEDULING | Facility: HOME HEALTH | Age: 74
End: 2024-07-25
Payer: MEDICARE

## 2024-07-25 PROCEDURE — G0155 HHCP-SVS OF CSW,EA 15 MIN: HCPCS

## 2024-07-25 PROCEDURE — 0651 HSPC ROUTINE HOME CARE

## 2024-07-26 PROCEDURE — 0651 HSPC ROUTINE HOME CARE

## 2024-07-27 PROCEDURE — 0651 HSPC ROUTINE HOME CARE

## 2024-07-28 ENCOUNTER — APPOINTMENT (OUTPATIENT)
Dept: GENERAL RADIOLOGY | Age: 74
End: 2024-07-28
Payer: MEDICARE

## 2024-07-28 ENCOUNTER — HOSPITAL ENCOUNTER (INPATIENT)
Age: 74
LOS: 5 days | Discharge: HOME | End: 2024-08-03
Attending: EMERGENCY MEDICINE | Admitting: HOSPITALIST
Payer: MEDICARE

## 2024-07-28 DIAGNOSIS — N18.6 ESRD (END STAGE RENAL DISEASE) (HCC): ICD-10-CM

## 2024-07-28 DIAGNOSIS — E87.5 HYPERKALEMIA: Primary | ICD-10-CM

## 2024-07-28 DIAGNOSIS — T85.611A PERITONEAL DIALYSIS CATHETER DYSFUNCTION (HCC): ICD-10-CM

## 2024-07-28 DIAGNOSIS — R06.02 SHORTNESS OF BREATH: ICD-10-CM

## 2024-07-28 LAB
ALBUMIN SERPL-MCNC: 2.8 G/DL (ref 3.2–4.6)
ALBUMIN/GLOB SERPL: 1 (ref 1–1.9)
ALP SERPL-CCNC: 66 U/L (ref 35–104)
ALT SERPL-CCNC: 23 U/L (ref 12–65)
ANION GAP SERPL CALC-SCNC: 17 MMOL/L (ref 9–18)
AST SERPL-CCNC: 31 U/L (ref 15–37)
BASOPHILS # BLD: 0.1 K/UL (ref 0–0.2)
BASOPHILS NFR BLD: 1 % (ref 0–2)
BILIRUB SERPL-MCNC: 0.3 MG/DL (ref 0–1.2)
BUN SERPL-MCNC: 109 MG/DL (ref 8–23)
CALCIUM SERPL-MCNC: 8.7 MG/DL (ref 8.8–10.2)
CHLORIDE SERPL-SCNC: 110 MMOL/L (ref 98–107)
CO2 SERPL-SCNC: 14 MMOL/L (ref 20–28)
CREAT SERPL-MCNC: 18 MG/DL (ref 0.6–1.1)
DIFFERENTIAL METHOD BLD: ABNORMAL
EOSINOPHIL # BLD: 0.3 K/UL (ref 0–0.8)
EOSINOPHIL NFR BLD: 5 % (ref 0.5–7.8)
ERYTHROCYTE [DISTWIDTH] IN BLOOD BY AUTOMATED COUNT: 14.3 % (ref 11.9–14.6)
GLOBULIN SER CALC-MCNC: 2.9 G/DL (ref 2.3–3.5)
GLUCOSE SERPL-MCNC: 93 MG/DL (ref 70–99)
HCT VFR BLD AUTO: 33 % (ref 35.8–46.3)
HGB BLD-MCNC: 9.8 G/DL (ref 11.7–15.4)
IMM GRANULOCYTES # BLD AUTO: 0 K/UL (ref 0–0.5)
IMM GRANULOCYTES NFR BLD AUTO: 1 % (ref 0–5)
LYMPHOCYTES # BLD: 1.3 K/UL (ref 0.5–4.6)
LYMPHOCYTES NFR BLD: 23 % (ref 13–44)
MAGNESIUM SERPL-MCNC: 2.9 MG/DL (ref 1.8–2.4)
MCH RBC QN AUTO: 30.4 PG (ref 26.1–32.9)
MCHC RBC AUTO-ENTMCNC: 29.7 G/DL (ref 31.4–35)
MCV RBC AUTO: 102.5 FL (ref 82–102)
MONOCYTES # BLD: 0.5 K/UL (ref 0.1–1.3)
MONOCYTES NFR BLD: 9 % (ref 4–12)
NEUTS SEG # BLD: 3.6 K/UL (ref 1.7–8.2)
NEUTS SEG NFR BLD: 62 % (ref 43–78)
NRBC # BLD: 0 K/UL (ref 0–0.2)
NT PRO BNP: ABNORMAL PG/ML (ref 0–125)
PLATELET # BLD AUTO: 284 K/UL (ref 150–450)
PMV BLD AUTO: 9.8 FL (ref 9.4–12.3)
POTASSIUM SERPL-SCNC: 6.7 MMOL/L (ref 3.5–5.1)
PROT SERPL-MCNC: 5.7 G/DL (ref 6.3–8.2)
RBC # BLD AUTO: 3.22 M/UL (ref 4.05–5.2)
SODIUM SERPL-SCNC: 141 MMOL/L (ref 136–145)
TROPONIN T SERPL HS-MCNC: 146 NG/L (ref 0–14)
WBC # BLD AUTO: 5.8 K/UL (ref 4.3–11.1)

## 2024-07-28 PROCEDURE — 93005 ELECTROCARDIOGRAM TRACING: CPT | Performed by: EMERGENCY MEDICINE

## 2024-07-28 PROCEDURE — 94644 CONT INHLJ TX 1ST HOUR: CPT

## 2024-07-28 PROCEDURE — 6370000000 HC RX 637 (ALT 250 FOR IP): Performed by: EMERGENCY MEDICINE

## 2024-07-28 PROCEDURE — 0651 HSPC ROUTINE HOME CARE

## 2024-07-28 PROCEDURE — 71045 X-RAY EXAM CHEST 1 VIEW: CPT

## 2024-07-28 PROCEDURE — 96367 TX/PROPH/DG ADDL SEQ IV INF: CPT

## 2024-07-28 PROCEDURE — 80053 COMPREHEN METABOLIC PANEL: CPT

## 2024-07-28 PROCEDURE — 96365 THER/PROPH/DIAG IV INF INIT: CPT

## 2024-07-28 PROCEDURE — 85025 COMPLETE CBC W/AUTO DIFF WBC: CPT

## 2024-07-28 PROCEDURE — 84484 ASSAY OF TROPONIN QUANT: CPT

## 2024-07-28 PROCEDURE — 6360000002 HC RX W HCPCS: Performed by: EMERGENCY MEDICINE

## 2024-07-28 PROCEDURE — 83735 ASSAY OF MAGNESIUM: CPT

## 2024-07-28 PROCEDURE — 99285 EMERGENCY DEPT VISIT HI MDM: CPT

## 2024-07-28 PROCEDURE — 83880 ASSAY OF NATRIURETIC PEPTIDE: CPT

## 2024-07-28 PROCEDURE — 2580000003 HC RX 258: Performed by: EMERGENCY MEDICINE

## 2024-07-28 PROCEDURE — 96375 TX/PRO/DX INJ NEW DRUG ADDON: CPT

## 2024-07-28 RX ORDER — IPRATROPIUM BROMIDE AND ALBUTEROL SULFATE 2.5; .5 MG/3ML; MG/3ML
1 SOLUTION RESPIRATORY (INHALATION) CONTINUOUS
Status: DISCONTINUED | OUTPATIENT
Start: 2024-07-28 | End: 2024-07-29

## 2024-07-28 RX ADMIN — METHYLPREDNISOLONE SODIUM SUCCINATE 40 MG: 40 INJECTION INTRAMUSCULAR; INTRAVENOUS at 23:59

## 2024-07-28 RX ADMIN — IPRATROPIUM BROMIDE AND ALBUTEROL SULFATE 1 DOSE: .5; 3 SOLUTION RESPIRATORY (INHALATION) at 23:11

## 2024-07-28 ASSESSMENT — PAIN DESCRIPTION - LOCATION: LOCATION: GENERALIZED

## 2024-07-28 ASSESSMENT — PAIN SCALES - GENERAL: PAINLEVEL_OUTOF10: 7

## 2024-07-28 ASSESSMENT — PAIN DESCRIPTION - DESCRIPTORS: DESCRIPTORS: ACHING

## 2024-07-28 ASSESSMENT — PAIN - FUNCTIONAL ASSESSMENT: PAIN_FUNCTIONAL_ASSESSMENT: 0-10

## 2024-07-29 ENCOUNTER — ANESTHESIA (OUTPATIENT)
Dept: SURGERY | Age: 74
End: 2024-07-29
Payer: MEDICARE

## 2024-07-29 ENCOUNTER — APPOINTMENT (OUTPATIENT)
Dept: GENERAL RADIOLOGY | Age: 74
End: 2024-07-29
Payer: MEDICARE

## 2024-07-29 ENCOUNTER — ANESTHESIA EVENT (OUTPATIENT)
Dept: SURGERY | Age: 74
End: 2024-07-29
Payer: MEDICARE

## 2024-07-29 ENCOUNTER — HOME CARE VISIT (OUTPATIENT)
Dept: SCHEDULING | Facility: HOME HEALTH | Age: 74
End: 2024-07-29
Payer: MEDICARE

## 2024-07-29 PROBLEM — R79.89 ELEVATED TROPONIN: Status: ACTIVE | Noted: 2024-07-29

## 2024-07-29 PROBLEM — Z66 DNR (DO NOT RESUSCITATE): Status: ACTIVE | Noted: 2024-07-29

## 2024-07-29 PROBLEM — T85.611A PD CATHETER DYSFUNCTION (HCC): Status: ACTIVE | Noted: 2024-07-29

## 2024-07-29 PROBLEM — I24.89 DEMAND ISCHEMIA (HCC): Status: ACTIVE | Noted: 2024-07-29

## 2024-07-29 PROBLEM — E87.5 HYPERKALEMIA: Status: ACTIVE | Noted: 2024-07-29

## 2024-07-29 LAB
ANION GAP SERPL CALC-SCNC: 18 MMOL/L (ref 9–18)
BUN SERPL-MCNC: 111 MG/DL (ref 8–23)
CALCIUM SERPL-MCNC: 8.5 MG/DL (ref 8.8–10.2)
CHLORIDE SERPL-SCNC: 105 MMOL/L (ref 98–107)
CO2 SERPL-SCNC: 11 MMOL/L (ref 20–28)
CREAT SERPL-MCNC: 17.1 MG/DL (ref 0.6–1.1)
EKG ATRIAL RATE: 68 BPM
EKG DIAGNOSIS: NORMAL
EKG P AXIS: 78 DEGREES
EKG P-R INTERVAL: 257 MS
EKG Q-T INTERVAL: 429 MS
EKG QRS DURATION: 107 MS
EKG QTC CALCULATION (BAZETT): 453 MS
EKG R AXIS: 23 DEGREES
EKG T AXIS: 82 DEGREES
EKG VENTRICULAR RATE: 68 BPM
ERYTHROCYTE [DISTWIDTH] IN BLOOD BY AUTOMATED COUNT: 14.4 % (ref 11.9–14.6)
GLUCOSE BLD STRIP.AUTO-MCNC: 169 MG/DL (ref 65–100)
GLUCOSE BLD STRIP.AUTO-MCNC: 181 MG/DL (ref 65–100)
GLUCOSE BLD STRIP.AUTO-MCNC: 193 MG/DL (ref 65–100)
GLUCOSE BLD STRIP.AUTO-MCNC: 206 MG/DL (ref 65–100)
GLUCOSE SERPL-MCNC: 190 MG/DL (ref 70–99)
HAV IGM SER QL: NONREACTIVE
HBV CORE IGM SER QL: NONREACTIVE
HBV SURFACE AB SERPL IA-ACNC: 3.56 MIU/ML
HBV SURFACE AG SER QL: NONREACTIVE
HCT VFR BLD AUTO: 30.6 % (ref 35.8–46.3)
HCV AB SER QL: REACTIVE
HGB BLD-MCNC: 9.1 G/DL (ref 11.7–15.4)
MCH RBC QN AUTO: 29.7 PG (ref 26.1–32.9)
MCHC RBC AUTO-ENTMCNC: 29.7 G/DL (ref 31.4–35)
MCV RBC AUTO: 100 FL (ref 82–102)
NRBC # BLD: 0 K/UL (ref 0–0.2)
PHOSPHATE SERPL-MCNC: 8.8 MG/DL (ref 2.5–4.5)
PLATELET # BLD AUTO: 263 K/UL (ref 150–450)
PMV BLD AUTO: 9.6 FL (ref 9.4–12.3)
POTASSIUM SERPL-SCNC: 5.5 MMOL/L (ref 3.5–5.1)
POTASSIUM SERPL-SCNC: 6.7 MMOL/L (ref 3.5–5.1)
RBC # BLD AUTO: 3.06 M/UL (ref 4.05–5.2)
SERVICE CMNT-IMP: ABNORMAL
SODIUM SERPL-SCNC: 134 MMOL/L (ref 136–145)
TROPONIN T SERPL HS-MCNC: 119 NG/L (ref 0–14)
TROPONIN T SERPL HS-MCNC: 125 NG/L (ref 0–14)
WBC # BLD AUTO: 4.8 K/UL (ref 4.3–11.1)

## 2024-07-29 PROCEDURE — 2500000003 HC RX 250 WO HCPCS: Performed by: EMERGENCY MEDICINE

## 2024-07-29 PROCEDURE — 93010 ELECTROCARDIOGRAM REPORT: CPT | Performed by: INTERNAL MEDICINE

## 2024-07-29 PROCEDURE — 86706 HEP B SURFACE ANTIBODY: CPT

## 2024-07-29 PROCEDURE — 84484 ASSAY OF TROPONIN QUANT: CPT

## 2024-07-29 PROCEDURE — 90935 HEMODIALYSIS ONE EVALUATION: CPT

## 2024-07-29 PROCEDURE — 6370000000 HC RX 637 (ALT 250 FOR IP): Performed by: HOSPITALIST

## 2024-07-29 PROCEDURE — 82962 GLUCOSE BLOOD TEST: CPT

## 2024-07-29 PROCEDURE — 80048 BASIC METABOLIC PNL TOTAL CA: CPT

## 2024-07-29 PROCEDURE — 2580000003 HC RX 258: Performed by: EMERGENCY MEDICINE

## 2024-07-29 PROCEDURE — 99222 1ST HOSP IP/OBS MODERATE 55: CPT | Performed by: SURGERY

## 2024-07-29 PROCEDURE — 1100000003 HC PRIVATE W/ TELEMETRY

## 2024-07-29 PROCEDURE — 94761 N-INVAS EAR/PLS OXIMETRY MLT: CPT

## 2024-07-29 PROCEDURE — 80074 ACUTE HEPATITIS PANEL: CPT

## 2024-07-29 PROCEDURE — 85027 COMPLETE CBC AUTOMATED: CPT

## 2024-07-29 PROCEDURE — 6360000002 HC RX W HCPCS: Performed by: HOSPITALIST

## 2024-07-29 PROCEDURE — 94640 AIRWAY INHALATION TREATMENT: CPT

## 2024-07-29 PROCEDURE — 0651 HSPC ROUTINE HOME CARE

## 2024-07-29 PROCEDURE — 36415 COLL VENOUS BLD VENIPUNCTURE: CPT

## 2024-07-29 PROCEDURE — 94760 N-INVAS EAR/PLS OXIMETRY 1: CPT

## 2024-07-29 PROCEDURE — 84100 ASSAY OF PHOSPHORUS: CPT

## 2024-07-29 PROCEDURE — 2500000003 HC RX 250 WO HCPCS: Performed by: HOSPITALIST

## 2024-07-29 PROCEDURE — 96375 TX/PRO/DX INJ NEW DRUG ADDON: CPT

## 2024-07-29 PROCEDURE — 2580000003 HC RX 258: Performed by: HOSPITALIST

## 2024-07-29 PROCEDURE — 6370000000 HC RX 637 (ALT 250 FOR IP): Performed by: EMERGENCY MEDICINE

## 2024-07-29 PROCEDURE — 3E1M39Z IRRIGATION OF PERITONEAL CAVITY USING DIALYSATE, PERCUTANEOUS APPROACH: ICD-10-PCS | Performed by: NURSE PRACTITIONER

## 2024-07-29 PROCEDURE — 84132 ASSAY OF SERUM POTASSIUM: CPT

## 2024-07-29 RX ORDER — ONDANSETRON 2 MG/ML
4 INJECTION INTRAMUSCULAR; INTRAVENOUS EVERY 6 HOURS PRN
Status: DISCONTINUED | OUTPATIENT
Start: 2024-07-29 | End: 2024-08-03 | Stop reason: HOSPADM

## 2024-07-29 RX ORDER — HEPARIN SODIUM 5000 [USP'U]/ML
5000 INJECTION, SOLUTION INTRAVENOUS; SUBCUTANEOUS EVERY 8 HOURS SCHEDULED
Status: DISCONTINUED | OUTPATIENT
Start: 2024-07-29 | End: 2024-08-03 | Stop reason: HOSPADM

## 2024-07-29 RX ORDER — OXYCODONE AND ACETAMINOPHEN 10; 325 MG/1; MG/1
1 TABLET ORAL EVERY 6 HOURS PRN
Status: ON HOLD | COMMUNITY
Start: 2024-06-27 | End: 2024-08-01 | Stop reason: HOSPADM

## 2024-07-29 RX ORDER — IPRATROPIUM BROMIDE AND ALBUTEROL SULFATE 2.5; .5 MG/3ML; MG/3ML
1 SOLUTION RESPIRATORY (INHALATION)
Status: DISCONTINUED | OUTPATIENT
Start: 2024-07-29 | End: 2024-07-30

## 2024-07-29 RX ORDER — CALCIUM GLUCONATE 20 MG/ML
1000 INJECTION, SOLUTION INTRAVENOUS ONCE
Status: COMPLETED | OUTPATIENT
Start: 2024-07-29 | End: 2024-07-29

## 2024-07-29 RX ORDER — ACETAMINOPHEN 650 MG/1
650 SUPPOSITORY RECTAL EVERY 6 HOURS PRN
Status: DISCONTINUED | OUTPATIENT
Start: 2024-07-29 | End: 2024-08-03 | Stop reason: HOSPADM

## 2024-07-29 RX ORDER — HYDROXYZINE HYDROCHLORIDE 10 MG/1
10 TABLET, FILM COATED ORAL EVERY 6 HOURS PRN
Status: DISCONTINUED | OUTPATIENT
Start: 2024-07-29 | End: 2024-08-03 | Stop reason: HOSPADM

## 2024-07-29 RX ORDER — POLYETHYLENE GLYCOL 3350 17 G/17G
17 POWDER, FOR SOLUTION ORAL DAILY PRN
Status: DISCONTINUED | OUTPATIENT
Start: 2024-07-29 | End: 2024-08-03 | Stop reason: HOSPADM

## 2024-07-29 RX ORDER — ACETAMINOPHEN 325 MG/1
650 TABLET ORAL EVERY 6 HOURS PRN
Status: DISCONTINUED | OUTPATIENT
Start: 2024-07-29 | End: 2024-08-03 | Stop reason: HOSPADM

## 2024-07-29 RX ORDER — ALBUTEROL SULFATE 2.5 MG/3ML
2.5 SOLUTION RESPIRATORY (INHALATION) EVERY 6 HOURS PRN
Status: DISCONTINUED | OUTPATIENT
Start: 2024-07-29 | End: 2024-08-03 | Stop reason: HOSPADM

## 2024-07-29 RX ORDER — NIFEDIPINE 30 MG/1
30 TABLET, EXTENDED RELEASE ORAL DAILY
Status: DISCONTINUED | OUTPATIENT
Start: 2024-07-29 | End: 2024-07-29

## 2024-07-29 RX ORDER — PANTOPRAZOLE SODIUM 40 MG/1
40 TABLET, DELAYED RELEASE ORAL DAILY
Status: DISCONTINUED | OUTPATIENT
Start: 2024-07-29 | End: 2024-08-03 | Stop reason: HOSPADM

## 2024-07-29 RX ORDER — PREDNISONE 20 MG/1
40 TABLET ORAL DAILY
Status: DISCONTINUED | OUTPATIENT
Start: 2024-07-31 | End: 2024-07-29

## 2024-07-29 RX ORDER — DIPHENHYDRAMINE HCL 12.5MG/5ML
25 LIQUID (ML) ORAL 4 TIMES DAILY PRN
COMMUNITY

## 2024-07-29 RX ORDER — SODIUM CHLORIDE 9 MG/ML
INJECTION, SOLUTION INTRAVENOUS PRN
Status: DISCONTINUED | OUTPATIENT
Start: 2024-07-29 | End: 2024-08-03 | Stop reason: HOSPADM

## 2024-07-29 RX ORDER — HALOPERIDOL 2 MG/ML
2 SOLUTION ORAL
Status: DISCONTINUED | OUTPATIENT
Start: 2024-07-29 | End: 2024-07-29

## 2024-07-29 RX ORDER — PREDNISONE 20 MG/1
40 TABLET ORAL DAILY
Status: COMPLETED | OUTPATIENT
Start: 2024-07-30 | End: 2024-08-01

## 2024-07-29 RX ORDER — NIFEDIPINE 90 MG/1
90 TABLET, EXTENDED RELEASE ORAL DAILY
Status: ON HOLD | COMMUNITY
Start: 2024-06-11 | End: 2024-08-01 | Stop reason: HOSPADM

## 2024-07-29 RX ORDER — DEXTROSE MONOHYDRATE 100 MG/ML
INJECTION, SOLUTION INTRAVENOUS CONTINUOUS PRN
Status: DISCONTINUED | OUTPATIENT
Start: 2024-07-29 | End: 2024-08-03 | Stop reason: HOSPADM

## 2024-07-29 RX ORDER — SODIUM CHLORIDE 0.9 % (FLUSH) 0.9 %
5-40 SYRINGE (ML) INJECTION EVERY 12 HOURS SCHEDULED
Status: DISCONTINUED | OUTPATIENT
Start: 2024-07-29 | End: 2024-08-03 | Stop reason: HOSPADM

## 2024-07-29 RX ORDER — IBUPROFEN 600 MG/1
1 TABLET ORAL PRN
Status: DISCONTINUED | OUTPATIENT
Start: 2024-07-29 | End: 2024-08-03 | Stop reason: HOSPADM

## 2024-07-29 RX ORDER — ONDANSETRON 4 MG/1
4 TABLET, ORALLY DISINTEGRATING ORAL EVERY 8 HOURS PRN
Status: DISCONTINUED | OUTPATIENT
Start: 2024-07-29 | End: 2024-08-03 | Stop reason: HOSPADM

## 2024-07-29 RX ORDER — SODIUM CHLORIDE 0.9 % (FLUSH) 0.9 %
5-40 SYRINGE (ML) INJECTION PRN
Status: DISCONTINUED | OUTPATIENT
Start: 2024-07-29 | End: 2024-08-03 | Stop reason: HOSPADM

## 2024-07-29 RX ORDER — CARVEDILOL 25 MG/1
25 TABLET ORAL 2 TIMES DAILY WITH MEALS
Status: DISCONTINUED | OUTPATIENT
Start: 2024-07-29 | End: 2024-08-03 | Stop reason: HOSPADM

## 2024-07-29 RX ADMIN — IPRATROPIUM BROMIDE AND ALBUTEROL SULFATE 1 DOSE: .5; 3 SOLUTION RESPIRATORY (INHALATION) at 08:49

## 2024-07-29 RX ADMIN — PANTOPRAZOLE SODIUM 40 MG: 40 TABLET, DELAYED RELEASE ORAL at 06:06

## 2024-07-29 RX ADMIN — DEXTROSE MONOHYDRATE 250 ML: 100 INJECTION, SOLUTION INTRAVENOUS at 00:46

## 2024-07-29 RX ADMIN — HEPARIN SODIUM 5000 UNITS: 5000 INJECTION INTRAVENOUS; SUBCUTANEOUS at 21:12

## 2024-07-29 RX ADMIN — INSULIN HUMAN 10 UNITS: 100 INJECTION, SOLUTION PARENTERAL at 01:09

## 2024-07-29 RX ADMIN — SODIUM BICARBONATE: 84 INJECTION, SOLUTION INTRAVENOUS at 04:39

## 2024-07-29 RX ADMIN — HEPARIN SODIUM 5000 UNITS: 5000 INJECTION INTRAVENOUS; SUBCUTANEOUS at 06:06

## 2024-07-29 RX ADMIN — CARVEDILOL 25 MG: 25 TABLET, FILM COATED ORAL at 08:26

## 2024-07-29 RX ADMIN — NIFEDIPINE 30 MG: 30 TABLET, EXTENDED RELEASE ORAL at 08:26

## 2024-07-29 RX ADMIN — SODIUM CHLORIDE, PRESERVATIVE FREE 10 ML: 5 INJECTION INTRAVENOUS at 21:14

## 2024-07-29 RX ADMIN — IPRATROPIUM BROMIDE AND ALBUTEROL SULFATE 1 DOSE: .5; 3 SOLUTION RESPIRATORY (INHALATION) at 19:24

## 2024-07-29 RX ADMIN — SODIUM CHLORIDE, PRESERVATIVE FREE 10 ML: 5 INJECTION INTRAVENOUS at 08:23

## 2024-07-29 RX ADMIN — CARVEDILOL 25 MG: 25 TABLET, FILM COATED ORAL at 16:52

## 2024-07-29 RX ADMIN — METHYLPREDNISOLONE SODIUM SUCCINATE 40 MG: 40 INJECTION INTRAMUSCULAR; INTRAVENOUS at 06:10

## 2024-07-29 RX ADMIN — CALCIUM GLUCONATE 1000 MG: 20 INJECTION, SOLUTION INTRAVENOUS at 00:17

## 2024-07-29 RX ADMIN — DEXTROSE MONOHYDRATE 250 ML: 100 INJECTION, SOLUTION INTRAVENOUS at 00:20

## 2024-07-29 RX ADMIN — IPRATROPIUM BROMIDE AND ALBUTEROL SULFATE 1 DOSE: .5; 3 SOLUTION RESPIRATORY (INHALATION) at 16:02

## 2024-07-29 ASSESSMENT — ENCOUNTER SYMPTOMS
ABDOMINAL PAIN: 1
COUGH: 1
WHEEZING: 1
VOMITING: 0
DIARRHEA: 1
CONSTIPATION: 1
SHORTNESS OF BREATH: 1

## 2024-07-29 ASSESSMENT — LIFESTYLE VARIABLES: SMOKING_STATUS: 1

## 2024-07-29 ASSESSMENT — COPD QUESTIONNAIRES: CAT_SEVERITY: MILD

## 2024-07-29 NOTE — ED NOTES
TRANSFER - OUT REPORT:    Verbal report given to NAVEEN Rg on Wilda Camara  being transferred to [facility] for routine progression of patient care       Report consisted of patient's Situation, Background, Assessment and   Recommendations(SBAR).     Information from the following report(s) Nurse Handoff Report was reviewed with the receiving nurse.    [place] Fall Assessment:    Presents to emergency department  because of falls (Syncope, seizure, or loss of consciousness): No  Age > 70: Yes  Altered Mental Status, Intoxication with alcohol or substance confusion (Disorientation, impaired judgment, poor safety awaremess, or inability to follow instructions): No  Impaired Mobility: Ambulates or transfers with assistive devices or assistance; Unable to ambulate or transer.: Yes  Nursing Judgement: Yes          Lines:   Peripheral IV 07/28/24 Proximal;Right Forearm (Active)   Site Assessment Clean, dry & intact 07/29/24 0154   Line Status Flushed;Normal saline locked 07/29/24 0154   Line Care Connections checked and tightened 07/29/24 0049   Phlebitis Assessment No symptoms 07/29/24 0154   Infiltration Assessment 0 07/29/24 0154   Alcohol Cap Used No 07/29/24 0049   Dressing Status Clean, dry & intact 07/29/24 0049   Dressing Type Transparent 07/29/24 0049       Peripheral IV 07/29/24 Right Hand (Active)   Site Assessment Clean, dry & intact 07/29/24 0154   Line Status Normal saline locked;Flushed 07/29/24 0154   Line Care Connections checked and tightened 07/29/24 0049   Phlebitis Assessment No symptoms 07/29/24 0154   Infiltration Assessment 0 07/29/24 0154   Alcohol Cap Used No 07/29/24 0049   Dressing Status Clean, dry & intact 07/29/24 0049       Tunneled Hemodialysis Catheter Right Subclavian (Active)       Tunneled Hemodialysis Catheter Right Subclavian (Active)        Opportunity for questions and clarification was provided.      Patient transported with:  Registered Nurse           Jessie Salazar,

## 2024-07-29 NOTE — ANESTHESIA PRE PROCEDURE
Department of Anesthesiology  Preprocedure Note       Name:  Wilda Camara   Age:  73 y.o.  :  1950                                          MRN:  [id number]         Date:  2024      Surgeon: Surgeon(s):  Sarabjit De Leon MD    Procedure: Procedure(s):  PD CATHETER REMOVAL    Medications prior to admission:   Prior to Admission medications    Medication Sig Start Date End Date Taking? Authorizing Provider   NIFEdipine (PROCARDIA XL) 90 MG extended release tablet Take 1 tablet by mouth daily  Patient not taking: Reported on 2024  Yes Belem Hernández MD   oxyCODONE-acetaminophen (PERCOCET)  MG per tablet Take 1 tablet by mouth every 6 hours as needed for Pain. Max Daily Amount: 4 tablets  Patient not taking: Reported on 2024  Yes Belem Hernández MD   diphenhydrAMINE (BENADRYL) 12.5 MG/5ML elixir Take 10 mLs by mouth 4 times daily as needed for Allergies or Itching   Yes Belem Hernández MD   OXYGEN Inhale 2 L/min into the lungs as needed for Shortness of Breath. 2 L/min via Nasal cannula as needed (shortness of breath). Inhale 2 l/min by nasal cannula as needed for shortness of breath Indications: shortness of breath    Indications: SHORTNESS OF BREATH 24   Belem Hernández MD   hydrocortisone (SCALACORT) 2 % LOTN lotion Apply 1 application  topically 2 times daily. Apply small amount of cream to the areas itching  Indications: Burning Feeling and Itching  Patient not taking: Reported on 2024   Belem Hernández MD   pantoprazole (PROTONIX) 40 MG tablet Take 1 tablet by mouth daily Indications: gerd 7/3/24   Belem Hernández MD   hydrOXYzine HCl (ATARAX) 10 MG tablet Take 1 tablet by mouth every 6 hours as needed for Anxiety or Itching Indications: anxiety, itching 7/3/24   Belem Hernández MD   COMFORT [place] FOR HOSPICE Take 1 Box by mouth as needed (symptom management). Acetaminophen Suppository 650mg, 1  at [facility] MAIN OR   • FISTULAGRAM Left 8/9/2023    LEFT ARM FISTULAGRAM performed by Sarabjit Johnson MD at [facility] MAIN OR   • IR TUNNELED CATHETER PLACEMENT GREATER THAN 5 YEARS  1/27/2023    IR TUNNELED CATHETER PLACEMENT GREATER THAN 5 YEARS 1/27/2023 Zaid Hart MD [facility] RADIOLOGY SPECIALS   • NEUROLOGICAL SURGERY      CERVICAL AND LUMBAR SX   • ORTHOPEDIC SURGERY Left     KNEE SCOPE   • UPPER GASTROINTESTINAL ENDOSCOPY      EGD   • UROLOGICAL SURGERY  07/2020    cystoscopy, insertion berkowitz catheter (suprapubic)   • UROLOGICAL SURGERY  2016    cystoscopy, urethral dilatation       Social History:    Social History     Tobacco Use   • Smoking status: Some Days     Current packs/day: 0.25     Average packs/day: 0.3 packs/day for 7.8 years (1.9 ttl pk-yrs)     Types: Cigarettes     Start date: 10/28/2016   • Smokeless tobacco: Never   • Tobacco comments:     3-4 cigarettes daily   Substance Use Topics   • Alcohol use: No                                Ready to quit: Not Answered  Counseling given: Not Answered  Tobacco comments: 3-4 cigarettes daily      Vital Signs (Current):   Vitals:    07/29/24 1230 07/29/24 1300 07/29/24 1330 07/29/24 1402   BP: 95/62 109/73 (!) 84/68 97/61   Pulse: 79 70 76 78   Resp:       Temp:       TempSrc:       SpO2:       Weight:       Height:                                                  BP Readings from Last 3 Encounters:   07/29/24 97/61   07/22/24 126/88   07/18/24 (!) 171/109       NPO Status:                                                                                 BMI:   Wt Readings from Last 3 Encounters:   07/29/24 71.5 kg (157 lb 9 oz)   07/03/24 66.2 kg (146 lb)   08/11/23 64.2 kg (141 lb 8 oz)     Body mass index is 22.61 kg/m².    CBC:   Lab Results   Component Value Date/Time    WBC 4.8 07/29/2024 11:21 AM    RBC 3.06 07/29/2024 11:21 AM    HGB 9.1 07/29/2024 11:21 AM    HCT 30.6 07/29/2024 11:21 AM    .0 07/29/2024 11:21 AM    RDW 14.4 07/29/2024 11:21

## 2024-07-29 NOTE — H&P
Hospitalist History and Physical   Admit Date:  2024  9:59 PM   Name:  Wilda Camara   Age:  73 y.o.  Sex:  female  :  1950   MRN:  [id number]   Room:  [facility]/    Presenting/Chief Complaint: Shortness of Breath and Generalized Body Aches     Reason(s) for Admission: Hyperkalemia [E87.5]     History of Present Illness:     73 years old female with ESRD, high-grade CAD status post treatment, history of cirrhosis of liver, hypothyroidism, overactive bladder, chronic pain, dyslipidemia presented to emergency room with revoking her hospice.  Patient was on hospice almost 8 weeks, did not get any dialysis, started feeling weak, reports her pain is not well-controlled, getting wheezing, shortness of breath, mild cough.  In emergency room patient was evaluated, chest x-ray did not show any infiltrates, lab work shows evidence of significantly elevated potassium of 6.7, serum bicarb is 14.  Troponins are elevated.  ER physician requested admission of this patient for further management of hyperkalemia, COPD exacerbation, to get dialysis.            Assessment & Plan:     Hyperkalemia: Will request nephrology evaluation for dialysis tomorrow.  Hyperkalemia cocktail was given, repeat potassium levels.  Due to metabolic acidosis initiated on bicarb drip which should help with hyperkalemia as well.    Acute metabolic acidosis: Likely secondary to not getting dialysis, initiated on bicarb drip.    Acute COPD exacerbation: Patient has diffuse wheezing, active smoker at this time, smoking cessation recommended.  Initiated bronchodilator therapy, steroids.    ESRD: Will request nephrology to restart back on dialysis, patient does not look significantly fluid overloaded.    Hypertension: Continue on home medications, currently blood pressure is reasonable, patient was on carvedilol and nifedipine.          Diet: ADULT DIET; Regular  VTE prophylaxis: SCD's   Code status: DNR      Non-peripheral Lines and  EVERY 6 HOURS PRN    NIFEdipine (PROCARDIA XL) 90 mg, Oral, DAILY    ondansetron (ZOFRAN-ODT) 8 mg, SubLINGual, EVERY 8 HOURS PRN    oxyCODONE-acetaminophen (PERCOCET)  MG per tablet 1 tablet, Oral, EVERY 6 HOURS PRN    OXYGEN 2 L/min, Inhalation, PRN, 2 L/min via Nasal cannula as needed (shortness of breath). Inhale 2 l/min by nasal cannula as needed for shortness of breath Indications: shortness of breath<BR>    pantoprazole (PROTONIX) 40 mg, Oral, DAILY       I have personally reviewed labs and tests:  Recent Results (from the past 24 hour(s))   CBC with Auto Differential    Collection Time: 07/28/24 10:12 PM   Result Value Ref Range    WBC 5.8 4.3 - 11.1 K/uL    RBC 3.22 (L) 4.05 - 5.2 M/uL    Hemoglobin 9.8 (L) 11.7 - 15.4 g/dL    Hematocrit 33.0 (L) 35.8 - 46.3 %    .5 (H) 82 - 102 FL    MCH 30.4 26.1 - 32.9 PG    MCHC 29.7 (L) 31.4 - 35.0 g/dL    RDW 14.3 11.9 - 14.6 %    Platelets 284 150 - 450 K/uL    MPV 9.8 9.4 - 12.3 FL    nRBC 0.00 0.0 - 0.2 K/uL    Differential Type AUTOMATED      Neutrophils % 62 43 - 78 %    Lymphocytes % 23 13 - 44 %    Monocytes % 9 4.0 - 12.0 %    Eosinophils % 5 0.5 - 7.8 %    Basophils % 1 0.0 - 2.0 %    Immature Granulocytes % 1 0.0 - 5.0 %    Neutrophils Absolute 3.6 1.7 - 8.2 K/UL    Lymphocytes Absolute 1.3 0.5 - 4.6 K/UL    Monocytes Absolute 0.5 0.1 - 1.3 K/UL    Eosinophils Absolute 0.3 0.0 - 0.8 K/UL    Basophils Absolute 0.1 0.0 - 0.2 K/UL    Immature Granulocytes Absolute 0.0 0.0 - 0.5 K/UL   Comprehensive Metabolic Panel    Collection Time: 07/28/24 10:12 PM   Result Value Ref Range    Sodium 141 136 - 145 mmol/L    Potassium 6.7 (HH) 3.5 - 5.1 mmol/L    Chloride 110 (H) 98 - 107 mmol/L    CO2 14 (LL) 20 - 28 mmol/L    Anion Gap 17 9 - 18 mmol/L    Glucose 93 70 - 99 mg/dL     (H) 8 - 23 MG/DL    Creatinine 18.00 (H) 0.60 - 1.10 MG/DL    Est, Glom Filt Rate 2 (L) >60 ml/min/1.73m2    Calcium 8.7 (L) 8.8 - 10.2 MG/DL    Total Bilirubin 0.3 0.0 - 1.2

## 2024-07-29 NOTE — H&P
Progress Note    Date:7/29/2024       Room:[facility]  Patient Name:Wilda Camara     YOB: 1950     Age:73 y.o.    Subjective   Wilda Camara is a 72 y/o female, with h/o ESRD, CAD, cirrhosis, hypothyroidism, dyslipidemia, chronic pain, and over active bladder who came to the ED for increasing SOB and weakness after revoking hospice today. The patient reports weight gain, fatigue, chills, cough, wheezing, lower extremity swelling and pain, abdominal discomfort, diarrhea, constipation. The patient denies fever, chest pain, hematochezia, urinary complaints. Pt was placed on hospice 13 weeks ago and at that time discontinued her dialysis. She states that her expectations of hospice were different than the treatment and felt very uncomfortable from the SOB and leg swelling where she could not walk. She has previously been on hemodialysis and peritoneal dialysis. She has not had her suprapubic catheter in 1.5 months which she normally has it replaced every month.       Review of Systems   Review of Systems   Constitutional:  Positive for chills and fatigue. Negative for fever.        Reports weight gain   Respiratory:  Positive for cough, shortness of breath and wheezing.    Cardiovascular:  Positive for leg swelling. Negative for chest pain.   Gastrointestinal:  Positive for abdominal pain, constipation and diarrhea. Negative for vomiting.   Genitourinary: Negative.    Neurological:  Negative for headaches.       Medications   Scheduled Meds:    sodium chloride flush  5-40 mL IntraVENous 2 times per day    heparin (porcine)  5,000 Units SubCUTAneous 3 times per day    ipratropium 0.5 mg-albuterol 2.5 mg  1 Dose Inhalation Q4H WA RT    methylPREDNISolone  40 mg IntraVENous Q6H    Followed by    [START ON 7/31/2024] predniSONE  40 mg Oral Daily    carvedilol  25 mg Oral BID WC    pantoprazole  40 mg Oral Daily    NIFEdipine  30 mg Oral Daily     Continuous Infusions:    dextrose      sodium

## 2024-07-29 NOTE — PROGRESS NOTES
Gen Surg  Brief Note    Patient surgery cancelled due to patient eating lunch.  Resume diet (renal)  General Surgery will check Dr De Leon's schedule for later this week to remove PD cath.     ADALBERTO GAUTAM, SAE - CNP

## 2024-07-29 NOTE — H&P (VIEW-ONLY)
H&P/Consult Note/Progress Note/Office Note:   Wilda Camara  MRN: [id number]  :1950  Age:73 y.o.    HPI: Wilda Camara is a 73 y.o. female who we are asked by Nephrology to see for PD catheter buldging with redness and pain. The patient has a PMHx of ESRD on Dialysis- has a PD catheter that was placed 24 by Dr Delarosa and from chart review- had been using the PD catheter up until the end of  when she decided to stop peritoneal dialysis as she had \"run out of steam\" and had attempted suicide with lorazepam and Percocet. She was hospitalized at [facility] -7/3/24 and was discharged home on Hospice ([facility]).  It is unclear when her last HD or PD was. Patient is very irritable with any questions pertaining to her PD or HD situation and is also not clear with answers.   She presented with increasing SOB and weakness on admission. She was admitted on 2024.     PD catheter was placed 23 by Dr Delarosa.  The patient also has a LUE AVF that was cannulated today by our Dialysis unit.      Abdominal Surgical Hx: PD Catheter placement 24, Cholecystectomy, Appendectomy  Anticoagulation:  None prior to admission        Past Medical History:   Diagnosis Date    Anemia     Arthritis     Chronic pain     arthritis knees    Cough     reported constant since 10/22    ESRD (end stage renal disease) on dialysis ([facility])     M,W,F- [facility]    GERD (gastroesophageal reflux disease)     controlled with med    High cholesterol     History of blood transfusion     Hypertension     medication    Kidney disease     acute renal failure 2020 (hospitalized x 6 days)  Followed by Dr. Wheeler, nephrologist      Liver disease     hep C, treated in     Melanoma ([facility])     RLE,     PD catheter dysfunction ([facility]) 2024    Psychiatric disorder     depression, treated with cymbalta    PUD (peptic ulcer disease)     PVD (peripheral vascular disease) with claudication ([facility])       WALDENMAIER, APRN - CNP   Had planned to remove the PD catheter this afternoon, but the patient decided to eat lunch after hemodialysis. Surgery cancelled by anesthesia. Will put on for some time later in the week. It will not be done on 7/30/24.  MD Moises.

## 2024-07-29 NOTE — CARE COORDINATION
ASSESSMENT NOTE    Attending Physician: Sherly Gordon, DO  Admit Problem: Hyperkalemia [E87.5]  Date/Time of Admission: 7/28/2024  9:59 PM  Problem List:  Patient Active Problem List   Diagnosis    Multiple joint pain    Anemia    Knee effusion    Lower obstructive uropathy    Hypertension    Acquired hypothyroidism    Gastroesophageal reflux disease without esophagitis    Primary osteoarthritis of left knee    Incisional hernia, without obstruction or gangrene    Suprapubic catheter (HCC)    History of hepatitis C    First degree AV block    CONKLIN (dyspnea on exertion)    Hyperlipidemia    Atypical chest pain    PVD (peripheral vascular disease) with claudication (HCC)    Pulmonary congestion    ESRD (end stage renal disease) ([facility])    History of smoking    Hyperkalemia    PD catheter dysfunction ([facility])       Service Assessment  Patient Orientation Alert and Oriented, Place, Person, Self   Cognition Alert   History Provided By Patient, Medical Record   Primary Caregiver Self   Accompanied By/Relationship N/A   Support Systems Spouse/Significant Other, Children, Family Members, Hospice (Pt was on OAH services prior to admission.)   Patient's Healthcare Decision Maker is: Legal Next of Kin   PCP Verified by CM Yes (Liliana Minor MD)   Last Visit to PCP Within last 6 months   Prior Functional Level Assistance with the following:, Housework, Cooking, Shopping, Mobility   Current Functional Level Assistance with the following:, Mobility, Shopping, Housework, Cooking   Can patient return to prior living arrangement Yes   Ability to make needs known: Good   Family able to assist with home care needs: Yes   Would you like for me to discuss the discharge plan with any other family members/significant others, and if so, who? No   Financial Resources     Community Resources None   CM/SW Referral Other (see comment) (N/A - Pt does Not wish to resume hospice at d/c)     Social/Functional History  Lives With Spouse   Type of Home  Residence: One story   # of Interior Steps     Height of Each Step (in)     Interior Rails     Lift Chair Available     History of Falls? No     Services At/After Discharge  Transition of Care Consult (CM Consult): Discharge Planning   Internal Home Health     Internal Hospice     Reason Outside Agency Chosen     Partner SNF     Reason Why Partner SNF Not Chosen     Internal Comfort Care     Reason Outside Comfort Care Chosen     Services At/After Discharge      Resource Information Provided? No   Mode of Transport at Discharge Other (see comment) (Family)   Hospital Transport Time of Discharge     Confirm Follow Up Transport Family     Condition of Participation: Discharge Planning  The plan for Transition of Care is related to the following treatment goals: Pt to obtain care to become medically stable and to return with a safe transition.   The Patient and/or Patient Representative was provided with a Choice of Provider? Patient   Name of the Patient Representative who was provided with the Choice of Provider and agrees with the Discharge Plan?     The Patient and/or Patient Representative Agree with the Discharge Plan?     Freedom of Choice list was provided with basic dialogue that supports the individualized plan of care/goals, treatment preferences, and shares the quality data associated with the providers? Yes     Documentation for Discharge Appeal  Discharge Appealed by     Date notified by QIO of appeal request:     Time notified by QIO of appeal request:     Detailed Notice of Discharge given to:     Date Notice of Discharge given:     Time Notice of Discharge given:     Date records sent to QIO     Time records sent to QIO     Date Notified of Outcome     Time Notified of Outcome     Outcome of appeal       74 y/o female pt who resides with her spouse Edil Camara [phone].  Pt has insurance and a PCP.  Prior to this admission pt was on OAH services.  Home O2 was ordered by hospice.  Pt

## 2024-07-29 NOTE — CONSULTS
heart  size is normal.  The bony thorax is intact.    Impression  No acute findings in the chest      Electronically signed by JASPREET BROWN    CT Result (most recent):  CT HEAD WO CONTRAST 10/08/2023    Narrative  NONCONTRAST HEAD CT    CLINICAL HISTORY:  Headache in a 73-year-old with hypertension.    TECHNIQUE:  Axial images were obtained with spiral technique.  Radiation dose  reduction was achieved using one or all of the following techniques: automated  exposure control, weight-based dosing, iterative reconstruction.    COMPARISON:  None.    REPORT:   Standard noncontrast head CT demonstrates no definite intracranial  mass effect, hemorrhage, or evidence of acute geographic infarction.  White  matter hypodensities are most consistent with small vessel ischemic disease.  The ventricles are normal in size and configuration, accounting for the  patient's age.  Orbits  and paranasal sinuses are clear where imaged. Bone  windows demonstrate no definite fracture or destruction.    Impression  SMALL VESSEL ISCHEMIC DISEASE WITH NO ACUTE INTRACRANIAL  ABNORMALITY IDENTIFIED AT NONCONTRAST CT.    US Result (most recent):  [facility] US TECHNOLOGIST SERVICE 04/27/2023    Narrative  Radiology exam is complete. No Radiologist dictation. Please follow up with ordering provider.        Admission date (for inpatients): 7/28/2024   * No surgery date entered *  * No surgery found *    ASSESSMENT/PLAN: 73 yr old female with ESRD, revoked Hospice and restarted HD with prior placed PD catheter segment nearly eroded through skin on abdomen.     Principal Problem:    Hyperkalemia  Active Problems:    PD catheter dysfunction (HCC)  Resolved Problems:    * No resolved hospital problems. *     Patient NPO now - seen in HD  Dr De Leon is arranging for surgical removal of the PD catheter.  She will need to stay on HD as it will take at least 6 weeks for abdomen to heal.   Patient seen in consultation with Dr De Leon.        Signed:  ADALBERTO MARTINEZ  WALDENMAIER, APRN - CNP   Had planned to remove the PD catheter this afternoon, but the patient decided to eat lunch after hemodialysis. Surgery cancelled by anesthesia. Will put on for some time later in the week. It will not be done on 7/30/24.  MD Moises.

## 2024-07-29 NOTE — ACP (ADVANCE CARE PLANNING)
Advance Care Planning     General Advance Care Planning (ACP) Conversation    Date of Conversation: 7/29/2024  Conducted with: Patient with Decision Making Capacity  Other persons present: None    Healthcare Decision Maker:   Primary Decision Maker: Edil Camara - Spouse - [phone]  Click here to complete Healthcare Decision Makers including selection of the Healthcare Decision Maker Relationship (ie \"Primary\").   Today we documented Decision Maker(s) consistent with Legal Next of Kin hierarchy.    Content/Action Overview:  No ACP documents on file.  Pt does not request information.  Reviewed DNR/DNI and patient confirms current DNR status - completed forms on file (place new order if needed)  treatment goals, hospitalization preferences, and resuscitation preferences  No ACP documents on file.  DNR per physician order.    Length of Voluntary ACP Conversation in minutes:  <16 minutes (Non-Billable)    MASHA Rodgers

## 2024-07-29 NOTE — PROGRESS NOTES
CH attempted to visit PT, but PT was with Staff.  CH prayed silently for PT, PT's Family, and Staff.    Rev. MIRYAM DempseyDiv.

## 2024-07-29 NOTE — PROGRESS NOTES
Hospitalist Progress Note   Admit Date:  2024  9:59 PM   Name:  Wilda Camara   Age:  73 y.o.  Sex:  female  :  1950   MRN:  [id number]   Room:  [facility]/    Presenting/Chief Complaint: Shortness of Breath and Generalized Body Aches     Reason(s) for Admission: Hyperkalemia [E87.5]     Hospital Course:   Wilda Camara is a 73 y.o. female with medical history of ESRD on peritoneal dialysis, Hepatitis C, Cirrhosis who presented with SOB after revoking her home hospice. Patient was on hospice almost 8 weeks, did not get any dialysis .     Of note, she was admitted at [facility] -7/3 for overdose with Ativan and Oxycontin, At that time  reported pt was attempting suicide by taking those medications. She was seen by psychiatry and was discharged back to home with home hospice w/o any narcotics. Psychiatry recommended low dose Ritalin at that time for pat with end-of-life depression.     In ED, K6.7, bicarb 14. Trop 146. BNP 28728. CXR shows no acute findings.     She was admitted d/t hyperkalemia and SOB most likely d/t volume overload/acute COPD exacerbation in setting of respiratory compensation with severe metabolic acidosis. Nephrology was consulted with plan to resume dialysis per pt request.       Subjective & 24hr Events:     Saw pt in dialysis. Pt was irritated on encounter and didn't want to answer all the questions. She was alert and oriented x3. Stated she wants to resume dialysis at this point. No sure about resuming hospice on discharge. No     Assessment & Plan:     Hyperkalemia  Improving  Low K diet  Cont dialysis per nephro  CTM BMP    ESRD on PD  Metabolic acidosis  HD in the meantime with LUE AVF  Cont bicarb gtt  Nephro on board, appreciate recs  General surgery consulted to evaluate PD catheter    Dyspnea  Volume overload  No hx of COPD or asthma  Cont Duonebs scheduled  Albuterol nebs prn  Change Solumedrol to prednisone as wheezing resolved     Elevated  Basophils % 1 0.0 - 2.0 %    Immature Granulocytes % 1 0.0 - 5.0 %    Neutrophils Absolute 3.6 1.7 - 8.2 K/UL    Lymphocytes Absolute 1.3 0.5 - 4.6 K/UL    Monocytes Absolute 0.5 0.1 - 1.3 K/UL    Eosinophils Absolute 0.3 0.0 - 0.8 K/UL    Basophils Absolute 0.1 0.0 - 0.2 K/UL    Immature Granulocytes Absolute 0.0 0.0 - 0.5 K/UL   Comprehensive Metabolic Panel    Collection Time: 07/28/24 10:12 PM   Result Value Ref Range    Sodium 141 136 - 145 mmol/L    Potassium 6.7 (HH) 3.5 - 5.1 mmol/L    Chloride 110 (H) 98 - 107 mmol/L    CO2 14 (LL) 20 - 28 mmol/L    Anion Gap 17 9 - 18 mmol/L    Glucose 93 70 - 99 mg/dL     (H) 8 - 23 MG/DL    Creatinine 18.00 (H) 0.60 - 1.10 MG/DL    Est, Glom Filt Rate 2 (L) >60 ml/min/1.73m2    Calcium 8.7 (L) 8.8 - 10.2 MG/DL    Total Bilirubin 0.3 0.0 - 1.2 MG/DL    ALT 23 12 - 65 U/L    AST 31 15 - 37 U/L    Alk Phosphatase 66 35 - 104 U/L    Total Protein 5.7 (L) 6.3 - 8.2 g/dL    Albumin 2.8 (L) 3.2 - 4.6 g/dL    Globulin 2.9 2.3 - 3.5 g/dL    Albumin/Globulin Ratio 1.0 1.0 - 1.9     Magnesium    Collection Time: 07/28/24 10:12 PM   Result Value Ref Range    Magnesium 2.9 (H) 1.8 - 2.4 mg/dL   Troponin    Collection Time: 07/28/24 10:12 PM   Result Value Ref Range    Troponin T 146.0 (HH) 0 - 14 ng/L   Brain Natriuretic Peptide    Collection Time: 07/28/24 10:12 PM   Result Value Ref Range    NT Pro-BNP 12,826 (H) 0 - 125 PG/ML   POCT Glucose    Collection Time: 07/29/24 12:42 AM   Result Value Ref Range    POC Glucose 169 (H) 65 - 100 mg/dL    Performed by: Crissy    POCT Glucose    Collection Time: 07/29/24  1:06 AM   Result Value Ref Range    POC Glucose 193 (H) 65 - 100 mg/dL    Performed by: Tammy    POCT Glucose    Collection Time: 07/29/24  1:33 AM   Result Value Ref Range    POC Glucose 206 (H) 65 - 100 mg/dL    Performed by: Crissy    POCT Glucose    Collection Time: 07/29/24  2:05 AM   Result Value Ref Range    POC Glucose 181 (H) 65 - 100

## 2024-07-29 NOTE — PROGRESS NOTES
TRANSFER - IN REPORT:    Verbal report received from Jessie Salazar RN on Wilda Camara  being received from ER for routine progression of patient care      Report consisted of patient's Situation, Background, Assessment and   Recommendations(SBAR).     Information from the following report(s) Nurse Handoff Report, ED Encounter Summary, MAR, and Recent Results was reviewed with the receiving nurse.    Opportunity for questions and clarification was provided.      Assessment to be completed upon patient's arrival to unit and care assumed.

## 2024-07-29 NOTE — HOSPICE
Pt presents as aaox3-4 with flat affect. Pt is on couch in living room and reports she does not feel well. Pt states \"I'm ready for this to be over. I can't do this anymore\" Pt denies pain, but reports fatigue and general discomfort. Pt is eating very little. Pt is able to ambulate with walker, but reports increased weakness. Listening and support provided. Pt states she has repaired relationship with her son. No other needs voiced.

## 2024-07-29 NOTE — ED NOTES
Pt provided w/ snacks and a drink at this time. Pt resting comfortably in bed, denies questions and concerns.     Clarissa Nolasco RN  07/29/24 0116

## 2024-07-29 NOTE — ED TRIAGE NOTES
Pt brought in by EMS from home c/o worsening SOB and generalized pain (x several days). Pt report she has been off of dialysis for approx 6 wks ever since being placed on hospice and her SOB has been worsening since then. Pt w/ rhonchi in all lung fields. VSS en route w/ EMS.

## 2024-07-29 NOTE — DIALYSIS
TRANSFER OUT - DIALYSIS    Hemodialysis treatment completed, pt ran 3 hours without complications.     Patient a/ox3 and BP 97/61 , P 78       1.5 Kg removed.      Needles x2 removed from access and manual pressure held until hemostasis complete and pressure dressing applied.      Meds given: 0.    RBCs given during dialysis: 0    Patient to 517 after dialysis.

## 2024-07-29 NOTE — PROGRESS NOTES
4 Eyes Skin Assessment     NAME:  Wilda Camara  YOB: 1950  MEDICAL RECORD NUMBER:  [id number]    The patient is being assessed for  Admission    I agree that at least one RN has performed a thorough Head to Toe Skin Assessment on the patient. ALL assessment sites listed below have been assessed.      Areas assessed by both nurses:    Head, Face, Ears, Shoulders, Back, Chest, Arms, Elbows, Hands, Sacrum. Buttock, Coccyx, Ischium, and Legs. Feet and Heels        Does the Patient have a Wound? Yes wound(s) were present on assessment. LDA wound assessment was Initiated and completed by RN  Pt with PD cath to LLQ abdomen redness noted next to insertion site catheter appears to be bulging under skin with redness   Pt with multiple scabs from scratches over trunk and extremities   Marcus Prevention initiated by RN: Yes  Wound Care Orders initiated by RN: Yes    Pressure Injury (Stage 3,4, Unstageable, DTI, NWPT, and Complex wounds) if present, place Wound referral order by RN under : No    New Ostomies, if present place, Ostomy referral order under : No     Nurse 1 eSignature: Electronically signed by SHELLEY WATT RN on 7/29/24 at 4:11 AM EDT    **SHARE this note so that the co-signing nurse can place an eSignature**    Nurse 2 eSignature: Electronically signed by Ifrah Gregg RN on 7/29/24 at 4:15 AM EDT

## 2024-07-29 NOTE — ED NOTES
Report given to Kallie RN to assume care at  this time.     Clarissa Nolasco, NAVEEN  07/29/24 0112

## 2024-07-29 NOTE — ACP (ADVANCE CARE PLANNING)
[facility] Hospitalist Service  At the heart of better care     Advance Care Planning   Admit Date:  2024  9:59 PM   Name:  Wilda Camara   Age:  73 y.o.  Sex:  female  :  1950   MRN:  [id number]   Room:  [facility]    Wilda Camara has capacity to make her own decisions:   Yes    If pt unable to make decisions, POA/surrogate decision maker:  Spouse Edil Camara    Pt alert and oriented x3. She is a DNR. Stated her  is her HCPOA. She desires to resume dialysis at this point and not sure about resuming hospice on discharge. GOC discussion ongoing. All questions answered.     Patient or surrogate consented to discussion of the current conditions, workup, management plans, prognosis, and the risk for further deterioration.  Time spent: 17 minutes in direct discussion.      Signed:  Sherly Gordon DO

## 2024-07-29 NOTE — ED PROVIDER NOTES
2 L/min via Nasal cannula as needed (shortness of breath). Inhale 2 l/min by nasal cannula as needed for shortness of breath Indications: shortness of breath    Indications: SHORTNESS OF BREATH    PANTOPRAZOLE (PROTONIX) 40 MG TABLET    Take 40 mg by mouth daily. Indications: gerd        Results for orders placed or performed during the hospital encounter of 07/28/24   XR CHEST PORTABLE    Narrative    Chest X-ray    INDICATION: Shortness of breath    COMPARISON:  None    TECHNIQUE: Frontal view of the chest was obtained.    FINDINGS: The lungs are clear. There are no infiltrates or effusions.  The heart  size is normal.  The bony thorax is intact.        Impression    No acute findings in the chest      Electronically signed by JASPREET BROWN   CBC with Auto Differential   Result Value Ref Range    WBC 5.8 4.3 - 11.1 K/uL    RBC 3.22 (L) 4.05 - 5.2 M/uL    Hemoglobin 9.8 (L) 11.7 - 15.4 g/dL    Hematocrit 33.0 (L) 35.8 - 46.3 %    .5 (H) 82 - 102 FL    MCH 30.4 26.1 - 32.9 PG    MCHC 29.7 (L) 31.4 - 35.0 g/dL    RDW 14.3 11.9 - 14.6 %    Platelets 284 150 - 450 K/uL    MPV 9.8 9.4 - 12.3 FL    nRBC 0.00 0.0 - 0.2 K/uL    Differential Type AUTOMATED      Neutrophils % 62 43 - 78 %    Lymphocytes % 23 13 - 44 %    Monocytes % 9 4.0 - 12.0 %    Eosinophils % 5 0.5 - 7.8 %    Basophils % 1 0.0 - 2.0 %    Immature Granulocytes % 1 0.0 - 5.0 %    Neutrophils Absolute 3.6 1.7 - 8.2 K/UL    Lymphocytes Absolute 1.3 0.5 - 4.6 K/UL    Monocytes Absolute 0.5 0.1 - 1.3 K/UL    Eosinophils Absolute 0.3 0.0 - 0.8 K/UL    Basophils Absolute 0.1 0.0 - 0.2 K/UL    Immature Granulocytes Absolute 0.0 0.0 - 0.5 K/UL   Comprehensive Metabolic Panel   Result Value Ref Range    Sodium 141 136 - 145 mmol/L    Potassium 6.7 (HH) 3.5 - 5.1 mmol/L    Chloride 110 (H) 98 - 107 mmol/L    CO2 14 (LL) 20 - 28 mmol/L    Anion Gap 17 9 - 18 mmol/L    Glucose 93 70 - 99 mg/dL     (H) 8 - 23 MG/DL    Creatinine 18.00 (H) 0.60 - 1.10  MG/DL    Est, Glom Filt Rate 2 (L) >60 ml/min/1.73m2    Calcium 8.7 (L) 8.8 - 10.2 MG/DL    Total Bilirubin 0.3 0.0 - 1.2 MG/DL    ALT 23 12 - 65 U/L    AST 31 15 - 37 U/L    Alk Phosphatase 66 35 - 104 U/L    Total Protein 5.7 (L) 6.3 - 8.2 g/dL    Albumin 2.8 (L) 3.2 - 4.6 g/dL    Globulin 2.9 2.3 - 3.5 g/dL    Albumin/Globulin Ratio 1.0 1.0 - 1.9     Magnesium   Result Value Ref Range    Magnesium 2.9 (H) 1.8 - 2.4 mg/dL   Troponin   Result Value Ref Range    Troponin T 146.0 (HH) 0 - 14 ng/L   Brain Natriuretic Peptide   Result Value Ref Range    NT Pro-BNP 12,826 (H) 0 - 125 PG/ML         XR CHEST PORTABLE   Final Result   No acute findings in the chest         Electronically signed by JASPREET BROWN                   No results for input(s): \"COVID19\" in the last 72 hours.     Voice dictation software was used during the making of this note.  This software is not perfect and grammatical and other typographical errors may be present.  This note has not been completely proofread for errors.     Catrachito Joseph MD  07/29/24 0036

## 2024-07-29 NOTE — CONSULTS
Nephrology consult    Admission Date:  7/28/2024    Admission Diagnosis  Hyperkalemia [E87.5]    We are asked by Leonardo Swan MD     History of Present Illness: Ms. Camara is a 73 y.o female with PMH significant for CAD, cirrhosis of liver, hypothyroidism, overactive bladder, HLD, chronic pain and ESRD reportedly admitted after revoking home hospice with complaints of poorly controlled shortness of breath and generalized discomfort. Has been off dialysis for past 8 weeks, was on PD.   Significant labs on admission K 6.7, CO2 14, Cr/BUN 18.0/109, Ca 8.7, Hgb 9.8.   Pt seen and examined in room she is sitting up in bed, reports ongoing c/o shortness of breath, weakness and generalized discomfort. No CP, N/V/D, fever or chills, states she was having a bad experience with home hospice. She has a PD catheter but there is redness with complaints of abdominal discomfort. She would like to resume dialysis and has a LUE AVF which we will attempt to cannulate.     Past Medical History:   Diagnosis Date    Anemia     Arthritis     Chronic pain     arthritis knees    Cough     reported constant since 10/22    ESRD (end stage renal disease) on dialysis ([facility])     M,W,F- [facility]    GERD (gastroesophageal reflux disease)     controlled with med    High cholesterol     History of blood transfusion 2022    Hypertension     medication    Kidney disease     acute renal failure 7/2020 (hospitalized x 6 days)  Followed by Dr. Wheeler, nephrologist      Liver disease     hep C, treated in 2009    Melanoma ([facility])     RLE,     Psychiatric disorder     depression, treated with cymbalta    PUD (peptic ulcer disease)     PVD (peripheral vascular disease) with claudication ([facility])     Dr. Johnson follows    Rheumatic fever     as a child    Thyroid disease     hypothyroid, synthroid    UTI (urinary tract infection)     suprapubic catherter, on antibiotics 4/23/23      Past Surgical History:   Procedure Laterality Date

## 2024-07-30 ENCOUNTER — APPOINTMENT (OUTPATIENT)
Dept: NON INVASIVE DIAGNOSTICS | Age: 74
End: 2024-07-30
Attending: FAMILY MEDICINE
Payer: MEDICARE

## 2024-07-30 ENCOUNTER — ANESTHESIA EVENT (OUTPATIENT)
Dept: SURGERY | Age: 74
End: 2024-07-30
Payer: MEDICARE

## 2024-07-30 ENCOUNTER — HOSPITAL ENCOUNTER (INPATIENT)
Dept: INTERVENTIONAL RADIOLOGY/VASCULAR | Age: 74
Discharge: HOME | End: 2024-08-02
Payer: MEDICARE

## 2024-07-30 ENCOUNTER — HOME CARE VISIT (OUTPATIENT)
Dept: HOSPICE | Facility: HOSPICE | Age: 74
End: 2024-07-30
Payer: MEDICARE

## 2024-07-30 VITALS
HEART RATE: 58 BPM | DIASTOLIC BLOOD PRESSURE: 78 MMHG | RESPIRATION RATE: 16 BRPM | SYSTOLIC BLOOD PRESSURE: 142 MMHG | OXYGEN SATURATION: 98 % | TEMPERATURE: 97.7 F

## 2024-07-30 PROBLEM — T85.611A PERITONEAL DIALYSIS CATHETER DYSFUNCTION (HCC): Status: ACTIVE | Noted: 2024-07-28

## 2024-07-30 LAB
ANION GAP SERPL CALC-SCNC: 14 MMOL/L (ref 9–18)
BUN SERPL-MCNC: 77 MG/DL (ref 8–23)
CALCIUM SERPL-MCNC: 8.3 MG/DL (ref 8.8–10.2)
CHLORIDE SERPL-SCNC: 105 MMOL/L (ref 98–107)
CO2 SERPL-SCNC: 18 MMOL/L (ref 20–28)
CREAT SERPL-MCNC: 13.1 MG/DL (ref 0.6–1.1)
ECHO AO ASC DIAM: 3.3 CM
ECHO AO ASCENDING AORTA INDEX: 1.74 CM/M2
ECHO AO ROOT DIAM: 3.4 CM
ECHO AO ROOT INDEX: 1.79 CM/M2
ECHO AR MAX VEL PISA: 4.4 M/S
ECHO AV AREA PEAK VELOCITY: 2.4 CM2
ECHO AV AREA VTI: 2.4 CM2
ECHO AV AREA/BSA PEAK VELOCITY: 1.3 CM2/M2
ECHO AV AREA/BSA VTI: 1.3 CM2/M2
ECHO AV MEAN GRADIENT: 3 MMHG
ECHO AV MEAN VELOCITY: 0.9 M/S
ECHO AV PEAK GRADIENT: 5 MMHG
ECHO AV PEAK VELOCITY: 1.2 M/S
ECHO AV REGURGITANT PHT: 723 MS
ECHO AV VELOCITY RATIO: 0.83
ECHO AV VTI: 24.6 CM
ECHO BSA: 1.88 M2
ECHO EST RA PRESSURE: 3 MMHG
ECHO IVC PROX: 0.7 CM
ECHO LA AREA 4C: 18 CM2
ECHO LA DIAMETER INDEX: 1.84 CM/M2
ECHO LA DIAMETER: 3.5 CM
ECHO LA MAJOR AXIS: 5.4 CM
ECHO LA TO AORTIC ROOT RATIO: 1.03
ECHO LA VOL MOD A4C: 45 ML (ref 22–52)
ECHO LA VOLUME INDEX MOD A4C: 24 ML/M2 (ref 16–34)
ECHO LV E' LATERAL VELOCITY: 7 CM/S
ECHO LV E' SEPTAL VELOCITY: 4 CM/S
ECHO LV EDV A2C: 84 ML
ECHO LV EDV A4C: 102 ML
ECHO LV EDV INDEX A4C: 54 ML/M2
ECHO LV EDV NDEX A2C: 44 ML/M2
ECHO LV EJECTION FRACTION A2C: 63 %
ECHO LV EJECTION FRACTION A4C: 66 %
ECHO LV EJECTION FRACTION BIPLANE: 64 % (ref 55–100)
ECHO LV ESV A2C: 31 ML
ECHO LV ESV A4C: 35 ML
ECHO LV ESV INDEX A2C: 16 ML/M2
ECHO LV ESV INDEX A4C: 18 ML/M2
ECHO LV FRACTIONAL SHORTENING: 37 % (ref 28–44)
ECHO LV INTERNAL DIMENSION DIASTOLE INDEX: 2.68 CM/M2
ECHO LV INTERNAL DIMENSION DIASTOLIC: 5.1 CM (ref 3.9–5.3)
ECHO LV INTERNAL DIMENSION SYSTOLIC INDEX: 1.68 CM/M2
ECHO LV INTERNAL DIMENSION SYSTOLIC: 3.2 CM
ECHO LV IVSD: 1.1 CM (ref 0.6–0.9)
ECHO LV MASS 2D: 200.8 G (ref 67–162)
ECHO LV MASS INDEX 2D: 105.7 G/M2 (ref 43–95)
ECHO LV POSTERIOR WALL DIASTOLIC: 1 CM (ref 0.6–0.9)
ECHO LV RELATIVE WALL THICKNESS RATIO: 0.39
ECHO LVOT AREA: 2.8 CM2
ECHO LVOT AV VTI INDEX: 0.84
ECHO LVOT DIAM: 1.9 CM
ECHO LVOT MEAN GRADIENT: 2 MMHG
ECHO LVOT PEAK GRADIENT: 4 MMHG
ECHO LVOT PEAK VELOCITY: 1 M/S
ECHO LVOT STROKE VOLUME INDEX: 30.7 ML/M2
ECHO LVOT SV: 58.4 ML
ECHO LVOT VTI: 20.6 CM
ECHO MV A VELOCITY: 0.78 M/S
ECHO MV E DECELERATION TIME (DT): 290 MS
ECHO MV E VELOCITY: 0.54 M/S
ECHO MV E/A RATIO: 0.69
ECHO MV E/E' LATERAL: 7.71
ECHO MV E/E' RATIO (AVERAGED): 10.61
ECHO MV E/E' SEPTAL: 13.5
ECHO PV ACCELERATION TIME (AT): 169 MS
ECHO PV MAX VELOCITY: 1.1 M/S
ECHO PV PEAK GRADIENT: 4 MMHG
ECHO RIGHT VENTRICULAR SYSTOLIC PRESSURE (RVSP): 21 MMHG
ECHO RV BASAL DIMENSION: 3.5 CM
ECHO RV FREE WALL PEAK S': 11 CM/S
ECHO RV INTERNAL DIMENSION: 3.2 CM
ECHO RV TAPSE: 2.1 CM (ref 1.7–?)
ECHO TV REGURGITANT MAX VELOCITY: 2.13 M/S
ECHO TV REGURGITANT PEAK GRADIENT: 18 MMHG
ERYTHROCYTE [DISTWIDTH] IN BLOOD BY AUTOMATED COUNT: 14.1 % (ref 11.9–14.6)
GLUCOSE SERPL-MCNC: 95 MG/DL (ref 70–99)
HCT VFR BLD AUTO: 30.6 % (ref 35.8–46.3)
HGB BLD-MCNC: 9.6 G/DL (ref 11.7–15.4)
MCH RBC QN AUTO: 30.4 PG (ref 26.1–32.9)
MCHC RBC AUTO-ENTMCNC: 31.4 G/DL (ref 31.4–35)
MCV RBC AUTO: 96.8 FL (ref 82–102)
NRBC # BLD: 0.02 K/UL (ref 0–0.2)
PLATELET # BLD AUTO: 238 K/UL (ref 150–450)
PMV BLD AUTO: 9.4 FL (ref 9.4–12.3)
POTASSIUM SERPL-SCNC: 5.1 MMOL/L (ref 3.5–5.1)
RBC # BLD AUTO: 3.16 M/UL (ref 4.05–5.2)
SODIUM SERPL-SCNC: 136 MMOL/L (ref 136–145)
WBC # BLD AUTO: 6.6 K/UL (ref 4.3–11.1)

## 2024-07-30 PROCEDURE — 6370000000 HC RX 637 (ALT 250 FOR IP): Performed by: HOSPITALIST

## 2024-07-30 PROCEDURE — 93306 TTE W/DOPPLER COMPLETE: CPT | Performed by: INTERNAL MEDICINE

## 2024-07-30 PROCEDURE — 77001 FLUOROGUIDE FOR VEIN DEVICE: CPT | Performed by: PHYSICIAN ASSISTANT

## 2024-07-30 PROCEDURE — 85027 COMPLETE CBC AUTOMATED: CPT

## 2024-07-30 PROCEDURE — 99152 MOD SED SAME PHYS/QHP 5/>YRS: CPT | Performed by: PHYSICIAN ASSISTANT

## 2024-07-30 PROCEDURE — 80048 BASIC METABOLIC PNL TOTAL CA: CPT

## 2024-07-30 PROCEDURE — 36415 COLL VENOUS BLD VENIPUNCTURE: CPT

## 2024-07-30 PROCEDURE — 6360000002 HC RX W HCPCS: Performed by: HOSPITALIST

## 2024-07-30 PROCEDURE — 5A1D70Z PERFORMANCE OF URINARY FILTRATION, INTERMITTENT, LESS THAN 6 HOURS PER DAY: ICD-10-PCS | Performed by: NURSE PRACTITIONER

## 2024-07-30 PROCEDURE — 6360000002 HC RX W HCPCS: Performed by: RADIOLOGY

## 2024-07-30 PROCEDURE — 2580000003 HC RX 258: Performed by: HOSPITALIST

## 2024-07-30 PROCEDURE — 6370000000 HC RX 637 (ALT 250 FOR IP): Performed by: FAMILY MEDICINE

## 2024-07-30 PROCEDURE — 6360000002 HC RX W HCPCS: Performed by: PHYSICIAN ASSISTANT

## 2024-07-30 PROCEDURE — 2500000003 HC RX 250 WO HCPCS: Performed by: PHYSICIAN ASSISTANT

## 2024-07-30 PROCEDURE — 6360000002 HC RX W HCPCS: Performed by: INTERNAL MEDICINE

## 2024-07-30 PROCEDURE — 36556 INSERT NON-TUNNEL CV CATH: CPT

## 2024-07-30 PROCEDURE — 93306 TTE W/DOPPLER COMPLETE: CPT

## 2024-07-30 PROCEDURE — 2500000003 HC RX 250 WO HCPCS: Performed by: FAMILY MEDICINE

## 2024-07-30 PROCEDURE — 97165 OT EVAL LOW COMPLEX 30 MIN: CPT

## 2024-07-30 PROCEDURE — 1100000003 HC PRIVATE W/ TELEMETRY

## 2024-07-30 PROCEDURE — 02H633Z INSERTION OF INFUSION DEVICE INTO RIGHT ATRIUM, PERCUTANEOUS APPROACH: ICD-10-PCS | Performed by: RADIOLOGY

## 2024-07-30 PROCEDURE — 76937 US GUIDE VASCULAR ACCESS: CPT

## 2024-07-30 PROCEDURE — 0651 HSPC ROUTINE HOME CARE

## 2024-07-30 PROCEDURE — 97535 SELF CARE MNGMENT TRAINING: CPT

## 2024-07-30 PROCEDURE — 90935 HEMODIALYSIS ONE EVALUATION: CPT

## 2024-07-30 PROCEDURE — 76937 US GUIDE VASCULAR ACCESS: CPT | Performed by: PHYSICIAN ASSISTANT

## 2024-07-30 PROCEDURE — 2709999900 IR TUNNELED CVC PLACE WO SQ PORT/PUMP > 5 YEARS

## 2024-07-30 PROCEDURE — 36558 INSERT TUNNELED CV CATH: CPT

## 2024-07-30 PROCEDURE — 99221 1ST HOSP IP/OBS SF/LOW 40: CPT | Performed by: UROLOGY

## 2024-07-30 PROCEDURE — 0JH63XZ INSERTION OF TUNNELED VASCULAR ACCESS DEVICE INTO CHEST SUBCUTANEOUS TISSUE AND FASCIA, PERCUTANEOUS APPROACH: ICD-10-PCS | Performed by: RADIOLOGY

## 2024-07-30 PROCEDURE — 36558 INSERT TUNNELED CV CATH: CPT | Performed by: PHYSICIAN ASSISTANT

## 2024-07-30 RX ORDER — LIDOCAINE HYDROCHLORIDE AND EPINEPHRINE 10; 10 MG/ML; UG/ML
INJECTION, SOLUTION INFILTRATION; PERINEURAL PRN
Status: COMPLETED | OUTPATIENT
Start: 2024-07-30 | End: 2024-07-30

## 2024-07-30 RX ORDER — GUAIFENESIN 600 MG/1
600 TABLET, EXTENDED RELEASE ORAL 2 TIMES DAILY
Status: DISCONTINUED | OUTPATIENT
Start: 2024-07-30 | End: 2024-08-01

## 2024-07-30 RX ORDER — MIDAZOLAM HYDROCHLORIDE 2 MG/2ML
INJECTION, SOLUTION INTRAMUSCULAR; INTRAVENOUS PRN
Status: COMPLETED | OUTPATIENT
Start: 2024-07-30 | End: 2024-07-30

## 2024-07-30 RX ORDER — HEPARIN SODIUM 1000 [USP'U]/ML
INJECTION, SOLUTION INTRAVENOUS; SUBCUTANEOUS PRN
Status: COMPLETED | OUTPATIENT
Start: 2024-07-30 | End: 2024-07-30

## 2024-07-30 RX ORDER — FENTANYL CITRATE 50 UG/ML
INJECTION, SOLUTION INTRAMUSCULAR; INTRAVENOUS PRN
Status: COMPLETED | OUTPATIENT
Start: 2024-07-30 | End: 2024-07-30

## 2024-07-30 RX ORDER — IPRATROPIUM BROMIDE AND ALBUTEROL SULFATE 2.5; .5 MG/3ML; MG/3ML
1 SOLUTION RESPIRATORY (INHALATION) EVERY 4 HOURS PRN
Status: DISCONTINUED | OUTPATIENT
Start: 2024-07-30 | End: 2024-08-03 | Stop reason: HOSPADM

## 2024-07-30 RX ORDER — GUAIFENESIN/DEXTROMETHORPHAN 100-10MG/5
5 SYRUP ORAL EVERY 4 HOURS PRN
Status: DISCONTINUED | OUTPATIENT
Start: 2024-07-30 | End: 2024-08-03 | Stop reason: HOSPADM

## 2024-07-30 RX ADMIN — CARVEDILOL 25 MG: 25 TABLET, FILM COATED ORAL at 17:44

## 2024-07-30 RX ADMIN — HEPARIN SODIUM 5000 UNITS: 5000 INJECTION INTRAVENOUS; SUBCUTANEOUS at 17:44

## 2024-07-30 RX ADMIN — FENTANYL CITRATE 50 MCG: 50 INJECTION, SOLUTION INTRAMUSCULAR; INTRAVENOUS at 12:34

## 2024-07-30 RX ADMIN — HEPARIN SODIUM 3200 UNITS: 1000 INJECTION, SOLUTION INTRAVENOUS; SUBCUTANEOUS at 12:51

## 2024-07-30 RX ADMIN — SODIUM CHLORIDE, PRESERVATIVE FREE 10 ML: 5 INJECTION INTRAVENOUS at 09:40

## 2024-07-30 RX ADMIN — MIDAZOLAM HYDROCHLORIDE 1 MG: 1 INJECTION, SOLUTION INTRAMUSCULAR; INTRAVENOUS at 12:34

## 2024-07-30 RX ADMIN — HEPARIN SODIUM 5000 UNITS: 5000 INJECTION INTRAVENOUS; SUBCUTANEOUS at 21:13

## 2024-07-30 RX ADMIN — SODIUM CHLORIDE, PRESERVATIVE FREE 10 ML: 5 INJECTION INTRAVENOUS at 21:05

## 2024-07-30 RX ADMIN — GUAIFENESIN 600 MG: 600 TABLET ORAL at 21:05

## 2024-07-30 RX ADMIN — HEPARIN SODIUM 5000 UNITS: 5000 INJECTION INTRAVENOUS; SUBCUTANEOUS at 05:38

## 2024-07-30 RX ADMIN — CARVEDILOL 25 MG: 25 TABLET, FILM COATED ORAL at 09:40

## 2024-07-30 RX ADMIN — EPOETIN ALFA-EPBX 10000 UNITS: 10000 INJECTION, SOLUTION INTRAVENOUS; SUBCUTANEOUS at 17:44

## 2024-07-30 RX ADMIN — GUAIFENESIN 600 MG: 600 TABLET ORAL at 17:44

## 2024-07-30 RX ADMIN — PANTOPRAZOLE SODIUM 40 MG: 40 TABLET, DELAYED RELEASE ORAL at 05:38

## 2024-07-30 RX ADMIN — PREDNISONE 40 MG: 20 TABLET ORAL at 09:40

## 2024-07-30 RX ADMIN — TUBERCULIN PURIFIED PROTEIN DERIVATIVE 5 UNITS: 5 INJECTION, SOLUTION INTRADERMAL at 17:45

## 2024-07-30 RX ADMIN — LIDOCAINE HYDROCHLORIDE,EPINEPHRINE BITARTRATE 8 ML: 10; .01 INJECTION, SOLUTION INFILTRATION; PERINEURAL at 12:41

## 2024-07-30 NOTE — OP NOTE
Title: Tunneled hemodialysis catheter placement.       Indication:   73-year-old female with end-stage renal disease, infected  peritoneal dialysis catheter.     A tunneled hemodialysis catheter is placed for this patient through the right  internal jugular vein using ultrasound and fluoroscopic guidance with maximum  sterile barrier appropriately documented and fluoroscopy time and images  documented in the report.     :  Kelley Chanel PA-C     Supervising Physician: Ernst aHnsen MD.  The physician attests to supervising  this procedure and agrees with the report as written.       Consent: Informed written and oral consent was obtained from the patient after  explanation of benefits and risks (including, but not limited to: Infection,  hemorrhage, pneumothorax).  The patient's questions were answered to their  satisfaction.  The patient stated understanding and requested that we proceed.     Procedure: This central venous catheter was inserted with all elements of  maximal sterile barrier technique, and cap and mask and sterile gown and sterile  gloves and sterile full-body drape and hand hygiene and 2% chlorhexidine for  cutaneous antisepsis and sterile ultrasound gel and sterile ultrasound probe  cover.     After the patient was prepped and draped, a local field block with lidocaine was  achieved.  Ultrasound evaluation of all potential access sites was performed due  to lack of a palpable vein.   No veins were palpable due to overlying adipose  tissue.  Using real-time ultrasound guidance, with appropriate image recording  and visualization of vascular needle entry, the patent right internal jugular  vein was accessed using micropuncture technique.  Using fluoroscopy, a peel-away  sheath was placed over a wire.     A subcutaneous tunnel was anesthetized on the right chest wall.  The new  catheter was pulled through the subcutaneous tunnel and passed down the  peel-away sheath.       All

## 2024-07-30 NOTE — PROGRESS NOTES
attempted to visit PT twice, but PT was  SHERRY or asleep.  prayed silently for PT, PT's Family, and Staff.    Rev. Kia Mccloud M.Div.

## 2024-07-30 NOTE — DIALYSIS
Attempted to access pt's HD fistula unsuccessfully. Pt declined other attempts. Pt to be placed on IR schedule to receive CVC today or tomorrow per Nephrology. Gen Surgery to remove PD cath tomorrow.

## 2024-07-30 NOTE — PROGRESS NOTES
TRANSFER - IN REPORT:    Verbal report received from NAVEEN Pimentel on Wilda Camara  being received from IR for ordered procedure      Report consisted of patient's Situation, Background, Assessment and   Recommendations(SBAR).     Information from the following report(s) Surgery Report and MAR was reviewed with the receiving nurse.    Opportunity for questions and clarification was provided.      Assessment completed upon patient's arrival to unit and care assumed.

## 2024-07-30 NOTE — PROGRESS NOTES
Hospitalist Progress Note   Admit Date:  2024  9:59 PM   Name:  Wilda Camara   Age:  73 y.o.  Sex:  female  :  1950   MRN:  [id number]   Room:  [facility]/    Presenting/Chief Complaint: Shortness of Breath and Generalized Body Aches     Reason(s) for Admission: Hyperkalemia [E87.5]     Hospital Course:   Wilda Camara is a 73 y.o. female with medical history of ESRD on peritoneal dialysis, Hepatitis C, Cirrhosis who presented with SOB after revoking her home hospice. Patient was on hospice almost 8 weeks, did not get any dialysis .     Of note, she was admitted at [facility] -7/3 for overdose with Ativan and Oxycontin, At that time  reported pt was attempting suicide by taking those medications. She was seen by psychiatry and was discharged back to home with home hospice w/o any narcotics. Psychiatry recommended low dose Ritalin at that time for pat with end-of-life depression.     In ED, K6.7, bicarb 14. Trop 146. BNP 77037. CXR shows no acute findings.     She was admitted d/t hyperkalemia and SOB most likely d/t volume overload in setting of respiratory compensation with severe metabolic acidosis. Nephrology was consulted with plan to resume dialysis per pt request; hospice revoked. Pt was started on HD with her LUE AVF, however AVF infiltrated and pt was planned to have TCC placed .  General surgery was consulted to evaluate for PD catheter and recommended to remove PD catheter and she will need to stay on HD as it would take at least 6 weeks for abdomen to heal. Plans for HD catheter removal on       Subjective & 24hr Events:     Pt alert and oriented x3. Pleasant today. Reported ongoing cough, coughing up yellow sputum. On room air. No chest pain.     Assessment & Plan:     Hyperkalemia, resolved  2/2 ESRD with stopping dialysis  Low K diet  Cont dialysis per nephro  CTM BMP    ESRD on PD  Metabolic acidosis, improving  General surgery was consulted to evaluate

## 2024-07-30 NOTE — PROGRESS NOTES
Wilda Camara  Admission Date: 7/28/2024         [facility] Progress Note: 7/30/2024    Follow-up for: ESRD    The patient's chart is reviewed and the patient is discussed with the staff.    Subjective:   Pt seen and examined in dialysis, difficulty cannulating LUE AVF, plan for TCC placement and PD catheter removal, she reports SOB better.     ROS:  Gen - no fever, no chills  CV - no chest pain  Lung -  shortness of breath- better, no cough  Abd - no tenderness, no nausea/vomiting, no diarrhea  Ext - + edema    Current Facility-Administered Medications   Medication Dose Route Frequency    [START ON 7/31/2024] ceFAZolin (ANCEF) 2000 mg in sterile water 20 mL IV syringe  2,000 mg IntraVENous On Call to OR    glucose chewable tablet 16 g  4 tablet Oral PRN    dextrose bolus 10% 125 mL  125 mL IntraVENous PRN    Or    dextrose bolus 10% 250 mL  250 mL IntraVENous PRN    Glucagon Emergency KIT 1 mg  1 mg SubCUTAneous PRN    dextrose 10 % infusion   IntraVENous Continuous PRN    sodium chloride flush 0.9 % injection 5-40 mL  5-40 mL IntraVENous 2 times per day    sodium chloride flush 0.9 % injection 5-40 mL  5-40 mL IntraVENous PRN    0.9 % sodium chloride infusion   IntraVENous PRN    ondansetron (ZOFRAN-ODT) disintegrating tablet 4 mg  4 mg Oral Q8H PRN    Or    ondansetron (ZOFRAN) injection 4 mg  4 mg IntraVENous Q6H PRN    polyethylene glycol (GLYCOLAX) packet 17 g  17 g Oral Daily PRN    heparin (porcine) injection 5,000 Units  5,000 Units SubCUTAneous 3 times per day    acetaminophen (TYLENOL) tablet 650 mg  650 mg Oral Q6H PRN    Or    acetaminophen (TYLENOL) suppository 650 mg  650 mg Rectal Q6H PRN    ipratropium 0.5 mg-albuterol 2.5 mg (DUONEB) nebulizer solution 1 Dose  1 Dose Inhalation Q4H WA RT    carvedilol (COREG) tablet 25 mg  25 mg Oral BID WC    pantoprazole (PROTONIX) tablet 40 mg  40 mg Oral Daily    hydrOXYzine HCl (ATARAX) tablet 10 mg  10 mg Oral Q6H PRN     use LUE AVF.    2. Hyperkalemia- 2 K dialysate, K better      3. Acute metabolic acidosis- should improve with RRT      4. Anemia stable      5. COPD      6. Hypertension - stable       SAE Simpson - CNP  [facility]

## 2024-07-30 NOTE — DIALYSIS
TRANSFER IN - DIALYSIS    Received patient in dialysis unit  from  (unit) for ordered procedure.    Consent verified for renal replacement therapy. Procedure explained to patient, opportunity for Q&A provided. Call light given.     Patient alert and vital signs stable. /81   Pulse 74   Temp 97.3 °F (36.3 °C) (Oral)   Resp 17   Ht 1.778 m (5' 10\")   Wt 73 kg (160 lb 15 oz)   SpO2 96%   BMI 23.09 kg/m²   Hemodialysis initiated using right cvc.  Aspirated and flushed both ports without difficulty. Dressing clean, dry and intact.  Machine settings per MD order.    Heparin 0 unit bolus and 0 units/hr.      Will monitor during treatment.

## 2024-07-30 NOTE — PROGRESS NOTES
ACUTE OCCUPATIONAL THERAPY GOALS:   (Developed with and agreed upon by patient and/or caregiver.)  1. Pt will toilet with SBA   2. Pt will complete functional mobility for ADLs with SBA using AD as needed  3. Pt will complete lower body dressing with SBA using AE as needed  4. Pt will complete grooming and hygiene at sink with SBA  5. Pt will tolerate 23 minutes functional activity with min or fewer rest breaks to promote increased endurance for ADLs  6. Pt will independently demonstrate/ verbalize 2+ energy conservation techniques to promote increased endurance for ADLs      Timeframe: 7 visits      OCCUPATIONAL THERAPY Initial Assessment and Daily Note       OT Visit Days: 1  Acknowledge Orders  Time  OT Charge Capture  Rehab Caseload Tracker      Wilda Camara is a 73 y.o. female   PRIMARY DIAGNOSIS: Hyperkalemia  Hyperkalemia [E87.5]  Procedure(s) (LRB):  PD CATHETER REMOVAL (N/A)  1 Day Post-Op  Reason for Referral: Generalized Muscle Weakness (M62.81)  Inpatient: Payor: [facility] MEDICARE / Plan: [employer] MEDICARE-ADVANTAGE PPO / Product Type: Medicare /     ASSESSMENT:     REHAB RECOMMENDATIONS:   Recommendation to date pending progress:  Setting:  Home Health Therapy    Equipment:    None     ASSESSMENT:  Ms. Camara was admitted with SOB after revoking hospice; had missed HD for several weeks. Pt presented generally weak with deficits in activity tolerance, mobility, and balance impacting ADLs. Pt required CGA overall for ADLs and for functional mobility for ADLs using rolling walker. Pt fatigued quickly with activity and endorsed feeling weak. Pt also reported a psychological component to current condition and reported feeling depressed/ ongoing depression. Pt presented below her functional baseline and would benefit from skilled OT services to address deficits.      [facility] AM-PAC™ “6 Clicks” Daily Activity Inpatient Short Form:    AM-PAC Daily Activity - Inpatient   How much help is  needed for putting on and taking off regular lower body clothing?: A Little  How much help is needed for bathing (which includes washing, rinsing, drying)?: A Little  How much help is needed for toileting (which includes using toilet, bedpan, or urinal)?: A Little  How much help is needed for putting on and taking off regular upper body clothing?: None  How much help is needed for taking care of personal grooming?: None  How much help for eating meals?: None  AM-PAC Inpatient Daily Activity Raw Score: 21  AM-PAC Inpatient ADL T-Scale Score : 44.27  ADL Inpatient CMS 0-100% Score: 32.79  ADL Inpatient CMS G-Code Modifier : CJ           SUBJECTIVE:     Ms. Camara states, \"oh God, I can't remember my bathroom!\"     Social/Functional Lives With: Spouse  Type of Home: House  Home Layout: One level  Bathroom Toilet: Standard  Bathroom Equipment: Commode  Bathroom Accessibility: Accessible  Home Equipment: Walker - Rolling, Oxygen (Home O2 ordered by OA)  Receives Help From: Family, Other (comment) (Pt was on [facility] services prior to admission.)  ADL Assistance: Needs assistance  Ambulation Assistance: Needs assistance  Transfer Assistance: Independent  Active : No  Patient's  Info: Family  Mode of Transportation: Family  Occupation: Retired  Additional Comments: Lives w/ spouse, reports ind w/ ADLs, uses RW, tub shower, shower chair, 1L, 3 steps    OBJECTIVE:     LINES / DRAINS / AIRWAY: None    RESTRICTIONS/PRECAUTIONS:       PAIN: VITALS / O2:   Pre Treatment:    0      Post Treatment: 0       Vitals          Oxygen     Room air        GROSS EVALUATION: INTACT IMPAIRED   (See Comments)   UE AROM [x] []   UE PROM [] []   Strength []  Generally decreased      Posture / Balance []  CGA w/ RW   Sensation []     Coordination []       Tone []       Edema []    Activity Tolerance []  Generally decreased      Hand Dominance R [] L []      COGNITION/  PERCEPTION: INTACT IMPAIRED   (See Comments)   Orientation [x]

## 2024-07-30 NOTE — PERIOP NOTE
Preop department called to notify patient of arrival time for scheduled procedure. Instructions given to   - Remain NPO after midnight, unless otherwise indicated, including gum, mints, and ice chips.   - Use antibacterial soap in shower the night before surgery and on the morning of surgery.       Person contacted: 5th floor nurseMarcela  Arrival time: 1000

## 2024-07-30 NOTE — BRIEF OP NOTE
[facility]  Department of Interventional Radiology  [phone]        Interventional Radiology Brief Procedure Note    Patient: Wilda Camara MRN: [id number]  SSN: [id number]    YOB: 1950  Age: 73 y.o.  Sex: female      Date of Procedure: 7/30/2024    Pre-Procedure Diagnosis: ESRD, infected PD catheter    Post-Procedure Diagnosis: SAME    Procedure(s): Tunneled Central Venous Catheter    Brief Description of Procedure: see above    Performed By: Kelley Chanel PA-C     Assistants: None    Anesthesia:Moderate Sedation MIRYAM Hansen MD    Estimated Blood Loss: Less than 10ml    Specimens:  None    Implants:  Tunneled Hemodialysis Catheter    Findings: catheter tip in right atrium     Complications: None    Recommendations: ok to use catheter     Follow Up: prn    Signed By: Kelely Chanel PA-C     July 30, 2024

## 2024-07-30 NOTE — PROGRESS NOTES
Physical Therapy Note:    Attempted to see patient this AM for physical therapy evaluation session. Patient off floor in HD this am. Will follow and re-attempt as schedule permits/patient available. Thank you,    Cynthia Oviedo, PT     Rehab Caseload Tracker

## 2024-07-30 NOTE — PROGRESS NOTES
Physical Therapy Note:    2nd attempt this afternoon to see patient for evaluation. Patient off floor. Will follow and re-attempt as schedule permits/patient available. Thank you,    Cynthia Oviedo, PT     Rehab Caseload Tracker

## 2024-07-30 NOTE — PROGRESS NOTES
attempted to visit PT twice in the early  afternoon. PT was SHERRY.  prayed silently for PT, PT's Family, and Staff     Rev. Kia Mccloud M.Div.

## 2024-07-30 NOTE — DIALYSIS
TRANSFER IN - DIALYSIS    Received patient in dialysis unit from [facility] (unit) for ordered procedure.    Consent verified for renal replacement therapy. Procedure explained to patient, opportunity for Q&A provided. Call light given.     Patient A&O and vital signs stable.  /94 , P78 .    Hemodialysis initiated using JOSEPH AVF and 17 g needles.  Machine settings per MD order.    Heparin 0 unit bolus and 0 units/hr.    Will monitor during treatment.

## 2024-07-30 NOTE — PROGRESS NOTES
TRANSFER - OUT REPORT:    Verbal report given to NAVEEN Truong on Wilda Camara  being transferred to 5th floor for routine post-op       Report consisted of patient's Situation, Background, Assessment and   Recommendations(SBAR).     Information from the following report(s) Surgery Report and MAR was reviewed with the receiving nurse.           Lines:   Peripheral IV 07/28/24 Proximal;Right Forearm (Active)   Site Assessment Clean, dry & intact 07/30/24 0741   Line Status Flushed;Capped 07/30/24 0741   Line Care Cap changed;Connections checked and tightened 07/30/24 0741   Phlebitis Assessment No symptoms 07/30/24 0741   Infiltration Assessment 0 07/30/24 0741   Alcohol Cap Used Yes 07/30/24 0741   Dressing Status Clean, dry & intact 07/30/24 0741   Dressing Type Transparent 07/30/24 0741   Dressing Intervention Other (Comment) 07/29/24 0830       Peripheral IV 07/29/24 Right Hand (Active)   Site Assessment Clean, dry & intact 07/30/24 0741   Line Status Flushed;Capped 07/30/24 0741   Line Care Cap changed;Connections checked and tightened 07/30/24 0741   Phlebitis Assessment No symptoms 07/30/24 0741   Infiltration Assessment 0 07/30/24 0741   Alcohol Cap Used Yes 07/30/24 0741   Dressing Status Clean, dry & intact 07/30/24 0741   Dressing Type Transparent 07/30/24 0741   Dressing Intervention Other (Comment) 07/29/24 0830       Tunneled Hemodialysis Catheter Right Subclavian (Active)        Opportunity for questions and clarification was provided.

## 2024-07-30 NOTE — PRE SEDATION
Sedation Pre-Procedure Note    Patient Name: Wilda Camara   YOB: 1950  Room/Bed: Room/bed info not found  Medical Record Number: [id number]  Date: 7/30/2024   Time: 12:09 PM       Indication:  ESRD, infected PD catheter    Consent: I have discussed with the patient and/or the patient representative the indication, alternatives, and the possible risks and/or complications of the planned procedure and the anesthesia methods. The patient and/or patient representative appear to understand and agree to proceed.    Vital Signs:   Vitals:    07/30/24 1115   Pulse: 70   Resp: 18   Temp: 97.7 °F (36.5 °C)       Past Medical History:   has a past medical history of Anemia, Arthritis, Chronic pain, Cough, ESRD (end stage renal disease) on dialysis ([facility]), GERD (gastroesophageal reflux disease), High cholesterol, History of blood transfusion, Hypertension, Kidney disease, Liver disease, Melanoma ([facility]), PD catheter dysfunction ([facility]), Psychiatric disorder, PUD (peptic ulcer disease), PVD (peripheral vascular disease) with claudication ([facility]), Rheumatic fever, Thyroid disease, and UTI (urinary tract infection).    Past Surgical History:   has a past surgical history that includes Colonoscopy (2015); Upper gastrointestinal endoscopy; Urological Surgery (07/2020); Urological Surgery (2016); Cholecystectomy; orthopedic surgery (Left); Appendectomy; neurological surgery; Cataract removal (2019); IR TUNNELED CVC PLACE WO SQ PORT/PUMP > 5 YEARS (1/27/2023); Dialysis fistula creation (Left, 2/15/2023); Fistulagram (Left, 4/27/2023); Fistulagram (Left, 8/9/2023); and Dialysis Catheter Insertion (N/A, 8/11/2023).    Medications:   Scheduled Meds:   Continuous Infusions:   PRN Meds:   Home Meds:   Prior to Admission medications    Medication Sig Start Date End Date Taking? Authorizing Provider   NIFEdipine (PROCARDIA XL) 90 MG extended release tablet Take 1 tablet by mouth daily  Patient not taking: Reported on 7/29/2024

## 2024-07-30 NOTE — CARE COORDINATION
Per nephrology's request CM has started the process of obtaining an OP HD slot for pt at [facility].  Still pending requested labs and updated HD order s/p today's procedure in order to finalize this chair.  CM will f/u with [facility] once this information is available in the EMR.  LOS =  1 day

## 2024-07-30 NOTE — DIALYSIS
TRANSFER OUT- DIALYSIS    Hemodialysis treatment completed. PT ran 2.5 hours, without complications.    Patient alert and VS stable  /69  P 70       1.5 Kg removed.      Flushed both ports with 10 mL of NS.  CVC dressing clean, dry, and intact, tego caps intact, curos caps placed.      Meds given: 0.    RBCs given during dialysis: 0    Patient to 517 after dialysis.

## 2024-07-31 ENCOUNTER — ANESTHESIA (OUTPATIENT)
Dept: SURGERY | Age: 74
End: 2024-07-31
Payer: MEDICARE

## 2024-07-31 ENCOUNTER — APPOINTMENT (OUTPATIENT)
Dept: ULTRASOUND IMAGING | Age: 74
End: 2024-07-31
Payer: MEDICARE

## 2024-07-31 ENCOUNTER — HOME CARE VISIT (OUTPATIENT)
Dept: HOSPICE | Facility: HOSPICE | Age: 74
End: 2024-07-31
Payer: MEDICARE

## 2024-07-31 LAB
ANION GAP SERPL CALC-SCNC: 15 MMOL/L (ref 9–18)
BUN SERPL-MCNC: 59 MG/DL (ref 8–23)
CALCIUM SERPL-MCNC: 8.4 MG/DL (ref 8.8–10.2)
CHLORIDE SERPL-SCNC: 103 MMOL/L (ref 98–107)
CO2 SERPL-SCNC: 20 MMOL/L (ref 20–28)
CREAT SERPL-MCNC: 11.2 MG/DL (ref 0.6–1.1)
ERYTHROCYTE [DISTWIDTH] IN BLOOD BY AUTOMATED COUNT: 14.2 % (ref 11.9–14.6)
GLUCOSE SERPL-MCNC: 92 MG/DL (ref 70–99)
HCT VFR BLD AUTO: 33.4 % (ref 35.8–46.3)
HGB BLD-MCNC: 10.4 G/DL (ref 11.7–15.4)
MCH RBC QN AUTO: 30.5 PG (ref 26.1–32.9)
MCHC RBC AUTO-ENTMCNC: 31.1 G/DL (ref 31.4–35)
MCV RBC AUTO: 97.9 FL (ref 82–102)
MM INDURATION, POC: 0 MM (ref 0–5)
NRBC # BLD: 0.03 K/UL (ref 0–0.2)
PLATELET # BLD AUTO: 225 K/UL (ref 150–450)
PMV BLD AUTO: 9.8 FL (ref 9.4–12.3)
POTASSIUM SERPL-SCNC: 5.2 MMOL/L (ref 3.5–5.1)
PPD, POC: NEGATIVE
RBC # BLD AUTO: 3.41 M/UL (ref 4.05–5.2)
SODIUM SERPL-SCNC: 138 MMOL/L (ref 136–145)
WBC # BLD AUTO: 7.8 K/UL (ref 4.3–11.1)

## 2024-07-31 PROCEDURE — 0WPG03Z REMOVAL OF INFUSION DEVICE FROM PERITONEAL CAVITY, OPEN APPROACH: ICD-10-PCS | Performed by: SURGERY

## 2024-07-31 PROCEDURE — 6370000000 HC RX 637 (ALT 250 FOR IP): Performed by: FAMILY MEDICINE

## 2024-07-31 PROCEDURE — 97530 THERAPEUTIC ACTIVITIES: CPT

## 2024-07-31 PROCEDURE — 2709999900 HC NON-CHARGEABLE SUPPLY: Performed by: SURGERY

## 2024-07-31 PROCEDURE — 2580000003 HC RX 258: Performed by: ANESTHESIOLOGY

## 2024-07-31 PROCEDURE — 87205 SMEAR GRAM STAIN: CPT

## 2024-07-31 PROCEDURE — 49329 UNLSTD LAPS PX ABD PERTM&OMN: CPT | Performed by: SURGERY

## 2024-07-31 PROCEDURE — 87075 CULTR BACTERIA EXCEPT BLOOD: CPT

## 2024-07-31 PROCEDURE — 7100000000 HC PACU RECOVERY - FIRST 15 MIN: Performed by: SURGERY

## 2024-07-31 PROCEDURE — 6360000002 HC RX W HCPCS: Performed by: FAMILY MEDICINE

## 2024-07-31 PROCEDURE — 6360000002 HC RX W HCPCS: Performed by: SURGERY

## 2024-07-31 PROCEDURE — 0W9G3ZZ DRAINAGE OF PERITONEAL CAVITY, PERCUTANEOUS APPROACH: ICD-10-PCS | Performed by: SURGERY

## 2024-07-31 PROCEDURE — 80048 BASIC METABOLIC PNL TOTAL CA: CPT

## 2024-07-31 PROCEDURE — 49422 REMOVE TUNNELED IP CATH: CPT | Performed by: SURGERY

## 2024-07-31 PROCEDURE — 6360000002 HC RX W HCPCS: Performed by: NURSE PRACTITIONER

## 2024-07-31 PROCEDURE — 2500000003 HC RX 250 WO HCPCS: Performed by: NURSE ANESTHETIST, CERTIFIED REGISTERED

## 2024-07-31 PROCEDURE — 87070 CULTURE OTHR SPECIMN AEROBIC: CPT

## 2024-07-31 PROCEDURE — 94640 AIRWAY INHALATION TREATMENT: CPT

## 2024-07-31 PROCEDURE — 51710 CHANGE OF BLADDER TUBE: CPT | Performed by: PHYSICIAN ASSISTANT

## 2024-07-31 PROCEDURE — 87186 SC STD MICRODIL/AGAR DIL: CPT

## 2024-07-31 PROCEDURE — 97161 PT EVAL LOW COMPLEX 20 MIN: CPT

## 2024-07-31 PROCEDURE — 87077 CULTURE AEROBIC IDENTIFY: CPT

## 2024-07-31 PROCEDURE — 3700000000 HC ANESTHESIA ATTENDED CARE: Performed by: SURGERY

## 2024-07-31 PROCEDURE — 3700000001 HC ADD 15 MINUTES (ANESTHESIA): Performed by: SURGERY

## 2024-07-31 PROCEDURE — 7100000001 HC PACU RECOVERY - ADDTL 15 MIN: Performed by: SURGERY

## 2024-07-31 PROCEDURE — 0651 HSPC ROUTINE HOME CARE

## 2024-07-31 PROCEDURE — 94760 N-INVAS EAR/PLS OXIMETRY 1: CPT

## 2024-07-31 PROCEDURE — 3600000002 HC SURGERY LEVEL 2 BASE: Performed by: SURGERY

## 2024-07-31 PROCEDURE — 6360000002 HC RX W HCPCS: Performed by: NURSE ANESTHETIST, CERTIFIED REGISTERED

## 2024-07-31 PROCEDURE — 3600000012 HC SURGERY LEVEL 2 ADDTL 15MIN: Performed by: SURGERY

## 2024-07-31 PROCEDURE — 6360000002 HC RX W HCPCS: Performed by: HOSPITALIST

## 2024-07-31 PROCEDURE — 6370000000 HC RX 637 (ALT 250 FOR IP): Performed by: HOSPITALIST

## 2024-07-31 PROCEDURE — 85027 COMPLETE CBC AUTOMATED: CPT

## 2024-07-31 PROCEDURE — 88300 SURGICAL PATH GROSS: CPT

## 2024-07-31 PROCEDURE — 1100000000 HC RM PRIVATE

## 2024-07-31 PROCEDURE — 36415 COLL VENOUS BLD VENIPUNCTURE: CPT

## 2024-07-31 PROCEDURE — 2580000003 HC RX 258: Performed by: HOSPITALIST

## 2024-07-31 PROCEDURE — 2700000000 HC OXYGEN THERAPY PER DAY

## 2024-07-31 RX ORDER — FENTANYL CITRATE 50 UG/ML
25 INJECTION, SOLUTION INTRAMUSCULAR; INTRAVENOUS EVERY 5 MIN PRN
Status: DISCONTINUED | OUTPATIENT
Start: 2024-07-31 | End: 2024-07-31 | Stop reason: HOSPADM

## 2024-07-31 RX ORDER — OXYCODONE HYDROCHLORIDE 5 MG/1
5 TABLET ORAL
Status: DISCONTINUED | OUTPATIENT
Start: 2024-07-31 | End: 2024-07-31 | Stop reason: HOSPADM

## 2024-07-31 RX ORDER — LIDOCAINE HYDROCHLORIDE 20 MG/ML
INJECTION, SOLUTION EPIDURAL; INFILTRATION; INTRACAUDAL; PERINEURAL PRN
Status: DISCONTINUED | OUTPATIENT
Start: 2024-07-31 | End: 2024-07-31 | Stop reason: SDUPTHER

## 2024-07-31 RX ORDER — SODIUM CHLORIDE 9 MG/ML
INJECTION, SOLUTION INTRAVENOUS PRN
Status: DISCONTINUED | OUTPATIENT
Start: 2024-07-31 | End: 2024-07-31 | Stop reason: HOSPADM

## 2024-07-31 RX ORDER — OXYCODONE HYDROCHLORIDE 5 MG/1
10 TABLET ORAL EVERY 4 HOURS PRN
Status: DISCONTINUED | OUTPATIENT
Start: 2024-07-31 | End: 2024-08-03 | Stop reason: HOSPADM

## 2024-07-31 RX ORDER — DEXAMETHASONE SODIUM PHOSPHATE 4 MG/ML
INJECTION, SOLUTION INTRA-ARTICULAR; INTRALESIONAL; INTRAMUSCULAR; INTRAVENOUS; SOFT TISSUE PRN
Status: DISCONTINUED | OUTPATIENT
Start: 2024-07-31 | End: 2024-07-31 | Stop reason: SDUPTHER

## 2024-07-31 RX ORDER — SODIUM CHLORIDE 0.9 % (FLUSH) 0.9 %
5-40 SYRINGE (ML) INJECTION PRN
Status: DISCONTINUED | OUTPATIENT
Start: 2024-07-31 | End: 2024-07-31 | Stop reason: HOSPADM

## 2024-07-31 RX ORDER — ONDANSETRON 2 MG/ML
INJECTION INTRAMUSCULAR; INTRAVENOUS PRN
Status: DISCONTINUED | OUTPATIENT
Start: 2024-07-31 | End: 2024-07-31 | Stop reason: SDUPTHER

## 2024-07-31 RX ORDER — DIPHENHYDRAMINE HYDROCHLORIDE 50 MG/ML
12.5 INJECTION INTRAMUSCULAR; INTRAVENOUS
Status: DISCONTINUED | OUTPATIENT
Start: 2024-07-31 | End: 2024-07-31 | Stop reason: HOSPADM

## 2024-07-31 RX ORDER — PROPOFOL 10 MG/ML
INJECTION, EMULSION INTRAVENOUS PRN
Status: DISCONTINUED | OUTPATIENT
Start: 2024-07-31 | End: 2024-07-31 | Stop reason: SDUPTHER

## 2024-07-31 RX ORDER — FENTANYL CITRATE 50 UG/ML
INJECTION, SOLUTION INTRAMUSCULAR; INTRAVENOUS PRN
Status: DISCONTINUED | OUTPATIENT
Start: 2024-07-31 | End: 2024-07-31 | Stop reason: SDUPTHER

## 2024-07-31 RX ORDER — IBUPROFEN 600 MG/1
1 TABLET ORAL PRN
Status: DISCONTINUED | OUTPATIENT
Start: 2024-07-31 | End: 2024-07-31 | Stop reason: HOSPADM

## 2024-07-31 RX ORDER — SODIUM CHLORIDE 0.9 % (FLUSH) 0.9 %
5-40 SYRINGE (ML) INJECTION EVERY 12 HOURS SCHEDULED
Status: DISCONTINUED | OUTPATIENT
Start: 2024-07-31 | End: 2024-07-31 | Stop reason: HOSPADM

## 2024-07-31 RX ORDER — LIDOCAINE HYDROCHLORIDE 10 MG/ML
1 INJECTION, SOLUTION INFILTRATION; PERINEURAL
Status: DISCONTINUED | OUTPATIENT
Start: 2024-07-31 | End: 2024-07-31 | Stop reason: HOSPADM

## 2024-07-31 RX ORDER — ROCURONIUM BROMIDE 10 MG/ML
INJECTION, SOLUTION INTRAVENOUS PRN
Status: DISCONTINUED | OUTPATIENT
Start: 2024-07-31 | End: 2024-07-31 | Stop reason: SDUPTHER

## 2024-07-31 RX ORDER — ACETAMINOPHEN 500 MG
1000 TABLET ORAL ONCE
Status: DISCONTINUED | OUTPATIENT
Start: 2024-07-31 | End: 2024-07-31 | Stop reason: HOSPADM

## 2024-07-31 RX ORDER — PROCHLORPERAZINE EDISYLATE 5 MG/ML
5 INJECTION INTRAMUSCULAR; INTRAVENOUS
Status: DISCONTINUED | OUTPATIENT
Start: 2024-07-31 | End: 2024-07-31 | Stop reason: HOSPADM

## 2024-07-31 RX ORDER — DEXTROSE MONOHYDRATE 100 MG/ML
INJECTION, SOLUTION INTRAVENOUS CONTINUOUS PRN
Status: DISCONTINUED | OUTPATIENT
Start: 2024-07-31 | End: 2024-07-31 | Stop reason: HOSPADM

## 2024-07-31 RX ORDER — BUPIVACAINE HYDROCHLORIDE 5 MG/ML
INJECTION, SOLUTION EPIDURAL; INTRACAUDAL PRN
Status: DISCONTINUED | OUTPATIENT
Start: 2024-07-31 | End: 2024-07-31 | Stop reason: ALTCHOICE

## 2024-07-31 RX ORDER — ONDANSETRON 2 MG/ML
4 INJECTION INTRAMUSCULAR; INTRAVENOUS
Status: DISCONTINUED | OUTPATIENT
Start: 2024-07-31 | End: 2024-07-31 | Stop reason: HOSPADM

## 2024-07-31 RX ORDER — OXYCODONE HYDROCHLORIDE 5 MG/1
5 TABLET ORAL EVERY 4 HOURS PRN
Status: DISCONTINUED | OUTPATIENT
Start: 2024-07-31 | End: 2024-08-03 | Stop reason: HOSPADM

## 2024-07-31 RX ORDER — NALOXONE HYDROCHLORIDE 0.4 MG/ML
INJECTION, SOLUTION INTRAMUSCULAR; INTRAVENOUS; SUBCUTANEOUS PRN
Status: DISCONTINUED | OUTPATIENT
Start: 2024-07-31 | End: 2024-07-31 | Stop reason: HOSPADM

## 2024-07-31 RX ORDER — HYDROMORPHONE HYDROCHLORIDE 2 MG/ML
0.5 INJECTION, SOLUTION INTRAMUSCULAR; INTRAVENOUS; SUBCUTANEOUS EVERY 5 MIN PRN
Status: DISCONTINUED | OUTPATIENT
Start: 2024-07-31 | End: 2024-07-31 | Stop reason: HOSPADM

## 2024-07-31 RX ORDER — EPHEDRINE SULFATE 5 MG/ML
INJECTION INTRAVENOUS PRN
Status: DISCONTINUED | OUTPATIENT
Start: 2024-07-31 | End: 2024-07-31 | Stop reason: SDUPTHER

## 2024-07-31 RX ORDER — SODIUM CHLORIDE, SODIUM LACTATE, POTASSIUM CHLORIDE, CALCIUM CHLORIDE 600; 310; 30; 20 MG/100ML; MG/100ML; MG/100ML; MG/100ML
INJECTION, SOLUTION INTRAVENOUS CONTINUOUS
Status: DISCONTINUED | OUTPATIENT
Start: 2024-07-31 | End: 2024-07-31 | Stop reason: HOSPADM

## 2024-07-31 RX ORDER — MIDAZOLAM HYDROCHLORIDE 2 MG/2ML
2 INJECTION, SOLUTION INTRAMUSCULAR; INTRAVENOUS
Status: DISCONTINUED | OUTPATIENT
Start: 2024-07-31 | End: 2024-07-31 | Stop reason: HOSPADM

## 2024-07-31 RX ADMIN — SUGAMMADEX 200 MG: 100 INJECTION, SOLUTION INTRAVENOUS at 13:21

## 2024-07-31 RX ADMIN — EPHEDRINE SULFATE 5 MG: 5 INJECTION INTRAVENOUS at 12:54

## 2024-07-31 RX ADMIN — FENTANYL CITRATE 25 MCG: 50 INJECTION, SOLUTION INTRAMUSCULAR; INTRAVENOUS at 13:14

## 2024-07-31 RX ADMIN — ACETAMINOPHEN 650 MG: 325 TABLET ORAL at 02:08

## 2024-07-31 RX ADMIN — ROCURONIUM BROMIDE 25 MG: 10 INJECTION, SOLUTION INTRAVENOUS at 13:04

## 2024-07-31 RX ADMIN — SODIUM CHLORIDE: 9 INJECTION, SOLUTION INTRAVENOUS at 12:31

## 2024-07-31 RX ADMIN — SODIUM CHLORIDE, PRESERVATIVE FREE 10 ML: 5 INJECTION INTRAVENOUS at 08:09

## 2024-07-31 RX ADMIN — CARVEDILOL 25 MG: 25 TABLET, FILM COATED ORAL at 08:07

## 2024-07-31 RX ADMIN — SODIUM CHLORIDE, PRESERVATIVE FREE 10 ML: 5 INJECTION INTRAVENOUS at 22:41

## 2024-07-31 RX ADMIN — FENTANYL CITRATE 25 MCG: 50 INJECTION, SOLUTION INTRAMUSCULAR; INTRAVENOUS at 12:40

## 2024-07-31 RX ADMIN — DEXAMETHASONE SODIUM PHOSPHATE 4 MG: 4 INJECTION INTRA-ARTICULAR; INTRALESIONAL; INTRAMUSCULAR; INTRAVENOUS; SOFT TISSUE at 12:59

## 2024-07-31 RX ADMIN — EPHEDRINE SULFATE 10 MG: 5 INJECTION INTRAVENOUS at 13:01

## 2024-07-31 RX ADMIN — GUAIFENESIN 600 MG: 600 TABLET ORAL at 22:41

## 2024-07-31 RX ADMIN — LIDOCAINE HYDROCHLORIDE 80 MG: 20 INJECTION, SOLUTION EPIDURAL; INFILTRATION; INTRACAUDAL; PERINEURAL at 12:41

## 2024-07-31 RX ADMIN — PHENYLEPHRINE HYDROCHLORIDE 100 MCG: 0.1 INJECTION, SOLUTION INTRAVENOUS at 12:50

## 2024-07-31 RX ADMIN — EPHEDRINE SULFATE 5 MG: 5 INJECTION INTRAVENOUS at 12:50

## 2024-07-31 RX ADMIN — PHENYLEPHRINE HYDROCHLORIDE 150 MCG: 0.1 INJECTION, SOLUTION INTRAVENOUS at 13:01

## 2024-07-31 RX ADMIN — ONDANSETRON 4 MG: 2 INJECTION INTRAMUSCULAR; INTRAVENOUS at 12:59

## 2024-07-31 RX ADMIN — PHENYLEPHRINE HYDROCHLORIDE 100 MCG: 0.1 INJECTION, SOLUTION INTRAVENOUS at 12:51

## 2024-07-31 RX ADMIN — CARVEDILOL 25 MG: 25 TABLET, FILM COATED ORAL at 16:10

## 2024-07-31 RX ADMIN — Medication 2 G: at 12:47

## 2024-07-31 RX ADMIN — PROPOFOL 120 MG: 10 INJECTION, EMULSION INTRAVENOUS at 12:41

## 2024-07-31 RX ADMIN — HEPARIN SODIUM 5000 UNITS: 5000 INJECTION INTRAVENOUS; SUBCUTANEOUS at 22:41

## 2024-07-31 RX ADMIN — ALBUTEROL SULFATE 2.5 MG: 2.5 SOLUTION RESPIRATORY (INHALATION) at 08:11

## 2024-07-31 RX ADMIN — EPHEDRINE SULFATE 5 MG: 5 INJECTION INTRAVENOUS at 12:51

## 2024-07-31 RX ADMIN — PHENYLEPHRINE HYDROCHLORIDE 150 MCG: 0.1 INJECTION, SOLUTION INTRAVENOUS at 12:54

## 2024-07-31 RX ADMIN — GUAIFENESIN 600 MG: 600 TABLET ORAL at 08:07

## 2024-07-31 RX ADMIN — PREDNISONE 40 MG: 20 TABLET ORAL at 08:07

## 2024-07-31 RX ADMIN — ROCURONIUM BROMIDE 25 MG: 10 INJECTION, SOLUTION INTRAVENOUS at 12:42

## 2024-07-31 ASSESSMENT — ENCOUNTER SYMPTOMS: SHORTNESS OF BREATH: 1

## 2024-07-31 ASSESSMENT — PAIN DESCRIPTION - PAIN TYPE: TYPE: ACUTE PAIN

## 2024-07-31 ASSESSMENT — PAIN DESCRIPTION - ORIENTATION
ORIENTATION: MID;POSTERIOR;RIGHT
ORIENTATION: RIGHT

## 2024-07-31 ASSESSMENT — PAIN DESCRIPTION - DESCRIPTORS
DESCRIPTORS: ACHING
DESCRIPTORS: ACHING

## 2024-07-31 ASSESSMENT — PAIN SCALES - GENERAL
PAINLEVEL_OUTOF10: 3
PAINLEVEL_OUTOF10: 0
PAINLEVEL_OUTOF10: 0
PAINLEVEL_OUTOF10: 4

## 2024-07-31 ASSESSMENT — PAIN - FUNCTIONAL ASSESSMENT: PAIN_FUNCTIONAL_ASSESSMENT: ACTIVITIES ARE NOT PREVENTED

## 2024-07-31 ASSESSMENT — PAIN DESCRIPTION - FREQUENCY: FREQUENCY: INTERMITTENT

## 2024-07-31 ASSESSMENT — PAIN DESCRIPTION - LOCATION
LOCATION: CHEST;NECK
LOCATION: CHEST;NECK

## 2024-07-31 ASSESSMENT — PAIN DESCRIPTION - ONSET: ONSET: ON-GOING

## 2024-07-31 NOTE — PROGRESS NOTES
Wilda Camara  Admission Date: 7/28/2024         [facility] Progress Note: 7/31/2024    Follow-up for: ESRD    The patient's chart is reviewed and the patient is discussed with the staff.    Subjective:   Pt seen and examined in room, she reports feeling better overall, s/p PD catheter removed earlier today.     ROS:  Gen - no fever, no chills  CV - no chest pain  Lung -  shortness of breath- better, no cough  Abd - no tenderness, no nausea/vomiting, no diarrhea  Ext - + edema- improving     Current Facility-Administered Medications   Medication Dose Route Frequency    oxyCODONE (ROXICODONE) immediate release tablet 5 mg  5 mg Oral Q4H PRN    oxyCODONE (ROXICODONE) immediate release tablet 10 mg  10 mg Oral Q4H PRN    epoetin sean-epbx (RETACRIT) injection 10,000 Units  10,000 Units SubCUTAneous Weekly    guaiFENesin (MUCINEX) extended release tablet 600 mg  600 mg Oral BID    guaiFENesin-dextromethorphan (ROBITUSSIN DM) 100-10 MG/5ML syrup 5 mL  5 mL Oral Q4H PRN    tuberculin injection 5 Units  5 Units IntraDERmal Once    ipratropium 0.5 mg-albuterol 2.5 mg (DUONEB) nebulizer solution 1 Dose  1 Dose Inhalation Q4H PRN    glucose chewable tablet 16 g  4 tablet Oral PRN    dextrose bolus 10% 125 mL  125 mL IntraVENous PRN    Or    dextrose bolus 10% 250 mL  250 mL IntraVENous PRN    Glucagon Emergency KIT 1 mg  1 mg SubCUTAneous PRN    dextrose 10 % infusion   IntraVENous Continuous PRN    sodium chloride flush 0.9 % injection 5-40 mL  5-40 mL IntraVENous 2 times per day    sodium chloride flush 0.9 % injection 5-40 mL  5-40 mL IntraVENous PRN    0.9 % sodium chloride infusion   IntraVENous PRN    ondansetron (ZOFRAN-ODT) disintegrating tablet 4 mg  4 mg Oral Q8H PRN    Or    ondansetron (ZOFRAN) injection 4 mg  4 mg IntraVENous Q6H PRN    polyethylene glycol (GLYCOLAX) packet 17 g  17 g Oral Daily PRN    heparin (porcine) injection 5,000 Units  5,000 Units SubCUTAneous 3 times  in this interval not displayed.       No results for input(s): \"PH\", \"PCO2\", \"PO2\", \"HCO3\" in the last 72 hours.      Assessment/Plan:  (Medical Decision Making)   ESRD she was on PD, off dialysis for past 8 weeks and was on home hospice- which she revoked.  - HD 7/29  - general surgery evaluated PD catheter removed today 7/31  - unable to use LUE AVF. LUE access US pending, vascular     2. Hyperkalemia- 2 K dialysate, K better      3. Acute metabolic acidosis- should improve with RRT      4. Anemia stable      5. COPD      6. Hypertension - stable     Urology replaced SPT catheter 7/31 with plans for SPT exchange in 4 weeks noted.      SAE Simpson - CNP  [facility]

## 2024-07-31 NOTE — ANESTHESIA POSTPROCEDURE EVALUATION
Department of Anesthesiology  Postprocedure Note    Patient: Wilda Camara  MRN: [id number]  YOB: 1950  Date of evaluation: 7/31/2024    Procedure Summary       Date: 07/31/24 Room / Location: [facility] OP OR 05 / [facility]    Anesthesia Start: 1231 Anesthesia Stop: 1334    Procedure: CATHETER REMOVAL PERITONEAL DIALYSIS LAPAROSCOPIC (Abdomen) Diagnosis:       Peritoneal dialysis catheter dysfunction (HCC)      (Peritoneal dialysis catheter dysfunction (HCC) [T85.611A])    Surgeons: Sarabjit De Leon MD Responsible Provider: Catrachito Brock IV, MD    Anesthesia Type: General ASA Status: 3            Anesthesia Type: General    Zion Phase I: Zion Score: 8    Zion Phase II: Zino Score: 9    Anesthesia Post Evaluation    Patient location during evaluation: PACU  Patient participation: complete - patient participated  Level of consciousness: awake  Airway patency: patent  Nausea & Vomiting: no nausea and no vomiting  Cardiovascular status: hemodynamically stable  Respiratory status: acceptable, nonlabored ventilation and spontaneous ventilation  Hydration status: euvolemic  Comments: /66   Pulse 61   Temp 97 °F (36.1 °C) (Oral)   Resp 17   Ht 1.778 m (5' 10\")   Wt 72.9 kg (160 lb 12 oz)   SpO2 93%   BMI 23.07 kg/m²     Multimodal analgesia pain management approach  Pain management: adequate and satisfactory to patient        No notable events documented.

## 2024-07-31 NOTE — PROGRESS NOTES
SPT balloon was deflated and tube was removed. Patient was then prepped and draped in sterile fashion. SPT site was prepped with betadine swabs. An 18 Fr berkowitz catheter was inserted into SPT site with return of clear yellow urine. Catheter balloon was inflated with 10 cc sterile water. Patient tolerated the procedure well. Will arrange for follow up in 4 weeks for next SPT exchange in office.    I have reviewed the above note.  I agree with the HPI, exam, assessment and plan as outlined by the nurse practitioner.    Oswald León M.D.    [facility]  [address] [address]  Phone: [phone]  Fax: [phone]

## 2024-07-31 NOTE — PROGRESS NOTES
TRANSFER - IN REPORT:    Verbal report received from rn on Wilda Camara being received from pacu  for routine progression of care.     Report consisted of patient’s Situation, Background, Assessment and Recommendations(SBAR).     Information from the following report(s) Procedure Summary was reviewed. Opportunity for questions and clarification was provided.      Assessment completed upon patient’s arrival to unit and care assumed.     Patient received to room 415  Patient connected to monitor and assessment completed. Plan of care reviewed. Patient oriented to room and call light. Patient aware to use call light to communicate any chest pain or needs.     Patient has 3 incisions sites from surgery.  LUQ, RUQ and LLQ. The LLQ has staples and gauze in the site. It is slightly oozing. New dressing applied.

## 2024-07-31 NOTE — PROGRESS NOTES
OT Note:    OT attempted to see patient for therapy. Pt was off the floor at this time. OT will re-attempt to see patient at a later date/time.    Thanks,  KELLEY Stone

## 2024-07-31 NOTE — CARE COORDINATION
Pt discussed during IDR and chart screened by CM for d/c planning.  On 7/30 pt had tunneled central venous catheter placed for HD access.  Today pt underwent removal of abdominal PD catheter.   CM has submitted requested updated records to [facility] as CM is working to obtain a new OP HD slot for pt.  Still awaiting PPD to be read.  PT has evaluated pt and recommends HH at d/c.  Awaiting OT eval and recs.  Dispo: return home with new OP HD slot once medically stable and cleared by nephrology.  CM will continue to follow.  LOS = 2 days

## 2024-07-31 NOTE — INTERVAL H&P NOTE
Update History & Physical    The patient's History and Physical of 7/29/24 was reviewed with the patient and I examined the patient. There was no change. The surgical site was confirmed by the patient and me.     Plan: The risks, benefits, expected outcome, and alternative to the recommended procedure have been discussed with the patient. Patient understands and wants to proceed with the procedure.     Electronically signed by FLOR CALI MD on 7/31/2024 at 11:40 AM

## 2024-07-31 NOTE — CONSULTS
[facility] Consult Note                                           07/30/24     Patient: Wilda Camara  MRN: [id number]    Admission Date:  7/28/2024, 1  Admission Diagnosis: Hyperkalemia [E87.5]  Reason for Consult: Urinary Retention / Neurogenic Bladder    ASSESSMENT: 73 y.o. female with a PMH of neurogenic bladder managed with SP tube.  Follows with HARRY Chau.     PLAN:  -Will plan to change SP tube tomorrow as patient is very tired from IR procedure today   -Rest of care per primary team  -Will follow       __________________________________________________________________________________    HPI:     Wilda Camara is a 73 y.o. female with a PMH of neurogenic bladder managed with SP tube.  Follows with HARRY Chau.     Currently admitted for dialysis catheter placement for ESRD.  Urology consulted for Sp tube exchange while patient is admitted to hospital.  Patient currently is very tired from IR dialysis catheter placement today.      Does have issues with recurrent UTIs and follows with HARRY Chau for that / is on UTI prevention measures.      Past Medical History:  Past Medical History:   Diagnosis Date    Anemia     Arthritis     Chronic pain     arthritis knees    Cough     reported constant since 10/22    ESRD (end stage renal disease) on dialysis ([facility])     M,W,F- [facility]    GERD (gastroesophageal reflux disease)     controlled with med    High cholesterol     History of blood transfusion 2022    Hypertension     medication    Kidney disease     acute renal failure 7/2020 (hospitalized x 6 days)  Followed by Dr. Wheeler, nephrologist      Liver disease     hep C, treated in 2009    Melanoma ([facility])     RLE,     PD catheter dysfunction ([facility]) 07/29/2024    Psychiatric disorder     depression, treated with cymbalta    PUD (peptic ulcer disease)     PVD (peripheral vascular disease) with claudication ([facility])     Dr. Johnson follows    Rheumatic fever   History:  Family History   Problem Relation Age of Onset    No Known Problems Mother     No Known Problems Father        ROS:  Review of Systems - General ROS: negative for - chills, fatigue, or fever  Respiratory ROS: no cough, shortness of breath, or wheezing  Cardiovascular ROS: no chest pain or dyspnea on exertion  Gastrointestinal ROS: no abdominal pain, change in bowel habits, or black or bloody stools  Genito-Urinary ROS: no dysuria, trouble voiding, or hematuria  Musculoskeletal ROS: negative for - muscular weakness    Vitals:  Vitals:    07/30/24 1934   BP: 116/83   Pulse: 82   Resp: 18   Temp: 98.1 °F (36.7 °C)   SpO2: 93%       Intake/Output:    Intake/Output Summary (Last 24 hours) at 7/30/2024 2026  Last data filed at 7/30/2024 1733  Gross per 24 hour   Intake 860 ml   Output 1625 ml   Net -765 ml        Physical Exam:   /83   Pulse 82   Temp 98.1 °F (36.7 °C) (Oral)   Resp 18   Ht 1.778 m (5' 10\")   Wt 73 kg (160 lb 15 oz)   SpO2 93%   BMI 23.09 kg/m²      GENERAL: No acute distress, Awake, Alert, Oriented X 3  CARDIAC: regular rate and rhythm  CHEST AND LUNG: Easy work of breathing,   ABDOMEN: soft, non tender, non-distended,   : SP tube draining clear yellow urine.     Lab/Radiology/Other Diagnostic Tests:  Recent Labs     07/28/24  2212 07/29/24  0213 07/29/24  1121 07/30/24  0622   HGB 9.8*  --  9.1* 9.6*   HCT 33.0*  --  30.6* 30.6*   WBC 5.8  --  4.8 6.6     --  134* 136   K 6.7* 5.5* 6.7* 5.1   *  --  105 105   CO2 14*  --  11* 18*   *  --  111* 77*   MG 2.9*  --   --   --    ALBUMIN 2.8*  --   --   --    AST 31  --   --   --    ALT 23  --   --   --    ALKPHOS 66  --   --   --          Oswald León M.D.    [facility] [place] [address]  Phone: [phone]  Fax: [phone]

## 2024-07-31 NOTE — PROGRESS NOTES
ACUTE PHYSICAL THERAPY GOALS:   (Developed with and agreed upon by patient and/or caregiver.)  LTG:  (1.)Ms. Camara will move from supine to sit and sit to supine , scoot up and down, and roll side to side in bed with INDEPENDENT within 7 treatment day(s).    (2.)Ms. Camara will transfer from bed to chair and chair to bed with MODIFIED INDEPENDENCE using the least restrictive device within 7 treatment day(s).    (3.)Ms. Camara will ambulate with STAND BY ASSIST for 500+ feet with the least restrictive device within 7 treatment day(s).  (4.)Ms. Camara will tolerate 23+ minutes of therapeutic activity within 7 treatment days for increased activity tolerance and functional mobility.   ________________________________________________________________________________________________       PHYSICAL THERAPY Initial Assessment and AM  (Link to Caseload Tracking: PT Visit Days : 1  Acknowledge Orders  Time In/Out  PT Charge Capture  Rehab Caseload Tracker    Wilda Camara is a 73 y.o. female   PRIMARY DIAGNOSIS: Hyperkalemia  Hyperkalemia [E87.5]  Procedure(s) (LRB):  CATHETER REMOVAL PERITONEAL DIALYSIS LAPAROSCOPIC (N/A)  Day of Surgery  Reason for Referral: Generalized Muscle Weakness (M62.81)  Difficulty in walking, Not elsewhere classified (R26.2)  Inpatient: Payor: [facility] MEDICARE / Plan: AET MEDICARE-ADVANTAGE PPO / Product Type: Medicare /     ASSESSMENT:     REHAB RECOMMENDATIONS:   Recommendation to date pending progress:  Setting:  Home Health Therapy    Equipment:    To Be Determined     ASSESSMENT:  Ms. Camara presents with decreased transfers, ambulation, balance, activity tolerance, strength and overall general functional mobility s/p hospital admission with SOB, revoking hospice care. Pt has missed HD for several weeks prior to admission. Pt is generally weak, fatigues quickly with activity. Pt on 4 L O2 this date, A & O x 4, reports uses rollator at baseline. Pt with SBA/CGA bed

## 2024-07-31 NOTE — OP NOTE
[address]  [address]                            OPERATIVE REPORT      PATIENT NAME: ALINA BE       : 1950  MED REC NO: [id number]                       ROOM: [facility]  ACCOUNT NO: [id number]                       ADMIT DATE: 2024  PROVIDER: Sarabjit De Leon MD    DATE OF SERVICE:  2024    PREOPERATIVE DIAGNOSES:  Dysfunctional peritoneal dialysis catheter.    POSTOPERATIVE DIAGNOSES:  Infected, dysfunctional peritoneal dialysis catheter as well as large amount of ascites found in the abdomen.    PROCEDURES PERFORMED:  Diagnostic laparoscopy, paracentesis with 4.5 L of fluid removed and removal of an infected dysfunctional peritoneal dialysis catheter.    SURGEON:  Sarabjit De Leon MD    ASSISTANT:  None.    ANESTHESIA:  General endotracheal anesthesia.    ESTIMATED BLOOD LOSS:  10 mL.    SPECIMENS REMOVED:  Culture swab of infected purulent fluid found in the PD catheter tract and the catheter itself was sent to pathology for gross examination only in 3 separate pieces.    INTRAOPERATIVE FINDINGS:  Infected purulent fluid found in the PD catheter tract and large amount of ascites found which was unexpected.  I removed 4.5 L of ascites.     COMPLICATIONS:  None.    IMPLANTS:  None.    DESCRIPTION OF PROCEDURE:  The patient is a 73-year-old female, who had a PD catheter placed by Dr. Delarosa in 2023.  The catheter has not been functioning properly and looked like it was becoming infected with a large bubble found along the tract of the PD catheter before it entered the abdominal cavity.  General Surgery was consulted by the hospitalist to evaluate this.  I recommended removal of the PD catheter as she already has a fistula and is now on hemodialysis.  Our nurse practitioner spoke to the patient and then I spoke to the patient as well.  We went through a laparoscopic open procedure.  She was agreeable,  removed it through the 12 mm trocar.  It was placed on the back table to be sent to pathology as part of the entire system, which was sent for gross examination only.  I then opened the tract with a bubbling had occurred.  There was purulent material found there.  I took a culture swab of this and sent it to microbiology for culture and sensitivity.  I cut out the initial cuff near the skin surface with the electrocautery.  I cut out the second cuff just as it entered the fascia of the anterior abdominal wall.  I removed the remaining portion of the catheter without problems.  The entire catheter was sent in 3 pieces to pathology for gross examination only.  We then checked if the 12 mm trocar was removed.  There was no bleeding from the trocar site.  The skin incisions were closed with surgical staples where the catheter had exited the body in the left lower quadrant.  I left a small portion of this tract open to drain the purulent material out.  I put a wet-to-dry dressing in this, which we will change daily.  The skin incisions were stapled.  I injected 30 mL of 0.5% Marcaine in divided doses to the 3 surgical sites.  The patient tolerated the procedure well, was extubated, brought to recovery room in stable condition.  She will continue on hemodialysis.    DRAINS:  None.        MD ASIA HUDSON/WALKER  D:  07/31/2024 14:32:16  T:  07/31/2024 16:36:35  JOB #:  [id number]

## 2024-07-31 NOTE — ANESTHESIA PRE PROCEDURE
Department of Anesthesiology  Preprocedure Note       Name:  Wilda Camara   Age:  73 y.o.  :  1950                                          MRN:  [id number]         Date:  2024      Surgeon: Surgeon(s):  Sarabjit De Leon MD    Procedure: Procedure(s):  CATHETER REMOVAL PERITONEAL DIALYSIS LAPAROSCOPIC    Medications prior to admission:   Prior to Admission medications    Medication Sig Start Date End Date Taking? Authorizing Provider   NIFEdipine (PROCARDIA XL) 90 MG extended release tablet Take 1 tablet by mouth daily  Patient not taking: Reported on 2024  Yes Belem Hernández MD   oxyCODONE-acetaminophen (PERCOCET)  MG per tablet Take 1 tablet by mouth every 6 hours as needed for Pain. Max Daily Amount: 4 tablets  Patient not taking: Reported on 2024  Yes Belem Hernández MD   diphenhydrAMINE (BENADRYL) 12.5 MG/5ML elixir Take 10 mLs by mouth 4 times daily as needed for Allergies or Itching   Yes Belem Hernández MD   OXYGEN Inhale 2 L/min into the lungs as needed for Shortness of Breath. 2 L/min via Nasal cannula as needed (shortness of breath). Inhale 2 l/min by nasal cannula as needed for shortness of breath Indications: shortness of breath    Indications: SHORTNESS OF BREATH 24   Belem Hernández MD   hydrocortisone (SCALACORT) 2 % LOTN lotion Apply 1 application  topically 2 times daily. Apply small amount of cream to the areas itching  Indications: Burning Feeling and Itching  Patient not taking: Reported on 2024   Belem Henrández MD   pantoprazole (PROTONIX) 40 MG tablet Take 1 tablet by mouth daily Indications: gerd 7/3/24   Belem Hernández MD   hydrOXYzine HCl (ATARAX) 10 MG tablet Take 1 tablet by mouth every 6 hours as needed for Anxiety or Itching Indications: anxiety, itching 7/3/24   Belem Hernández MD   COMFORT [place] [facility] Take 1 Box by mouth as needed (symptom management).

## 2024-07-31 NOTE — PROGRESS NOTES
Pt refused tunneled catheter dressing change, had some bleeding over night, no active bleeding at the moment will continue to monitor.

## 2024-07-31 NOTE — PROGRESS NOTES
TRANSFER - IN REPORT:    Verbal report received from NAVEEN Arshad on Wilda Camara  being received from Post OP for routine post-op      Report consisted of patient's Situation, Background, Assessment and   Recommendations(SBAR).     Information from the following report(s) Nurse Handoff Report was reviewed with the receiving nurse.    Opportunity for questions and clarification was provided.      Report taken for primary NAVEEN Mckeon.

## 2024-07-31 NOTE — PROGRESS NOTES
Hospitalist Progress Note   Admit Date:  2024  9:59 PM   Name:  Wilda Camara   Age:  73 y.o.  Sex:  female  :  1950   MRN:  [id number]   Room:  [facility]    Presenting/Chief Complaint: Shortness of Breath and Generalized Body Aches     Reason(s) for Admission: Hyperkalemia [E87.5]     Hospital Course:   Wilda Camara is a 73 y.o. female with medical history of ESRD on peritoneal dialysis, Hepatitis C, Cirrhosis who presented with SOB after revoking her home hospice. Patient was on hospice almost 8 weeks, did not get any dialysis .     Of note, she was admitted at [facility] -7/3 for overdose with Ativan and Oxycontin, At that time  reported pt was attempting suicide by taking those medications. She was seen by psychiatry and was discharged back to home with home hospice w/o any narcotics. Psychiatry recommended low dose Ritalin at that time for pat with end-of-life depression.     In ED, K6.7, bicarb 14. Trop 146. BNP 76338. CXR shows no acute findings.     She was admitted d/t hyperkalemia and SOB most likely d/t volume overload in setting of respiratory compensation with severe metabolic acidosis. Nephrology was consulted with plan to resume dialysis per pt request; hospice revoked. Pt was started on HD with her LUE AVF, however AVF infiltrated and pt had her TCC placed with IR on  to allow AVF to rest and mature.  General surgery was consulted to evaluate for PD catheter and recommended to remove PD catheter and she will need to stay on HD as it would take at least 6 weeks for abdomen to heal. Plans for HD catheter removal on .     Urology was consulted for routine SPT exchange and planned to perform on       Subjective & 24hr Events:     Pt alert and oriented x3. No acute concerns. Going to OR for PD catheter removal today. On room air. No chest pain.     Assessment & Plan:     Hyperkalemia, resolved  2/2 ESRD with stopping dialysis  Low K diet  Cont dialysis  cm    AV Area by VTI 2.4 cm2    AV Area by Peak Velocity 2.4 cm2    Aortic Root 3.4 cm    Ascending Aorta 3.3 cm    IVC Proxmal 0.7 cm    MV E Wave Deceleration Time 290.0 ms    MV A Velocity 0.78 m/s    MV E Velocity 0.54 m/s    PV .0 ms    PV Max Velocity 1.1 m/s    PV Peak Gradient 4 mmHg    RVIDd 3.2 cm    RV Basal Dimension 3.5 cm    RV Free Wall Peak S' 11 cm/s    TAPSE 2.1 1.7 cm    TR Max Velocity 2.13 m/s    TR Peak Gradient 18 mmHg    Body Surface Area 1.88 m2    Fractional Shortening 2D 37 28 - 44 %    LV ESV Index A4C 18 mL/m2    LV EDV Index A4C 54 mL/m2    LV ESV Index A2C 16 mL/m2    LV EDV Index A2C 44 mL/m2    LVIDd Index 2.68 cm/m2    LVIDs Index 1.68 cm/m2    LV RWT Ratio 0.39     LV Mass 2D 200.8 (A) 67 - 162 g    LV Mass 2D Index 105.7 (A) 43 - 95 g/m2    MV E/A 0.69     E/E' Ratio (Averaged) 10.61     E/E' Lateral 7.71     E/E' Septal 13.50     LVOT Stroke Volume Index 30.7 mL/m2    LA Volume Index MOD A4C 24 16 - 34 ml/m2    LA Size Index 1.84 cm/m2    LA/AO Root Ratio 1.03     Ao Root Index 1.79 cm/m2    Ascending Aorta Index 1.74 cm/m2    AV Velocity Ratio 0.83     LVOT:AV VTI Index 0.84     MORGAN/BSA VTI 1.3 cm2/m2    MORGAN/BSA Peak Velocity 1.3 cm2/m2    Est. RA Pressure 3 mmHg    RVSP 21 mmHg   CBC    Collection Time: 07/31/24  5:04 AM   Result Value Ref Range    WBC 7.8 4.3 - 11.1 K/uL    RBC 3.41 (L) 4.05 - 5.2 M/uL    Hemoglobin 10.4 (L) 11.7 - 15.4 g/dL    Hematocrit 33.4 (L) 35.8 - 46.3 %    MCV 97.9 82 - 102 FL    MCH 30.5 26.1 - 32.9 PG    MCHC 31.1 (L) 31.4 - 35.0 g/dL    RDW 14.2 11.9 - 14.6 %    Platelets 225 150 - 450 K/uL    MPV 9.8 9.4 - 12.3 FL    nRBC 0.03 0.0 - 0.2 K/uL   Basic Metabolic Panel w/ Reflex to MG    Collection Time: 07/31/24  5:04 AM   Result Value Ref Range    Sodium 138 136 - 145 mmol/L    Potassium 5.2 (H) 3.5 - 5.1 mmol/L    Chloride 103 98 - 107 mmol/L    CO2 20 20 - 28 mmol/L    Anion Gap 15 9 - 18 mmol/L    Glucose 92 70 - 99 mg/dL    BUN 59 (H) 8 -

## 2024-07-31 NOTE — BRIEF OP NOTE
Brief Postoperative Note      Patient: Wilda Camara  YOB: 1950  MRN: [id number]    Date of Procedure: 7/31/2024    Pre-Op Diagnosis Codes:     * Peritoneal dialysis catheter dysfunction (HCC) [T85.611A]    Post-Op Diagnosis: Dysfunctional/infected PD catheter       Procedure: Diagnostic laparoscopy, paracentesis and removal of a dysfunctional/infected peritoneal catheter.    Surgeon(s):  Sarabjit De Leon MD    Assistant:  * No surgical staff found *    Anesthesia: General    Estimated Blood Loss (mL): Minimal    Complications: None    Specimens:   ID Type Source Tests Collected by Time Destination   1 : abdominal wound Swab Abdomen CULTURE, ANAEROBIC, CULTURE, WOUND Sarabjit De Leon MD 7/31/2024 1308    B : PD Catheter for gross examination only Catheter Tip Catheter Tip CYTOLOGY, NON-GYN Sarabjit De Leon MD 7/31/2024 1318        Implants:  * No implants in log *      Drains:   Suprapubic Catheter Non-latex (Active)   Site Assessment Red 07/30/24 0543   Urine Color Yellow 07/30/24 0543   Urine Appearance Clear 07/30/24 0543   Urine Odor Other (Comment) 07/30/24 0543   Collection Container Leg bag 07/29/24 1557   Securement Method Leg strap 07/29/24 1557   Catheter Care  Perineal wipes 07/29/24 1557   Catheter Best Practices  Drainage tube clipped to bed;Catheter secured to thigh;Tamper seal intact;Bag below bladder;Bag not on floor;Lack of dependent loop in tubing;Drainage bag less than half full 07/29/24 1557   Status Draining;Patent 07/29/24 1557   Output (mL) 125 mL 07/30/24 0543       Findings:  Infection Present At Time Of Surgery (PATOS) (choose all levels that have infection present):  - Superficial Infection (skin/subcutaneous) present as evidenced by pus  Other Findings: See dictated    Electronically signed by SARABJIT DE LEON MD on 7/31/2024 at 1:34 PM

## 2024-08-01 ENCOUNTER — APPOINTMENT (OUTPATIENT)
Dept: ULTRASOUND IMAGING | Age: 74
End: 2024-08-01
Payer: MEDICARE

## 2024-08-01 PROBLEM — K65.9 ACUTE BACTERIAL PERITONITIS (HCC): Status: ACTIVE | Noted: 2024-08-01

## 2024-08-01 PROBLEM — E87.20 METABOLIC ACIDOSIS: Status: RESOLVED | Noted: 2023-01-26 | Resolved: 2024-08-01

## 2024-08-01 PROBLEM — I10 HYPERTENSION: Chronic | Status: ACTIVE | Noted: 2020-08-19

## 2024-08-01 PROBLEM — N18.6 ESRD (END STAGE RENAL DISEASE) (HCC): Chronic | Status: ACTIVE | Noted: 2022-08-01

## 2024-08-01 PROBLEM — I24.89 DEMAND ISCHEMIA (HCC): Status: RESOLVED | Noted: 2024-07-29 | Resolved: 2024-08-01

## 2024-08-01 PROBLEM — K21.9 GASTROESOPHAGEAL REFLUX DISEASE WITHOUT ESOPHAGITIS: Chronic | Status: ACTIVE | Noted: 2020-08-19

## 2024-08-01 PROBLEM — Z66 DNR (DO NOT RESUSCITATE): Chronic | Status: ACTIVE | Noted: 2024-07-29

## 2024-08-01 PROBLEM — D64.9 ANEMIA: Chronic | Status: ACTIVE | Noted: 2020-07-16

## 2024-08-01 PROBLEM — N13.9 LOWER OBSTRUCTIVE UROPATHY: Chronic | Status: ACTIVE | Noted: 2020-07-16

## 2024-08-01 PROBLEM — E87.5 HYPERKALEMIA: Status: RESOLVED | Noted: 2024-07-29 | Resolved: 2024-08-01

## 2024-08-01 PROBLEM — T85.71XA INFECTION DUE TO PERITONEAL DIALYSIS CATHETER (HCC): Status: RESOLVED | Noted: 2024-07-29 | Resolved: 2024-08-01

## 2024-08-01 PROBLEM — T85.611A PD CATHETER DYSFUNCTION (HCC): Status: RESOLVED | Noted: 2024-07-29 | Resolved: 2024-08-01

## 2024-08-01 PROBLEM — Z93.59 SUPRAPUBIC CATHETER (HCC): Chronic | Status: ACTIVE | Noted: 2022-05-16

## 2024-08-01 PROBLEM — I73.9 PVD (PERIPHERAL VASCULAR DISEASE) WITH CLAUDICATION (HCC): Chronic | Status: ACTIVE | Noted: 2023-02-15

## 2024-08-01 LAB
ANION GAP SERPL CALC-SCNC: 15 MMOL/L (ref 9–18)
BUN SERPL-MCNC: 65 MG/DL (ref 8–23)
CALCIUM SERPL-MCNC: 7.9 MG/DL (ref 8.8–10.2)
CHLORIDE SERPL-SCNC: 102 MMOL/L (ref 98–107)
CO2 SERPL-SCNC: 20 MMOL/L (ref 20–28)
CREAT SERPL-MCNC: 11.5 MG/DL (ref 0.6–1.1)
ERYTHROCYTE [DISTWIDTH] IN BLOOD BY AUTOMATED COUNT: 14.1 % (ref 11.9–14.6)
GLUCOSE SERPL-MCNC: 121 MG/DL (ref 70–99)
HCT VFR BLD AUTO: 31.8 % (ref 35.8–46.3)
HGB BLD-MCNC: 9.5 G/DL (ref 11.7–15.4)
MCH RBC QN AUTO: 30.1 PG (ref 26.1–32.9)
MCHC RBC AUTO-ENTMCNC: 29.9 G/DL (ref 31.4–35)
MCV RBC AUTO: 100.6 FL (ref 82–102)
NRBC # BLD: 0.03 K/UL (ref 0–0.2)
PLATELET # BLD AUTO: 210 K/UL (ref 150–450)
PMV BLD AUTO: 9.5 FL (ref 9.4–12.3)
POTASSIUM SERPL-SCNC: 5 MMOL/L (ref 3.5–5.1)
RBC # BLD AUTO: 3.16 M/UL (ref 4.05–5.2)
SODIUM SERPL-SCNC: 136 MMOL/L (ref 136–145)
WBC # BLD AUTO: 8.9 K/UL (ref 4.3–11.1)

## 2024-08-01 PROCEDURE — 85027 COMPLETE CBC AUTOMATED: CPT

## 2024-08-01 PROCEDURE — 6370000000 HC RX 637 (ALT 250 FOR IP): Performed by: INTERNAL MEDICINE

## 2024-08-01 PROCEDURE — 6370000000 HC RX 637 (ALT 250 FOR IP): Performed by: FAMILY MEDICINE

## 2024-08-01 PROCEDURE — 6370000000 HC RX 637 (ALT 250 FOR IP): Performed by: HOSPITALIST

## 2024-08-01 PROCEDURE — 80048 BASIC METABOLIC PNL TOTAL CA: CPT

## 2024-08-01 PROCEDURE — 36415 COLL VENOUS BLD VENIPUNCTURE: CPT

## 2024-08-01 PROCEDURE — 93990 DOPPLER FLOW TESTING: CPT

## 2024-08-01 PROCEDURE — 2580000003 HC RX 258: Performed by: HOSPITALIST

## 2024-08-01 PROCEDURE — 6360000002 HC RX W HCPCS: Performed by: NURSE PRACTITIONER

## 2024-08-01 PROCEDURE — 90935 HEMODIALYSIS ONE EVALUATION: CPT

## 2024-08-01 PROCEDURE — 6360000002 HC RX W HCPCS: Performed by: HOSPITALIST

## 2024-08-01 PROCEDURE — 93990 DOPPLER FLOW TESTING: CPT | Performed by: RADIOLOGY

## 2024-08-01 PROCEDURE — 1100000000 HC RM PRIVATE

## 2024-08-01 PROCEDURE — 2580000003 HC RX 258: Performed by: NURSE PRACTITIONER

## 2024-08-01 RX ORDER — CEFDINIR 300 MG/1
300 CAPSULE ORAL DAILY
Qty: 7 CAPSULE | Refills: 0 | Status: SHIPPED | OUTPATIENT
Start: 2024-08-01 | End: 2024-08-08

## 2024-08-01 RX ORDER — CEFDINIR 300 MG/1
300 CAPSULE ORAL DAILY
Status: DISCONTINUED | OUTPATIENT
Start: 2024-08-01 | End: 2024-08-03 | Stop reason: HOSPADM

## 2024-08-01 RX ORDER — METRONIDAZOLE 250 MG/1
250 TABLET ORAL 3 TIMES DAILY
Qty: 21 TABLET | Refills: 0 | Status: SHIPPED | OUTPATIENT
Start: 2024-08-01 | End: 2024-08-08

## 2024-08-01 RX ORDER — METRONIDAZOLE 500 MG/100ML
500 INJECTION, SOLUTION INTRAVENOUS EVERY 8 HOURS
Status: DISCONTINUED | OUTPATIENT
Start: 2024-08-01 | End: 2024-08-01

## 2024-08-01 RX ORDER — METRONIDAZOLE 500 MG/1
500 TABLET ORAL EVERY 8 HOURS SCHEDULED
Status: DISCONTINUED | OUTPATIENT
Start: 2024-08-01 | End: 2024-08-03 | Stop reason: HOSPADM

## 2024-08-01 RX ORDER — SENNOSIDES 8.6 MG
650 CAPSULE ORAL EVERY 6 HOURS PRN
Qty: 60 TABLET | Refills: 3 | COMMUNITY
Start: 2024-08-01

## 2024-08-01 RX ADMIN — METRONIDAZOLE 500 MG: 500 TABLET ORAL at 19:38

## 2024-08-01 RX ADMIN — HEPARIN SODIUM 5000 UNITS: 5000 INJECTION INTRAVENOUS; SUBCUTANEOUS at 15:32

## 2024-08-01 RX ADMIN — PANTOPRAZOLE SODIUM 40 MG: 40 TABLET, DELAYED RELEASE ORAL at 10:57

## 2024-08-01 RX ADMIN — HYDROXYZINE HYDROCHLORIDE 10 MG: 10 TABLET ORAL at 08:39

## 2024-08-01 RX ADMIN — SODIUM CHLORIDE, PRESERVATIVE FREE 10 ML: 5 INJECTION INTRAVENOUS at 19:39

## 2024-08-01 RX ADMIN — GUAIFENESIN 600 MG: 600 TABLET ORAL at 10:57

## 2024-08-01 RX ADMIN — METRONIDAZOLE 500 MG: 500 INJECTION, SOLUTION INTRAVENOUS at 11:02

## 2024-08-01 RX ADMIN — HYDROXYZINE HYDROCHLORIDE 10 MG: 10 TABLET ORAL at 23:35

## 2024-08-01 RX ADMIN — CEFTRIAXONE 1000 MG: 1 INJECTION, POWDER, FOR SOLUTION INTRAMUSCULAR; INTRAVENOUS at 10:58

## 2024-08-01 RX ADMIN — CEFDINIR 300 MG: 300 CAPSULE ORAL at 15:28

## 2024-08-01 RX ADMIN — HEPARIN SODIUM 5000 UNITS: 5000 INJECTION INTRAVENOUS; SUBCUTANEOUS at 06:26

## 2024-08-01 RX ADMIN — CARVEDILOL 25 MG: 25 TABLET, FILM COATED ORAL at 10:57

## 2024-08-01 RX ADMIN — CARVEDILOL 25 MG: 25 TABLET, FILM COATED ORAL at 15:28

## 2024-08-01 RX ADMIN — SODIUM CHLORIDE, PRESERVATIVE FREE 10 ML: 5 INJECTION INTRAVENOUS at 11:04

## 2024-08-01 RX ADMIN — PREDNISONE 40 MG: 20 TABLET ORAL at 10:57

## 2024-08-01 RX ADMIN — HEPARIN SODIUM 5000 UNITS: 5000 INJECTION INTRAVENOUS; SUBCUTANEOUS at 21:35

## 2024-08-01 ASSESSMENT — PAIN SCALES - GENERAL
PAINLEVEL_OUTOF10: 0

## 2024-08-01 NOTE — DIALYSIS
TRANSFER OUT- DIALYSIS    Hemodialysis treatment completed. PT ran 3 hours, without complications.    Patient alert and VS stable BP (!) 83/67   Pulse 71   Temp 97.7 °F (36.5 °C)   Resp 17   Ht 1.778 m (5' 10\")   Wt 66.7 kg (147 lb 0.8 oz)   SpO2 98%   BMI 21.10 kg/m²         1 Kg removed.      Flushed both ports with 10 mL of NS.  CVC dressing clean, dry, and intact, tego caps intact, curos caps placed.      Meds given: 0.    RBCs given during dialysis: 0    Patient to 517 after dialysis.

## 2024-08-01 NOTE — CARE COORDINATION
Pt discussed during IDR and chart screened by CM for d/c planning.  Pt has a new OP HD slot with [facility] on TTS - awaiting final chair time.  Per pt's preference [facility]: SN and PT has been ordered - awaiting confirmation from home health.  Dispo: d/c home on 8/3 so that pt does not have too long of a gap in between HD run days as the clinic does not accept new pts on Saturdays.  CM will continue to follow.  LOS = 3 days    HD chair time assigned for 1130 - TTS.

## 2024-08-01 NOTE — PLAN OF CARE
Problem: Pain  Goal: Verbalizes/displays adequate comfort level or baseline comfort level  Outcome: Progressing     Problem: Safety - Adult  Goal: Free from fall injury  Outcome: Progressing  Flowsheets (Taken 7/31/2024 2241)  Free From Fall Injury: Instruct family/caregiver on patient safety     Problem: ABCDS Injury Assessment  Goal: Absence of physical injury  Outcome: Progressing  Flowsheets (Taken 7/31/2024 2241)  Absence of Physical Injury: Implement safety measures based on patient assessment

## 2024-08-01 NOTE — DIALYSIS
TRANSFER IN - DIALYSIS    Received patient in dialysis unit  from [facility] (unit) for ordered procedure.    Consent verified for renal replacement therapy. Procedure explained to patient, opportunity for Q&A provided. Call light given.     Patient alert and vital signs stable.  BP (!) 134/103   Pulse 68   Temp 97.7 °F (36.5 °C) (Oral)   Resp 17   Ht 1.778 m (5' 10\")   Wt 66.7 kg (147 lb 0.8 oz)   SpO2 98%   BMI 21.10 kg/m²     Hemodialysis initiated using right cvc.  Aspirated and flushed both ports without difficulty. Dressing clean, dry and intact.  Machine settings per MD order.    Heparin 0 unit bolus and 0 units/hr.      Will monitor during treatment.

## 2024-08-01 NOTE — PROGRESS NOTES
Admit Date: 2024    POD 1 Day Post-Op    Procedure:  Procedure(s):  CATHETER REMOVAL PERITONEAL DIALYSIS LAPAROSCOPIC    Subjective:     Patient has no post op complaints today  Seen in HD    Objective:       Vitals:    24 0243 24 0710 24 0732 24 0800   BP: (!) 134/103 (!) 130/99 (!) 141/99 131/79   Pulse:  68 60 73   Resp: 17 17     Temp: 97.7 °F (36.5 °C) 97.7 °F (36.5 °C)     TempSrc: Oral      SpO2:  98%     Weight:       Height:           Temp (24hrs), Av.5 °F (36.4 °C), Min:97 °F (36.1 °C), Max:97.7 °F (36.5 °C)    Intake/Output Summary (Last 24 hours) at 2024 0829  Last data filed at 2024 1544  Gross per 24 hour   Intake 420 ml   Output 201 ml   Net 219 ml         Physical Exam:   Abd: 3 surgical sites. Covered with bulky dressings- 2 with staples, 1 requiring moist to dry packing.     Labs:   Recent Results (from the past 24 hour(s))   PLEASE READ & DOCUMENT PPD TEST IN 24 HRS    Collection Time: 24  4:15 PM   Result Value Ref Range    PPD, (POC) Negative     mm Induration 0 0 - 5 mm   CBC    Collection Time: 24  4:26 AM   Result Value Ref Range    WBC 8.9 4.3 - 11.1 K/uL    RBC 3.16 (L) 4.05 - 5.2 M/uL    Hemoglobin 9.5 (L) 11.7 - 15.4 g/dL    Hematocrit 31.8 (L) 35.8 - 46.3 %    .6 82 - 102 FL    MCH 30.1 26.1 - 32.9 PG    MCHC 29.9 (L) 31.4 - 35.0 g/dL    RDW 14.1 11.9 - 14.6 %    Platelets 210 150 - 450 K/uL    MPV 9.5 9.4 - 12.3 FL    nRBC 0.03 0.0 - 0.2 K/uL   Basic Metabolic Panel w/ Reflex to MG    Collection Time: 24  4:26 AM   Result Value Ref Range    Sodium 136 136 - 145 mmol/L    Potassium 5.0 3.5 - 5.1 mmol/L    Chloride 102 98 - 107 mmol/L    CO2 20 20 - 28 mmol/L    Anion Gap 15 9 - 18 mmol/L    Glucose 121 (H) 70 - 99 mg/dL    BUN 65 (H) 8 - 23 MG/DL    Creatinine 11.50 (H) 0.60 - 1.10 MG/DL    Est, Glom Filt Rate 3 (L) >60 ml/min/1.73m2    Calcium 7.9 (L) 8.8 - 10.2 MG/DL         Assessment:     Principal Problem:

## 2024-08-01 NOTE — PROGRESS NOTES
Wilda Camara  Admission Date: 7/28/2024         [facility] Progress Note: 8/1/2024    Follow-up for: ESRD    The patient's chart is reviewed and the patient is discussed with the staff.    Subjective:   Pt seen and examined on HD today, tolerating. /88, UF 2000, Qb 350. PD catheter removed yesterday.     ROS:  Gen - no fever, no chills  CV - no chest pain  Lung -  equal chest rise/fall  Abd - no tenderness, no nausea/vomiting, no diarrhea  Ext - + edema- improving     Current Facility-Administered Medications   Medication Dose Route Frequency    oxyCODONE (ROXICODONE) immediate release tablet 5 mg  5 mg Oral Q4H PRN    oxyCODONE (ROXICODONE) immediate release tablet 10 mg  10 mg Oral Q4H PRN    epoetin sean-epbx (RETACRIT) injection 10,000 Units  10,000 Units SubCUTAneous Weekly    guaiFENesin (MUCINEX) extended release tablet 600 mg  600 mg Oral BID    guaiFENesin-dextromethorphan (ROBITUSSIN DM) 100-10 MG/5ML syrup 5 mL  5 mL Oral Q4H PRN    tuberculin injection 5 Units  5 Units IntraDERmal Once    ipratropium 0.5 mg-albuterol 2.5 mg (DUONEB) nebulizer solution 1 Dose  1 Dose Inhalation Q4H PRN    glucose chewable tablet 16 g  4 tablet Oral PRN    dextrose bolus 10% 125 mL  125 mL IntraVENous PRN    Or    dextrose bolus 10% 250 mL  250 mL IntraVENous PRN    Glucagon Emergency KIT 1 mg  1 mg SubCUTAneous PRN    dextrose 10 % infusion   IntraVENous Continuous PRN    sodium chloride flush 0.9 % injection 5-40 mL  5-40 mL IntraVENous 2 times per day    sodium chloride flush 0.9 % injection 5-40 mL  5-40 mL IntraVENous PRN    0.9 % sodium chloride infusion   IntraVENous PRN    ondansetron (ZOFRAN-ODT) disintegrating tablet 4 mg  4 mg Oral Q8H PRN    Or    ondansetron (ZOFRAN) injection 4 mg  4 mg IntraVENous Q6H PRN    polyethylene glycol (GLYCOLAX) packet 17 g  17 g Oral Daily PRN    heparin (porcine) injection 5,000 Units  5,000 Units SubCUTAneous 3 times per day

## 2024-08-01 NOTE — PROGRESS NOTES
0.03 0.0 - 0.2 K/uL   Basic Metabolic Panel w/ Reflex to MG    Collection Time: 08/01/24  4:26 AM   Result Value Ref Range    Sodium 136 136 - 145 mmol/L    Potassium 5.0 3.5 - 5.1 mmol/L    Chloride 102 98 - 107 mmol/L    CO2 20 20 - 28 mmol/L    Anion Gap 15 9 - 18 mmol/L    Glucose 121 (H) 70 - 99 mg/dL    BUN 65 (H) 8 - 23 MG/DL    Creatinine 11.50 (H) 0.60 - 1.10 MG/DL    Est, Glom Filt Rate 3 (L) >60 ml/min/1.73m2    Calcium 7.9 (L) 8.8 - 10.2 MG/DL       No results for input(s): \"COVID19\" in the last 72 hours.    Current Meds:  Current Facility-Administered Medications   Medication Dose Route Frequency    metroNIDAZOLE (FLAGYL) tablet 500 mg  500 mg Oral 3 times per day    cefdinir (OMNICEF) capsule 300 mg  300 mg Oral Daily    oxyCODONE (ROXICODONE) immediate release tablet 5 mg  5 mg Oral Q4H PRN    oxyCODONE (ROXICODONE) immediate release tablet 10 mg  10 mg Oral Q4H PRN    epoetin sean-epbx (RETACRIT) injection 10,000 Units  10,000 Units SubCUTAneous Weekly    guaiFENesin-dextromethorphan (ROBITUSSIN DM) 100-10 MG/5ML syrup 5 mL  5 mL Oral Q4H PRN    tuberculin injection 5 Units  5 Units IntraDERmal Once    ipratropium 0.5 mg-albuterol 2.5 mg (DUONEB) nebulizer solution 1 Dose  1 Dose Inhalation Q4H PRN    glucose chewable tablet 16 g  4 tablet Oral PRN    dextrose bolus 10% 125 mL  125 mL IntraVENous PRN    Or    dextrose bolus 10% 250 mL  250 mL IntraVENous PRN    Glucagon Emergency KIT 1 mg  1 mg SubCUTAneous PRN    dextrose 10 % infusion   IntraVENous Continuous PRN    sodium chloride flush 0.9 % injection 5-40 mL  5-40 mL IntraVENous 2 times per day    sodium chloride flush 0.9 % injection 5-40 mL  5-40 mL IntraVENous PRN    0.9 % sodium chloride infusion   IntraVENous PRN    ondansetron (ZOFRAN-ODT) disintegrating tablet 4 mg  4 mg Oral Q8H PRN    Or    ondansetron (ZOFRAN) injection 4 mg  4 mg IntraVENous Q6H PRN    polyethylene glycol (GLYCOLAX) packet 17 g  17 g Oral Daily PRN    heparin  (porcine) injection 5,000 Units  5,000 Units SubCUTAneous 3 times per day    acetaminophen (TYLENOL) tablet 650 mg  650 mg Oral Q6H PRN    Or    acetaminophen (TYLENOL) suppository 650 mg  650 mg Rectal Q6H PRN    carvedilol (COREG) tablet 25 mg  25 mg Oral BID WC    pantoprazole (PROTONIX) tablet 40 mg  40 mg Oral Daily    hydrOXYzine HCl (ATARAX) tablet 10 mg  10 mg Oral Q6H PRN    albuterol (PROVENTIL) (2.5 MG/3ML) 0.083% nebulizer solution 2.5 mg  2.5 mg Nebulization Q6H PRN       Signed:  Chris Martinez MD    Part of this note may have been written by using a voice dictation software.  The note has been proof read but may still contain some grammatical/other typographical errors.

## 2024-08-02 ENCOUNTER — TELEPHONE (OUTPATIENT)
Dept: INTERNAL MEDICINE CLINIC | Facility: CLINIC | Age: 74
End: 2024-08-02

## 2024-08-02 LAB
ANION GAP SERPL CALC-SCNC: 13 MMOL/L (ref 9–18)
BACTERIA SPEC CULT: NORMAL
BUN SERPL-MCNC: 42 MG/DL (ref 8–23)
CALCIUM SERPL-MCNC: 8 MG/DL (ref 8.8–10.2)
CHLORIDE SERPL-SCNC: 101 MMOL/L (ref 98–107)
CO2 SERPL-SCNC: 23 MMOL/L (ref 20–28)
CREAT SERPL-MCNC: 8.13 MG/DL (ref 0.6–1.1)
ERYTHROCYTE [DISTWIDTH] IN BLOOD BY AUTOMATED COUNT: 14.2 % (ref 11.9–14.6)
GLUCOSE SERPL-MCNC: 96 MG/DL (ref 70–99)
HCT VFR BLD AUTO: 34.5 % (ref 35.8–46.3)
HGB BLD-MCNC: 10.2 G/DL (ref 11.7–15.4)
MCH RBC QN AUTO: 30.8 PG (ref 26.1–32.9)
MCHC RBC AUTO-ENTMCNC: 29.6 G/DL (ref 31.4–35)
MCV RBC AUTO: 104.2 FL (ref 82–102)
NRBC # BLD: 0.23 K/UL (ref 0–0.2)
PLATELET # BLD AUTO: 229 K/UL (ref 150–450)
PMV BLD AUTO: 9.5 FL (ref 9.4–12.3)
POTASSIUM SERPL-SCNC: 4.9 MMOL/L (ref 3.5–5.1)
RBC # BLD AUTO: 3.31 M/UL (ref 4.05–5.2)
SERVICE CMNT-IMP: NORMAL
SODIUM SERPL-SCNC: 137 MMOL/L (ref 136–145)
WBC # BLD AUTO: 10.4 K/UL (ref 4.3–11.1)

## 2024-08-02 PROCEDURE — 36415 COLL VENOUS BLD VENIPUNCTURE: CPT

## 2024-08-02 PROCEDURE — 1100000000 HC RM PRIVATE

## 2024-08-02 PROCEDURE — 2580000003 HC RX 258: Performed by: HOSPITALIST

## 2024-08-02 PROCEDURE — 6370000000 HC RX 637 (ALT 250 FOR IP): Performed by: HOSPITALIST

## 2024-08-02 PROCEDURE — 80048 BASIC METABOLIC PNL TOTAL CA: CPT

## 2024-08-02 PROCEDURE — 6360000002 HC RX W HCPCS: Performed by: HOSPITALIST

## 2024-08-02 PROCEDURE — 85027 COMPLETE CBC AUTOMATED: CPT

## 2024-08-02 PROCEDURE — 6370000000 HC RX 637 (ALT 250 FOR IP): Performed by: INTERNAL MEDICINE

## 2024-08-02 RX ADMIN — SODIUM CHLORIDE, PRESERVATIVE FREE 10 ML: 5 INJECTION INTRAVENOUS at 07:54

## 2024-08-02 RX ADMIN — METRONIDAZOLE 500 MG: 500 TABLET ORAL at 14:24

## 2024-08-02 RX ADMIN — METRONIDAZOLE 500 MG: 500 TABLET ORAL at 22:04

## 2024-08-02 RX ADMIN — HEPARIN SODIUM 5000 UNITS: 5000 INJECTION INTRAVENOUS; SUBCUTANEOUS at 14:55

## 2024-08-02 RX ADMIN — HYDROXYZINE HYDROCHLORIDE 10 MG: 10 TABLET ORAL at 18:01

## 2024-08-02 RX ADMIN — SODIUM CHLORIDE, PRESERVATIVE FREE 10 ML: 5 INJECTION INTRAVENOUS at 22:04

## 2024-08-02 RX ADMIN — HEPARIN SODIUM 5000 UNITS: 5000 INJECTION INTRAVENOUS; SUBCUTANEOUS at 22:04

## 2024-08-02 RX ADMIN — HEPARIN SODIUM 5000 UNITS: 5000 INJECTION INTRAVENOUS; SUBCUTANEOUS at 05:59

## 2024-08-02 RX ADMIN — HYDROXYZINE HYDROCHLORIDE 10 MG: 10 TABLET ORAL at 06:01

## 2024-08-02 RX ADMIN — METRONIDAZOLE 500 MG: 500 TABLET ORAL at 05:59

## 2024-08-02 RX ADMIN — CARVEDILOL 25 MG: 25 TABLET, FILM COATED ORAL at 17:56

## 2024-08-02 RX ADMIN — PANTOPRAZOLE SODIUM 40 MG: 40 TABLET, DELAYED RELEASE ORAL at 05:59

## 2024-08-02 RX ADMIN — CEFDINIR 300 MG: 300 CAPSULE ORAL at 08:33

## 2024-08-02 RX ADMIN — CARVEDILOL 25 MG: 25 TABLET, FILM COATED ORAL at 07:53

## 2024-08-02 ASSESSMENT — PAIN SCALES - GENERAL
PAINLEVEL_OUTOF10: 0

## 2024-08-02 NOTE — PROGRESS NOTES
Hospitalist Progress Note   Admit Date:  2024  9:59 PM   Name:  Wilda Camara   Age:  73 y.o.  Sex:  female  :  1950   MRN:  [id number]   Room:  [facility]    Presenting/Chief Complaint: Shortness of Breath and Generalized Body Aches     Reason(s) for Admission: Hyperkalemia [E87.5]     Hospital Course:   Wilda Camara is a 73 y.o. female with medical history of ESRD on peritoneal dialysis, Hepatitis C, Cirrhosis who presented with SOB after revoking her home hospice. Patient was on hospice almost 8 weeks, did not get any dialysis .      Of note, she was admitted at [facility] -7/3 for overdose with Ativan and Oxycontin, At that time  reported pt was attempting suicide by taking those medications. She was seen by psychiatry and was discharged back to home with home hospice w/o any narcotics. Psychiatry recommended low dose Ritalin at that time for pat with end-of-life depression.      In ED, K6.7, bicarb 14. Trop 146. BNP 31396. CXR shows no acute findings.      She was admitted d/t hyperkalemia and SOB most likely d/t volume overload in setting of respiratory compensation with severe metabolic acidosis. Nephrology was consulted with plan to resume dialysis per pt request; hospice revoked. Pt was started on HD with her LUE AVF, however AVF infiltrated and pt had her TCC placed with IR on  to allow AVF to rest and mature.  General surgery was consulted to evaluate for PD catheter and recommended to remove PD catheter and she will need to stay on HD as it would take at least 6 weeks for abdomen to heal.  PD catheter removal on .   Surgery signed off     Urology was consulted for routine SPT exchange and done on      abx for peritonitis related to infected PD catheter.   Now using R HD catheter.      Subjective & 24hr Events:   Pt resting comfortably.  No complaints presently.  Feels ready to leave but needs HD tomorrow      Assessment & Plan:     Acute bacterial  peritonitis due to infected PD catheter  -omnicef and flagyl  -light staph in wound swab but no sensitivities yet.  -surgery signed off; rec 2 week follow up.    Hyperkalemia, resolved  -low K diet and dialysis    ESRD on HD  -has outpatient TTS slot.  Can discharge after dialysis Saturday.    Dyspnea, improved  Volume overload, improved  Managed with dialysis    Elevated Troponin  resolved    Anemia of chronic disease  -hb overall stable in 9-10 range    HTN  -Cont home Coreg    Suprapubic catheter in place, chronic  -exchanged 7/31 by urology    Anticipated Discharge Arrangements:   Anticipate d/c in 1-2 days once finalizing plans from Nephrology. PT/OT to eval as well.     PT/OT evals ordered?  Therapy evals ordered  Diet:  ADULT DIET; Regular; Low Fat/Low Chol/High Fiber/2 gm Na; Low Potassium (Less than 3000 mg/day)  VTE prophylaxis: Heparin  Code status: DNR      Non-peripheral Lines and Tubes (if present):      Suprapubic Catheter Non-latex (Active)     Tunneled Hemodialysis Catheter Right Subclavian (Active)         Telemetry (if present):  Cardiac/Telemetry Monitor On: No        Hospital Problems:  Principal Problem:    Acute bacterial peritonitis (HCC)  Active Problems:    PVD (peripheral vascular disease) with claudication (HCC)    Anemia    Lower obstructive uropathy    Hypertension    Gastroesophageal reflux disease without esophagitis    Suprapubic catheter (HCC)    History of hepatitis C    ESRD (end stage renal disease) (HCC)    DNR (do not resuscitate)  Resolved Problems:    Metabolic acidosis    Hyperkalemia    Infection due to peritoneal dialysis catheter (HCC)    Demand ischemia (HCC)      Objective:   Patient Vitals for the past 24 hrs:   Temp Pulse Resp BP SpO2   08/02/24 0727 98.1 °F (36.7 °C) 85 18 111/67 100 %   08/02/24 0322 98.4 °F (36.9 °C) 85 16 (!) 126/90 96 %   08/01/24 2304 98.2 °F (36.8 °C) 87 16 109/71 96 %   08/01/24 1930 97.9 °F (36.6 °C) 75 19 (!) 87/74 98 %   08/01/24 1520 98.6

## 2024-08-02 NOTE — TELEPHONE ENCOUNTER
Once the pt is admitted they need for dr carroll to sign some home health orders. Thank you   ivory

## 2024-08-02 NOTE — CARE COORDINATION
CM received notification that [facility] is currently not accepting any new referrals.  CM provided pt with a list of  agencies to choose another.  Pt chose [facility].  Referral submitted for SN and PT.  Pt to d/c home tomorrow s/p HD.  LOS = 4 days

## 2024-08-02 NOTE — PROGRESS NOTES
[facility] Progress Note    Follow-Up on: ESRD    ROS:  Gen - no fever, no chills, appetite unchanged  CV - no chest pain, no palpitation  Lung - no shortness of breath, no cough  Abd - no tenderness, no nausea/vomiting, no diarrhea  Ext - no edema    Exam:  Vitals:    08/01/24 2304 08/02/24 0322 08/02/24 0727 08/02/24 1145   BP: 109/71 (!) 126/90 111/67 99/81   Pulse: 87 85 85 82   Resp: 16 16 18 18   Temp: 98.2 °F (36.8 °C) 98.4 °F (36.9 °C) 98.1 °F (36.7 °C) 98.1 °F (36.7 °C)   TempSrc: Oral Oral Oral Oral   SpO2: 96% 96% 100% 99%   Weight:       Height:             Intake/Output Summary (Last 24 hours) at 8/2/2024 1208  Last data filed at 8/2/2024 0830  Gross per 24 hour   Intake 600 ml   Output 302 ml   Net 298 ml       Wt Readings from Last 3 Encounters:   08/01/24 77.7 kg (171 lb 4.8 oz)   07/03/24 66.2 kg (146 lb)   08/11/23 64.2 kg (141 lb 8 oz)       GEN - in no distress  CV - regular, no murmur, no rub  Lung - clear bilaterally  Abd - soft, nontender  Ext - no edema    Recent Labs     07/31/24  0504 08/01/24  0426 08/02/24  0445   WBC 7.8 8.9 10.4   HGB 10.4* 9.5* 10.2*   HCT 33.4* 31.8* 34.5*    210 229        Recent Labs     07/31/24  0504 08/01/24  0426 08/02/24  0445    136 137   K 5.2* 5.0 4.9    102 101   CO2 20 20 23   BUN 59* 65* 42*   CREATININE 11.20* 11.50* 8.13*   CALCIUM 8.4* 7.9* 8.0*       Assessment / Plan:  Principal Problem:    Acute bacterial peritonitis (HCC)  Active Problems:    PVD (peripheral vascular disease) with claudication (HCC)    Anemia    Lower obstructive uropathy    Hypertension    Gastroesophageal reflux disease without esophagitis    Suprapubic catheter (HCC)    History of hepatitis C    ESRD (end stage renal disease) (HCC)    DNR (do not resuscitate)  Resolved Problems:    Metabolic acidosis    Hyperkalemia    Infection due to peritoneal dialysis catheter (HCC)    Demand ischemia (HCC)    1) ESRD  - Pt recently declined and d/c'd hospice

## 2024-08-02 NOTE — PROGRESS NOTES
Changed patient's abdominal dressing per patient request d/t leaking.    Incisions are clean, dry, and intact.    Gauze with tegaderm placed on top of incisions.    Patient tolerated well. No complaints of pain at this time.

## 2024-08-02 NOTE — PROGRESS NOTES
Physical Therapy Note:    Attempted to see patient for physical therapy treatment session. At time of attempt patient kindly requested to defer secondary to fatigue and wanting to rest this AM. PT followed up in PM, pt having conversation with CM, declined therapy for now. Will follow and re-attempt as schedule permits/patient available.    Thank you,  Elisabeth Lopez, PT, DPT

## 2024-08-02 NOTE — TELEPHONE ENCOUNTER
Pt is being discharged, has home health orders, needs baldea to confirm that she will sign off on the plan of care.

## 2024-08-03 ENCOUNTER — APPOINTMENT (OUTPATIENT)
Dept: GENERAL RADIOLOGY | Age: 74
End: 2024-08-03
Payer: MEDICARE

## 2024-08-03 VITALS
HEART RATE: 92 BPM | DIASTOLIC BLOOD PRESSURE: 81 MMHG | RESPIRATION RATE: 17 BRPM | SYSTOLIC BLOOD PRESSURE: 118 MMHG | OXYGEN SATURATION: 99 % | BODY MASS INDEX: 24.08 KG/M2 | HEIGHT: 70 IN | WEIGHT: 168.21 LBS | TEMPERATURE: 97.3 F

## 2024-08-03 PROCEDURE — 6370000000 HC RX 637 (ALT 250 FOR IP): Performed by: INTERNAL MEDICINE

## 2024-08-03 PROCEDURE — 90935 HEMODIALYSIS ONE EVALUATION: CPT

## 2024-08-03 PROCEDURE — 6360000002 HC RX W HCPCS: Performed by: HOSPITALIST

## 2024-08-03 PROCEDURE — 6370000000 HC RX 637 (ALT 250 FOR IP): Performed by: HOSPITALIST

## 2024-08-03 PROCEDURE — 2580000003 HC RX 258: Performed by: HOSPITALIST

## 2024-08-03 RX ADMIN — METRONIDAZOLE 500 MG: 500 TABLET ORAL at 06:10

## 2024-08-03 RX ADMIN — SODIUM CHLORIDE, PRESERVATIVE FREE 10 ML: 5 INJECTION INTRAVENOUS at 11:06

## 2024-08-03 RX ADMIN — CEFDINIR 300 MG: 300 CAPSULE ORAL at 11:02

## 2024-08-03 RX ADMIN — HEPARIN SODIUM 5000 UNITS: 5000 INJECTION INTRAVENOUS; SUBCUTANEOUS at 06:10

## 2024-08-03 RX ADMIN — HYDROXYZINE HYDROCHLORIDE 10 MG: 10 TABLET ORAL at 11:05

## 2024-08-03 RX ADMIN — PANTOPRAZOLE SODIUM 40 MG: 40 TABLET, DELAYED RELEASE ORAL at 06:10

## 2024-08-03 RX ADMIN — HYDROXYZINE HYDROCHLORIDE 10 MG: 10 TABLET ORAL at 00:13

## 2024-08-03 RX ADMIN — CARVEDILOL 25 MG: 25 TABLET, FILM COATED ORAL at 11:02

## 2024-08-03 ASSESSMENT — PAIN SCALES - GENERAL
PAINLEVEL_OUTOF10: 0

## 2024-08-03 NOTE — CARE COORDINATION
CM reviewed / screen patient for discharge today.  CM reviewed medical chart / discharge summary: Disposition: Home  and CM weekend report: Has ins & PCP.  No current HH services or home DME.  PT/OT = HH. I sent a referral to [facility]: SN and PT - apparently @ capacity & cannot accept pt. Pt's [facility] choice is [facility] - referral sent on 8/2. Revoked hospice upon admission and does Not want to go back on hospice.  I have obtained a new OP HD slot for him at [facility] - it will be TTS at 1130.  Pt to d/c home on 8/3 after HD run.  Previous CM has completed a referral for [facility].  Family will transport patient home.  Patient has met all treatment goals / milestones.  CM will continue to follow and remain available for any needs, concerns or questions that may arise.           07/29/24 1452   Service Assessment   Patient Orientation Alert and Oriented;Place;Person;Self   Cognition Alert   History Provided By Patient;Medical Record   Primary Caregiver Self   Accompanied By/Relationship N/A   Support Systems Spouse/Significant Other;Children;Family Members;Hospice  (Pt was on OA services prior to admission.)   Patient's Healthcare Decision Maker is: Legal Next of Kin   PCP Verified by CM Yes  (Liliana Minor MD)   Last Visit to PCP Within last 6 months   Prior Functional Level Assistance with the following:;Housework;Cooking;Shopping;Mobility   Current Functional Level Assistance with the following:;Mobility;Shopping;Housework;Cooking   Can patient return to prior living arrangement Yes   Ability to make needs known: Good   Family able to assist with home care needs: Yes   Would you like for me to discuss the discharge plan with any other family members/significant others, and if so, who? No   Financial Resources Medicare  ([employer])   Community Resources None   CM/SW Referral Other (see comment)  (N/A - Pt does Not wish to resume hospice at d/c)   Social/Functional History   Lives With Spouse   Type of Home

## 2024-08-03 NOTE — DIALYSIS
TRANSFER IN - DIALYSIS    Received patient in dialysis unit  from [facility] (unit) for ordered procedure.    Consent verified for renal replacement therapy. Procedure explained to patient, opportunity for Q&A provided. Call light given.     Patient A&O and vital signs stable.  /84,  P87 .    Hemodialysis initiated using R CVC.  Aspirated and flushed both ports without difficulty. Dressing clean, dry and intact.  Machine settings per MD order.    Heparin 0 unit bolus and 0 units/hr.      Will monitor during treatment.

## 2024-08-03 NOTE — DIALYSIS
TRANSFER OUT- DIALYSIS    Hemodialysis treatment completed. PT ran 3 hours, without complications.    Patient A&O and VS stable  /79  P 60       2 Kg removed.      Flushed both ports with 10 mL of NS.  CVC dressing clean, dry, and intact, tego caps intact, curos caps placed.      Meds given: none.      Patient to 517 after dialysis.

## 2024-08-03 NOTE — PROGRESS NOTES
[facility] Progress Note    Follow-Up on: ESRD    ROS:  Gen - no fever, no chills, appetite unchanged  CV - no chest pain, no palpitation  Lung - no shortness of breath, no cough  Abd - no tenderness, no nausea/vomiting, no diarrhea  Ext - no edema    Exam:  Vitals:    08/03/24 0730 08/03/24 0800 08/03/24 0830 08/03/24 0900   BP: 128/80 125/87 (!) 131/96 115/69   Pulse: 85 84 93 95   Resp:       Temp:       TempSrc:       SpO2:       Weight:       Height:             Intake/Output Summary (Last 24 hours) at 8/3/2024 0923  Last data filed at 8/3/2024 0608  Gross per 24 hour   Intake 860 ml   Output 550 ml   Net 310 ml         Wt Readings from Last 3 Encounters:   08/02/24 76.3 kg (168 lb 3.4 oz)   07/03/24 66.2 kg (146 lb)   08/11/23 64.2 kg (141 lb 8 oz)       GEN - in no distress  CV - regular, no murmur, no rub  Lung - clear bilaterally  Abd - soft, nontender  Ext - no edema    Recent Labs     08/01/24  0426 08/02/24  0445   WBC 8.9 10.4   HGB 9.5* 10.2*   HCT 31.8* 34.5*    229          Recent Labs     08/01/24  0426 08/02/24  0445    137   K 5.0 4.9    101   CO2 20 23   BUN 65* 42*   CREATININE 11.50* 8.13*   CALCIUM 7.9* 8.0*         Assessment / Plan:  Principal Problem:    Acute bacterial peritonitis (HCC)  Active Problems:    PVD (peripheral vascular disease) with claudication (HCC)    Anemia    Lower obstructive uropathy    Hypertension    Gastroesophageal reflux disease without esophagitis    Suprapubic catheter (HCC)    History of hepatitis C    ESRD (end stage renal disease) (HCC)    DNR (do not resuscitate)  Resolved Problems:    Metabolic acidosis    Hyperkalemia    Infection due to peritoneal dialysis catheter (HCC)    Demand ischemia (HCC)    1) ESRD  - Pt recently declined and d/c'd hospice status, then resumed dialysis  - HD for clearance and UF via TCC  - AVF infiltrated earlier this week  - PD catheter removed     2) Hyperkalemia - better     3) Acute metabolic

## 2024-08-03 NOTE — DISCHARGE SUMMARY
Hospitalist Discharge Summary   Admit Date:  2024  9:59 PM   DC Note date: 8/3/2024  Name:  Wilda Camara   Age:  73 y.o.  Sex:  female  :  1950   MRN:  [id number]   Room:  [facility]  PCP:  Liliana Minor MD    Presenting Complaint: Shortness of Breath and Generalized Body Aches     Initial Admission Diagnosis: Hyperkalemia [E87.5]     Problem List for this Hospitalization (present on admission):    Principal Problem:    Acute bacterial peritonitis (HCC)  Active Problems:    PVD (peripheral vascular disease) with claudication (HCC)    Anemia    Lower obstructive uropathy    Hypertension    Gastroesophageal reflux disease without esophagitis    Suprapubic catheter (HCC)    History of hepatitis C    ESRD (end stage renal disease) (HCC)    DNR (do not resuscitate)  Resolved Problems:    Metabolic acidosis    Hyperkalemia    Infection due to peritoneal dialysis catheter (HCC)    Demand ischemia (HCC)      Hospital Course:  Wilda Camara is a 73 y.o. female with medical history of ESRD on peritoneal dialysis, Hepatitis C, Cirrhosis who presented with SOB after revoking her home hospice. Patient was on hospice almost 8 weeks, did not get any dialysis .   Of note, she was admitted at [facility] -7/3 for overdose with Ativan and Oxycontin, At that time  reported pt was attempting suicide by taking those medications. She was seen by psychiatry and was discharged back to home with home hospice w/o any narcotics. Psychiatry recommended low dose Ritalin at that time for pat with end-of-life depression.   In ED, K6.7, bicarb 14. Trop 146. BNP 55164. CXR shows no acute findings.      She was admitted d/t hyperkalemia and SOB most likely d/t volume overload in setting of respiratory compensation with severe metabolic acidosis. Nephrology was consulted with plan to resume dialysis per pt request; hospice revoked. Pt was started on HD with her LUE AVF, however AVF infiltrated and pt had her TCC

## 2024-08-04 LAB
BACTERIA SPEC CULT: ABNORMAL
BACTERIA SPEC CULT: ABNORMAL
GRAM STN SPEC: ABNORMAL
GRAM STN SPEC: ABNORMAL
SERVICE CMNT-IMP: ABNORMAL

## 2024-08-05 ENCOUNTER — HOSPITAL ENCOUNTER (EMERGENCY)
Age: 74
Discharge: HOME | End: 2024-08-05
Attending: EMERGENCY MEDICINE
Payer: MEDICARE

## 2024-08-05 ENCOUNTER — APPOINTMENT (OUTPATIENT)
Dept: CT IMAGING | Age: 74
End: 2024-08-05
Payer: MEDICARE

## 2024-08-05 ENCOUNTER — TELEPHONE (OUTPATIENT)
Dept: VASCULAR SURGERY | Age: 74
End: 2024-08-05

## 2024-08-05 ENCOUNTER — APPOINTMENT (OUTPATIENT)
Dept: GENERAL RADIOLOGY | Age: 74
End: 2024-08-05
Payer: MEDICARE

## 2024-08-05 VITALS
SYSTOLIC BLOOD PRESSURE: 150 MMHG | OXYGEN SATURATION: 91 % | RESPIRATION RATE: 15 BRPM | TEMPERATURE: 98.2 F | DIASTOLIC BLOOD PRESSURE: 98 MMHG | HEART RATE: 85 BPM

## 2024-08-05 DIAGNOSIS — S09.90XA CLOSED HEAD INJURY, INITIAL ENCOUNTER: ICD-10-CM

## 2024-08-05 DIAGNOSIS — S16.1XXA CERVICAL STRAIN, ACUTE, INITIAL ENCOUNTER: ICD-10-CM

## 2024-08-05 DIAGNOSIS — N39.0 URINARY TRACT INFECTION WITH HEMATURIA, SITE UNSPECIFIED: ICD-10-CM

## 2024-08-05 DIAGNOSIS — N18.6 ESRD (END STAGE RENAL DISEASE) (HCC): ICD-10-CM

## 2024-08-05 DIAGNOSIS — R31.9 URINARY TRACT INFECTION WITH HEMATURIA, SITE UNSPECIFIED: ICD-10-CM
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
## 2024-08-05 LAB
ALBUMIN SERPL-MCNC: 2.8 G/DL (ref 3.2–4.6)
ALBUMIN/GLOB SERPL: 0.9 (ref 1–1.9)
ALP SERPL-CCNC: 82 U/L (ref 35–104)
ALT SERPL-CCNC: 19 U/L (ref 12–65)
ANION GAP SERPL CALC-SCNC: 15 MMOL/L (ref 9–18)
APPEARANCE UR: CLEAR
AST SERPL-CCNC: 57 U/L (ref 15–37)
BACTERIA SPEC CULT: NORMAL
BACTERIA URNS QL MICRO: ABNORMAL /HPF
BASOPHILS # BLD: 0.1 K/UL (ref 0–0.2)
BASOPHILS NFR BLD: 1 % (ref 0–2)
BILIRUB SERPL-MCNC: 0.3 MG/DL (ref 0–1.2)
BILIRUB UR QL: NEGATIVE
BUN SERPL-MCNC: 40 MG/DL (ref 8–23)
CALCIUM SERPL-MCNC: 8.5 MG/DL (ref 8.8–10.2)
CASTS URNS QL MICRO: 0 /LPF
CHLORIDE SERPL-SCNC: 100 MMOL/L (ref 98–107)
CK SERPL-CCNC: 362 U/L (ref 21–215)
CO2 SERPL-SCNC: 26 MMOL/L (ref 20–28)
COLOR UR: YELLOW
CREAT SERPL-MCNC: 7.9 MG/DL (ref 0.6–1.1)
CRYSTALS URNS QL MICRO: 0 /LPF
DIFFERENTIAL METHOD BLD: ABNORMAL
EKG ATRIAL RATE: 97 BPM
EKG DIAGNOSIS: NORMAL
EKG P AXIS: 81 DEGREES
EKG P-R INTERVAL: 220 MS
EKG Q-T INTERVAL: 348 MS
EKG QRS DURATION: 102 MS
EKG QTC CALCULATION (BAZETT): 441 MS
EKG R AXIS: 36 DEGREES
EKG T AXIS: 90 DEGREES
EKG VENTRICULAR RATE: 97 BPM
EOSINOPHIL # BLD: 0.4 K/UL (ref 0–0.8)
EOSINOPHIL NFR BLD: 4 % (ref 0.5–7.8)
EPI CELLS #/AREA URNS HPF: ABNORMAL /HPF
ERYTHROCYTE [DISTWIDTH] IN BLOOD BY AUTOMATED COUNT: 14.7 % (ref 11.9–14.6)
GLOBULIN SER CALC-MCNC: 3.1 G/DL (ref 2.3–3.5)
GLUCOSE SERPL-MCNC: 88 MG/DL (ref 70–99)
GLUCOSE UR STRIP.AUTO-MCNC: 100 MG/DL
HCT VFR BLD AUTO: 33 % (ref 35.8–46.3)
HGB BLD-MCNC: 9.9 G/DL (ref 11.7–15.4)
HGB UR QL STRIP: ABNORMAL
IMM GRANULOCYTES # BLD AUTO: 0.3 K/UL (ref 0–0.5)
IMM GRANULOCYTES NFR BLD AUTO: 3 % (ref 0–5)
KETONES UR QL STRIP.AUTO: NEGATIVE MG/DL
LACTATE SERPL-SCNC: 1.4 MMOL/L (ref 0.5–2)
LEUKOCYTE ESTERASE UR QL STRIP.AUTO: ABNORMAL
LYMPHOCYTES # BLD: 1.5 K/UL (ref 0.5–4.6)
LYMPHOCYTES NFR BLD: 16 % (ref 13–44)
MCH RBC QN AUTO: 30.2 PG (ref 26.1–32.9)
MCHC RBC AUTO-ENTMCNC: 30 G/DL (ref 31.4–35)
MCV RBC AUTO: 100.6 FL (ref 82–102)
MONOCYTES # BLD: 1.2 K/UL (ref 0.1–1.3)
MONOCYTES NFR BLD: 12 % (ref 4–12)
MUCOUS THREADS URNS QL MICRO: 0 /LPF
NEUTS SEG # BLD: 6.3 K/UL (ref 1.7–8.2)
NEUTS SEG NFR BLD: 65 % (ref 43–78)
NITRITE UR QL STRIP.AUTO: NEGATIVE
NRBC # BLD: 0.02 K/UL (ref 0–0.2)
NT PRO BNP: 6813 PG/ML (ref 0–125)
OTHER OBSERVATIONS: ABNORMAL
PH UR STRIP: 8.5 (ref 5–9)
PLATELET # BLD AUTO: 197 K/UL (ref 150–450)
PMV BLD AUTO: 9.6 FL (ref 9.4–12.3)
POTASSIUM SERPL-SCNC: 4.3 MMOL/L (ref 3.5–5.1)
PROCALCITONIN SERPL-MCNC: 0.42 NG/ML (ref 0–0.1)
PROT SERPL-MCNC: 5.8 G/DL (ref 6.3–8.2)
PROT UR STRIP-MCNC: 100 MG/DL
RBC # BLD AUTO: 3.28 M/UL (ref 4.05–5.2)
RBC #/AREA URNS HPF: ABNORMAL /HPF
SERVICE CMNT-IMP: NORMAL
SODIUM SERPL-SCNC: 140 MMOL/L (ref 136–145)
SP GR UR REFRACTOMETRY: 1.01 (ref 1–1.02)
TROPONIN T SERPL HS-MCNC: 153 NG/L (ref 0–14)
TROPONIN T SERPL HS-MCNC: 174 NG/L (ref 0–14)
UROBILINOGEN UR QL STRIP.AUTO: 0.2 EU/DL (ref 0.2–1)
WBC # BLD AUTO: 9.7 K/UL (ref 4.3–11.1)
WBC URNS QL MICRO: ABNORMAL /HPF

## 2024-08-05 PROCEDURE — 93010 ELECTROCARDIOGRAM REPORT: CPT | Performed by: INTERNAL MEDICINE

## 2024-08-05 PROCEDURE — 87086 URINE CULTURE/COLONY COUNT: CPT

## 2024-08-05 PROCEDURE — 70450 CT HEAD/BRAIN W/O DYE: CPT

## 2024-08-05 PROCEDURE — 71045 X-RAY EXAM CHEST 1 VIEW: CPT

## 2024-08-05 PROCEDURE — 2580000003 HC RX 258: Performed by: PHYSICIAN ASSISTANT

## 2024-08-05 PROCEDURE — 82550 ASSAY OF CK (CPK): CPT

## 2024-08-05 PROCEDURE — 85025 COMPLETE CBC W/AUTO DIFF WBC: CPT

## 2024-08-05 PROCEDURE — 84484 ASSAY OF TROPONIN QUANT: CPT

## 2024-08-05 PROCEDURE — 81001 URINALYSIS AUTO W/SCOPE: CPT

## 2024-08-05 PROCEDURE — 87040 BLOOD CULTURE FOR BACTERIA: CPT

## 2024-08-05 PROCEDURE — 84145 PROCALCITONIN (PCT): CPT

## 2024-08-05 PROCEDURE — 99285 EMERGENCY DEPT VISIT HI MDM: CPT

## 2024-08-05 PROCEDURE — 80053 COMPREHEN METABOLIC PANEL: CPT

## 2024-08-05 PROCEDURE — 93005 ELECTROCARDIOGRAM TRACING: CPT | Performed by: PHYSICIAN ASSISTANT

## 2024-08-05 PROCEDURE — 83880 ASSAY OF NATRIURETIC PEPTIDE: CPT

## 2024-08-05 PROCEDURE — 83605 ASSAY OF LACTIC ACID: CPT

## 2024-08-05 PROCEDURE — 72125 CT NECK SPINE W/O DYE: CPT

## 2024-08-05 RX ORDER — SODIUM CHLORIDE, SODIUM LACTATE, POTASSIUM CHLORIDE, AND CALCIUM CHLORIDE .6; .31; .03; .02 G/100ML; G/100ML; G/100ML; G/100ML
1000 INJECTION, SOLUTION INTRAVENOUS ONCE
Status: COMPLETED | OUTPATIENT
Start: 2024-08-05 | End: 2024-08-05

## 2024-08-05 RX ORDER — METHOCARBAMOL 500 MG/1
500 TABLET, FILM COATED ORAL 3 TIMES DAILY PRN
Qty: 30 TABLET | Refills: 0 | Status: SHIPPED | OUTPATIENT
Start: 2024-08-05 | End: 2024-08-15

## 2024-08-05 RX ADMIN — SODIUM CHLORIDE, POTASSIUM CHLORIDE, SODIUM LACTATE AND CALCIUM CHLORIDE 1000 ML: 600; 310; 30; 20 INJECTION, SOLUTION INTRAVENOUS at 14:51

## 2024-08-05 ASSESSMENT — PAIN - FUNCTIONAL ASSESSMENT: PAIN_FUNCTIONAL_ASSESSMENT: NONE - DENIES PAIN

## 2024-08-05 NOTE — DISCHARGE INSTRUCTIONS
Patient you are taking your antibiotics twice daily as directed that you have at home, this is the Omnicef.  I did write a muscle relaxer at your request for muscle spasm from the fall.  Make sure you are moving and stretching multiple times a day.  Use warm moist heat to the area is much as you can.  Use ice to the back of your head.  If anything changes to include severe headaches, nausea, vomiting or worsening symptoms, please return to the ED immediately.  Keep your appointment for dialysis for tomorrow.

## 2024-08-05 NOTE — TELEPHONE ENCOUNTER
I called the patient to let her know that I scheduled her for an ultrasound and appointment with Dr. Jonhson, per Standard, NP. I asked that she call back if the appointment time tomorrow does not work.

## 2024-08-05 NOTE — ED PROVIDER NOTES
Emergency Department Provider Note       PCP: Liliana Minor MD   Age: 73 y.o.   Sex: female     DISPOSITION Decision To Discharge 08/05/2024 05:16:04 PM       ICD-10-CM    1. Fall, initial encounter  W19.XXXA       2. Closed head injury, initial encounter  S09.90XA       3. Cervical strain, acute, initial encounter  S16.1XXA       4. Urinary tract infection with hematuria, site unspecified  N39.0     R31.9       5. ESRD (end stage renal disease) (HCC)  N18.6 Vascular duplex hemodialysis access left     Vascular duplex hemodialysis access left          Medical Decision Making     Patient's discharge summary on 08/03/24 by Dr. Martinez:   Wilda Camara is a 73 y.o. female with medical history of ESRD on peritoneal dialysis, Hepatitis C, Cirrhosis who presented with SOB after revoking her home hospice. Patient was on hospice almost 8 weeks, did not get any dialysis .   Of note, she was admitted at [facility] 6/29-7/3 for overdose with Ativan and Oxycontin, At that time  reported pt was attempting suicide by taking those medications. She was seen by psychiatry and was discharged back to home with home hospice w/o any narcotics. Psychiatry recommended low dose Ritalin at that time for pat with end-of-life depression.   In ED, K6.7, bicarb 14. Trop 146. BNP 02201. CXR shows no acute findings.      She was admitted d/t hyperkalemia and SOB most likely d/t volume overload in setting of respiratory compensation with severe metabolic acidosis. Nephrology was consulted with plan to resume dialysis per pt request; hospice revoked. Pt was started on HD with her LUE AVF, however AVF infiltrated and pt had her TCC placed with IR on 7/30 to allow AVF to rest and mature.  General surgery was consulted to evaluate for PD catheter and recommended to remove PD catheter and she will need to stay on HD as it would take at least 6 weeks for abdomen to heal.  PD catheter removal on 7/31.   Surgery signed off     Urology was

## 2024-08-05 NOTE — ED TRIAGE NOTES
Pt arrives to ER, reports having a fall around 10p last night, patient was standing in the kitchen, patient lost balance and fell backwards. Reports hitting head off the kitchen floor (hardwood). Patient with posterior lump on scalp and headache.   Denies blood thinners or LOC.   Patient reports recent N/V. And smelling different odors after the fall.     Hx ESRD and was recently admitted to [facility] r/t kidney failure and LUE AVF malfunction. Patient also has a right HD port.

## 2024-08-06 ENCOUNTER — CLINICAL DOCUMENTATION (OUTPATIENT)
Dept: VASCULAR SURGERY | Age: 74
End: 2024-08-06

## 2024-08-06 ENCOUNTER — CARE COORDINATION (OUTPATIENT)
Dept: CARE COORDINATION | Facility: CLINIC | Age: 74
End: 2024-08-06

## 2024-08-06 NOTE — PROGRESS NOTES
Called patient to reschedule her missed US and Office visit today and she bluntly stated she did not want to come in and that Im coming in at the tail end of the case because the fistula doesn't work and she wants Dr. Johnson to remove it from her left arm\"for free\".  After stating that she hung up the phone.

## 2024-08-06 NOTE — TELEPHONE ENCOUNTER
MARCELLA of [facility], informing her Dr. Minor will be signing off on the plan of care for pt.

## 2024-08-06 NOTE — CARE COORDINATION
Ambulatory Care Management Outreach Attempt    This patient was received as a referral from  Daily assignment for case management of ed utilizer.    Attempted to reach patient for ED follow up. Pt has respectfully declined services at this time, my contact information has been shared with pt in the event there circumstances change. They are free to reach out at any time. ACM signing off.        Patient: Wilda Camara Patient : 1950 MRN: [id number]    Last Discharge Facility       Date Complaint Diagnosis Description Type Department Provider    24 Fall Fall, initial encounter ... ED (DISCHARGE) SFAna Cristina Marcus MD                Noted following upcoming appointments from discharge chart review:   [facility] follow up appointment(s):   Future Appointments   Date Time Provider Department Center   2024  3:30 PM [id number] INJECTION/CATH [id number] [facility]     Non-[facility] follow up appointment(s):

## 2024-08-07 ENCOUNTER — TELEPHONE (OUTPATIENT)
Dept: VASCULAR SURGERY | Age: 74
End: 2024-08-07

## 2024-08-07 LAB
BACTERIA SPEC CULT: NORMAL
BACTERIA SPEC CULT: NORMAL
SERVICE CMNT-IMP: NORMAL

## 2024-08-07 NOTE — TELEPHONE ENCOUNTER
Pt stating she wants her arm back, what ever is in it she wants out and dont think she needs to pay for the surgery.    -I placed her on hold and called Dr Johnson to find out what needed to be done, by the time I got back to her she had hung-up    ** per DTW she needs to have an ultra-sound done and see him or she has to live with what she has    -called her back and phone rang and rang them gave me a busy signal

## 2024-08-10 ENCOUNTER — TELEPHONE (OUTPATIENT)
Dept: INTERNAL MEDICINE CLINIC | Facility: CLINIC | Age: 74
End: 2024-08-10

## 2024-08-12 ENCOUNTER — APPOINTMENT (OUTPATIENT)
Dept: GENERAL RADIOLOGY | Age: 74
End: 2024-08-12
Payer: MEDICARE

## 2024-08-12 ENCOUNTER — TELEPHONE (OUTPATIENT)
Dept: INTERNAL MEDICINE CLINIC | Facility: CLINIC | Age: 74
End: 2024-08-12

## 2024-08-12 ENCOUNTER — TELEPHONE (OUTPATIENT)
Dept: PULMONOLOGY | Age: 74
End: 2024-08-12

## 2024-08-12 ENCOUNTER — HOSPITAL ENCOUNTER (EMERGENCY)
Age: 74
Discharge: HOME | End: 2024-08-12
Payer: MEDICARE

## 2024-08-12 ENCOUNTER — TELEPHONE (OUTPATIENT)
Dept: SURGERY | Age: 74
End: 2024-08-12

## 2024-08-12 VITALS
RESPIRATION RATE: 15 BRPM | DIASTOLIC BLOOD PRESSURE: 95 MMHG | BODY MASS INDEX: 22.19 KG/M2 | SYSTOLIC BLOOD PRESSURE: 128 MMHG | HEART RATE: 72 BPM | HEIGHT: 70 IN | TEMPERATURE: 97.7 F | OXYGEN SATURATION: 99 % | WEIGHT: 155 LBS

## 2024-08-12 DIAGNOSIS — R79.89 ELEVATED TROPONIN: ICD-10-CM

## 2024-08-12 DIAGNOSIS — J44.1 COPD EXACERBATION (HCC): Primary | ICD-10-CM

## 2024-08-12 LAB
ANION GAP SERPL CALC-SCNC: 14 MMOL/L (ref 9–18)
BASOPHILS # BLD: 0.1 K/UL (ref 0–0.2)
BASOPHILS NFR BLD: 1 % (ref 0–2)
BUN SERPL-MCNC: 23 MG/DL (ref 8–23)
CALCIUM SERPL-MCNC: 7.4 MG/DL (ref 8.8–10.2)
CHLORIDE SERPL-SCNC: 101 MMOL/L (ref 98–107)
CO2 SERPL-SCNC: 24 MMOL/L (ref 20–28)
CREAT SERPL-MCNC: 5.4 MG/DL (ref 0.6–1.1)
DIFFERENTIAL METHOD BLD: ABNORMAL
EOSINOPHIL # BLD: 0.3 K/UL (ref 0–0.8)
EOSINOPHIL NFR BLD: 4 % (ref 0.5–7.8)
ERYTHROCYTE [DISTWIDTH] IN BLOOD BY AUTOMATED COUNT: 15 % (ref 11.9–14.6)
GLUCOSE SERPL-MCNC: 102 MG/DL (ref 70–99)
HCT VFR BLD AUTO: 31.8 % (ref 35.8–46.3)
HGB BLD-MCNC: 9.3 G/DL (ref 11.7–15.4)
IMM GRANULOCYTES # BLD AUTO: 0.1 K/UL (ref 0–0.5)
IMM GRANULOCYTES NFR BLD AUTO: 1 % (ref 0–5)
LACTATE SERPL-SCNC: 1.1 MMOL/L (ref 0.5–2)
LIPASE SERPL-CCNC: 51 U/L (ref 13–60)
LYMPHOCYTES # BLD: 1.8 K/UL (ref 0.5–4.6)
LYMPHOCYTES NFR BLD: 27 % (ref 13–44)
MAGNESIUM SERPL-MCNC: 1.9 MG/DL (ref 1.8–2.4)
MCH RBC QN AUTO: 30.3 PG (ref 26.1–32.9)
MCHC RBC AUTO-ENTMCNC: 29.2 G/DL (ref 31.4–35)
MCV RBC AUTO: 103.6 FL (ref 82–102)
MONOCYTES # BLD: 0.7 K/UL (ref 0.1–1.3)
MONOCYTES NFR BLD: 10 % (ref 4–12)
NEUTS SEG # BLD: 3.8 K/UL (ref 1.7–8.2)
NEUTS SEG NFR BLD: 57 % (ref 43–78)
NRBC # BLD: 0 K/UL (ref 0–0.2)
PLATELET # BLD AUTO: 171 K/UL (ref 150–450)
PMV BLD AUTO: 9.6 FL (ref 9.4–12.3)
POTASSIUM SERPL-SCNC: 4.3 MMOL/L (ref 3.5–5.1)
PROCALCITONIN SERPL-MCNC: 0.28 NG/ML (ref 0–0.1)
RBC # BLD AUTO: 3.07 M/UL (ref 4.05–5.2)
SODIUM SERPL-SCNC: 139 MMOL/L (ref 136–145)
TROPONIN T SERPL HS-MCNC: 192 NG/L (ref 0–14)
TROPONIN T SERPL HS-MCNC: 199 NG/L (ref 0–14)
WBC # BLD AUTO: 6.8 K/UL (ref 4.3–11.1)

## 2024-08-12 PROCEDURE — 84145 PROCALCITONIN (PCT): CPT

## 2024-08-12 PROCEDURE — 85025 COMPLETE CBC W/AUTO DIFF WBC: CPT

## 2024-08-12 PROCEDURE — 83605 ASSAY OF LACTIC ACID: CPT

## 2024-08-12 PROCEDURE — 99285 EMERGENCY DEPT VISIT HI MDM: CPT

## 2024-08-12 PROCEDURE — 6360000002 HC RX W HCPCS

## 2024-08-12 PROCEDURE — 96374 THER/PROPH/DIAG INJ IV PUSH: CPT

## 2024-08-12 PROCEDURE — 94640 AIRWAY INHALATION TREATMENT: CPT

## 2024-08-12 PROCEDURE — 71046 X-RAY EXAM CHEST 2 VIEWS: CPT

## 2024-08-12 PROCEDURE — 80048 BASIC METABOLIC PNL TOTAL CA: CPT

## 2024-08-12 PROCEDURE — 87040 BLOOD CULTURE FOR BACTERIA: CPT

## 2024-08-12 PROCEDURE — 83735 ASSAY OF MAGNESIUM: CPT

## 2024-08-12 PROCEDURE — 84484 ASSAY OF TROPONIN QUANT: CPT

## 2024-08-12 PROCEDURE — 83690 ASSAY OF LIPASE: CPT

## 2024-08-12 PROCEDURE — 6370000000 HC RX 637 (ALT 250 FOR IP)

## 2024-08-12 RX ORDER — MORPHINE SULFATE 4 MG/ML
4 INJECTION, SOLUTION INTRAMUSCULAR; INTRAVENOUS
Status: DISCONTINUED | OUTPATIENT
Start: 2024-08-12 | End: 2024-08-12

## 2024-08-12 RX ORDER — ALBUTEROL SULFATE 90 UG/1
2 AEROSOL, METERED RESPIRATORY (INHALATION) 4 TIMES DAILY PRN
Qty: 18 G | Refills: 5 | Status: SHIPPED | OUTPATIENT
Start: 2024-08-12

## 2024-08-12 RX ORDER — PREDNISONE 50 MG/1
50 TABLET ORAL DAILY
Qty: 5 TABLET | Refills: 0 | Status: ON HOLD | OUTPATIENT
Start: 2024-08-12 | End: 2024-08-16 | Stop reason: HOSPADM

## 2024-08-12 RX ORDER — IPRATROPIUM BROMIDE AND ALBUTEROL SULFATE 2.5; .5 MG/3ML; MG/3ML
1 SOLUTION RESPIRATORY (INHALATION) ONCE
Status: COMPLETED | OUTPATIENT
Start: 2024-08-12 | End: 2024-08-12

## 2024-08-12 RX ADMIN — FENTANYL CITRATE 50 MCG: 50 INJECTION INTRAMUSCULAR; INTRAVENOUS at 02:45

## 2024-08-12 RX ADMIN — IPRATROPIUM BROMIDE AND ALBUTEROL SULFATE 1 DOSE: 2.5; .5 SOLUTION RESPIRATORY (INHALATION) at 02:39

## 2024-08-12 ASSESSMENT — ENCOUNTER SYMPTOMS
CHEST TIGHTNESS: 1
SHORTNESS OF BREATH: 1
NAUSEA: 0
ABDOMINAL PAIN: 0
DIARRHEA: 0
BACK PAIN: 0
VOMITING: 0

## 2024-08-12 NOTE — TELEPHONE ENCOUNTER
Pt is being released from [facility] today and needs a HFU    The earliest I see is Aug. 20th with Rosio Clayton, She was in hospital for Pulmonary Problems. Has been to ER 3 times.    I didn't see any appts available for Dr. Minor    Please advise

## 2024-08-12 NOTE — DISCHARGE INSTRUCTIONS
Start prednisone once daily for the next 5 days.  Follow-up closely with your primary care provider.  They will help you get your medications straightened out.  I will place a consult for case management to follow-up for someone to help you in your home.  It does appear that you have refills on your thyroid medication at your [facility] pharmacy.  Please pick this up.

## 2024-08-12 NOTE — TELEPHONE ENCOUNTER
Pt wants to know if Dr. Minor can call her in pain meds ? Since appt is not til Aug. 20th with Rosio Clayton    She said she spoke to Iván on the phone recently.

## 2024-08-12 NOTE — ED PROVIDER NOTES
Emergency Department Provider Note       PCP: Liliana Minor MD   Age: 73 y.o.   Sex: female     DISPOSITION Decision To Discharge 08/12/2024 04:46:03 AM       ICD-10-CM    1. COPD exacerbation (HCC)  J44.1 [facility]      2. Elevated troponin  R79.89           Medical Decision Making     73-year-old female presents with chest pain and some shortness of breath.  History of end-stage renal disease recently rescinded hospice.  She is receiving hemodialysis last dialysis yesterday.  Has some mild wheezing to left lower lung fields.  Initial troponin 199, repeat 192.'s are stable to previous troponins.  EKG shows no acute infarction.  Patient has been a lifelong smoker has not been formally diagnosed with COPD but suspect this is a COPD exacerbation.  Patient feeling much better would like to go home.  Consult placed for case management, states that she would like some home health services.     1 or more acute illnesses that pose a threat to life or bodily function.   Over the counter drug management performed.  Prescription drug management performed.  Patient was discharged risks and benefits of hospitalization were considered.  Shared medical decision making was utilized in creating the patients health plan today.    I independently ordered and reviewed each unique test.  I reviewed external records: ED visit note from an outside group.  I reviewed external records: provider visit note from PCP.  I reviewed external records: provider visit note from outside specialist.  I reviewed external records: previous EKG including cardiologist interpretation.       I interpreted the X-rays no acute.  My Independent EKG Interpretation: sinus rhythm, no evidence of arrhythmia      ST Segments:Normal ST segments - NO STEMI   Rate: 80            History     73-year-old female presents with shortness of breath.  She reports this started earlier tonight.  She was having some chest tightness felt

## 2024-08-12 NOTE — ED NOTES
Patient mobility status  wheelchair bound. Provider aware     I have reviewed discharge instructions with the patient.  The patient verbalized understanding.    Patient left ED via Discharge Method: wheelchair to Home with Spouse.    Opportunity for questions and clarification provided.     Patient given 2 e-scripts.            Donna Coy RN  08/12/24 0542

## 2024-08-12 NOTE — TELEPHONE ENCOUNTER
Returned call to patient. Patient called today at 11:55 am stating that she had a PD cath removal by Dr. De Leon on 7/31/2024. She had \"3 huge holes in her, two which were closed, and one that was open\". She was wanting to know if she could come in and see Dr. De Leon today. Patient mentioned many different issues during her call, such as a recent fall, and pulmonary issues. Patient also stated that she was \"non-ambulatory\".  This CMA spoke with Dr. De Leon around 1:00 pm. right before afternoon clinic started. Dr. De Leon stated that he purposely left a incision open to allow for drainage, since patient had an infection. He stated that if patient is non-ambulatory, patient should go to the ED to be seen. Patient also called again this afternoon, stating that \"the hospital left her IV in, and wanted to come in to get that removed. I called patient back at 4:35 pm   after I was done on the floor with patients. Patient answered and I asked patient what was going on. Patient stated \" I am short of breath\". I instructed patient, if she was SOB, she should go to the ED to be evaluated. Patient then responded \"did you not even listen to my messages\". I explained that I did. Patient stated \"I do not have enough breath to explain everything to you again. I will just let Bradley know tomorrow how you handled this situation\", and hung up on me. Patient did not give me an opportunity to speak or advise her in anyway before hanging up on me.

## 2024-08-12 NOTE — TELEPHONE ENCOUNTER
Note:  Ref from [facility] for COPD exacerbation. Marked urgent. CXR 8/5/24 8/12/24    Patient seen by [facility] ED. Patient is offered appointment tomorrow but unable to make.  She is scheduled to see Ms Valencia on 8/21.

## 2024-08-12 NOTE — ED TRIAGE NOTES
Pt c/o SOB, weakness, dizziness, onset this morning.  Pt was on hospice and did not have dialysis because she refused.  Pt had dialysis yesterday. Pt states she is extremely short of breath with exertion.  Pt had her PD dialysis access pulled two weeks ago, thinks it was infected.

## 2024-08-13 ENCOUNTER — APPOINTMENT (OUTPATIENT)
Dept: GENERAL RADIOLOGY | Age: 74
End: 2024-08-13
Payer: MEDICARE

## 2024-08-13 ENCOUNTER — HOSPITAL ENCOUNTER (OUTPATIENT)
Age: 74
Setting detail: OBSERVATION
Discharge: HOME | End: 2024-08-17
Attending: EMERGENCY MEDICINE | Admitting: HOSPITALIST
Payer: MEDICARE

## 2024-08-13 DIAGNOSIS — R11.2 NAUSEA AND VOMITING, UNSPECIFIED VOMITING TYPE: ICD-10-CM

## 2024-08-13 DIAGNOSIS — Z99.2 END STAGE RENAL DISEASE ON DIALYSIS (HCC): Primary | ICD-10-CM

## 2024-08-13 DIAGNOSIS — N18.6 END STAGE RENAL DISEASE ON DIALYSIS (HCC): Primary | ICD-10-CM

## 2024-08-13 LAB
ALBUMIN SERPL-MCNC: 2.7 G/DL (ref 3.2–4.6)
ALBUMIN/GLOB SERPL: 0.9 (ref 1–1.9)
ALP SERPL-CCNC: 64 U/L (ref 35–104)
ALT SERPL-CCNC: 19 U/L (ref 12–65)
ANION GAP SERPL CALC-SCNC: 15 MMOL/L (ref 9–18)
AST SERPL-CCNC: 33 U/L (ref 15–37)
BASOPHILS # BLD: 0 K/UL (ref 0–0.2)
BASOPHILS NFR BLD: 0 % (ref 0–2)
BILIRUB SERPL-MCNC: 0.3 MG/DL (ref 0–1.2)
BUN SERPL-MCNC: 32 MG/DL (ref 8–23)
CALCIUM SERPL-MCNC: 8.5 MG/DL (ref 8.8–10.2)
CHLORIDE SERPL-SCNC: 100 MMOL/L (ref 98–107)
CO2 SERPL-SCNC: 23 MMOL/L (ref 20–28)
CREAT SERPL-MCNC: 6.6 MG/DL (ref 0.6–1.1)
DIFFERENTIAL METHOD BLD: ABNORMAL
EKG ATRIAL RATE: 80 BPM
EKG DIAGNOSIS: NORMAL
EKG P AXIS: 67 DEGREES
EKG P-R INTERVAL: 224 MS
EKG Q-T INTERVAL: 407 MS
EKG QRS DURATION: 102 MS
EKG QTC CALCULATION (BAZETT): 467 MS
EKG R AXIS: 6 DEGREES
EKG T AXIS: 90 DEGREES
EKG VENTRICULAR RATE: 79 BPM
EOSINOPHIL # BLD: 0 K/UL (ref 0–0.8)
EOSINOPHIL NFR BLD: 0 % (ref 0.5–7.8)
ERYTHROCYTE [DISTWIDTH] IN BLOOD BY AUTOMATED COUNT: 14.6 % (ref 11.9–14.6)
GLOBULIN SER CALC-MCNC: 3.2 G/DL (ref 2.3–3.5)
GLUCOSE BLD STRIP.AUTO-MCNC: 144 MG/DL (ref 65–100)
GLUCOSE BLD STRIP.AUTO-MCNC: 155 MG/DL (ref 65–100)
GLUCOSE BLD STRIP.AUTO-MCNC: 184 MG/DL (ref 65–100)
GLUCOSE BLD STRIP.AUTO-MCNC: 205 MG/DL (ref 65–100)
GLUCOSE SERPL-MCNC: 133 MG/DL (ref 70–99)
HCT VFR BLD AUTO: 30 % (ref 35.8–46.3)
HGB BLD-MCNC: 9 G/DL (ref 11.7–15.4)
IMM GRANULOCYTES # BLD AUTO: 0.1 K/UL (ref 0–0.5)
IMM GRANULOCYTES NFR BLD AUTO: 1 % (ref 0–5)
LIPASE SERPL-CCNC: 40 U/L (ref 13–60)
LYMPHOCYTES # BLD: 0.9 K/UL (ref 0.5–4.6)
LYMPHOCYTES NFR BLD: 8 % (ref 13–44)
MCH RBC QN AUTO: 30.5 PG (ref 26.1–32.9)
MCHC RBC AUTO-ENTMCNC: 30 G/DL (ref 31.4–35)
MCV RBC AUTO: 101.7 FL (ref 82–102)
MONOCYTES # BLD: 0.1 K/UL (ref 0.1–1.3)
MONOCYTES NFR BLD: 1 % (ref 4–12)
NEUTS SEG # BLD: 10.3 K/UL (ref 1.7–8.2)
NEUTS SEG NFR BLD: 90 % (ref 43–78)
NRBC # BLD: 0 K/UL (ref 0–0.2)
PLATELET # BLD AUTO: 230 K/UL (ref 150–450)
PMV BLD AUTO: 9.8 FL (ref 9.4–12.3)
POTASSIUM SERPL-SCNC: 5.7 MMOL/L (ref 3.5–5.1)
PROT SERPL-MCNC: 5.9 G/DL (ref 6.3–8.2)
RBC # BLD AUTO: 2.95 M/UL (ref 4.05–5.2)
SERVICE CMNT-IMP: ABNORMAL
SODIUM SERPL-SCNC: 138 MMOL/L (ref 136–145)
WBC # BLD AUTO: 11.4 K/UL (ref 4.3–11.1)

## 2024-08-13 PROCEDURE — G0378 HOSPITAL OBSERVATION PER HR: HCPCS

## 2024-08-13 PROCEDURE — 83690 ASSAY OF LIPASE: CPT

## 2024-08-13 PROCEDURE — 73562 X-RAY EXAM OF KNEE 3: CPT

## 2024-08-13 PROCEDURE — 93010 ELECTROCARDIOGRAM REPORT: CPT | Performed by: INTERNAL MEDICINE

## 2024-08-13 PROCEDURE — 93005 ELECTROCARDIOGRAM TRACING: CPT | Performed by: EMERGENCY MEDICINE

## 2024-08-13 PROCEDURE — 6370000000 HC RX 637 (ALT 250 FOR IP): Performed by: EMERGENCY MEDICINE

## 2024-08-13 PROCEDURE — 96374 THER/PROPH/DIAG INJ IV PUSH: CPT

## 2024-08-13 PROCEDURE — 2500000003 HC RX 250 WO HCPCS: Performed by: EMERGENCY MEDICINE

## 2024-08-13 PROCEDURE — 6370000000 HC RX 637 (ALT 250 FOR IP): Performed by: HOSPITALIST

## 2024-08-13 PROCEDURE — 2580000003 HC RX 258: Performed by: HOSPITALIST

## 2024-08-13 PROCEDURE — 2580000003 HC RX 258: Performed by: EMERGENCY MEDICINE

## 2024-08-13 PROCEDURE — 99285 EMERGENCY DEPT VISIT HI MDM: CPT

## 2024-08-13 PROCEDURE — 85025 COMPLETE CBC W/AUTO DIFF WBC: CPT

## 2024-08-13 PROCEDURE — 82962 GLUCOSE BLOOD TEST: CPT

## 2024-08-13 PROCEDURE — 90935 HEMODIALYSIS ONE EVALUATION: CPT

## 2024-08-13 PROCEDURE — 6360000002 HC RX W HCPCS: Performed by: HOSPITALIST

## 2024-08-13 PROCEDURE — 96372 THER/PROPH/DIAG INJ SC/IM: CPT

## 2024-08-13 PROCEDURE — 80053 COMPREHEN METABOLIC PANEL: CPT

## 2024-08-13 PROCEDURE — 96375 TX/PRO/DX INJ NEW DRUG ADDON: CPT

## 2024-08-13 RX ORDER — POLYETHYLENE GLYCOL 3350 17 G/17G
17 POWDER, FOR SOLUTION ORAL DAILY PRN
Status: DISCONTINUED | OUTPATIENT
Start: 2024-08-13 | End: 2024-08-17 | Stop reason: HOSPADM

## 2024-08-13 RX ORDER — PREDNISONE 10 MG/1
10 TABLET ORAL DAILY
Status: DISCONTINUED | OUTPATIENT
Start: 2024-08-13 | End: 2024-08-15

## 2024-08-13 RX ORDER — 0.9 % SODIUM CHLORIDE 0.9 %
1000 INTRAVENOUS SOLUTION INTRAVENOUS ONCE
Status: DISCONTINUED | OUTPATIENT
Start: 2024-08-13 | End: 2024-08-13

## 2024-08-13 RX ORDER — SODIUM POLYSTYRENE SULFONATE 15 G/60ML
30 SUSPENSION ORAL; RECTAL ONCE
Status: COMPLETED | OUTPATIENT
Start: 2024-08-13 | End: 2024-08-13

## 2024-08-13 RX ORDER — DEXTROSE MONOHYDRATE 100 MG/ML
INJECTION, SOLUTION INTRAVENOUS CONTINUOUS PRN
Status: DISCONTINUED | OUTPATIENT
Start: 2024-08-13 | End: 2024-08-17 | Stop reason: HOSPADM

## 2024-08-13 RX ORDER — HEPARIN SODIUM 5000 [USP'U]/ML
5000 INJECTION, SOLUTION INTRAVENOUS; SUBCUTANEOUS EVERY 8 HOURS SCHEDULED
Status: DISCONTINUED | OUTPATIENT
Start: 2024-08-13 | End: 2024-08-17 | Stop reason: HOSPADM

## 2024-08-13 RX ORDER — ALBUTEROL SULFATE 2.5 MG/3ML
2.5 SOLUTION RESPIRATORY (INHALATION) 4 TIMES DAILY PRN
Status: DISCONTINUED | OUTPATIENT
Start: 2024-08-13 | End: 2024-08-17 | Stop reason: HOSPADM

## 2024-08-13 RX ORDER — ACETAMINOPHEN 650 MG/1
650 SUPPOSITORY RECTAL EVERY 6 HOURS PRN
Status: DISCONTINUED | OUTPATIENT
Start: 2024-08-13 | End: 2024-08-17 | Stop reason: HOSPADM

## 2024-08-13 RX ORDER — POTASSIUM CHLORIDE 7.45 MG/ML
10 INJECTION INTRAVENOUS PRN
Status: DISCONTINUED | OUTPATIENT
Start: 2024-08-13 | End: 2024-08-17 | Stop reason: HOSPADM

## 2024-08-13 RX ORDER — ONDANSETRON 4 MG/1
4 TABLET, ORALLY DISINTEGRATING ORAL EVERY 8 HOURS PRN
Status: DISCONTINUED | OUTPATIENT
Start: 2024-08-13 | End: 2024-08-17 | Stop reason: HOSPADM

## 2024-08-13 RX ORDER — SODIUM CHLORIDE 0.9 % (FLUSH) 0.9 %
5-40 SYRINGE (ML) INJECTION PRN
Status: DISCONTINUED | OUTPATIENT
Start: 2024-08-13 | End: 2024-08-17 | Stop reason: HOSPADM

## 2024-08-13 RX ORDER — METHOCARBAMOL 500 MG/1
500 TABLET, FILM COATED ORAL 3 TIMES DAILY PRN
Status: DISCONTINUED | OUTPATIENT
Start: 2024-08-13 | End: 2024-08-17 | Stop reason: HOSPADM

## 2024-08-13 RX ORDER — ACETAMINOPHEN 325 MG/1
650 TABLET ORAL EVERY 6 HOURS PRN
Status: DISCONTINUED | OUTPATIENT
Start: 2024-08-13 | End: 2024-08-17 | Stop reason: HOSPADM

## 2024-08-13 RX ORDER — PREDNISONE 10 MG/1
10 TABLET ORAL DAILY
Status: DISCONTINUED | OUTPATIENT
Start: 2024-08-14 | End: 2024-08-13

## 2024-08-13 RX ORDER — ONDANSETRON 2 MG/ML
4 INJECTION INTRAMUSCULAR; INTRAVENOUS EVERY 6 HOURS PRN
Status: DISCONTINUED | OUTPATIENT
Start: 2024-08-13 | End: 2024-08-17 | Stop reason: HOSPADM

## 2024-08-13 RX ORDER — ONDANSETRON 4 MG/1
4 TABLET, ORALLY DISINTEGRATING ORAL ONCE
Status: COMPLETED | OUTPATIENT
Start: 2024-08-13 | End: 2024-08-13

## 2024-08-13 RX ORDER — POTASSIUM CHLORIDE 20 MEQ/1
40 TABLET, EXTENDED RELEASE ORAL PRN
Status: DISCONTINUED | OUTPATIENT
Start: 2024-08-13 | End: 2024-08-17 | Stop reason: HOSPADM

## 2024-08-13 RX ORDER — SODIUM CHLORIDE 0.9 % (FLUSH) 0.9 %
5-40 SYRINGE (ML) INJECTION EVERY 12 HOURS SCHEDULED
Status: DISCONTINUED | OUTPATIENT
Start: 2024-08-13 | End: 2024-08-17 | Stop reason: HOSPADM

## 2024-08-13 RX ORDER — PANTOPRAZOLE SODIUM 40 MG/1
40 TABLET, DELAYED RELEASE ORAL DAILY
Status: DISCONTINUED | OUTPATIENT
Start: 2024-08-14 | End: 2024-08-17 | Stop reason: HOSPADM

## 2024-08-13 RX ORDER — PREDNISONE 20 MG/1
20 TABLET ORAL DAILY
Status: DISCONTINUED | OUTPATIENT
Start: 2024-08-13 | End: 2024-08-13

## 2024-08-13 RX ORDER — ALBUTEROL SULFATE 90 UG/1
2 AEROSOL, METERED RESPIRATORY (INHALATION) 4 TIMES DAILY PRN
Status: DISCONTINUED | OUTPATIENT
Start: 2024-08-13 | End: 2024-08-13 | Stop reason: SDUPTHER

## 2024-08-13 RX ORDER — IBUPROFEN 600 MG/1
1 TABLET ORAL PRN
Status: DISCONTINUED | OUTPATIENT
Start: 2024-08-13 | End: 2024-08-17 | Stop reason: HOSPADM

## 2024-08-13 RX ORDER — CARVEDILOL 25 MG/1
25 TABLET ORAL 2 TIMES DAILY
Status: DISCONTINUED | OUTPATIENT
Start: 2024-08-13 | End: 2024-08-17 | Stop reason: HOSPADM

## 2024-08-13 RX ORDER — MAGNESIUM SULFATE IN WATER 40 MG/ML
2000 INJECTION, SOLUTION INTRAVENOUS PRN
Status: DISCONTINUED | OUTPATIENT
Start: 2024-08-13 | End: 2024-08-17 | Stop reason: HOSPADM

## 2024-08-13 RX ORDER — SODIUM CHLORIDE 9 MG/ML
INJECTION, SOLUTION INTRAVENOUS PRN
Status: DISCONTINUED | OUTPATIENT
Start: 2024-08-13 | End: 2024-08-17 | Stop reason: HOSPADM

## 2024-08-13 RX ORDER — HYDROXYZINE HYDROCHLORIDE 10 MG/1
10 TABLET, FILM COATED ORAL EVERY 6 HOURS PRN
Status: DISCONTINUED | OUTPATIENT
Start: 2024-08-13 | End: 2024-08-17 | Stop reason: HOSPADM

## 2024-08-13 RX ORDER — ONDANSETRON 2 MG/ML
4 INJECTION INTRAMUSCULAR; INTRAVENOUS ONCE
Status: DISCONTINUED | OUTPATIENT
Start: 2024-08-13 | End: 2024-08-13

## 2024-08-13 RX ADMIN — ONDANSETRON 4 MG: 4 TABLET, ORALLY DISINTEGRATING ORAL at 05:47

## 2024-08-13 RX ADMIN — DEXTROSE MONOHYDRATE 250 ML: 100 INJECTION, SOLUTION INTRAVENOUS at 08:51

## 2024-08-13 RX ADMIN — SODIUM CHLORIDE, PRESERVATIVE FREE 10 ML: 5 INJECTION INTRAVENOUS at 20:40

## 2024-08-13 RX ADMIN — HYDROXYZINE HYDROCHLORIDE 10 MG: 10 TABLET ORAL at 20:39

## 2024-08-13 RX ADMIN — INSULIN HUMAN 10 UNITS: 100 INJECTION, SOLUTION PARENTERAL at 09:15

## 2024-08-13 RX ADMIN — SODIUM BICARBONATE 50 MEQ: 84 INJECTION, SOLUTION INTRAVENOUS at 08:46

## 2024-08-13 RX ADMIN — SODIUM POLYSTYRENE SULFONATE 30 G: 15 SUSPENSION ORAL; RECTAL at 09:20

## 2024-08-13 RX ADMIN — PREDNISONE 10 MG: 10 TABLET ORAL at 16:19

## 2024-08-13 RX ADMIN — HEPARIN SODIUM 5000 UNITS: 5000 INJECTION INTRAVENOUS; SUBCUTANEOUS at 20:40

## 2024-08-13 RX ADMIN — CARVEDILOL 25 MG: 25 TABLET, FILM COATED ORAL at 20:39

## 2024-08-13 ASSESSMENT — ENCOUNTER SYMPTOMS
DIARRHEA: 0
NAUSEA: 1
BACK PAIN: 0
SHORTNESS OF BREATH: 1
CONSTIPATION: 0
ABDOMINAL PAIN: 0
VOMITING: 1

## 2024-08-13 ASSESSMENT — PAIN SCALES - GENERAL: PAINLEVEL_OUTOF10: 8

## 2024-08-13 ASSESSMENT — PAIN DESCRIPTION - LOCATION: LOCATION: HIP;KNEE

## 2024-08-13 ASSESSMENT — PAIN DESCRIPTION - FREQUENCY: FREQUENCY: CONTINUOUS

## 2024-08-13 ASSESSMENT — PAIN - FUNCTIONAL ASSESSMENT: PAIN_FUNCTIONAL_ASSESSMENT: NONE - DENIES PAIN

## 2024-08-13 ASSESSMENT — PAIN DESCRIPTION - PAIN TYPE: TYPE: ACUTE PAIN

## 2024-08-13 ASSESSMENT — PAIN DESCRIPTION - DESCRIPTORS: DESCRIPTORS: ACHING
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
## 2024-08-13 NOTE — DIALYSIS
TRANSFER IN - DIALYSIS    Received patient in dialysis unit  from ER (unit) for ordered procedure.    Consent verified for renal replacement therapy. Procedure explained to patient, opportunity for Q&A provided. Call light given.     Patient A&O and vital signs stable.  /85,  P65 .    Hemodialysis initiated using R CVC.  Aspirated and flushed both ports without difficulty. Dressing clean, dry and intact.  Machine settings per MD order.    Heparin 0 unit bolus and 0 units/hr.      Will monitor during treatment.
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
## 2024-08-13 NOTE — ED PROVIDER NOTES
dysfunction (HCC) 07/29/2024    Psychiatric disorder     depression, treated with cymbalta    PUD (peptic ulcer disease)     PVD (peripheral vascular disease) with claudication ([facility])     Dr. Johnson follows    Rheumatic fever     as a child    Thyroid disease     hypothyroid, synthroid    UTI (urinary tract infection)     suprapubic catherter, on antibiotics 4/23/23        Past Surgical History:   Procedure Laterality Date    APPENDECTOMY      CATARACT REMOVAL  2019    bilateral, IOL implant    CHOLECYSTECTOMY      COLONOSCOPY  2015    DIALYSIS CATHETER INSERTION N/A 8/11/2023    CATHETER INSERTION PERITONEAL DIALYSIS LAPAROSCOPIC/ OPEN INCISIONAL HERNIA REPAIR performed by Deirdre Delarosa DO at [facility] MAIN OR    DIALYSIS CATHETER REMOVAL N/A 7/31/2024    CATHETER REMOVAL PERITONEAL DIALYSIS LAPAROSCOPIC performed by Sarabjit De Leon MD at [facility] OPC    DIALYSIS FISTULA CREATION Left 2/15/2023    LEFT BRACHIOCEPHALIC FISTULA CREATION performed by Sarabjit Johnson MD at [facility] MAIN OR    FISTULAGRAM Left 4/27/2023    LEFT ARM FISTULAGRAM W/ AV FISTULA REVISION AND LIGATION performed by Sarabjit Johnson MD at [facility] MAIN OR    FISTULAGRAM Left 8/9/2023    LEFT ARM FISTULAGRAM performed by Sarabjit Johnson MD at [facility] MAIN OR    IR TUNNELED CATHETER PLACEMENT GREATER THAN 5 YEARS  1/27/2023    IR TUNNELED CATHETER PLACEMENT GREATER THAN 5 YEARS 1/27/2023 Zaid Hart MD [facility] RADIOLOGY SPECIALS    IR TUNNELED CATHETER PLACEMENT GREATER THAN 5 YEARS  7/30/2024    IR TUNNELED CATHETER PLACEMENT GREATER THAN 5 YEARS 7/30/2024 [facility] RADIOLOGY SPECIALS    NEUROLOGICAL SURGERY      CERVICAL AND LUMBAR SX    ORTHOPEDIC SURGERY Left     KNEE SCOPE    UPPER GASTROINTESTINAL ENDOSCOPY      EGD    UROLOGICAL SURGERY  07/2020    cystoscopy, insertion berkowitz catheter (suprapubic)    UROLOGICAL SURGERY  2016    cystoscopy, urethral dilatation        Family History   Problem Relation Age of Onset    No Known Problems Mother     No  Known Problems Father         Social History     Socioeconomic History    Marital status:    Tobacco Use    Smoking status: Some Days     Current packs/day: 0.25     Average packs/day: 0.3 packs/day for 7.8 years (1.9 ttl pk-yrs)     Types: Cigarettes     Start date: 10/28/2016    Smokeless tobacco: Never    Tobacco comments:     3-4 cigarettes daily   Vaping Use    Vaping status: Never Used   Substance and Sexual Activity    Alcohol use: No    Drug use: Not Currently     Types: Marijuana (Weed)     Social Determinants of Health     Financial Resource Strain: Low Risk  (7/1/2024)    Received from [employer]    Financial Resource Strain     Difficulty Paying Living Expenses: Not very hard     Difficulty Paying Medical Expenses: No   Food Insecurity: No Food Insecurity (7/29/2024)    Hunger Vital Sign     Worried About Running Out of Food in the Last Year: Never true     Ran Out of Food in the Last Year: Never true   Transportation Needs: No Transportation Needs (7/29/2024)    PRAPARE - Transportation     Lack of Transportation (Medical): No     Lack of Transportation (Non-Medical): No   Physical Activity: Inactive (7/1/2024)    Received from [employer]    Physical Activity     Days of Exercise per Week: 0     Minutes of Exercise per Session: 0     Total Minutes of Exercise per Week: 0    Social Connections ([facility] HRSN)   Intimate Partner Violence: Unknown (3/20/2021)    Received from [employer]    Intimate Partner Violence     Fear of Current or Ex-Partner: Not asked     Emotionally Abused: Not asked     Physically Abused: Not asked     Sexually Abused: Not asked   Housing Stability: Low Risk  (7/29/2024)    Housing Stability Vital Sign     Unable to Pay for Housing in the Last Year: No     Number of Places Lived in the Last Year: 1     Unstable Housing in the Last Year: No        Allergies: Latex    Previous Medications    ACETAMINOPHEN (TYLENOL) 650 MG

## 2024-08-13 NOTE — PROGRESS NOTES
TRANSFER - IN REPORT:    Verbal report received from Diamond on Wilda Camara  being received from Dialysis for routine progression of patient care.      Report consisted of patient's Situation, Background, Assessment and   Recommendations(SBAR).     Information from the following report(s) Nurse Handoff Report was reviewed with the receiving nurse.    Opportunity for questions and clarification was provided.      Assessment will be completed upon patient's arrival to unit and care assumed.

## 2024-08-13 NOTE — ACP (ADVANCE CARE PLANNING)
Advance Care Planning   Healthcare Decision Maker:    Primary Decision Maker: Edil Camara - [facility] - [phone]    Click here to complete Healthcare Decision Makers including selection of the Healthcare Decision Maker Relationship (ie \"Primary\").  Today we documented Decision Maker(s) consistent with Legal Next of Kin hierarchy.
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
## 2024-08-13 NOTE — DIALYSIS
TRANSFER OUT- DIALYSIS    Hemodialysis treatment completed. PT ran 3 hours, without complications.    Patient A&O and VS stable  /82  P 93       2.5 Kg removed.      Flushed both ports with 10 mL of NS.  CVC dressing clean, dry, and intact, tego caps intact, curos caps placed.      Meds given: Heparin.        Patient to 624 after dialysis.
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
## 2024-08-13 NOTE — CONSULTS
2015    DIALYSIS CATHETER INSERTION N/A 8/11/2023    CATHETER INSERTION PERITONEAL DIALYSIS LAPAROSCOPIC/ OPEN INCISIONAL HERNIA REPAIR performed by Deirdre Delarosa DO at [facility] MAIN OR    DIALYSIS CATHETER REMOVAL N/A 7/31/2024    CATHETER REMOVAL PERITONEAL DIALYSIS LAPAROSCOPIC performed by Sarabjit De Leon MD at [facility] OPC    DIALYSIS FISTULA CREATION Left 2/15/2023    LEFT BRACHIOCEPHALIC FISTULA CREATION performed by Sarabjit Johnson MD at [facility] MAIN OR    FISTULAGRAM Left 4/27/2023    LEFT ARM FISTULAGRAM W/ AV FISTULA REVISION AND LIGATION performed by Sarabjit Johnson MD at [facility] MAIN OR    FISTULAGRAM Left 8/9/2023    LEFT ARM FISTULAGRAM performed by Sarabjit Johnson MD at [facility] MAIN OR    IR TUNNELED CATHETER PLACEMENT GREATER THAN 5 YEARS  1/27/2023    IR TUNNELED CATHETER PLACEMENT GREATER THAN 5 YEARS 1/27/2023 Zaid Hart MD [facility] RADIOLOGY SPECIALS    IR TUNNELED CATHETER PLACEMENT GREATER THAN 5 YEARS  7/30/2024    IR TUNNELED CATHETER PLACEMENT GREATER THAN 5 YEARS 7/30/2024 [facility] RADIOLOGY SPECIALS    NEUROLOGICAL SURGERY      CERVICAL AND LUMBAR SX    ORTHOPEDIC SURGERY Left     KNEE SCOPE    UPPER GASTROINTESTINAL ENDOSCOPY      EGD    UROLOGICAL SURGERY  07/2020    cystoscopy, insertion berkowitz catheter (suprapubic)    UROLOGICAL SURGERY  2016    cystoscopy, urethral dilatation         Family History   Problem Relation Age of Onset    No Known Problems Mother     No Known Problems Father       Social History     Tobacco Use    Smoking status: Some Days     Current packs/day: 0.25     Average packs/day: 0.3 packs/day for 7.8 years (1.9 ttl pk-yrs)     Types: Cigarettes     Start date: 10/28/2016    Smokeless tobacco: Never    Tobacco comments:     3-4 cigarettes daily   Vaping Use    Vaping status: Never Used   Substance Use Topics    Alcohol use: No    Drug use: Not Currently     Types: Marijuana (Weed)         Review of Systems  Remainder negative unless included in HPI      Objective:     Acquired hypothyroidism 07/16/2020    Incisional hernia, without obstruction or gangrene 08/16/2018    Knee effusion 03/28/2016    Primary osteoarthritis of left knee 03/28/2016    Multiple joint pain 02/23/2016    History of hepatitis C 02/23/2016           Plan:     ESRD on hemodialysis.  Will plan on dialysis today for hyperkalemia and some fluid overload.  Given her over volume status I suggested that we do daily treatments alternating hemodialysis and ultrafiltration to get her back toward her target weight.  Hyperkalemia has received some temporizing measures but will improve with dialytic therapy.  COPD she is requesting another round of prednisone because it made her feel better

## 2024-08-13 NOTE — PROGRESS NOTES
4 Eyes Skin Assessment     NAME:  Wilda Camara  YOB: 1950  MEDICAL RECORD NUMBER:  [id number]    The patient is being assessed for  Admission    I agree that at least one RN has performed a thorough Head to Toe Skin Assessment on the patient. ALL assessment sites listed below have been assessed.      Areas assessed by both nurses:    Head, Face, Ears, Shoulders, Back, Chest, Arms, Elbows, Hands, Sacrum. Buttock, Coccyx, Ischium, and Legs. Feet and Heels        Does the Patient have a Wound? No noted wound(s)       Marcus Prevention initiated by RN: No  Wound Care Orders initiated by RN: No    Pressure Injury (Stage 3,4, Unstageable, DTI, NWPT, and Complex wounds) if present, place Wound referral order by RN under : No    New Ostomies, if present place, Ostomy referral order under : No     Nurse 1 eSignature: Electronically signed by Tara Romero RN on 8/13/24 at 4:01 PM EDT    **SHARE this note so that the co-signing nurse can place an eSignature**    Nurse 2 eSignature: Electronically signed by VISH GRANADOS RN on 8/13/24 at 6:13 PM EDT

## 2024-08-13 NOTE — CONSULTS
REMOVAL N/A 7/31/2024    CATHETER REMOVAL PERITONEAL DIALYSIS LAPAROSCOPIC performed by Sarabjit De Leon MD at [facility] OPC    DIALYSIS FISTULA CREATION Left 2/15/2023    LEFT BRACHIOCEPHALIC FISTULA CREATION performed by Sarabjit Johnson MD at [facility] MAIN OR    FISTULAGRAM Left 4/27/2023    LEFT ARM FISTULAGRAM W/ AV FISTULA REVISION AND LIGATION performed by Sarabjit Johnson MD at [facility] MAIN OR    FISTULAGRAM Left 8/9/2023    LEFT ARM FISTULAGRAM performed by Sarabjit Johnson MD at [facility] MAIN OR    IR TUNNELED CATHETER PLACEMENT GREATER THAN 5 YEARS  1/27/2023    IR TUNNELED CATHETER PLACEMENT GREATER THAN 5 YEARS 1/27/2023 Zaid Hart MD [facility] RADIOLOGY SPECIALS    IR TUNNELED CATHETER PLACEMENT GREATER THAN 5 YEARS  7/30/2024    IR TUNNELED CATHETER PLACEMENT GREATER THAN 5 YEARS 7/30/2024 [facility] RADIOLOGY SPECIALS    NEUROLOGICAL SURGERY      CERVICAL AND LUMBAR SX    ORTHOPEDIC SURGERY Left     KNEE SCOPE    UPPER GASTROINTESTINAL ENDOSCOPY      EGD    UROLOGICAL SURGERY  07/2020    cystoscopy, insertion berkowitz catheter (suprapubic)    UROLOGICAL SURGERY  2016    cystoscopy, urethral dilatation     Current Facility-Administered Medications   Medication Dose Route Frequency    glucose chewable tablet 16 g  4 tablet Oral PRN    dextrose bolus 10% 125 mL  125 mL IntraVENous PRN    Or    dextrose bolus 10% 250 mL  250 mL IntraVENous PRN    Glucagon Emergency KIT 1 mg  1 mg SubCUTAneous PRN    dextrose 10 % infusion   IntraVENous Continuous PRN    albuterol sulfate HFA (PROVENTIL;VENTOLIN;PROAIR) 108 (90 Base) MCG/ACT inhaler 2 puff  2 puff Inhalation 4x Daily PRN    carvedilol (COREG) tablet 25 mg  25 mg Oral BID    diphenhydrAMINE (BENADRYL) 12.5 MG/5ML elixir 25 mg  25 mg Oral 4x Daily PRN    hydrOXYzine HCl (ATARAX) tablet 10 mg  10 mg Oral Q6H PRN    methocarbamol (ROBAXIN) tablet 500 mg  500 mg Oral TID PRN    pantoprazole (PROTONIX) tablet 40 mg  40 mg Oral Daily    sodium chloride flush 0.9 % injection 5-40 mL    None    TECHNIQUE: Three views of the left knee were obtained    FINDINGS: Severe tricompartmental osteoarthrosis with joint space narrowing,  subchondral sclerosis and osteophyte formation. Findings are worse in the  lateral tibiofemoral compartment with bone-on-bone apposition. There is a  moderate sized suprapatellar joint effusion    Impression  No acute fractures.    Severe tricompartmental osteoarthrosis, worse in the lateral tibiofemoral  compartment bone-on-bone apposition    Moderate size suprapatellar joint effusion      Electronically signed by Eduardo Aguayo          Admission date (for inpatients): 8/13/2024   * No surgery found *  * No surgery found *    ASSESSMENT/PLAN:73 yr old female s/p PD catheter removal 7/31 with poor wound healing    Principal Problem:    Nausea & vomiting  Active Problems:    PVD (peripheral vascular disease) with claudication ([facility])    Multiple joint pain    Knee effusion    Hypertension    Gastroesophageal reflux disease without esophagitis    Suprapubic catheter (HCC)    ESRD (end stage renal disease) ([facility])    DNR (do not resuscitate)  Resolved Problems:    * No resolved hospital problems. *       Will order daily dressings to open area on abdomen where the PD catheter was removed.  Remove every other abdominal staples to ensure no wound dehiscence. Dr De Leon ordered IR eval for paracentesis. Will follow.      Signed:  SAE JURADO - CNP
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
## 2024-08-14 LAB
ANION GAP SERPL CALC-SCNC: 15 MMOL/L (ref 9–18)
BASOPHILS # BLD: 0 K/UL (ref 0–0.2)
BASOPHILS NFR BLD: 0 % (ref 0–2)
BUN SERPL-MCNC: 25 MG/DL (ref 8–23)
CALCIUM SERPL-MCNC: 7.9 MG/DL (ref 8.8–10.2)
CHLORIDE SERPL-SCNC: 102 MMOL/L (ref 98–107)
CO2 SERPL-SCNC: 24 MMOL/L (ref 20–28)
CREAT SERPL-MCNC: 5.71 MG/DL (ref 0.6–1.1)
DIFFERENTIAL METHOD BLD: ABNORMAL
EOSINOPHIL # BLD: 0 K/UL (ref 0–0.8)
EOSINOPHIL NFR BLD: 0 % (ref 0.5–7.8)
ERYTHROCYTE [DISTWIDTH] IN BLOOD BY AUTOMATED COUNT: 14.8 % (ref 11.9–14.6)
GLUCOSE BLD STRIP.AUTO-MCNC: 121 MG/DL (ref 65–100)
GLUCOSE SERPL-MCNC: 113 MG/DL (ref 70–99)
HCT VFR BLD AUTO: 27.2 % (ref 35.8–46.3)
HGB BLD-MCNC: 8.1 G/DL (ref 11.7–15.4)
IMM GRANULOCYTES # BLD AUTO: 0.1 K/UL (ref 0–0.5)
IMM GRANULOCYTES NFR BLD AUTO: 1 % (ref 0–5)
LYMPHOCYTES # BLD: 1.5 K/UL (ref 0.5–4.6)
LYMPHOCYTES NFR BLD: 13 % (ref 13–44)
MCH RBC QN AUTO: 30.9 PG (ref 26.1–32.9)
MCHC RBC AUTO-ENTMCNC: 29.8 G/DL (ref 31.4–35)
MCV RBC AUTO: 103.8 FL (ref 82–102)
MONOCYTES # BLD: 1 K/UL (ref 0.1–1.3)
MONOCYTES NFR BLD: 9 % (ref 4–12)
NEUTS SEG # BLD: 9 K/UL (ref 1.7–8.2)
NEUTS SEG NFR BLD: 77 % (ref 43–78)
NRBC # BLD: 0 K/UL (ref 0–0.2)
PLATELET # BLD AUTO: 163 K/UL (ref 150–450)
PMV BLD AUTO: 9.6 FL (ref 9.4–12.3)
POTASSIUM SERPL-SCNC: 4.1 MMOL/L (ref 3.5–5.1)
RBC # BLD AUTO: 2.62 M/UL (ref 4.05–5.2)
SERVICE CMNT-IMP: ABNORMAL
SODIUM SERPL-SCNC: 141 MMOL/L (ref 136–145)
WBC # BLD AUTO: 11.6 K/UL (ref 4.3–11.1)

## 2024-08-14 PROCEDURE — 6370000000 HC RX 637 (ALT 250 FOR IP): Performed by: HOSPITALIST

## 2024-08-14 PROCEDURE — 6360000002 HC RX W HCPCS: Performed by: HOSPITALIST

## 2024-08-14 PROCEDURE — 97165 OT EVAL LOW COMPLEX 30 MIN: CPT

## 2024-08-14 PROCEDURE — 96372 THER/PROPH/DIAG INJ SC/IM: CPT

## 2024-08-14 PROCEDURE — G0378 HOSPITAL OBSERVATION PER HR: HCPCS

## 2024-08-14 PROCEDURE — 97161 PT EVAL LOW COMPLEX 20 MIN: CPT

## 2024-08-14 PROCEDURE — 82962 GLUCOSE BLOOD TEST: CPT

## 2024-08-14 PROCEDURE — 97112 NEUROMUSCULAR REEDUCATION: CPT

## 2024-08-14 PROCEDURE — 90935 HEMODIALYSIS ONE EVALUATION: CPT

## 2024-08-14 PROCEDURE — 94760 N-INVAS EAR/PLS OXIMETRY 1: CPT

## 2024-08-14 PROCEDURE — 2580000003 HC RX 258: Performed by: HOSPITALIST

## 2024-08-14 PROCEDURE — 94640 AIRWAY INHALATION TREATMENT: CPT

## 2024-08-14 PROCEDURE — 85025 COMPLETE CBC W/AUTO DIFF WBC: CPT

## 2024-08-14 PROCEDURE — 97530 THERAPEUTIC ACTIVITIES: CPT

## 2024-08-14 PROCEDURE — 80048 BASIC METABOLIC PNL TOTAL CA: CPT

## 2024-08-14 PROCEDURE — 36415 COLL VENOUS BLD VENIPUNCTURE: CPT

## 2024-08-14 PROCEDURE — 99221 1ST HOSP IP/OBS SF/LOW 40: CPT | Performed by: PHYSICIAN ASSISTANT

## 2024-08-14 RX ORDER — HEPARIN SODIUM 1000 [USP'U]/ML
5000 INJECTION, SOLUTION INTRAVENOUS; SUBCUTANEOUS PRN
Status: DISCONTINUED | OUTPATIENT
Start: 2024-08-14 | End: 2024-08-17 | Stop reason: HOSPADM

## 2024-08-14 RX ORDER — FUROSEMIDE 40 MG/1
80 TABLET ORAL DAILY
Status: DISCONTINUED | OUTPATIENT
Start: 2024-08-14 | End: 2024-08-17 | Stop reason: HOSPADM

## 2024-08-14 RX ADMIN — HEPARIN SODIUM 5000 UNITS: 5000 INJECTION INTRAVENOUS; SUBCUTANEOUS at 05:14

## 2024-08-14 RX ADMIN — PANTOPRAZOLE SODIUM 40 MG: 40 TABLET, DELAYED RELEASE ORAL at 08:18

## 2024-08-14 RX ADMIN — ACETAMINOPHEN 650 MG: 325 TABLET ORAL at 05:14

## 2024-08-14 RX ADMIN — SODIUM CHLORIDE, PRESERVATIVE FREE 10 ML: 5 INJECTION INTRAVENOUS at 08:19

## 2024-08-14 RX ADMIN — HYDROXYZINE HYDROCHLORIDE 10 MG: 10 TABLET ORAL at 20:22

## 2024-08-14 RX ADMIN — CARVEDILOL 25 MG: 25 TABLET, FILM COATED ORAL at 08:18

## 2024-08-14 RX ADMIN — HEPARIN SODIUM 5000 UNITS: 5000 INJECTION INTRAVENOUS; SUBCUTANEOUS at 20:22

## 2024-08-14 RX ADMIN — ALBUTEROL SULFATE 2.5 MG: 2.5 SOLUTION RESPIRATORY (INHALATION) at 18:52

## 2024-08-14 RX ADMIN — SODIUM CHLORIDE, PRESERVATIVE FREE 10 ML: 5 INJECTION INTRAVENOUS at 20:23

## 2024-08-14 RX ADMIN — HEPARIN SODIUM 5000 UNITS: 5000 INJECTION INTRAVENOUS; SUBCUTANEOUS at 14:56

## 2024-08-14 RX ADMIN — PREDNISONE 10 MG: 10 TABLET ORAL at 08:18

## 2024-08-14 RX ADMIN — CARVEDILOL 25 MG: 25 TABLET, FILM COATED ORAL at 20:22

## 2024-08-14 RX ADMIN — FUROSEMIDE 80 MG: 40 TABLET ORAL at 14:56

## 2024-08-14 ASSESSMENT — PAIN DESCRIPTION - LOCATION: LOCATION: GENERALIZED

## 2024-08-14 ASSESSMENT — PAIN DESCRIPTION - DESCRIPTORS: DESCRIPTORS: DISCOMFORT

## 2024-08-14 ASSESSMENT — PAIN SCALES - GENERAL
PAINLEVEL_OUTOF10: 3
PAINLEVEL_OUTOF10: 0
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
## 2024-08-14 NOTE — THERAPY EVALUATION
ACUTE PHYSICAL THERAPY GOALS:   (Developed with and agreed upon by patient and/or caregiver.)  (1.) Wilda Camara  will move from supine to sit and sit to supine , scoot up and down, and roll side to side with MODIFIED INDEPENDENCE within 7 treatment day(s).    (2.) Wilda Camara will transfer from bed to chair and chair to bed with MODIFIED INDEPENDENCE using the least restrictive device within 7 treatment day(s).    (3.) Wilda Camara will ambulate with MODIFIED INDEPENDENCE for 600+ feet with the least restrictive device within 7 treatment day(s).   (4.) Wilda Camara will perform standing static and dynamic balance activities x 25 minutes with MODIFIED INDEPENDENCE to improve safety within 7 treatment day(s).  (5.) Wilda Camara will perform therapeutic exercises x 20 min for HEP with INDEPENDENCE to improve strength, endurance, and functional mobility within 7 treatment day(s).     PHYSICAL THERAPY Initial Assessment, Daily Note, and AM  (Link to Caseload Tracking: PT Visit Days : 1  Acknowledge Orders  Time In/Out  PT Charge Capture  Rehab Caseload Tracker    Wilda Camara is a 73 y.o. female   PRIMARY DIAGNOSIS: Nausea & vomiting  Nausea & vomiting [R11.2]  End stage renal disease on dialysis (HCC) [N18.6, Z99.2]  Nausea and vomiting, unspecified vomiting type [R11.2]     Reason for Referral: Generalized Muscle Weakness (M62.81)  Other lack of cordination (R27.8)  Difficulty in walking, Not elsewhere classified (R26.2)  Other abnormalities of gait and mobility (R26.89)  Repeated Falls (R29.6)  History of falling (Z91.81)  Observation: Payor: [employer] MEDICARE / Plan: AET MEDICARE-ADVANTAGE PPO / Product Type: Medicare /     ASSESSMENT:     REHAB RECOMMENDATIONS:   Recommendation to date pending progress:  Setting:  Home Health Therapy    Equipment:    Rolling Walker     ASSESSMENT:    Ms. Camara is a 73 year old F who presents to hospital with nausea

## 2024-08-14 NOTE — DIALYSIS
TRANSFER OUT- DIALYSIS    Hemodialysis treatment completed. PT ran 2 hours, without complications.    Patient alert and VS stable /68   Pulse 80   Temp 98.4 °F (36.9 °C) (Oral)   Resp 18   Ht 1.778 m (5' 10\")   Wt 77.3 kg (170 lb 6.4 oz)   SpO2 97%   BMI 24.45 kg/m²        2.8 Kg removed.      Flushed both ports with 10 mL of NS.  CVC dressing clean, dry, and intact, tego caps intact, curos caps placed.      Meds given: 0.    RBCs given during dialysis: 0    Patient to 624 after dialysis.

## 2024-08-14 NOTE — DIALYSIS
TRANSFER IN - DIALYSIS    Received patient in dialysis unit  from [facility] (unit) for ordered procedure.    Consent verified for renal replacement therapy. Procedure explained to patient, opportunity for Q&A provided. Call light given.     Patient slert and vital signs stable.  /70,  P65  , room air.    Hemodialysis initiated using right chest catheter.  Aspirated and flushed both ports without difficulty. Dressing clean, dry and intact.  Machine settings per MD order.    Heparin 2000 unit bolus and 500 units/hr.      Will monitor during treatment.
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
## 2024-08-14 NOTE — PLAN OF CARE
Problem: Discharge Planning  Goal: Discharge to home or other facility with appropriate resources  8/14/2024 0727 by Kirsten Hernandez, RN  Outcome: Progressing  8/14/2024 0302 by Dana Arango RN  Outcome: Progressing     Problem: Pain  Goal: Verbalizes/displays adequate comfort level or baseline comfort level  8/14/2024 0727 by Kirsten Hernandez, RN  Outcome: Progressing  8/14/2024 0302 by Dana Arango, RN  Outcome: Progressing     Problem: Safety - Adult  Goal: Free from fall injury  8/14/2024 0727 by Kirsten Hernandez, RN  Outcome: Progressing  8/14/2024 0302 by Dana Arango, RN  Outcome: Progressing
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
## 2024-08-14 NOTE — PROGRESS NOTES
Pt went to dialysis today. Some staples removed per order as well as wound care was done. Dressing change on dialysis cath changed per infection team's request. Pt worked with PT/OT and ambulated halls throughout shift.    Rounds performed throughout shift. Pt denies needs at this time. Bed in low position, locked and call light/personal items within reach.

## 2024-08-14 NOTE — PLAN OF CARE
Problem: Discharge Planning  Goal: Discharge to home or other facility with appropriate resources  8/14/2024 0302 by Dana Arango RN  Outcome: Progressing  8/13/2024 1553 by Tara Romero RN  Outcome: Progressing     Problem: Pain  Goal: Verbalizes/displays adequate comfort level or baseline comfort level  8/14/2024 0302 by Dana Arango, RN  Outcome: Progressing  8/13/2024 1553 by Tara Romero RN  Outcome: Progressing     Problem: Safety - Adult  Goal: Free from fall injury  8/14/2024 0302 by Dana Arango RN  Outcome: Progressing  8/13/2024 1553 by Tara Romero RN  Outcome: Progressing

## 2024-08-14 NOTE — PROGRESS NOTES
Hourly rounds performed this shift. Bed lowered and locked. Call light within reach. PRN anxiety and tylenol given per MAR. Pt refused leg bag to be emptied this shift.

## 2024-08-14 NOTE — THERAPY EVALUATION
ACUTE OCCUPATIONAL THERAPY GOALS:   (Developed with and agreed upon by patient and/or caregiver.)  (1.) Wilda Camara  will complete lower body bathing and dressing with INDEPENDENCE and adaptive equipment as needed.    (2.) Wilda Camara will complete toileting with INDEPENDENCE.  (3.) Wilda Camara will tolerate 25 minutes of OT treatment with 1-2 rest breaks to increase activity tolerance for ADLs.   (4.) Wilda Camara will complete functional transfers with INDEPENDENCE and adaptive equipment as needed.   (5.) Wilda Camara will complete functional ADL task standing at sink INDEPENDENCE and adaptive equipment as needed.     Timeframe: 7 visits       OCCUPATIONAL THERAPY Initial Assessment and Daily Note       OT Visit Days: 1  Acknowledge Orders  Time  OT Charge Capture  Rehab Caseload Tracker      Wilda Camara is a 73 y.o. female   PRIMARY DIAGNOSIS: Nausea & vomiting  Nausea & vomiting [R11.2]  End stage renal disease on dialysis (HCC) [N18.6, Z99.2]  Nausea and vomiting, unspecified vomiting type [R11.2]       Reason for Referral: Generalized Muscle Weakness (M62.81)  Difficulty in walking, Not elsewhere classified (R26.2)  History of falling (Z91.81)  Observation: Payor: [employer] MEDICARE / Plan: [employer] MEDICARE-ADVANTAGE PPO / Product Type: Medicare /     ASSESSMENT:     REHAB RECOMMENDATIONS:   Recommendation to date pending progress:  Setting:  Home Health Therapy    Equipment:    Rolling Walker     ASSESSMENT:  Ms. Camara is a 72 y/o female who presents with complaints of nausea and vomiting. Pt also reports increased weakness at home and frequent falls.  PMHx of ESRD on HD. At baseline pt lives with her , is independent with ADLs, and uses a rollator for mobility. Pt mentions that she has felt weaker over the last few weeks and it has been harder to care for herself.    This date, pt presented supine and agreeable to session. Pt overall CGA

## 2024-08-15 LAB
ANION GAP SERPL CALC-SCNC: 13 MMOL/L (ref 9–18)
BASOPHILS # BLD: 0 K/UL (ref 0–0.2)
BASOPHILS NFR BLD: 1 % (ref 0–2)
BUN SERPL-MCNC: 32 MG/DL (ref 8–23)
CALCIUM SERPL-MCNC: 7.2 MG/DL (ref 8.8–10.2)
CHLORIDE SERPL-SCNC: 100 MMOL/L (ref 98–107)
CO2 SERPL-SCNC: 26 MMOL/L (ref 20–28)
CREAT SERPL-MCNC: 6.3 MG/DL (ref 0.6–1.1)
DIFFERENTIAL METHOD BLD: ABNORMAL
EOSINOPHIL # BLD: 0.2 K/UL (ref 0–0.8)
EOSINOPHIL NFR BLD: 2 % (ref 0.5–7.8)
ERYTHROCYTE [DISTWIDTH] IN BLOOD BY AUTOMATED COUNT: 14.6 % (ref 11.9–14.6)
GLUCOSE SERPL-MCNC: 106 MG/DL (ref 70–99)
HCT VFR BLD AUTO: 24.7 % (ref 35.8–46.3)
HGB BLD-MCNC: 7.5 G/DL (ref 11.7–15.4)
IMM GRANULOCYTES # BLD AUTO: 0.1 K/UL (ref 0–0.5)
IMM GRANULOCYTES NFR BLD AUTO: 1 % (ref 0–5)
LYMPHOCYTES # BLD: 1.3 K/UL (ref 0.5–4.6)
LYMPHOCYTES NFR BLD: 19 % (ref 13–44)
MCH RBC QN AUTO: 30.6 PG (ref 26.1–32.9)
MCHC RBC AUTO-ENTMCNC: 30.4 G/DL (ref 31.4–35)
MCV RBC AUTO: 100.8 FL (ref 82–102)
MONOCYTES # BLD: 0.6 K/UL (ref 0.1–1.3)
MONOCYTES NFR BLD: 9 % (ref 4–12)
NEUTS SEG # BLD: 4.6 K/UL (ref 1.7–8.2)
NEUTS SEG NFR BLD: 68 % (ref 43–78)
NRBC # BLD: 0 K/UL (ref 0–0.2)
PLATELET # BLD AUTO: 165 K/UL (ref 150–450)
PMV BLD AUTO: 9.2 FL (ref 9.4–12.3)
POTASSIUM SERPL-SCNC: 3.9 MMOL/L (ref 3.5–5.1)
RBC # BLD AUTO: 2.45 M/UL (ref 4.05–5.2)
SODIUM SERPL-SCNC: 139 MMOL/L (ref 136–145)
WBC # BLD AUTO: 6.7 K/UL (ref 4.3–11.1)

## 2024-08-15 PROCEDURE — G0378 HOSPITAL OBSERVATION PER HR: HCPCS

## 2024-08-15 PROCEDURE — 6370000000 HC RX 637 (ALT 250 FOR IP): Performed by: HOSPITALIST

## 2024-08-15 PROCEDURE — 2580000003 HC RX 258: Performed by: HOSPITALIST

## 2024-08-15 PROCEDURE — 94760 N-INVAS EAR/PLS OXIMETRY 1: CPT

## 2024-08-15 PROCEDURE — 6360000002 HC RX W HCPCS: Performed by: HOSPITALIST

## 2024-08-15 PROCEDURE — 94640 AIRWAY INHALATION TREATMENT: CPT

## 2024-08-15 PROCEDURE — 85025 COMPLETE CBC W/AUTO DIFF WBC: CPT

## 2024-08-15 PROCEDURE — 90935 HEMODIALYSIS ONE EVALUATION: CPT

## 2024-08-15 PROCEDURE — 96372 THER/PROPH/DIAG INJ SC/IM: CPT

## 2024-08-15 PROCEDURE — 80048 BASIC METABOLIC PNL TOTAL CA: CPT

## 2024-08-15 PROCEDURE — 97530 THERAPEUTIC ACTIVITIES: CPT

## 2024-08-15 RX ORDER — PREDNISONE 20 MG/1
40 TABLET ORAL DAILY
Status: DISCONTINUED | OUTPATIENT
Start: 2024-08-15 | End: 2024-08-17 | Stop reason: HOSPADM

## 2024-08-15 RX ADMIN — PREDNISONE 40 MG: 20 TABLET ORAL at 10:59

## 2024-08-15 RX ADMIN — HEPARIN SODIUM 5000 UNITS: 5000 INJECTION INTRAVENOUS; SUBCUTANEOUS at 21:39

## 2024-08-15 RX ADMIN — SODIUM CHLORIDE, PRESERVATIVE FREE 10 ML: 5 INJECTION INTRAVENOUS at 21:13

## 2024-08-15 RX ADMIN — METHOCARBAMOL TABLETS 500 MG: 500 TABLET, COATED ORAL at 14:31

## 2024-08-15 RX ADMIN — SODIUM CHLORIDE, PRESERVATIVE FREE 10 ML: 5 INJECTION INTRAVENOUS at 11:00

## 2024-08-15 RX ADMIN — HEPARIN SODIUM 5000 UNITS: 5000 INJECTION INTRAVENOUS; SUBCUTANEOUS at 05:11

## 2024-08-15 RX ADMIN — ALBUTEROL SULFATE 2.5 MG: 2.5 SOLUTION RESPIRATORY (INHALATION) at 01:48

## 2024-08-15 RX ADMIN — PANTOPRAZOLE SODIUM 40 MG: 40 TABLET, DELAYED RELEASE ORAL at 10:59

## 2024-08-15 RX ADMIN — FUROSEMIDE 80 MG: 40 TABLET ORAL at 10:59

## 2024-08-15 RX ADMIN — ALBUTEROL SULFATE 2.5 MG: 2.5 SOLUTION RESPIRATORY (INHALATION) at 20:15

## 2024-08-15 RX ADMIN — HEPARIN SODIUM 5000 UNITS: 5000 INJECTION INTRAVENOUS; SUBCUTANEOUS at 14:27

## 2024-08-15 RX ADMIN — CARVEDILOL 25 MG: 25 TABLET, FILM COATED ORAL at 21:07

## 2024-08-15 RX ADMIN — CARVEDILOL 25 MG: 25 TABLET, FILM COATED ORAL at 10:59

## 2024-08-15 NOTE — PROGRESS NOTES
Pt went to dialysis today. Pt ambulating the halls and worked with PT. Dressing change done per order.    Rounds performed throughout shift. Pt denies needs at this time. Bed in low position, locked and call light/personal items within reach.

## 2024-08-15 NOTE — PLAN OF CARE
Problem: Discharge Planning  Goal: Discharge to home or other facility with appropriate resources  8/15/2024 0801 by Kirsten Hernandez, RN  Outcome: Progressing  8/15/2024 0342 by Dana Arango RN  Outcome: Progressing     Problem: Pain  Goal: Verbalizes/displays adequate comfort level or baseline comfort level  8/15/2024 0801 by Kirsten Hernandez, RN  Outcome: Progressing  8/15/2024 0342 by Dana Arango, RN  Outcome: Progressing     Problem: Safety - Adult  Goal: Free from fall injury  8/15/2024 0801 by Kirsten Hernandez, RN  Outcome: Progressing  8/15/2024 0342 by Dana Arango, RN  Outcome: Progressing

## 2024-08-15 NOTE — PROGRESS NOTES
ACUTE PHYSICAL THERAPY GOALS:   (Developed with and agreed upon by patient and/or caregiver.)  (1.) Wilda Camara  will move from supine to sit and sit to supine , scoot up and down, and roll side to side with MODIFIED INDEPENDENCE within 7 treatment day(s).    (2.) Wilda Camara will transfer from bed to chair and chair to bed with MODIFIED INDEPENDENCE using the least restrictive device within 7 treatment day(s).    (3.) Wilda Camara will ambulate with MODIFIED INDEPENDENCE for 600+ feet with the least restrictive device within 7 treatment day(s).   (4.) Wilda Camara will perform standing static and dynamic balance activities x 25 minutes with MODIFIED INDEPENDENCE to improve safety within 7 treatment day(s).  (5.) Wilda Camara will perform therapeutic exercises x 20 min for HEP with INDEPENDENCE to improve strength, endurance, and functional mobility within 7 treatment day(s).     PHYSICAL THERAPY: Daily Note PM   (Link to Caseload Tracking: PT Visit Days : 2  Time In/Out PT Charge Capture  Rehab Caseload Tracker  Orders    Wilda Camara is a 73 y.o. female   PRIMARY DIAGNOSIS: Nausea & vomiting  Nausea & vomiting [R11.2]  End stage renal disease on dialysis (HCC) [N18.6, Z99.2]  Nausea and vomiting, unspecified vomiting type [R11.2]       Observation: Payor: [employer] / Plan: [employer] MED[employer]ARE-[employer]VANT[employer] PPO / Product Type: Medicare /     ASSESSMENT:     REHAB RECOMMENDATIONS:   Recommendation to date pending progress:  Setting:  Home Health Therapy    Equipment:    Rolling Walker     ASSESSMENT:    Ms. Camara presents resting in bed, endorsing some fatigue following HD this AM but overall she is in good spirits and eager to work with therapy. She verbalizes appreciation for all the support and care staff have provided thus far. PT facilitated therapeutic activities including sit <> stand transfers, standing balance activities, and ambulation in  1515  Time Out: 1528  Minutes: 13    Elisabeth Lopez, PT

## 2024-08-15 NOTE — DIALYSIS
TRANSFER OUT- DIALYSIS    Hemodialysis treatment completed. PT ran 3.5 hours, without complications.    Patient alert and VS stable  BP 98/72  P 94       3 Kg removed.      Flushed both ports with 10 mL of NS.  CVC dressing clean, dry, and intact, tego caps intact, curos caps placed.      Meds given: 0.    RBCs given during dialysis: 0    Patient to 624 after dialysis.

## 2024-08-15 NOTE — PROGRESS NOTES
Hourly rounds performed this shift. Bed lowered and locked. Call light within reach. PRN anxiety medication given per MAR. Pt ambulating halls with walker several times this shift.

## 2024-08-15 NOTE — PROGRESS NOTES
[facility] Progress Note    Subjective:    Seen on dialysis.  Tolerating with 3 kg UF target    ROS:  Denies CP, SOB, nausea/ vomitting     Objective:    Current Facility-Administered Medications   Medication Dose Route Frequency    predniSONE (DELTASONE) tablet 40 mg  40 mg Oral Daily    diphenhydrAMINE (BENYLIN) 12.5 MG/5ML liquid 25 mg  25 mg Oral Q6H PRN    heparin (porcine) injection 5,000 Units  5,000 Units IntraCATHeter PRN    furosemide (LASIX) tablet 80 mg  80 mg Oral Daily    glucose chewable tablet 16 g  4 tablet Oral PRN    dextrose bolus 10% 125 mL  125 mL IntraVENous PRN    Or    dextrose bolus 10% 250 mL  250 mL IntraVENous PRN    Glucagon Emergency KIT 1 mg  1 mg SubCUTAneous PRN    dextrose 10 % infusion   IntraVENous Continuous PRN    carvedilol (COREG) tablet 25 mg  25 mg Oral BID    hydrOXYzine HCl (ATARAX) tablet 10 mg  10 mg Oral Q6H PRN    methocarbamol (ROBAXIN) tablet 500 mg  500 mg Oral TID PRN    pantoprazole (PROTONIX) tablet 40 mg  40 mg Oral Daily    sodium chloride flush 0.9 % injection 5-40 mL  5-40 mL IntraVENous 2 times per day    sodium chloride flush 0.9 % injection 5-40 mL  5-40 mL IntraVENous PRN    0.9 % sodium chloride infusion   IntraVENous PRN    heparin (porcine) injection 5,000 Units  5,000 Units SubCUTAneous 3 times per day    ondansetron (ZOFRAN-ODT) disintegrating tablet 4 mg  4 mg Oral Q8H PRN    Or    ondansetron (ZOFRAN) injection 4 mg  4 mg IntraVENous Q6H PRN    polyethylene glycol (GLYCOLAX) packet 17 g  17 g Oral Daily PRN    acetaminophen (TYLENOL) tablet 650 mg  650 mg Oral Q6H PRN    Or    acetaminophen (TYLENOL) suppository 650 mg  650 mg Rectal Q6H PRN    potassium chloride (KLOR-CON M) extended release tablet 40 mEq  40 mEq Oral PRN    Or    potassium bicarb-citric acid (EFFER-K) effervescent tablet 40 mEq  40 mEq Oral PRN    Or    potassium chloride 10 mEq/100 mL IVPB (Peripheral Line)  10 mEq IntraVENous PRN    magnesium sulfate 2000 mg in 50 mL

## 2024-08-15 NOTE — DISCHARGE INSTRUCTIONS
Clean open abdominal wound with wound  daily and keep covered with dry gauze  May shower  Discussed with the patient and all questioned fully answered. She will call Primary Care Physician or come to Emergency Room. Dialysis Tuesday, Thursday, Saturday
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
## 2024-08-15 NOTE — PROGRESS NOTES
H&P/Consult Note/Progress Note/Office Note:   Wilda Camara  MRN: [id number]  :1950  Age:73 y.o.    HPI: Wilda Camara is a 73 y.o. female who we are asked by IM to see for concern at PD catheter site. The patient has a PMHx of ESRD with PD catheter origionally placed . She presented with nausea. She was admitted on 2024.    24 (Dr De Leon) Diagnostic laparoscopy, paracentesis with 4.5 L of fluid removed and removal of an infected dysfunctional peritoneal dialysis catheter.     Admission Labs: WBC 11.4, Plt 230, Hgb 9.0  Vital signs on admission: T 98.9, HR 96, RR 18, /82    8/15/24: seen in HD.  Cheerful.  Abd: soft.  Staples (every other) remain intact. Abd dressing covering PD cath site.     Past Medical History:   Diagnosis Date    Anemia     Arthritis     Chronic pain     arthritis knees    Cough     reported constant since 10/22    ESRD (end stage renal disease) on dialysis ([facility])     M,W,F- [facility]    GERD (gastroesophageal reflux disease)     controlled with med    High cholesterol     History of blood transfusion     Hypertension     medication    Kidney disease     acute renal failure 2020 (hospitalized x 6 days)  Followed by Dr. Wheeler, nephrologist      Liver disease     hep C, treated in     Melanoma ([facility])     RLE,     PD catheter dysfunction ([facility]) 2024    Psychiatric disorder     depression, treated with cymbalta    PUD (peptic ulcer disease)     PVD (peripheral vascular disease) with claudication ([facility])     Dr. Johnson follows    Rheumatic fever     as a child    Thyroid disease     hypothyroid, synthroid    UTI (urinary tract infection)     suprapubic catherter, on antibiotics 23     Past Surgical History:   Procedure Laterality Date    APPENDECTOMY      CATARACT REMOVAL      bilateral, IOL implant    CHOLECYSTECTOMY      COLONOSCOPY      DIALYSIS CATHETER INSERTION N/A 2023    CATHETER INSERTION PERITONEAL DIALYSIS  Medical Expenses: No   Food Insecurity: No Food Insecurity (8/13/2024)    Hunger Vital Sign     Worried About Running Out of Food in the Last Year: Never true     Ran Out of Food in the Last Year: Never true   Transportation Needs: No Transportation Needs (8/13/2024)    PRAPARE - Transportation     Lack of Transportation (Medical): No     Lack of Transportation (Non-Medical): No   Physical Activity: Inactive (7/1/2024)    Received from [email or web address]    Physical Activity     Days of Exercise per Week: 0     Minutes of Exercise per Session: 0     Total Minutes of Exercise per Week: 0    Social Connections ([facility] HRSN)   Intimate Partner Violence: Unknown (3/20/2021)    Received from [email or web address]    Intimate Partner Violence     Fear of Current or Ex-Partner: Not asked     Emotionally Abused: Not asked     Physically Abused: Not asked     Sexually Abused: Not asked   Housing Stability: Low Risk  (8/13/2024)    Housing Stability Vital Sign     Unable to Pay for Housing in the Last Year: No     Number of Times Moved in the Last Year: 0     Homeless in the Last Year: No     Social History     Tobacco Use   Smoking Status Some Days    Current packs/day: 0.25    Average packs/day: 0.3 packs/day for 7.8 years (1.9 ttl pk-yrs)    Types: Cigarettes    Start date: 10/28/2016   Smokeless Tobacco Never   Tobacco Comments    3-4 cigarettes daily     Family History   Problem Relation Age of Onset    No Known Problems Mother     No Known Problems Father      ROS: The patient has no difficulty with chest pain or shortness of breath.  No fever or chills.  Comprehensive review of systems was otherwise unremarkable except as noted above.    Physical Exam:   /70   Pulse 91   Temp 97.7 °F (36.5 °C) (Oral)   Resp 16   Ht 1.778 m (5' 10\")   Wt 73.7 kg (162 lb 8 oz)   SpO2 95%   BMI 23.32 kg/m²   Constitutional: Alert, oriented, cooperative patient in no acute distress; appears stated age

## 2024-08-15 NOTE — DIALYSIS
TRANSFER IN - DIALYSIS    Received patient in dialysis unit  from [facility] (unit) for ordered procedure.    Consent verified for renal replacement therapy. Procedure explained to patient, opportunity for Q&A provided. Call light given.     Patient alert and vital signs stable.  /101,  P 64  , 0 L O2 via RA.    Hemodialysis initiated using Right CVC.  Aspirated and flushed both ports without difficulty. Dressing clean, dry and intact.  Machine settings per MD order.    Heparin 0 unit bolus and 0 units/hr.      Will monitor during treatment.

## 2024-08-15 NOTE — CARE COORDINATION
CM spoke with Angela with [facility] via phone with patient in room and completed the intake process. Patient has been approved for their [place] or [place] shelters. Currently there is no availability. Patient advised to call back everyday to check, and has the phone number.     CM is checking with other shelters in the area, and also the medical respite program.    Seamus MARKS, ACM  [place]

## 2024-08-16 LAB
ANION GAP SERPL CALC-SCNC: 13 MMOL/L (ref 9–18)
BASOPHILS # BLD: 0 K/UL (ref 0–0.2)
BASOPHILS NFR BLD: 1 % (ref 0–2)
BUN SERPL-MCNC: 27 MG/DL (ref 8–23)
CALCIUM SERPL-MCNC: 7.6 MG/DL (ref 8.8–10.2)
CHLORIDE SERPL-SCNC: 101 MMOL/L (ref 98–107)
CO2 SERPL-SCNC: 25 MMOL/L (ref 20–28)
CREAT SERPL-MCNC: 5.63 MG/DL (ref 0.6–1.1)
DIFFERENTIAL METHOD BLD: ABNORMAL
EOSINOPHIL # BLD: 0.1 K/UL (ref 0–0.8)
EOSINOPHIL NFR BLD: 2 % (ref 0.5–7.8)
ERYTHROCYTE [DISTWIDTH] IN BLOOD BY AUTOMATED COUNT: 14.6 % (ref 11.9–14.6)
GLUCOSE SERPL-MCNC: 110 MG/DL (ref 70–99)
HCT VFR BLD AUTO: 25 % (ref 35.8–46.3)
HGB BLD-MCNC: 7.4 G/DL (ref 11.7–15.4)
IMM GRANULOCYTES # BLD AUTO: 0 K/UL (ref 0–0.5)
IMM GRANULOCYTES NFR BLD AUTO: 1 % (ref 0–5)
LYMPHOCYTES # BLD: 1 K/UL (ref 0.5–4.6)
LYMPHOCYTES NFR BLD: 19 % (ref 13–44)
MCH RBC QN AUTO: 30.5 PG (ref 26.1–32.9)
MCHC RBC AUTO-ENTMCNC: 29.6 G/DL (ref 31.4–35)
MCV RBC AUTO: 102.9 FL (ref 82–102)
MONOCYTES # BLD: 0.6 K/UL (ref 0.1–1.3)
MONOCYTES NFR BLD: 12 % (ref 4–12)
NEUTS SEG # BLD: 3.5 K/UL (ref 1.7–8.2)
NEUTS SEG NFR BLD: 65 % (ref 43–78)
NRBC # BLD: 0 K/UL (ref 0–0.2)
PLATELET # BLD AUTO: 171 K/UL (ref 150–450)
PMV BLD AUTO: 9.5 FL (ref 9.4–12.3)
POTASSIUM SERPL-SCNC: 3.9 MMOL/L (ref 3.5–5.1)
RBC # BLD AUTO: 2.43 M/UL (ref 4.05–5.2)
SODIUM SERPL-SCNC: 139 MMOL/L (ref 136–145)
WBC # BLD AUTO: 5.3 K/UL (ref 4.3–11.1)

## 2024-08-16 PROCEDURE — G0378 HOSPITAL OBSERVATION PER HR: HCPCS

## 2024-08-16 PROCEDURE — 90935 HEMODIALYSIS ONE EVALUATION: CPT

## 2024-08-16 PROCEDURE — 6370000000 HC RX 637 (ALT 250 FOR IP): Performed by: HOSPITALIST

## 2024-08-16 PROCEDURE — 85025 COMPLETE CBC W/AUTO DIFF WBC: CPT

## 2024-08-16 PROCEDURE — 94640 AIRWAY INHALATION TREATMENT: CPT

## 2024-08-16 PROCEDURE — 6360000002 HC RX W HCPCS: Performed by: HOSPITALIST

## 2024-08-16 PROCEDURE — 2580000003 HC RX 258: Performed by: HOSPITALIST

## 2024-08-16 PROCEDURE — 96372 THER/PROPH/DIAG INJ SC/IM: CPT

## 2024-08-16 PROCEDURE — 80048 BASIC METABOLIC PNL TOTAL CA: CPT

## 2024-08-16 PROCEDURE — 94760 N-INVAS EAR/PLS OXIMETRY 1: CPT

## 2024-08-16 RX ORDER — PREDNISONE 20 MG/1
40 TABLET ORAL DAILY
Qty: 10 TABLET | Refills: 0 | Status: SHIPPED | OUTPATIENT
Start: 2024-08-17 | End: 2024-08-22

## 2024-08-16 RX ORDER — IPRATROPIUM BROMIDE AND ALBUTEROL SULFATE 2.5; .5 MG/3ML; MG/3ML
1 SOLUTION RESPIRATORY (INHALATION) ONCE
Status: COMPLETED | OUTPATIENT
Start: 2024-08-16 | End: 2024-08-16

## 2024-08-16 RX ADMIN — IPRATROPIUM BROMIDE AND ALBUTEROL SULFATE 1 DOSE: 2.5; .5 SOLUTION RESPIRATORY (INHALATION) at 10:33

## 2024-08-16 RX ADMIN — SODIUM CHLORIDE, PRESERVATIVE FREE 10 ML: 5 INJECTION INTRAVENOUS at 21:05

## 2024-08-16 RX ADMIN — CARVEDILOL 25 MG: 25 TABLET, FILM COATED ORAL at 09:13

## 2024-08-16 RX ADMIN — SODIUM CHLORIDE, PRESERVATIVE FREE 10 ML: 5 INJECTION INTRAVENOUS at 09:19

## 2024-08-16 RX ADMIN — PREDNISONE 40 MG: 20 TABLET ORAL at 09:12

## 2024-08-16 RX ADMIN — CARVEDILOL 25 MG: 25 TABLET, FILM COATED ORAL at 21:04

## 2024-08-16 RX ADMIN — HEPARIN SODIUM 5000 UNITS: 5000 INJECTION INTRAVENOUS; SUBCUTANEOUS at 05:43

## 2024-08-16 RX ADMIN — PANTOPRAZOLE SODIUM 40 MG: 40 TABLET, DELAYED RELEASE ORAL at 09:14

## 2024-08-16 RX ADMIN — FUROSEMIDE 80 MG: 40 TABLET ORAL at 09:12

## 2024-08-16 RX ADMIN — ALBUTEROL SULFATE 2.5 MG: 2.5 SOLUTION RESPIRATORY (INHALATION) at 21:17

## 2024-08-16 NOTE — PROGRESS NOTES
Physical Therapy Note:    Attempted to see patient this AM for physical therapy treatment  session. Patient off floor to dialysis. Will follow and re-attempt as schedule permits/patient available. Thank you,    LUCIANA MARTINEZ, [facility]     Rehab Caseload Tracker

## 2024-08-16 NOTE — DIALYSIS
TRANSFER IN - DIALYSIS    Received patient in dialysis unit  from [facility] (unit) for ordered procedure.    Consent verified for renal replacement therapy. Procedure explained to patient, opportunity for Q&A provided. Call light given.     Patient A&O and vital signs stable.  /108,  P72.    Hemodialysis initiated using R CVC.  Aspirated and flushed both ports without difficulty. Dressing clean, dry and intact.  Machine settings per MD order.    Heparin 2000 unit bolus and 500 units/hr.      Will monitor during treatment.

## 2024-08-16 NOTE — PROGRESS NOTES
ml/min/1.73m2    Calcium 7.6 (L) 8.8 - 10.2 MG/DL       No results for input(s): \"COVID19\" in the last 72 hours.    Current Meds:  Current Facility-Administered Medications   Medication Dose Route Frequency    predniSONE (DELTASONE) tablet 40 mg  40 mg Oral Daily    diphenhydrAMINE (BENYLIN) 12.5 MG/5ML liquid 25 mg  25 mg Oral Q6H PRN    heparin (porcine) injection 5,000 Units  5,000 Units IntraCATHeter PRN    furosemide (LASIX) tablet 80 mg  80 mg Oral Daily    glucose chewable tablet 16 g  4 tablet Oral PRN    dextrose bolus 10% 125 mL  125 mL IntraVENous PRN    Or    dextrose bolus 10% 250 mL  250 mL IntraVENous PRN    Glucagon Emergency KIT 1 mg  1 mg SubCUTAneous PRN    dextrose 10 % infusion   IntraVENous Continuous PRN    carvedilol (COREG) tablet 25 mg  25 mg Oral BID    hydrOXYzine HCl (ATARAX) tablet 10 mg  10 mg Oral Q6H PRN    methocarbamol (ROBAXIN) tablet 500 mg  500 mg Oral TID PRN    pantoprazole (PROTONIX) tablet 40 mg  40 mg Oral Daily    sodium chloride flush 0.9 % injection 5-40 mL  5-40 mL IntraVENous 2 times per day    sodium chloride flush 0.9 % injection 5-40 mL  5-40 mL IntraVENous PRN    0.9 % sodium chloride infusion   IntraVENous PRN    heparin (porcine) injection 5,000 Units  5,000 Units SubCUTAneous 3 times per day    ondansetron (ZOFRAN-ODT) disintegrating tablet 4 mg  4 mg Oral Q8H PRN    Or    ondansetron (ZOFRAN) injection 4 mg  4 mg IntraVENous Q6H PRN    polyethylene glycol (GLYCOLAX) packet 17 g  17 g Oral Daily PRN    acetaminophen (TYLENOL) tablet 650 mg  650 mg Oral Q6H PRN    Or    acetaminophen (TYLENOL) suppository 650 mg  650 mg Rectal Q6H PRN    potassium chloride (KLOR-CON M) extended release tablet 40 mEq  40 mEq Oral PRN    Or    potassium bicarb-citric acid (EFFER-K) effervescent tablet 40 mEq  40 mEq Oral PRN    Or    potassium chloride 10 mEq/100 mL IVPB (Peripheral Line)  10 mEq IntraVENous PRN    magnesium sulfate 2000 mg in 50 mL IVPB premix  2,000 mg  IntraVENous PRN    albuterol (PROVENTIL) (2.5 MG/3ML) 0.083% nebulizer solution 2.5 mg  2.5 mg Nebulization 4x Daily PRN       Signed:  Cm Ruelas MD    Part of this note may have been written by using a voice dictation software.  The note has been proof read but may still contain some grammatical/other typographical errors.
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
## 2024-08-16 NOTE — DIALYSIS
TRANSFER OUT- DIALYSIS    Hemodialysis treatment completed. PT ran 2 hours, without complications.    Patient sleepy and VS at baseline  BP 91/53  P 71       2.0 Kg removed.      Flushed both ports with 10 mL of NS.  CVC dressing clean, dry, and intact, tego caps intact, curos caps placed.      Meds given: none.      Patient to 624 after dialysis.
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
## 2024-08-16 NOTE — PROGRESS NOTES
Occupational Therapy Note:    Attempted to see patient this AM for occupational therapy treatment  session. Patient off floor to dialysis. Will follow and re-attempt as schedule permits/patient available. Thank you,    Leigh Ann Pimentel, OT     Rehab Caseload Tracker

## 2024-08-16 NOTE — CARE COORDINATION
Patient not accepted at the [facility] respite program. CM called [facility] in [place], and gave number to patient to completed their intake. Unfortunately they are currently full. Voicemail left for [facility] in [place], awaiting a call back.     Seamus CLEMONS, ACM  [place]

## 2024-08-16 NOTE — PROGRESS NOTES
[facility] Progress Note    Subjective:    Seen on dialysis.  -Ultrafiltration only today    ROS:  Denies CP, SOB, nausea/ vomitting     Objective:    Current Facility-Administered Medications   Medication Dose Route Frequency    predniSONE (DELTASONE) tablet 40 mg  40 mg Oral Daily    diphenhydrAMINE (BENYLIN) 12.5 MG/5ML liquid 25 mg  25 mg Oral Q6H PRN    heparin (porcine) injection 5,000 Units  5,000 Units IntraCATHeter PRN    furosemide (LASIX) tablet 80 mg  80 mg Oral Daily    glucose chewable tablet 16 g  4 tablet Oral PRN    dextrose bolus 10% 125 mL  125 mL IntraVENous PRN    Or    dextrose bolus 10% 250 mL  250 mL IntraVENous PRN    Glucagon Emergency KIT 1 mg  1 mg SubCUTAneous PRN    dextrose 10 % infusion   IntraVENous Continuous PRN    carvedilol (COREG) tablet 25 mg  25 mg Oral BID    hydrOXYzine HCl (ATARAX) tablet 10 mg  10 mg Oral Q6H PRN    methocarbamol (ROBAXIN) tablet 500 mg  500 mg Oral TID PRN    pantoprazole (PROTONIX) tablet 40 mg  40 mg Oral Daily    sodium chloride flush 0.9 % injection 5-40 mL  5-40 mL IntraVENous 2 times per day    sodium chloride flush 0.9 % injection 5-40 mL  5-40 mL IntraVENous PRN    0.9 % sodium chloride infusion   IntraVENous PRN    heparin (porcine) injection 5,000 Units  5,000 Units SubCUTAneous 3 times per day    ondansetron (ZOFRAN-ODT) disintegrating tablet 4 mg  4 mg Oral Q8H PRN    Or    ondansetron (ZOFRAN) injection 4 mg  4 mg IntraVENous Q6H PRN    polyethylene glycol (GLYCOLAX) packet 17 g  17 g Oral Daily PRN    acetaminophen (TYLENOL) tablet 650 mg  650 mg Oral Q6H PRN    Or    acetaminophen (TYLENOL) suppository 650 mg  650 mg Rectal Q6H PRN    potassium chloride (KLOR-CON M) extended release tablet 40 mEq  40 mEq Oral PRN    Or    potassium bicarb-citric acid (EFFER-K) effervescent tablet 40 mEq  40 mEq Oral PRN    Or    potassium chloride 10 mEq/100 mL IVPB (Peripheral Line)  10 mEq IntraVENous PRN    magnesium sulfate 2000 mg in 50 mL

## 2024-08-16 NOTE — PROGRESS NOTES
Pt resting in bed at present time without complaints. Pt ambulated around the hallway a few times.     Rounds performed, all needs met this shift. Bed lock and low, call light and personal belongings within reach. Will give report to oncoming day shift nurse.

## 2024-08-17 VITALS
OXYGEN SATURATION: 95 % | WEIGHT: 162.5 LBS | DIASTOLIC BLOOD PRESSURE: 69 MMHG | BODY MASS INDEX: 23.26 KG/M2 | TEMPERATURE: 98.1 F | RESPIRATION RATE: 18 BRPM | SYSTOLIC BLOOD PRESSURE: 94 MMHG | HEIGHT: 70 IN | HEART RATE: 92 BPM

## 2024-08-17 PROCEDURE — 94640 AIRWAY INHALATION TREATMENT: CPT

## 2024-08-17 PROCEDURE — 2580000003 HC RX 258: Performed by: HOSPITALIST

## 2024-08-17 PROCEDURE — 6360000002 HC RX W HCPCS: Performed by: HOSPITALIST

## 2024-08-17 PROCEDURE — G0378 HOSPITAL OBSERVATION PER HR: HCPCS

## 2024-08-17 PROCEDURE — 6370000000 HC RX 637 (ALT 250 FOR IP): Performed by: HOSPITALIST

## 2024-08-17 PROCEDURE — 90935 HEMODIALYSIS ONE EVALUATION: CPT

## 2024-08-17 PROCEDURE — 94760 N-INVAS EAR/PLS OXIMETRY 1: CPT

## 2024-08-17 RX ADMIN — CARVEDILOL 25 MG: 25 TABLET, FILM COATED ORAL at 12:07

## 2024-08-17 RX ADMIN — PREDNISONE 40 MG: 20 TABLET ORAL at 12:07

## 2024-08-17 RX ADMIN — FUROSEMIDE 80 MG: 40 TABLET ORAL at 12:07

## 2024-08-17 RX ADMIN — PANTOPRAZOLE SODIUM 40 MG: 40 TABLET, DELAYED RELEASE ORAL at 12:08

## 2024-08-17 RX ADMIN — ALBUTEROL SULFATE 2.5 MG: 2.5 SOLUTION RESPIRATORY (INHALATION) at 03:36

## 2024-08-17 RX ADMIN — SODIUM CHLORIDE, PRESERVATIVE FREE 10 ML: 5 INJECTION INTRAVENOUS at 12:08

## 2024-08-17 NOTE — PROGRESS NOTES
magnesium sulfate 2000 mg in 50 mL IVPB premix  2,000 mg IntraVENous PRN    albuterol (PROVENTIL) (2.5 MG/3ML) 0.083% nebulizer solution 2.5 mg  2.5 mg Nebulization 4x Daily PRN       Exam:  Vitals:    08/16/24 2104 08/16/24 2117 08/17/24 0401 08/17/24 0727   BP: 115/77  (!) 127/98 (!) 141/84   Pulse: 97 79 83 85   Resp:  18 18    Temp:   98.1 °F (36.7 °C)    TempSrc:   Oral    SpO2:  96% 95%    Weight:       Height:             Intake/Output Summary (Last 24 hours) at 8/17/2024 0809  Last data filed at 8/16/2024 1236  Gross per 24 hour   Intake 502 ml   Output 2500 ml   Net -1998 ml       Gen: comfortable , NAD  HEENT: moist membranes  CV: S1, S2  Lungs: Clear bilaterally  Extem: 1+edema        Labs  Recent Labs     08/15/24  0725 08/16/24  1040   WBC 6.7 5.3   HGB 7.5* 7.4*   HCT 24.7* 25.0*    171     Recent Labs     08/15/24  0725 08/16/24  1040    139   K 3.9 3.9    101   CO2 26 25   BUN 32* 27*   CREATININE 6.30* 5.63*         Problem List:  Patient Active Problem List    Diagnosis Date Noted    PVD (peripheral vascular disease) with claudication ([facility]) 02/15/2023    CONKLIN (dyspnea on exertion) 06/15/2022    Hyperlipidemia 06/15/2022    Atypical chest pain 06/15/2022    Nausea & vomiting 08/13/2024    Acute bacterial peritonitis (HCC) 08/01/2024    DNR (do not resuscitate) 07/29/2024    Peritoneal dialysis catheter dysfunction ([facility]) 07/28/2024    History of smoking 01/19/2024    Pulmonary congestion 10/08/2023    ESRD (end stage renal disease) ([facility]) 08/01/2022    Suprapubic catheter ([facility]) 05/16/2022    First degree AV block 05/16/2022    Hypertension 08/19/2020    Gastroesophageal reflux disease without esophagitis 08/19/2020    Anemia 07/16/2020    Lower obstructive uropathy 07/16/2020    Acquired hypothyroidism 07/16/2020    Incisional hernia, without obstruction or gangrene 08/16/2018    Knee effusion 03/28/2016    Primary osteoarthritis of left knee 03/28/2016    Multiple joint pain  02/23/2016    History of hepatitis C 02/23/2016       Assessment/Plan:  ESRD on hemodialysis  -Today's are regular day on schedule Tuesday Thursday Saturday.  -Cumulative 9.5 L net removal since admission.  Were planning 3 EKGs today so that could probably take her total net removal for this admission to 11.5 EKGs.  -She still has some lower extremity edema and suggested that her outpatient clinic continue to challenge below the previous treatments and weight to get her more euvolemic     Hyperkalemia  -Resolved after a couple treatments.     COPD  -On therapy.      She has a safe discharge plan today

## 2024-08-17 NOTE — PROGRESS NOTES
Pt resting in bed at present time without complaints. RT contacted x2 for PRN breathing treatment. Pt ambulated around the hallway a few times during this shift.     Rounds performed, all needs met this shift. Bed lock and low, call light within reach. Will give report to oncoming day shift nurse.

## 2024-08-17 NOTE — DIALYSIS
TRANSFER IN - DIALYSIS    Received patient in dialysis unit  from [facility] (unit) for ordered procedure.    Consent verified for renal replacement therapy. Procedure explained to patient, opportunity for Q&A provided. Call light given.     Patient alert and vital signs stable.  /84,  P85  , room ai.    Hemodialysis initiated using right Chest catheter.  Aspirated and flushed both ports without difficulty. Dressing clean, dry and intact.  Machine settings per MD order.    Heparin 0 unit bolus and 0 units/hr.      Will monitor during treatment.
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
## 2024-08-17 NOTE — DIALYSIS
TRANSFER OUT- DIALYSIS    Hemodialysis treatment completed. PT ran 3.5 hours. Machine system clotted off twice during treatment despite heparin.    Patient a/ox3 and VS stable  BP 94/69  P 92       2.2 Kg removed.      Flushed both ports with 10 mL of NS.  CVC dressing clean, dry, and intact, tego caps intact, curos caps placed.      Meds given: heparin.    RBCs given during dialysis: 0    Patient to 624 after dialysis.

## 2024-08-19 ENCOUNTER — CARE COORDINATION (OUTPATIENT)
Dept: CARE COORDINATION | Facility: CLINIC | Age: 74
End: 2024-08-19

## 2024-08-19 ENCOUNTER — TELEPHONE (OUTPATIENT)
Dept: INTERNAL MEDICINE CLINIC | Facility: CLINIC | Age: 74
End: 2024-08-19

## 2024-08-19 NOTE — CARE COORDINATION
Care Transitions Note    Initial Call - Call within 2 business days of discharge: Yes    Patient Current Location:  [place]    Care Transition Nurse contacted the patient by telephone to perform post hospital discharge assessment, verified name and  as identifiers. Provided introduction to self, and explanation of the Care Transition Nurse role.   Patient reports she is staying at a hotel and safe house did not work for her. Patient reports she is doing well and working things out with her spouse. Patient reports she has a car and is able to drive. Patient reports she has supplies of her medications and for wound care. Patient declines CTN assistance at this time. Patient prefers to schedule for own appointments and is aware of upcoming follow up. Patient reports she goes to HD for treatment on ,, sat at [facility]. Patient provided with contact for CTN and encouraged to reach out if needs arise.  Patient: Wilda Camara   Patient : 1950   MRN: [id number]    Reason for Admission: End stage renal disease on dialysis   Discharge Date: 24  RURS: Readmission Risk Score: 20.5      Last Discharge Facility       Date Complaint Diagnosis Description Type Department Provider    24 Nausea End stage renal disease on dialysis (HCC) ... ED to Hosp-Admission (Discharged) (ADMITTED) [id number] Cm Ruelas MD; Max Maravilla...            Was this an external facility discharge? No    Follow Up Appointment:   Discussed follow up appointments. Patient has hospital follow up appointment scheduled within 7 days of discharge.   Future Appointments         Provider Specialty Dept Phone    2024 2:20 PM Nany Valencia, APRN - NP Pulmonology [phone]    2024 3:30 PM [id number] INJECTION/CATH Urology [phone]    2024 1:20 PM Sarabjit De Leon MD General Surgery [phone]        Lisa Guy RN

## 2024-08-21 ENCOUNTER — TELEPHONE (OUTPATIENT)
Dept: PULMONOLOGY | Age: 74
End: 2024-08-21

## 2024-08-21 NOTE — TELEPHONE ENCOUNTER
Tried to call patient regarding her appt today with Ms. Nany Valencia but no answer LVM. Silvia GOMEZ

## 2024-08-26 ENCOUNTER — TELEPHONE (OUTPATIENT)
Dept: PULMONOLOGY | Age: 74
End: 2024-08-26

## 2024-08-26 NOTE — TELEPHONE ENCOUNTER
Notified by referral department that patient appointment was not completed last week.  I have left her a message to call to reschedule.

## 2024-08-28 ENCOUNTER — TELEPHONE (OUTPATIENT)
Dept: UROLOGY | Age: 74
End: 2024-08-28

## 2024-08-28 NOTE — TELEPHONE ENCOUNTER
Pt LVM stating that she is unable to make it to today's appt saying that the air is terrible and she has no help today to come. WCB to reschedule.

## 2024-09-03 RX ORDER — CIPROFLOXACIN 500 MG/1
500 TABLET, FILM COATED ORAL DAILY
Qty: 7 TABLET | Refills: 0 | Status: ON HOLD | OUTPATIENT
Start: 2024-09-03 | End: 2024-09-10

## 2024-09-05 ENCOUNTER — TELEPHONE (OUTPATIENT)
Dept: SURGERY | Age: 74
End: 2024-09-05

## 2024-09-05 NOTE — TELEPHONE ENCOUNTER
Returned patients call concerning an antibiotic for an infection.  I let her know that Lashae MCDONALD @ [facility] sent Cipro in for her on 9/3/2024 to adela.  She thank me, she will call to get the medication.

## 2024-09-06 ENCOUNTER — HOSPITAL ENCOUNTER (INPATIENT)
Age: 74
LOS: 5 days | Discharge: HOME HEALTH | DRG: 640 | End: 2024-09-11
Attending: STUDENT IN AN ORGANIZED HEALTH CARE EDUCATION/TRAINING PROGRAM
Payer: MEDICARE

## 2024-09-06 ENCOUNTER — TELEPHONE (OUTPATIENT)
Dept: SURGERY | Age: 74
End: 2024-09-06

## 2024-09-06 ENCOUNTER — APPOINTMENT (OUTPATIENT)
Dept: GENERAL RADIOLOGY | Age: 74
End: 2024-09-06
Payer: MEDICARE

## 2024-09-06 ENCOUNTER — HOSPITAL ENCOUNTER (EMERGENCY)
Age: 74
Discharge: ANOTHER HOSPITAL | End: 2024-09-06
Payer: MEDICARE

## 2024-09-06 VITALS
SYSTOLIC BLOOD PRESSURE: 125 MMHG | HEART RATE: 82 BPM | HEIGHT: 70 IN | WEIGHT: 170 LBS | BODY MASS INDEX: 24.34 KG/M2 | TEMPERATURE: 98.3 F | DIASTOLIC BLOOD PRESSURE: 96 MMHG | OXYGEN SATURATION: 95 % | RESPIRATION RATE: 22 BRPM

## 2024-09-06 DIAGNOSIS — E87.70 HYPERVOLEMIA, UNSPECIFIED HYPERVOLEMIA TYPE: Primary | ICD-10-CM

## 2024-09-06 DIAGNOSIS — U07.1 COVID-19: ICD-10-CM

## 2024-09-06 DIAGNOSIS — R79.89 ELEVATED TSH: ICD-10-CM

## 2024-09-06 PROBLEM — J90 PLEURAL EFFUSION ON LEFT: Status: ACTIVE | Noted: 2024-09-06

## 2024-09-06 PROBLEM — E03.9 SEVERE HYPOTHYROIDISM: Status: ACTIVE | Noted: 2024-09-06

## 2024-09-06 LAB
ALBUMIN SERPL-MCNC: 2.7 G/DL (ref 3.2–4.6)
ALBUMIN/GLOB SERPL: 0.8 (ref 1–1.9)
ALP SERPL-CCNC: 64 U/L (ref 35–104)
ALT SERPL-CCNC: 33 U/L (ref 12–65)
ANION GAP SERPL CALC-SCNC: 15 MMOL/L (ref 9–18)
APPEARANCE UR: ABNORMAL
AST SERPL-CCNC: 30 U/L (ref 15–37)
BACTERIA URNS QL MICRO: ABNORMAL /HPF
BASOPHILS # BLD: 0 K/UL (ref 0–0.2)
BASOPHILS NFR BLD: 1 % (ref 0–2)
BILIRUB SERPL-MCNC: <0.2 MG/DL (ref 0–1.2)
BILIRUB UR QL: NEGATIVE
BUN SERPL-MCNC: 21 MG/DL (ref 8–23)
CALCIUM SERPL-MCNC: 8.8 MG/DL (ref 8.8–10.2)
CHLORIDE SERPL-SCNC: 99 MMOL/L (ref 98–107)
CO2 SERPL-SCNC: 28 MMOL/L (ref 20–28)
COLOR UR: ABNORMAL
CREAT SERPL-MCNC: 4.71 MG/DL (ref 0.6–1.1)
DIFFERENTIAL METHOD BLD: ABNORMAL
EKG ATRIAL RATE: 82 BPM
EKG DIAGNOSIS: NORMAL
EKG P AXIS: 54 DEGREES
EKG P-R INTERVAL: 216 MS
EKG Q-T INTERVAL: 390 MS
EKG QRS DURATION: 92 MS
EKG QTC CALCULATION (BAZETT): 455 MS
EKG R AXIS: -15 DEGREES
EKG T AXIS: 63 DEGREES
EKG VENTRICULAR RATE: 82 BPM
EOSINOPHIL # BLD: 0.1 K/UL (ref 0–0.8)
EOSINOPHIL NFR BLD: 2 % (ref 0.5–7.8)
EPI CELLS #/AREA URNS HPF: ABNORMAL /HPF
ERYTHROCYTE [DISTWIDTH] IN BLOOD BY AUTOMATED COUNT: 15.9 % (ref 11.9–14.6)
FLUAV RNA SPEC QL NAA+PROBE: NOT DETECTED
FLUBV RNA SPEC QL NAA+PROBE: NOT DETECTED
GLOBULIN SER CALC-MCNC: 3.3 G/DL (ref 2.3–3.5)
GLUCOSE SERPL-MCNC: 87 MG/DL (ref 70–99)
GLUCOSE UR STRIP.AUTO-MCNC: NEGATIVE MG/DL
HCT VFR BLD AUTO: 27.6 % (ref 35.8–46.3)
HGB BLD-MCNC: 8.4 G/DL (ref 11.7–15.4)
HGB UR QL STRIP: NEGATIVE
IMM GRANULOCYTES # BLD AUTO: 0 K/UL (ref 0–0.5)
IMM GRANULOCYTES NFR BLD AUTO: 1 % (ref 0–5)
KETONES UR QL STRIP.AUTO: NEGATIVE MG/DL
LACTATE SERPL-SCNC: 0.8 MMOL/L (ref 0.5–2)
LEUKOCYTE ESTERASE UR QL STRIP.AUTO: ABNORMAL
LIPASE SERPL-CCNC: 32 U/L (ref 13–60)
LYMPHOCYTES # BLD: 1.2 K/UL (ref 0.5–4.6)
LYMPHOCYTES NFR BLD: 21 % (ref 13–44)
MAGNESIUM SERPL-MCNC: 1.9 MG/DL (ref 1.8–2.4)
MCH RBC QN AUTO: 30.7 PG (ref 26.1–32.9)
MCHC RBC AUTO-ENTMCNC: 30.4 G/DL (ref 31.4–35)
MCV RBC AUTO: 100.7 FL (ref 82–102)
MONOCYTES # BLD: 0.4 K/UL (ref 0.1–1.3)
MONOCYTES NFR BLD: 8 % (ref 4–12)
NEUTS SEG # BLD: 3.9 K/UL (ref 1.7–8.2)
NEUTS SEG NFR BLD: 68 % (ref 43–78)
NITRITE UR QL STRIP.AUTO: NEGATIVE
NRBC # BLD: 0 K/UL (ref 0–0.2)
NT PRO BNP: ABNORMAL PG/ML (ref 0–125)
OTHER OBSERVATIONS: ABNORMAL
PH UR STRIP: >=9 (ref 5–9)
PLATELET # BLD AUTO: 183 K/UL (ref 150–450)
PMV BLD AUTO: 9.4 FL (ref 9.4–12.3)
POTASSIUM SERPL-SCNC: 5.1 MMOL/L (ref 3.5–5.1)
PROCALCITONIN SERPL-MCNC: 0.38 NG/ML (ref 0–0.1)
PROT SERPL-MCNC: 6 G/DL (ref 6.3–8.2)
PROT UR STRIP-MCNC: 300 MG/DL
RBC # BLD AUTO: 2.74 M/UL (ref 4.05–5.2)
RBC #/AREA URNS HPF: ABNORMAL /HPF
SARS-COV-2 RDRP RESP QL NAA+PROBE: DETECTED
SODIUM SERPL-SCNC: 142 MMOL/L (ref 136–145)
SOURCE: ABNORMAL
SP GR UR REFRACTOMETRY: 1.01 (ref 1–1.02)
T4 FREE SERPL-MCNC: <0.2 NG/DL (ref 0.9–1.7)
TROPONIN T SERPL HS-MCNC: 247 NG/L (ref 0–14)
TSH, 3RD GENERATION: 522 UIU/ML (ref 0.27–4.2)
UROBILINOGEN UR QL STRIP.AUTO: 0.2 EU/DL (ref 0.2–1)
WBC # BLD AUTO: 5.8 K/UL (ref 4.3–11.1)
WBC URNS QL MICRO: ABNORMAL /HPF

## 2024-09-06 PROCEDURE — 93010 ELECTROCARDIOGRAM REPORT: CPT | Performed by: INTERNAL MEDICINE

## 2024-09-06 PROCEDURE — 87502 INFLUENZA DNA AMP PROBE: CPT

## 2024-09-06 PROCEDURE — 84443 ASSAY THYROID STIM HORMONE: CPT

## 2024-09-06 PROCEDURE — 84484 ASSAY OF TROPONIN QUANT: CPT

## 2024-09-06 PROCEDURE — 1100000000 HC RM PRIVATE

## 2024-09-06 PROCEDURE — 83735 ASSAY OF MAGNESIUM: CPT

## 2024-09-06 PROCEDURE — 83880 ASSAY OF NATRIURETIC PEPTIDE: CPT

## 2024-09-06 PROCEDURE — 96374 THER/PROPH/DIAG INJ IV PUSH: CPT

## 2024-09-06 PROCEDURE — 87522 HEPATITIS C REVRS TRNSCRPJ: CPT

## 2024-09-06 PROCEDURE — 86803 HEPATITIS C AB TEST: CPT

## 2024-09-06 PROCEDURE — 36415 COLL VENOUS BLD VENIPUNCTURE: CPT

## 2024-09-06 PROCEDURE — 81001 URINALYSIS AUTO W/SCOPE: CPT

## 2024-09-06 PROCEDURE — 80053 COMPREHEN METABOLIC PANEL: CPT

## 2024-09-06 PROCEDURE — 84439 ASSAY OF FREE THYROXINE: CPT

## 2024-09-06 PROCEDURE — 71045 X-RAY EXAM CHEST 1 VIEW: CPT

## 2024-09-06 PROCEDURE — 83605 ASSAY OF LACTIC ACID: CPT

## 2024-09-06 PROCEDURE — 99285 EMERGENCY DEPT VISIT HI MDM: CPT

## 2024-09-06 PROCEDURE — 93005 ELECTROCARDIOGRAM TRACING: CPT

## 2024-09-06 PROCEDURE — 85025 COMPLETE CBC W/AUTO DIFF WBC: CPT

## 2024-09-06 PROCEDURE — 83690 ASSAY OF LIPASE: CPT

## 2024-09-06 PROCEDURE — 2580000003 HC RX 258

## 2024-09-06 PROCEDURE — 6360000002 HC RX W HCPCS

## 2024-09-06 PROCEDURE — 84145 PROCALCITONIN (PCT): CPT

## 2024-09-06 PROCEDURE — 87635 SARS-COV-2 COVID-19 AMP PRB: CPT

## 2024-09-06 PROCEDURE — 6370000000 HC RX 637 (ALT 250 FOR IP)

## 2024-09-06 RX ORDER — ACETAMINOPHEN 325 MG/1
650 TABLET ORAL EVERY 6 HOURS PRN
Status: DISCONTINUED | OUTPATIENT
Start: 2024-09-06 | End: 2024-09-11 | Stop reason: HOSPADM

## 2024-09-06 RX ORDER — ALBUTEROL SULFATE 90 UG/1
2 AEROSOL, METERED RESPIRATORY (INHALATION) 4 TIMES DAILY PRN
Status: DISCONTINUED | OUTPATIENT
Start: 2024-09-06 | End: 2024-09-06 | Stop reason: CLARIF

## 2024-09-06 RX ORDER — SODIUM CHLORIDE 0.9 % (FLUSH) 0.9 %
5-40 SYRINGE (ML) INJECTION EVERY 12 HOURS SCHEDULED
Status: DISCONTINUED | OUTPATIENT
Start: 2024-09-06 | End: 2024-09-11 | Stop reason: HOSPADM

## 2024-09-06 RX ORDER — SODIUM CHLORIDE 0.9 % (FLUSH) 0.9 %
5-40 SYRINGE (ML) INJECTION PRN
Status: DISCONTINUED | OUTPATIENT
Start: 2024-09-06 | End: 2024-09-11 | Stop reason: HOSPADM

## 2024-09-06 RX ORDER — LEVOTHYROXINE SODIUM 125 UG/1
125 TABLET ORAL DAILY
Status: DISCONTINUED | OUTPATIENT
Start: 2024-09-06 | End: 2024-09-11 | Stop reason: HOSPADM

## 2024-09-06 RX ORDER — HYDROMORPHONE HYDROCHLORIDE 1 MG/ML
0.25 INJECTION, SOLUTION INTRAMUSCULAR; INTRAVENOUS; SUBCUTANEOUS ONCE
Status: COMPLETED | OUTPATIENT
Start: 2024-09-06 | End: 2024-09-06

## 2024-09-06 RX ORDER — ACETAMINOPHEN 650 MG/1
650 SUPPOSITORY RECTAL EVERY 6 HOURS PRN
Status: DISCONTINUED | OUTPATIENT
Start: 2024-09-06 | End: 2024-09-11 | Stop reason: HOSPADM

## 2024-09-06 RX ORDER — PANTOPRAZOLE SODIUM 40 MG/1
40 TABLET, DELAYED RELEASE ORAL
Status: DISCONTINUED | OUTPATIENT
Start: 2024-09-07 | End: 2024-09-11 | Stop reason: HOSPADM

## 2024-09-06 RX ORDER — FAMOTIDINE 20 MG/1
10 TABLET, FILM COATED ORAL DAILY PRN
Status: DISCONTINUED | OUTPATIENT
Start: 2024-09-06 | End: 2024-09-11 | Stop reason: HOSPADM

## 2024-09-06 RX ORDER — ONDANSETRON 2 MG/ML
4 INJECTION INTRAMUSCULAR; INTRAVENOUS EVERY 6 HOURS PRN
Status: DISCONTINUED | OUTPATIENT
Start: 2024-09-06 | End: 2024-09-11 | Stop reason: HOSPADM

## 2024-09-06 RX ORDER — POLYETHYLENE GLYCOL 3350 17 G/17G
17 POWDER, FOR SOLUTION ORAL DAILY PRN
Status: DISCONTINUED | OUTPATIENT
Start: 2024-09-06 | End: 2024-09-11 | Stop reason: HOSPADM

## 2024-09-06 RX ORDER — HEPARIN SODIUM 5000 [USP'U]/ML
5000 INJECTION, SOLUTION INTRAVENOUS; SUBCUTANEOUS EVERY 8 HOURS SCHEDULED
Status: DISCONTINUED | OUTPATIENT
Start: 2024-09-06 | End: 2024-09-11 | Stop reason: HOSPADM

## 2024-09-06 RX ORDER — CARVEDILOL 25 MG/1
25 TABLET ORAL 2 TIMES DAILY WITH MEALS
Status: DISCONTINUED | OUTPATIENT
Start: 2024-09-06 | End: 2024-09-11 | Stop reason: HOSPADM

## 2024-09-06 RX ORDER — SODIUM CHLORIDE 9 MG/ML
INJECTION, SOLUTION INTRAVENOUS PRN
Status: DISCONTINUED | OUTPATIENT
Start: 2024-09-06 | End: 2024-09-11 | Stop reason: HOSPADM

## 2024-09-06 RX ORDER — MAGNESIUM HYDROXIDE/ALUMINUM HYDROXICE/SIMETHICONE 120; 1200; 1200 MG/30ML; MG/30ML; MG/30ML
30 SUSPENSION ORAL EVERY 6 HOURS PRN
Status: DISCONTINUED | OUTPATIENT
Start: 2024-09-06 | End: 2024-09-11 | Stop reason: HOSPADM

## 2024-09-06 RX ORDER — ALBUTEROL SULFATE 0.83 MG/ML
2.5 SOLUTION RESPIRATORY (INHALATION) 4 TIMES DAILY PRN
Status: DISCONTINUED | OUTPATIENT
Start: 2024-09-06 | End: 2024-09-11 | Stop reason: HOSPADM

## 2024-09-06 RX ORDER — ONDANSETRON 4 MG/1
4 TABLET, ORALLY DISINTEGRATING ORAL EVERY 8 HOURS PRN
Status: DISCONTINUED | OUTPATIENT
Start: 2024-09-06 | End: 2024-09-11 | Stop reason: HOSPADM

## 2024-09-06 RX ADMIN — CARVEDILOL 25 MG: 25 TABLET, FILM COATED ORAL at 22:33

## 2024-09-06 RX ADMIN — HYDROMORPHONE HYDROCHLORIDE 0.25 MG: 1 INJECTION, SOLUTION INTRAMUSCULAR; INTRAVENOUS; SUBCUTANEOUS at 18:09

## 2024-09-06 RX ADMIN — SODIUM CHLORIDE, PRESERVATIVE FREE 10 ML: 5 INJECTION INTRAVENOUS at 22:10

## 2024-09-06 RX ADMIN — LEVOTHYROXINE SODIUM 125 MCG: 0.12 TABLET ORAL at 22:49

## 2024-09-06 ASSESSMENT — ENCOUNTER SYMPTOMS
EYES NEGATIVE: 1
ALLERGIC/IMMUNOLOGIC NEGATIVE: 1
RESPIRATORY NEGATIVE: 1
VOMITING: 0
DIARRHEA: 0
NAUSEA: 1
ABDOMINAL PAIN: 1

## 2024-09-06 ASSESSMENT — PAIN - FUNCTIONAL ASSESSMENT: PAIN_FUNCTIONAL_ASSESSMENT: 0-10

## 2024-09-06 ASSESSMENT — PAIN SCALES - GENERAL
PAINLEVEL_OUTOF10: 7
PAINLEVEL_OUTOF10: 10
PAINLEVEL_OUTOF10: 3
PAINLEVEL_OUTOF10: 0

## 2024-09-06 ASSESSMENT — PAIN DESCRIPTION - DESCRIPTORS: DESCRIPTORS: DISCOMFORT

## 2024-09-06 ASSESSMENT — PAIN DESCRIPTION - LOCATION: LOCATION: GENERALIZED

## 2024-09-06 NOTE — TELEPHONE ENCOUNTER
[facility] for patient to call to give her the message from Malick HALL.    ----- Message from RAFAEL More sent at 9/6/2024  9:46 AM EDT -----  Regarding: RE: help  No response. If she calls back and thinks she septic, she should go to the ER to be evaluated.  ----- Message -----  From: Teresa Falcon MA  Sent: 9/6/2024   9:38 AM EDT  To: RAFAEL Smith  Subject: help                                             Please take a look in her chart.  I have had several messages from stating that she is septic.  I looked at her labs and her WBC is ok.  She just received Cipro from Urology on 9/3/24 for a UTI.  I really feel like she has some confusion.  Can you please give her a call?

## 2024-09-06 NOTE — ED NOTES
TRANSFER - OUT REPORT:    Verbal report given to Arielle  on Wilda Camara  being transferred to [facility] for routine progression of patient care       Report consisted of patient's Situation, Background, Assessment and   Recommendations(SBAR).     Information from the following report(s) Nurse Handoff Report was reviewed with the receiving nurse.    Lines:   Peripheral IV Right Antecubital (Active)       Peripheral IV Posterior;Proximal;Right Forearm (Active)       Tunneled Hemodialysis Catheter Right Subclavian (Active)        Opportunity for questions and clarification was provided.      Patient transported with:  [employer]

## 2024-09-06 NOTE — CARE COORDINATION
Patient expressed concerns getting to and from medical appointments. Patient noted to have [employer] Medicare. SW provided the patient with a flyer regarding transportation benefits through her insurance. Attached below for future reference.     Myrna Reyna, JD [facility]  Medical Social Worker  [facility]        [employer] Transportation Benefit    Transportation to your medical appointments  We never want you to miss a medical appointment because you don't have a way to get there. Your [employer] Medicare Advantage plan includes non-emergency transportation to your appointments and back home again.    How the program works    Our partner [employer]? will provide up to 24 one-way trips, up to 60 miles per trip. If you need a ride to and from your doctor's office, you'll use 2 of your 24. Transportation to your appointments must be scheduled at least 72 hours in advance to guarantee availability.    To schedule your ride, call [phone] (TTY: 171), Monday to Friday, 8 AM to 8 PM all time zones. You can also reserve a ride or get more details at [email or web address].

## 2024-09-06 NOTE — ED PROVIDER NOTES
Emergency Department Provider Note       PCP: Liliana Minor MD   Age: 74 y.o.   Sex: female     DISPOSITION Decision To Transfer 09/06/2024 05:23:42 PM  Condition at Disposition: Good       ICD-10-CM    1. Hypervolemia, unspecified hypervolemia type  E87.70       2. COVID-19  U07.1       3. Elevated TSH  R79.89           Medical Decision Making     In summary this is a chronically ill-appearing 74-year-old female arriving for complaints of generalized weakness, shortness of breath primarily on exertion, naus for the past week.  Patient is a Tuesday Thursday Saturday dialysis patient.  Her vital signs here been stable.  Not been tachycardic.  No fever.  Normal O2 sats.  CBC without leukocytosis.  Her hemoglobin is 8.4 hematocrit 27.6 which is improved from priors.  No evidence of thrombocytopenia.  She have a elevated Pro-Martin with a normal lactic acid.  As above no white count.  Her creatinine here today is 4.71 with GFR of 9 which is improved.  Troponin is elevated at 247 which is elevated above baseline's.  Nonischemic EKG.  BNP is 13,583 which is double her baseline from prior admission.  She does have a pleural effusion noted on x-ray.  Subsequent finding of a TSH of 522 with T4 less than 0.2 was appreciated on labs.  Feel this may be contributory to generalized symptoms.  Her exam is consistent with volume overload as she does have some lower extremity edema and decreased breath sounds.  She also has COVID-19 which is also contributory to generalized symptoms. She does not have transportation to dialysis tomorrow.  Given findings of volume overload today I have reached out to hospitalist for admission.  Dr. Wright has been contacted and is in agreement for admission.  Patient will need to go downtown due to dialysis.  I put in for admission.  I appreciate the admitting team's willingness to further care for this patient.  ED Course as of 09/06/24 1807   Fri Sep 06, 2024   1522 CBC without leukocytosis.   the   caval atrial junction.      Electronically signed by Zaid Montez                   Recent Labs     09/06/24  1554   COVID19 Detected*       Voice dictation software was used during the making of this note.  This software is not perfect and grammatical and other typographical errors may be present.  This note has not been completely proofread for errors.      Chriss Henderson APRN - NP  09/06/24 4368

## 2024-09-06 NOTE — ED TRIAGE NOTES
Pt arrives POV A&Ox4 ambulatory w/walking assistive device. Pt reports seen twice prior this year and admitted for sepsis related issues. Pt states meds were stopped prior to d/c. Pt attempted to contact PCPs to have medication prescribed w/o success. Pt reports onset of difficulty walking, difficulty talking, and diffuse sores throughout body. Pt reports feeling needs a wheelchair to move around. Pt states difficulty talking is from \"not having talked in a while\" or from having difficulty thinking.

## 2024-09-07 LAB
ANION GAP SERPL CALC-SCNC: 13 MMOL/L (ref 9–18)
BASOPHILS # BLD: 0.1 K/UL (ref 0–0.2)
BASOPHILS NFR BLD: 1 % (ref 0–2)
BUN SERPL-MCNC: 26 MG/DL (ref 8–23)
CALCIUM SERPL-MCNC: 7.8 MG/DL (ref 8.8–10.2)
CHLORIDE SERPL-SCNC: 98 MMOL/L (ref 98–107)
CO2 SERPL-SCNC: 27 MMOL/L (ref 20–28)
CORTIS AM PEAK SERPL-MCNC: 15.4 UG/DL (ref 4.8–19.5)
CREAT SERPL-MCNC: 5.56 MG/DL (ref 0.6–1.1)
DIFFERENTIAL METHOD BLD: ABNORMAL
EOSINOPHIL # BLD: 0.1 K/UL (ref 0–0.8)
EOSINOPHIL NFR BLD: 3 % (ref 0.5–7.8)
ERYTHROCYTE [DISTWIDTH] IN BLOOD BY AUTOMATED COUNT: 15.9 % (ref 11.9–14.6)
GLUCOSE BLD STRIP.AUTO-MCNC: 87 MG/DL (ref 65–100)
GLUCOSE SERPL-MCNC: 91 MG/DL (ref 70–99)
HBV SURFACE AG SER QL: NONREACTIVE
HCT VFR BLD AUTO: 23.4 % (ref 35.8–46.3)
HGB BLD-MCNC: 7 G/DL (ref 11.7–15.4)
IMM GRANULOCYTES # BLD AUTO: 0 K/UL (ref 0–0.5)
IMM GRANULOCYTES NFR BLD AUTO: 1 % (ref 0–5)
LYMPHOCYTES # BLD: 1.1 K/UL (ref 0.5–4.6)
LYMPHOCYTES NFR BLD: 24 % (ref 13–44)
MCH RBC QN AUTO: 30.8 PG (ref 26.1–32.9)
MCHC RBC AUTO-ENTMCNC: 29.9 G/DL (ref 31.4–35)
MCV RBC AUTO: 103.1 FL (ref 82–102)
MONOCYTES # BLD: 0.4 K/UL (ref 0.1–1.3)
MONOCYTES NFR BLD: 8 % (ref 4–12)
NEUTS SEG # BLD: 3 K/UL (ref 1.7–8.2)
NEUTS SEG NFR BLD: 63 % (ref 43–78)
NRBC # BLD: 0 K/UL (ref 0–0.2)
PLATELET # BLD AUTO: 159 K/UL (ref 150–450)
PMV BLD AUTO: 9 FL (ref 9.4–12.3)
POTASSIUM SERPL-SCNC: 5 MMOL/L (ref 3.5–5.1)
RBC # BLD AUTO: 2.27 M/UL (ref 4.05–5.2)
SERVICE CMNT-IMP: NORMAL
SODIUM SERPL-SCNC: 138 MMOL/L (ref 136–145)
T4 FREE SERPL-MCNC: <0.2 NG/DL (ref 0.9–1.7)
TROPONIN T SERPL HS-MCNC: 219 NG/L (ref 0–14)
TSH W FREE THYROID IF ABNORMAL: 524 UIU/ML (ref 0.27–4.2)
WBC # BLD AUTO: 4.7 K/UL (ref 4.3–11.1)

## 2024-09-07 PROCEDURE — 76937 US GUIDE VASCULAR ACCESS: CPT

## 2024-09-07 PROCEDURE — 85025 COMPLETE CBC W/AUTO DIFF WBC: CPT

## 2024-09-07 PROCEDURE — 82962 GLUCOSE BLOOD TEST: CPT

## 2024-09-07 PROCEDURE — 2580000003 HC RX 258

## 2024-09-07 PROCEDURE — 6370000000 HC RX 637 (ALT 250 FOR IP): Performed by: FAMILY MEDICINE

## 2024-09-07 PROCEDURE — 6370000000 HC RX 637 (ALT 250 FOR IP)

## 2024-09-07 PROCEDURE — 82533 TOTAL CORTISOL: CPT

## 2024-09-07 PROCEDURE — 90935 HEMODIALYSIS ONE EVALUATION: CPT

## 2024-09-07 PROCEDURE — 87340 HEPATITIS B SURFACE AG IA: CPT

## 2024-09-07 PROCEDURE — 6360000002 HC RX W HCPCS

## 2024-09-07 PROCEDURE — 80048 BASIC METABOLIC PNL TOTAL CA: CPT

## 2024-09-07 PROCEDURE — 5A1D70Z PERFORMANCE OF URINARY FILTRATION, INTERMITTENT, LESS THAN 6 HOURS PER DAY: ICD-10-PCS

## 2024-09-07 PROCEDURE — 1100000000 HC RM PRIVATE

## 2024-09-07 RX ORDER — MIDODRINE HYDROCHLORIDE 5 MG/1
5 TABLET ORAL
Status: DISCONTINUED | OUTPATIENT
Start: 2024-09-07 | End: 2024-09-07

## 2024-09-07 RX ORDER — LORAZEPAM 0.5 MG/1
0.5 TABLET ORAL ONCE
Status: COMPLETED | OUTPATIENT
Start: 2024-09-07 | End: 2024-09-07

## 2024-09-07 RX ORDER — OXYCODONE HYDROCHLORIDE 5 MG/1
5 TABLET ORAL ONCE
Status: COMPLETED | OUTPATIENT
Start: 2024-09-07 | End: 2024-09-07

## 2024-09-07 RX ORDER — SODIUM CHLORIDE, SODIUM LACTATE, POTASSIUM CHLORIDE, AND CALCIUM CHLORIDE .6; .31; .03; .02 G/100ML; G/100ML; G/100ML; G/100ML
250 INJECTION, SOLUTION INTRAVENOUS ONCE
Status: COMPLETED | OUTPATIENT
Start: 2024-09-07 | End: 2024-09-07

## 2024-09-07 RX ORDER — HYDROCODONE BITARTRATE AND ACETAMINOPHEN 5; 325 MG/1; MG/1
1 TABLET ORAL ONCE
Status: COMPLETED | OUTPATIENT
Start: 2024-09-07 | End: 2024-09-07

## 2024-09-07 RX ORDER — MIDODRINE HYDROCHLORIDE 5 MG/1
10 TABLET ORAL
Status: DISCONTINUED | OUTPATIENT
Start: 2024-09-08 | End: 2024-09-08

## 2024-09-07 RX ADMIN — CARVEDILOL 25 MG: 25 TABLET, FILM COATED ORAL at 09:15

## 2024-09-07 RX ADMIN — SODIUM CHLORIDE, PRESERVATIVE FREE 10 ML: 5 INJECTION INTRAVENOUS at 09:16

## 2024-09-07 RX ADMIN — MIDODRINE HYDROCHLORIDE 5 MG: 5 TABLET ORAL at 14:42

## 2024-09-07 RX ADMIN — HYDROCODONE BITARTRATE AND ACETAMINOPHEN 1 TABLET: 5; 325 TABLET ORAL at 05:55

## 2024-09-07 RX ADMIN — SODIUM CHLORIDE, POTASSIUM CHLORIDE, SODIUM LACTATE AND CALCIUM CHLORIDE 250 ML: 600; 310; 30; 20 INJECTION, SOLUTION INTRAVENOUS at 14:29

## 2024-09-07 RX ADMIN — LORAZEPAM 0.5 MG: 0.5 TABLET ORAL at 21:26

## 2024-09-07 RX ADMIN — OXYCODONE HYDROCHLORIDE 5 MG: 5 TABLET ORAL at 20:25

## 2024-09-07 RX ADMIN — HEPARIN SODIUM 5000 UNITS: 5000 INJECTION INTRAVENOUS; SUBCUTANEOUS at 14:55

## 2024-09-07 RX ADMIN — PANTOPRAZOLE SODIUM 40 MG: 40 TABLET, DELAYED RELEASE ORAL at 05:29

## 2024-09-07 RX ADMIN — HEPARIN SODIUM 5000 UNITS: 5000 INJECTION INTRAVENOUS; SUBCUTANEOUS at 20:25

## 2024-09-07 RX ADMIN — SODIUM CHLORIDE 250 ML: 9 INJECTION, SOLUTION INTRAVENOUS at 14:28

## 2024-09-07 ASSESSMENT — PAIN SCALES - GENERAL
PAINLEVEL_OUTOF10: 0
PAINLEVEL_OUTOF10: 5
PAINLEVEL_OUTOF10: 0
PAINLEVEL_OUTOF10: 10

## 2024-09-07 ASSESSMENT — PAIN DESCRIPTION - DESCRIPTORS: DESCRIPTORS: ACHING;DISCOMFORT

## 2024-09-07 ASSESSMENT — PAIN DESCRIPTION - LOCATION: LOCATION: GENERALIZED

## 2024-09-07 ASSESSMENT — PAIN DESCRIPTION - ORIENTATION: ORIENTATION: OTHER (COMMENT)

## 2024-09-07 ASSESSMENT — PAIN - FUNCTIONAL ASSESSMENT: PAIN_FUNCTIONAL_ASSESSMENT: PREVENTS OR INTERFERES SOME ACTIVE ACTIVITIES AND ADLS

## 2024-09-08 LAB
ANION GAP SERPL CALC-SCNC: 11 MMOL/L (ref 9–18)
BASOPHILS # BLD: 0.1 K/UL (ref 0–0.2)
BASOPHILS NFR BLD: 1 % (ref 0–2)
BUN SERPL-MCNC: 20 MG/DL (ref 8–23)
CALCIUM SERPL-MCNC: 7.9 MG/DL (ref 8.8–10.2)
CHLORIDE SERPL-SCNC: 100 MMOL/L (ref 98–107)
CO2 SERPL-SCNC: 28 MMOL/L (ref 20–28)
CREAT SERPL-MCNC: 5.2 MG/DL (ref 0.6–1.1)
DIFFERENTIAL METHOD BLD: ABNORMAL
EOSINOPHIL # BLD: 0.2 K/UL (ref 0–0.8)
EOSINOPHIL NFR BLD: 4 % (ref 0.5–7.8)
ERYTHROCYTE [DISTWIDTH] IN BLOOD BY AUTOMATED COUNT: 15.9 % (ref 11.9–14.6)
GLUCOSE SERPL-MCNC: 70 MG/DL (ref 70–99)
HCT VFR BLD AUTO: 23.9 % (ref 35.8–46.3)
HGB BLD-MCNC: 7.2 G/DL (ref 11.7–15.4)
IMM GRANULOCYTES # BLD AUTO: 0 K/UL (ref 0–0.5)
IMM GRANULOCYTES NFR BLD AUTO: 1 % (ref 0–5)
LYMPHOCYTES # BLD: 1.4 K/UL (ref 0.5–4.6)
LYMPHOCYTES NFR BLD: 33 % (ref 13–44)
MCH RBC QN AUTO: 31.6 PG (ref 26.1–32.9)
MCHC RBC AUTO-ENTMCNC: 30.1 G/DL (ref 31.4–35)
MCV RBC AUTO: 104.8 FL (ref 82–102)
MONOCYTES # BLD: 0.4 K/UL (ref 0.1–1.3)
MONOCYTES NFR BLD: 10 % (ref 4–12)
NEUTS SEG # BLD: 2.2 K/UL (ref 1.7–8.2)
NEUTS SEG NFR BLD: 51 % (ref 43–78)
NRBC # BLD: 0 K/UL (ref 0–0.2)
PLATELET # BLD AUTO: 157 K/UL (ref 150–450)
PMV BLD AUTO: 9.3 FL (ref 9.4–12.3)
POTASSIUM SERPL-SCNC: 4.6 MMOL/L (ref 3.5–5.1)
RBC # BLD AUTO: 2.28 M/UL (ref 4.05–5.2)
SODIUM SERPL-SCNC: 139 MMOL/L (ref 136–145)
WBC # BLD AUTO: 4.2 K/UL (ref 4.3–11.1)

## 2024-09-08 PROCEDURE — 94640 AIRWAY INHALATION TREATMENT: CPT

## 2024-09-08 PROCEDURE — 6360000002 HC RX W HCPCS

## 2024-09-08 PROCEDURE — 1100000000 HC RM PRIVATE

## 2024-09-08 PROCEDURE — 94760 N-INVAS EAR/PLS OXIMETRY 1: CPT

## 2024-09-08 PROCEDURE — 36415 COLL VENOUS BLD VENIPUNCTURE: CPT

## 2024-09-08 PROCEDURE — 85025 COMPLETE CBC W/AUTO DIFF WBC: CPT

## 2024-09-08 PROCEDURE — 2580000003 HC RX 258

## 2024-09-08 PROCEDURE — 80048 BASIC METABOLIC PNL TOTAL CA: CPT

## 2024-09-08 PROCEDURE — 6370000000 HC RX 637 (ALT 250 FOR IP)

## 2024-09-08 RX ADMIN — ALBUTEROL SULFATE 2.5 MG: 2.5 SOLUTION RESPIRATORY (INHALATION) at 10:50

## 2024-09-08 RX ADMIN — CARVEDILOL 25 MG: 25 TABLET, FILM COATED ORAL at 17:44

## 2024-09-08 RX ADMIN — LEVOTHYROXINE SODIUM 125 MCG: 0.12 TABLET ORAL at 05:29

## 2024-09-08 RX ADMIN — SODIUM CHLORIDE, PRESERVATIVE FREE 10 ML: 5 INJECTION INTRAVENOUS at 22:44

## 2024-09-08 RX ADMIN — HEPARIN SODIUM 5000 UNITS: 5000 INJECTION INTRAVENOUS; SUBCUTANEOUS at 13:19

## 2024-09-08 RX ADMIN — HEPARIN SODIUM 5000 UNITS: 5000 INJECTION INTRAVENOUS; SUBCUTANEOUS at 22:43

## 2024-09-08 RX ADMIN — PANTOPRAZOLE SODIUM 40 MG: 40 TABLET, DELAYED RELEASE ORAL at 05:29

## 2024-09-09 LAB
ANION GAP SERPL CALC-SCNC: 15 MMOL/L (ref 9–18)
BASOPHILS # BLD: 0.1 K/UL (ref 0–0.2)
BASOPHILS NFR BLD: 2 % (ref 0–2)
BUN SERPL-MCNC: 26 MG/DL (ref 8–23)
CALCIUM SERPL-MCNC: 8.1 MG/DL (ref 8.8–10.2)
CHLORIDE SERPL-SCNC: 98 MMOL/L (ref 98–107)
CO2 SERPL-SCNC: 25 MMOL/L (ref 20–28)
CREAT SERPL-MCNC: 5.97 MG/DL (ref 0.6–1.1)
DIFFERENTIAL METHOD BLD: ABNORMAL
EOSINOPHIL # BLD: 0.2 K/UL (ref 0–0.8)
EOSINOPHIL NFR BLD: 3 % (ref 0.5–7.8)
ERYTHROCYTE [DISTWIDTH] IN BLOOD BY AUTOMATED COUNT: 15.9 % (ref 11.9–14.6)
GLUCOSE SERPL-MCNC: 102 MG/DL (ref 70–99)
HCT VFR BLD AUTO: 24.5 % (ref 35.8–46.3)
HGB BLD-MCNC: 7.2 G/DL (ref 11.7–15.4)
IMM GRANULOCYTES # BLD AUTO: 0 K/UL (ref 0–0.5)
IMM GRANULOCYTES NFR BLD AUTO: 1 % (ref 0–5)
LYMPHOCYTES # BLD: 1.3 K/UL (ref 0.5–4.6)
LYMPHOCYTES NFR BLD: 28 % (ref 13–44)
MCH RBC QN AUTO: 31.2 PG (ref 26.1–32.9)
MCHC RBC AUTO-ENTMCNC: 29.4 G/DL (ref 31.4–35)
MCV RBC AUTO: 106.1 FL (ref 82–102)
MONOCYTES # BLD: 0.4 K/UL (ref 0.1–1.3)
MONOCYTES NFR BLD: 9 % (ref 4–12)
NEUTS SEG # BLD: 2.7 K/UL (ref 1.7–8.2)
NEUTS SEG NFR BLD: 57 % (ref 43–78)
NRBC # BLD: 0 K/UL (ref 0–0.2)
PLATELET # BLD AUTO: 159 K/UL (ref 150–450)
PMV BLD AUTO: 9.3 FL (ref 9.4–12.3)
POTASSIUM SERPL-SCNC: 4.1 MMOL/L (ref 3.5–5.1)
RBC # BLD AUTO: 2.31 M/UL (ref 4.05–5.2)
SODIUM SERPL-SCNC: 138 MMOL/L (ref 136–145)
WBC # BLD AUTO: 4.7 K/UL (ref 4.3–11.1)

## 2024-09-09 PROCEDURE — 94760 N-INVAS EAR/PLS OXIMETRY 1: CPT

## 2024-09-09 PROCEDURE — 6360000002 HC RX W HCPCS

## 2024-09-09 PROCEDURE — 6370000000 HC RX 637 (ALT 250 FOR IP)

## 2024-09-09 PROCEDURE — 36415 COLL VENOUS BLD VENIPUNCTURE: CPT

## 2024-09-09 PROCEDURE — 97165 OT EVAL LOW COMPLEX 30 MIN: CPT

## 2024-09-09 PROCEDURE — 97535 SELF CARE MNGMENT TRAINING: CPT

## 2024-09-09 PROCEDURE — 97530 THERAPEUTIC ACTIVITIES: CPT

## 2024-09-09 PROCEDURE — 97161 PT EVAL LOW COMPLEX 20 MIN: CPT

## 2024-09-09 PROCEDURE — 2580000003 HC RX 258

## 2024-09-09 PROCEDURE — 85025 COMPLETE CBC W/AUTO DIFF WBC: CPT

## 2024-09-09 PROCEDURE — 6370000000 HC RX 637 (ALT 250 FOR IP): Performed by: NURSE PRACTITIONER

## 2024-09-09 PROCEDURE — 80048 BASIC METABOLIC PNL TOTAL CA: CPT

## 2024-09-09 PROCEDURE — 94640 AIRWAY INHALATION TREATMENT: CPT

## 2024-09-09 PROCEDURE — 1100000000 HC RM PRIVATE

## 2024-09-09 RX ORDER — TRAZODONE HYDROCHLORIDE 50 MG/1
50 TABLET, FILM COATED ORAL NIGHTLY PRN
Status: DISCONTINUED | OUTPATIENT
Start: 2024-09-09 | End: 2024-09-11 | Stop reason: HOSPADM

## 2024-09-09 RX ADMIN — HEPARIN SODIUM 5000 UNITS: 5000 INJECTION INTRAVENOUS; SUBCUTANEOUS at 06:06

## 2024-09-09 RX ADMIN — LEVOTHYROXINE SODIUM 125 MCG: 0.12 TABLET ORAL at 06:07

## 2024-09-09 RX ADMIN — TRAZODONE HYDROCHLORIDE 50 MG: 50 TABLET ORAL at 23:04

## 2024-09-09 RX ADMIN — CARVEDILOL 25 MG: 25 TABLET, FILM COATED ORAL at 08:44

## 2024-09-09 RX ADMIN — SODIUM CHLORIDE, PRESERVATIVE FREE 10 ML: 5 INJECTION INTRAVENOUS at 21:40

## 2024-09-09 RX ADMIN — ALBUTEROL SULFATE 2.5 MG: 2.5 SOLUTION RESPIRATORY (INHALATION) at 02:11

## 2024-09-09 RX ADMIN — SODIUM CHLORIDE, PRESERVATIVE FREE 10 ML: 5 INJECTION INTRAVENOUS at 08:44

## 2024-09-09 RX ADMIN — HEPARIN SODIUM 5000 UNITS: 5000 INJECTION INTRAVENOUS; SUBCUTANEOUS at 14:53

## 2024-09-09 RX ADMIN — PANTOPRAZOLE SODIUM 40 MG: 40 TABLET, DELAYED RELEASE ORAL at 06:07

## 2024-09-09 ASSESSMENT — PAIN SCALES - GENERAL: PAINLEVEL_OUTOF10: 0

## 2024-09-10 ENCOUNTER — APPOINTMENT (OUTPATIENT)
Dept: GENERAL RADIOLOGY | Age: 74
DRG: 640 | End: 2024-09-10
Attending: STUDENT IN AN ORGANIZED HEALTH CARE EDUCATION/TRAINING PROGRAM
Payer: MEDICARE

## 2024-09-10 LAB
ANION GAP SERPL CALC-SCNC: 13 MMOL/L (ref 9–18)
BASOPHILS # BLD: 0.1 K/UL (ref 0–0.2)
BASOPHILS NFR BLD: 1 % (ref 0–2)
BUN SERPL-MCNC: 35 MG/DL (ref 8–23)
CALCIUM SERPL-MCNC: 8.6 MG/DL (ref 8.8–10.2)
CHLORIDE SERPL-SCNC: 98 MMOL/L (ref 98–107)
CO2 SERPL-SCNC: 25 MMOL/L (ref 20–28)
CREAT SERPL-MCNC: 7.79 MG/DL (ref 0.6–1.1)
DIFFERENTIAL METHOD BLD: ABNORMAL
EOSINOPHIL # BLD: 0.2 K/UL (ref 0–0.8)
EOSINOPHIL NFR BLD: 4 % (ref 0.5–7.8)
ERYTHROCYTE [DISTWIDTH] IN BLOOD BY AUTOMATED COUNT: 15.9 % (ref 11.9–14.6)
FERRITIN SERPL-MCNC: 616 NG/ML (ref 8–388)
GLUCOSE SERPL-MCNC: 75 MG/DL (ref 70–99)
HCT VFR BLD AUTO: 21.9 % (ref 35.8–46.3)
HCT VFR BLD AUTO: 26.6 % (ref 35.8–46.3)
HCV IGG SERPL QL IA: REACTIVE S/CO RATIO
HCV RNA SERPL NAA+PROBE-ACNC: NORMAL IU/ML
HGB BLD-MCNC: 6.7 G/DL (ref 11.7–15.4)
HGB BLD-MCNC: 8.1 G/DL (ref 11.7–15.4)
HISTORY CHECK: NORMAL
IMM GRANULOCYTES # BLD AUTO: 0.1 K/UL (ref 0–0.5)
IMM GRANULOCYTES NFR BLD AUTO: 1 % (ref 0–5)
INTERPRETATION: NORMAL
IRON SATN MFR SERPL: 24 % (ref 20–50)
IRON SERPL-MCNC: 61 UG/DL (ref 35–100)
LYMPHOCYTES # BLD: 1.2 K/UL (ref 0.5–4.6)
LYMPHOCYTES NFR BLD: 26 % (ref 13–44)
MCH RBC QN AUTO: 30.9 PG (ref 26.1–32.9)
MCHC RBC AUTO-ENTMCNC: 30.6 G/DL (ref 31.4–35)
MCV RBC AUTO: 100.9 FL (ref 82–102)
MONOCYTES # BLD: 0.4 K/UL (ref 0.1–1.3)
MONOCYTES NFR BLD: 9 % (ref 4–12)
NEUTS SEG # BLD: 2.7 K/UL (ref 1.7–8.2)
NEUTS SEG NFR BLD: 59 % (ref 43–78)
NRBC # BLD: 0 K/UL (ref 0–0.2)
PLATELET # BLD AUTO: 200 K/UL (ref 150–450)
PMV BLD AUTO: 9.2 FL (ref 9.4–12.3)
POTASSIUM SERPL-SCNC: 4.5 MMOL/L (ref 3.5–5.1)
RBC # BLD AUTO: 2.17 M/UL (ref 4.05–5.2)
REF LAB TEST REF RANGE: NORMAL
SODIUM SERPL-SCNC: 137 MMOL/L (ref 136–145)
TIBC SERPL-MCNC: 257 UG/DL (ref 240–450)
UIBC SERPL-MCNC: 196 UG/DL (ref 112–347)
WBC # BLD AUTO: 4.6 K/UL (ref 4.3–11.1)

## 2024-09-10 PROCEDURE — 86850 RBC ANTIBODY SCREEN: CPT

## 2024-09-10 PROCEDURE — 85025 COMPLETE CBC W/AUTO DIFF WBC: CPT

## 2024-09-10 PROCEDURE — 30233N1 TRANSFUSION OF NONAUTOLOGOUS RED BLOOD CELLS INTO PERIPHERAL VEIN, PERCUTANEOUS APPROACH: ICD-10-PCS

## 2024-09-10 PROCEDURE — 36415 COLL VENOUS BLD VENIPUNCTURE: CPT

## 2024-09-10 PROCEDURE — 6360000002 HC RX W HCPCS

## 2024-09-10 PROCEDURE — 6370000000 HC RX 637 (ALT 250 FOR IP)

## 2024-09-10 PROCEDURE — 36430 TRANSFUSION BLD/BLD COMPNT: CPT

## 2024-09-10 PROCEDURE — 1100000000 HC RM PRIVATE

## 2024-09-10 PROCEDURE — P9016 RBC LEUKOCYTES REDUCED: HCPCS

## 2024-09-10 PROCEDURE — 80048 BASIC METABOLIC PNL TOTAL CA: CPT

## 2024-09-10 PROCEDURE — 86923 COMPATIBILITY TEST ELECTRIC: CPT

## 2024-09-10 PROCEDURE — 82728 ASSAY OF FERRITIN: CPT

## 2024-09-10 PROCEDURE — 86900 BLOOD TYPING SEROLOGIC ABO: CPT

## 2024-09-10 PROCEDURE — 2580000003 HC RX 258

## 2024-09-10 PROCEDURE — 90935 HEMODIALYSIS ONE EVALUATION: CPT

## 2024-09-10 PROCEDURE — 85014 HEMATOCRIT: CPT

## 2024-09-10 PROCEDURE — 85018 HEMOGLOBIN: CPT

## 2024-09-10 PROCEDURE — 86901 BLOOD TYPING SEROLOGIC RH(D): CPT

## 2024-09-10 PROCEDURE — 83540 ASSAY OF IRON: CPT

## 2024-09-10 PROCEDURE — 74018 RADEX ABDOMEN 1 VIEW: CPT

## 2024-09-10 PROCEDURE — 6360000002 HC RX W HCPCS: Performed by: NURSE PRACTITIONER

## 2024-09-10 PROCEDURE — 76937 US GUIDE VASCULAR ACCESS: CPT

## 2024-09-10 PROCEDURE — 83550 IRON BINDING TEST: CPT

## 2024-09-10 RX ORDER — SODIUM CHLORIDE 9 MG/ML
INJECTION, SOLUTION INTRAVENOUS PRN
Status: DISCONTINUED | OUTPATIENT
Start: 2024-09-10 | End: 2024-09-11 | Stop reason: HOSPADM

## 2024-09-10 RX ORDER — HEPARIN SODIUM 1000 [USP'U]/ML
5000 INJECTION, SOLUTION INTRAVENOUS; SUBCUTANEOUS PRN
Status: DISCONTINUED | OUTPATIENT
Start: 2024-09-10 | End: 2024-09-11 | Stop reason: HOSPADM

## 2024-09-10 RX ADMIN — LEVOTHYROXINE SODIUM 125 MCG: 0.12 TABLET ORAL at 06:10

## 2024-09-10 RX ADMIN — HEPARIN SODIUM 5000 UNITS: 5000 INJECTION INTRAVENOUS; SUBCUTANEOUS at 22:16

## 2024-09-10 RX ADMIN — PANTOPRAZOLE SODIUM 40 MG: 40 TABLET, DELAYED RELEASE ORAL at 06:10

## 2024-09-10 RX ADMIN — CARVEDILOL 25 MG: 25 TABLET, FILM COATED ORAL at 18:01

## 2024-09-10 RX ADMIN — HEPARIN SODIUM 5000 UNITS: 1000 INJECTION INTRAVENOUS; SUBCUTANEOUS at 07:22

## 2024-09-10 RX ADMIN — SODIUM CHLORIDE, PRESERVATIVE FREE 10 ML: 5 INJECTION INTRAVENOUS at 19:55

## 2024-09-10 ASSESSMENT — PAIN SCALES - GENERAL: PAINLEVEL_OUTOF10: 0

## 2024-09-11 VITALS
DIASTOLIC BLOOD PRESSURE: 85 MMHG | TEMPERATURE: 98.1 F | SYSTOLIC BLOOD PRESSURE: 137 MMHG | RESPIRATION RATE: 18 BRPM | OXYGEN SATURATION: 94 % | HEART RATE: 72 BPM | HEIGHT: 70 IN | WEIGHT: 150 LBS | BODY MASS INDEX: 21.47 KG/M2

## 2024-09-11 LAB
ABO + RH BLD: NORMAL
ANION GAP SERPL CALC-SCNC: 14 MMOL/L (ref 9–18)
BASOPHILS # BLD: 0.1 K/UL (ref 0–0.2)
BASOPHILS NFR BLD: 2 % (ref 0–2)
BLD PROD TYP BPU: NORMAL
BLOOD BANK BLOOD PRODUCT EXPIRATION DATE: NORMAL
BLOOD BANK DISPENSE STATUS: NORMAL
BLOOD BANK ISBT PRODUCT BLOOD TYPE: 5100
BLOOD BANK UNIT TYPE AND RH: NORMAL
BLOOD GROUP ANTIBODIES SERPL: NORMAL
BPU ID: NORMAL
BUN SERPL-MCNC: 24 MG/DL (ref 8–23)
CALCIUM SERPL-MCNC: 8.7 MG/DL (ref 8.8–10.2)
CHLORIDE SERPL-SCNC: 99 MMOL/L (ref 98–107)
CO2 SERPL-SCNC: 25 MMOL/L (ref 20–28)
CREAT SERPL-MCNC: 5.69 MG/DL (ref 0.6–1.1)
CROSSMATCH RESULT: NORMAL
DIFFERENTIAL METHOD BLD: ABNORMAL
EOSINOPHIL # BLD: 0.2 K/UL (ref 0–0.8)
EOSINOPHIL NFR BLD: 5 % (ref 0.5–7.8)
ERYTHROCYTE [DISTWIDTH] IN BLOOD BY AUTOMATED COUNT: 16.8 % (ref 11.9–14.6)
GLUCOSE SERPL-MCNC: 76 MG/DL (ref 70–99)
HCT VFR BLD AUTO: 28.9 % (ref 35.8–46.3)
HGB BLD-MCNC: 8.8 G/DL (ref 11.7–15.4)
IMM GRANULOCYTES # BLD AUTO: 0.1 K/UL (ref 0–0.5)
IMM GRANULOCYTES NFR BLD AUTO: 2 % (ref 0–5)
LYMPHOCYTES # BLD: 1.5 K/UL (ref 0.5–4.6)
LYMPHOCYTES NFR BLD: 31 % (ref 13–44)
MCH RBC QN AUTO: 30.9 PG (ref 26.1–32.9)
MCHC RBC AUTO-ENTMCNC: 30.4 G/DL (ref 31.4–35)
MCV RBC AUTO: 101.4 FL (ref 82–102)
MONOCYTES # BLD: 0.5 K/UL (ref 0.1–1.3)
MONOCYTES NFR BLD: 10 % (ref 4–12)
NEUTS SEG # BLD: 2.4 K/UL (ref 1.7–8.2)
NEUTS SEG NFR BLD: 50 % (ref 43–78)
NRBC # BLD: 0 K/UL (ref 0–0.2)
PLATELET # BLD AUTO: 191 K/UL (ref 150–450)
PMV BLD AUTO: 9 FL (ref 9.4–12.3)
POTASSIUM SERPL-SCNC: 4.5 MMOL/L (ref 3.5–5.1)
RBC # BLD AUTO: 2.85 M/UL (ref 4.05–5.2)
SODIUM SERPL-SCNC: 138 MMOL/L (ref 136–145)
SPECIMEN EXP DATE BLD: NORMAL
UNIT DIVISION: 0
UNIT ISSUE DATE/TIME: NORMAL
WBC # BLD AUTO: 4.7 K/UL (ref 4.3–11.1)

## 2024-09-11 PROCEDURE — 6370000000 HC RX 637 (ALT 250 FOR IP)

## 2024-09-11 PROCEDURE — 36415 COLL VENOUS BLD VENIPUNCTURE: CPT

## 2024-09-11 PROCEDURE — 97535 SELF CARE MNGMENT TRAINING: CPT

## 2024-09-11 PROCEDURE — 2580000003 HC RX 258

## 2024-09-11 PROCEDURE — 85025 COMPLETE CBC W/AUTO DIFF WBC: CPT

## 2024-09-11 PROCEDURE — 97112 NEUROMUSCULAR REEDUCATION: CPT

## 2024-09-11 PROCEDURE — 80048 BASIC METABOLIC PNL TOTAL CA: CPT

## 2024-09-11 PROCEDURE — 97530 THERAPEUTIC ACTIVITIES: CPT

## 2024-09-11 RX ORDER — LEVOTHYROXINE SODIUM 125 UG/1
125 TABLET ORAL DAILY
Qty: 30 TABLET | Refills: 3 | Status: SHIPPED | OUTPATIENT
Start: 2024-09-12

## 2024-09-11 RX ADMIN — SODIUM CHLORIDE, PRESERVATIVE FREE 10 ML: 5 INJECTION INTRAVENOUS at 08:46

## 2024-09-11 RX ADMIN — CARVEDILOL 25 MG: 25 TABLET, FILM COATED ORAL at 08:46

## 2024-09-11 RX ADMIN — PANTOPRAZOLE SODIUM 40 MG: 40 TABLET, DELAYED RELEASE ORAL at 05:05

## 2024-09-11 RX ADMIN — LEVOTHYROXINE SODIUM 125 MCG: 0.12 TABLET ORAL at 05:05

## 2024-09-11 ASSESSMENT — PAIN SCALES - GENERAL: PAINLEVEL_OUTOF10: 0

## 2024-09-12 ENCOUNTER — TELEPHONE (OUTPATIENT)
Dept: INTERNAL MEDICINE CLINIC | Facility: CLINIC | Age: 74
End: 2024-09-12

## 2024-09-12 ENCOUNTER — CARE COORDINATION (OUTPATIENT)
Dept: CARE COORDINATION | Facility: CLINIC | Age: 74
End: 2024-09-12

## 2024-09-16 ENCOUNTER — TELEPHONE (OUTPATIENT)
Dept: INTERNAL MEDICINE CLINIC | Facility: CLINIC | Age: 74
End: 2024-09-16

## 2024-09-18 ENCOUNTER — HOSPITAL ENCOUNTER (EMERGENCY)
Age: 74
Discharge: HOME | End: 2024-09-19
Attending: EMERGENCY MEDICINE
Payer: MEDICARE

## 2024-09-18 DIAGNOSIS — Z99.2 END STAGE RENAL DISEASE ON DIALYSIS (HCC): ICD-10-CM

## 2024-09-18 DIAGNOSIS — R06.00 DYSPNEA, UNSPECIFIED TYPE: Primary | ICD-10-CM

## 2024-09-18 DIAGNOSIS — E87.70 HYPERVOLEMIA, UNSPECIFIED HYPERVOLEMIA TYPE: ICD-10-CM

## 2024-09-18 DIAGNOSIS — N18.6 END STAGE RENAL DISEASE ON DIALYSIS (HCC): ICD-10-CM

## 2024-09-18 PROCEDURE — 99285 EMERGENCY DEPT VISIT HI MDM: CPT

## 2024-09-18 PROCEDURE — 93005 ELECTROCARDIOGRAM TRACING: CPT | Performed by: EMERGENCY MEDICINE

## 2024-09-18 ASSESSMENT — ENCOUNTER SYMPTOMS
COUGH: 1
ANAL BLEEDING: 0
SPUTUM PRODUCTION: 0
BACK PAIN: 0
VOMITING: 0
EYE PAIN: 0
VOICE CHANGE: 0
ABDOMINAL PAIN: 0
SWOLLEN GLANDS: 0
CHEST TIGHTNESS: 1
EYE REDNESS: 0
COLOR CHANGE: 0
SHORTNESS OF BREATH: 1

## 2024-09-18 ASSESSMENT — PAIN SCALES - GENERAL: PAINLEVEL_OUTOF10: 0

## 2024-09-19 ENCOUNTER — APPOINTMENT (OUTPATIENT)
Dept: GENERAL RADIOLOGY | Age: 74
End: 2024-09-19
Payer: MEDICARE

## 2024-09-19 VITALS
BODY MASS INDEX: 26.56 KG/M2 | SYSTOLIC BLOOD PRESSURE: 132 MMHG | RESPIRATION RATE: 12 BRPM | DIASTOLIC BLOOD PRESSURE: 91 MMHG | TEMPERATURE: 97.4 F | WEIGHT: 185.5 LBS | HEART RATE: 75 BPM | OXYGEN SATURATION: 90 % | HEIGHT: 70 IN

## 2024-09-19 LAB
ALBUMIN SERPL-MCNC: 2.9 G/DL (ref 3.2–4.6)
ALBUMIN/GLOB SERPL: 0.9 (ref 1–1.9)
ALP SERPL-CCNC: 67 U/L (ref 35–104)
ALT SERPL-CCNC: 40 U/L (ref 12–65)
ANION GAP SERPL CALC-SCNC: 15 MMOL/L (ref 9–18)
AST SERPL-CCNC: 36 U/L (ref 15–37)
BASOPHILS # BLD: 0 K/UL (ref 0–0.2)
BASOPHILS NFR BLD: 0 % (ref 0–2)
BILIRUB SERPL-MCNC: <0.2 MG/DL (ref 0–1.2)
BUN SERPL-MCNC: 30 MG/DL (ref 8–23)
CALCIUM SERPL-MCNC: 9.4 MG/DL (ref 8.8–10.2)
CHLORIDE SERPL-SCNC: 96 MMOL/L (ref 98–107)
CO2 SERPL-SCNC: 29 MMOL/L (ref 20–28)
CREAT SERPL-MCNC: 6.13 MG/DL (ref 0.6–1.1)
DIFFERENTIAL METHOD BLD: ABNORMAL
EKG ATRIAL RATE: 79 BPM
EKG DIAGNOSIS: NORMAL
EKG Q-T INTERVAL: 398 MS
EKG QRS DURATION: 90 MS
EKG QTC CALCULATION (BAZETT): 451 MS
EKG R AXIS: 5 DEGREES
EKG T AXIS: -27 DEGREES
EKG VENTRICULAR RATE: 77 BPM
EOSINOPHIL # BLD: 0 K/UL (ref 0–0.8)
EOSINOPHIL NFR BLD: 0 % (ref 0.5–7.8)
ERYTHROCYTE [DISTWIDTH] IN BLOOD BY AUTOMATED COUNT: 15.6 % (ref 11.9–14.6)
GLOBULIN SER CALC-MCNC: 3.1 G/DL (ref 2.3–3.5)
GLUCOSE SERPL-MCNC: 100 MG/DL (ref 70–99)
HCT VFR BLD AUTO: 30.1 % (ref 35.8–46.3)
HGB BLD-MCNC: 9.5 G/DL (ref 11.7–15.4)
IMM GRANULOCYTES # BLD AUTO: 0.1 K/UL (ref 0–0.5)
IMM GRANULOCYTES NFR BLD AUTO: 1 % (ref 0–5)
INR PPP: 1
LYMPHOCYTES # BLD: 1.1 K/UL (ref 0.5–4.6)
LYMPHOCYTES NFR BLD: 16 % (ref 13–44)
MCH RBC QN AUTO: 31.6 PG (ref 26.1–32.9)
MCHC RBC AUTO-ENTMCNC: 31.6 G/DL (ref 31.4–35)
MCV RBC AUTO: 100 FL (ref 82–102)
MONOCYTES # BLD: 0.5 K/UL (ref 0.1–1.3)
MONOCYTES NFR BLD: 8 % (ref 4–12)
NEUTS SEG # BLD: 5.1 K/UL (ref 1.7–8.2)
NEUTS SEG NFR BLD: 75 % (ref 43–78)
NRBC # BLD: 0 K/UL (ref 0–0.2)
NT PRO BNP: ABNORMAL PG/ML (ref 0–125)
PLATELET # BLD AUTO: 275 K/UL (ref 150–450)
PMV BLD AUTO: 8.8 FL (ref 9.4–12.3)
POTASSIUM SERPL-SCNC: 4.7 MMOL/L (ref 3.5–5.1)
PROCALCITONIN SERPL-MCNC: 0.41 NG/ML (ref 0–0.1)
PROT SERPL-MCNC: 6 G/DL (ref 6.3–8.2)
PROTHROMBIN TIME: 13.9 SEC (ref 11.3–14.9)
RBC # BLD AUTO: 3.01 M/UL (ref 4.05–5.2)
SODIUM SERPL-SCNC: 139 MMOL/L (ref 136–145)
WBC # BLD AUTO: 6.9 K/UL (ref 4.3–11.1)

## 2024-09-19 PROCEDURE — 85610 PROTHROMBIN TIME: CPT

## 2024-09-19 PROCEDURE — 96374 THER/PROPH/DIAG INJ IV PUSH: CPT

## 2024-09-19 PROCEDURE — 84145 PROCALCITONIN (PCT): CPT

## 2024-09-19 PROCEDURE — 83880 ASSAY OF NATRIURETIC PEPTIDE: CPT

## 2024-09-19 PROCEDURE — 71045 X-RAY EXAM CHEST 1 VIEW: CPT

## 2024-09-19 PROCEDURE — 93010 ELECTROCARDIOGRAM REPORT: CPT | Performed by: INTERNAL MEDICINE

## 2024-09-19 PROCEDURE — 6360000002 HC RX W HCPCS: Performed by: EMERGENCY MEDICINE

## 2024-09-19 PROCEDURE — 96375 TX/PRO/DX INJ NEW DRUG ADDON: CPT

## 2024-09-19 PROCEDURE — 80053 COMPREHEN METABOLIC PANEL: CPT

## 2024-09-19 PROCEDURE — 85025 COMPLETE CBC W/AUTO DIFF WBC: CPT

## 2024-09-19 RX ORDER — MORPHINE SULFATE 2 MG/ML
2 INJECTION, SOLUTION INTRAMUSCULAR; INTRAVENOUS ONCE
Status: COMPLETED | OUTPATIENT
Start: 2024-09-19 | End: 2024-09-19

## 2024-09-19 RX ORDER — ONDANSETRON 2 MG/ML
4 INJECTION INTRAMUSCULAR; INTRAVENOUS
Status: COMPLETED | OUTPATIENT
Start: 2024-09-19 | End: 2024-09-19

## 2024-09-19 RX ADMIN — MORPHINE SULFATE 2 MG: 2 INJECTION, SOLUTION INTRAMUSCULAR; INTRAVENOUS at 00:52

## 2024-09-19 RX ADMIN — ONDANSETRON 4 MG: 2 INJECTION INTRAMUSCULAR; INTRAVENOUS at 00:52

## 2024-09-23 ENCOUNTER — TELEPHONE (OUTPATIENT)
Dept: INTERNAL MEDICINE CLINIC | Facility: CLINIC | Age: 74
End: 2024-09-23

## 2024-09-23 NOTE — TELEPHONE ENCOUNTER
I called the patient in regards to her message she sent for an appt request, the patient already has an appt on 9/30/24. I spoke to the patients spouse and he states that she has pneumonia and is coughing a lot I suggested to him that if she is getting worse to please go to the ER, he states understanding.   Thank you, Yolanda

## 2024-09-24 ENCOUNTER — APPOINTMENT (OUTPATIENT)
Dept: GENERAL RADIOLOGY | Age: 74
End: 2024-09-24
Payer: MEDICARE

## 2024-09-24 ENCOUNTER — HOSPITAL ENCOUNTER (OUTPATIENT)
Age: 74
Setting detail: OBSERVATION
Discharge: HOME HEALTH | End: 2024-10-01
Attending: STUDENT IN AN ORGANIZED HEALTH CARE EDUCATION/TRAINING PROGRAM | Admitting: INTERNAL MEDICINE
Payer: MEDICARE

## 2024-09-24 DIAGNOSIS — N18.6 END-STAGE RENAL DISEASE NEEDING DIALYSIS (HCC): Primary | ICD-10-CM

## 2024-09-24 DIAGNOSIS — E87.70 HYPERVOLEMIA, UNSPECIFIED HYPERVOLEMIA TYPE: ICD-10-CM

## 2024-09-24 DIAGNOSIS — Z99.2 END-STAGE RENAL DISEASE NEEDING DIALYSIS (HCC): Primary | ICD-10-CM

## 2024-09-24 LAB
ALBUMIN SERPL-MCNC: 3.1 G/DL (ref 3.2–4.6)
ALBUMIN/GLOB SERPL: 0.9 (ref 1–1.9)
ALP SERPL-CCNC: 65 U/L (ref 35–104)
ALT SERPL-CCNC: 20 U/L (ref 8–45)
ANION GAP SERPL CALC-SCNC: 16 MMOL/L (ref 9–18)
AST SERPL-CCNC: 24 U/L (ref 15–37)
B PERT DNA SPEC QL NAA+PROBE: NOT DETECTED
BASOPHILS # BLD: 0.1 K/UL (ref 0–0.2)
BASOPHILS NFR BLD: 1 % (ref 0–2)
BILIRUB SERPL-MCNC: 0.3 MG/DL (ref 0–1.2)
BORDETELLA PARAPERTUSSIS BY PCR: NOT DETECTED
BUN SERPL-MCNC: 24 MG/DL (ref 8–23)
C PNEUM DNA SPEC QL NAA+PROBE: NOT DETECTED
CALCIUM SERPL-MCNC: 9.7 MG/DL (ref 8.8–10.2)
CHLORIDE SERPL-SCNC: 100 MMOL/L (ref 98–107)
CO2 SERPL-SCNC: 26 MMOL/L (ref 20–28)
CREAT SERPL-MCNC: 6.97 MG/DL (ref 0.6–1.1)
DIFFERENTIAL METHOD BLD: ABNORMAL
EKG ATRIAL RATE: 88 BPM
EKG DIAGNOSIS: NORMAL
EKG P AXIS: 35 DEGREES
EKG P-R INTERVAL: 194 MS
EKG Q-T INTERVAL: 374 MS
EKG QRS DURATION: 102 MS
EKG QTC CALCULATION (BAZETT): 452 MS
EKG R AXIS: 10 DEGREES
EKG T AXIS: 23 DEGREES
EKG VENTRICULAR RATE: 88 BPM
EOSINOPHIL # BLD: 0.2 K/UL (ref 0–0.8)
EOSINOPHIL NFR BLD: 3 % (ref 0.5–7.8)
ERYTHROCYTE [DISTWIDTH] IN BLOOD BY AUTOMATED COUNT: 15.7 % (ref 11.9–14.6)
FLUAV SUBTYP SPEC NAA+PROBE: NOT DETECTED
FLUBV RNA SPEC QL NAA+PROBE: NOT DETECTED
GLOBULIN SER CALC-MCNC: 3.5 G/DL (ref 2.3–3.5)
GLUCOSE SERPL-MCNC: 79 MG/DL (ref 70–99)
HADV DNA SPEC QL NAA+PROBE: NOT DETECTED
HCOV 229E RNA SPEC QL NAA+PROBE: NOT DETECTED
HCOV HKU1 RNA SPEC QL NAA+PROBE: NOT DETECTED
HCOV NL63 RNA SPEC QL NAA+PROBE: NOT DETECTED
HCOV OC43 RNA SPEC QL NAA+PROBE: NOT DETECTED
HCT VFR BLD AUTO: 35 % (ref 35.8–46.3)
HGB BLD-MCNC: 10.7 G/DL (ref 11.7–15.4)
HMPV RNA SPEC QL NAA+PROBE: NOT DETECTED
HPIV1 RNA SPEC QL NAA+PROBE: NOT DETECTED
HPIV2 RNA SPEC QL NAA+PROBE: NOT DETECTED
HPIV3 RNA SPEC QL NAA+PROBE: NOT DETECTED
HPIV4 RNA SPEC QL NAA+PROBE: NOT DETECTED
IMM GRANULOCYTES # BLD AUTO: 0.1 K/UL (ref 0–0.5)
IMM GRANULOCYTES NFR BLD AUTO: 1 % (ref 0–5)
LACTATE SERPL-SCNC: 0.9 MMOL/L (ref 0.5–2)
LIPASE SERPL-CCNC: 18 U/L (ref 13–60)
LYMPHOCYTES # BLD: 1.1 K/UL (ref 0.5–4.6)
LYMPHOCYTES NFR BLD: 16 % (ref 13–44)
M PNEUMO DNA SPEC QL NAA+PROBE: NOT DETECTED
MAGNESIUM SERPL-MCNC: 2.3 MG/DL (ref 1.8–2.4)
MCH RBC QN AUTO: 31.6 PG (ref 26.1–32.9)
MCHC RBC AUTO-ENTMCNC: 30.6 G/DL (ref 31.4–35)
MCV RBC AUTO: 103.2 FL (ref 82–102)
MONOCYTES # BLD: 0.6 K/UL (ref 0.1–1.3)
MONOCYTES NFR BLD: 8 % (ref 4–12)
NEUTS SEG # BLD: 4.7 K/UL (ref 1.7–8.2)
NEUTS SEG NFR BLD: 71 % (ref 43–78)
NRBC # BLD: 0 K/UL (ref 0–0.2)
NT PRO BNP: ABNORMAL PG/ML (ref 0–125)
PLATELET # BLD AUTO: 317 K/UL (ref 150–450)
PMV BLD AUTO: 8.6 FL (ref 9.4–12.3)
POTASSIUM SERPL-SCNC: 4 MMOL/L (ref 3.5–5.1)
PROCALCITONIN SERPL-MCNC: 0.28 NG/ML (ref 0–0.1)
PROT SERPL-MCNC: 6.6 G/DL (ref 6.3–8.2)
RBC # BLD AUTO: 3.39 M/UL (ref 4.05–5.2)
RSV RNA SPEC QL NAA+PROBE: NOT DETECTED
RV+EV RNA SPEC QL NAA+PROBE: NOT DETECTED
SARS-COV-2 RNA RESP QL NAA+PROBE: NOT DETECTED
SODIUM SERPL-SCNC: 142 MMOL/L (ref 136–145)
TROPONIN T SERPL HS-MCNC: 184 NG/L (ref 0–14)
TROPONIN T SERPL HS-MCNC: 187 NG/L (ref 0–14)
WBC # BLD AUTO: 6.6 K/UL (ref 4.3–11.1)

## 2024-09-24 PROCEDURE — 94640 AIRWAY INHALATION TREATMENT: CPT

## 2024-09-24 PROCEDURE — 6360000002 HC RX W HCPCS: Performed by: HOSPITALIST

## 2024-09-24 PROCEDURE — 93005 ELECTROCARDIOGRAM TRACING: CPT | Performed by: STUDENT IN AN ORGANIZED HEALTH CARE EDUCATION/TRAINING PROGRAM

## 2024-09-24 PROCEDURE — G0378 HOSPITAL OBSERVATION PER HR: HCPCS

## 2024-09-24 PROCEDURE — 36415 COLL VENOUS BLD VENIPUNCTURE: CPT

## 2024-09-24 PROCEDURE — 93010 ELECTROCARDIOGRAM REPORT: CPT | Performed by: INTERNAL MEDICINE

## 2024-09-24 PROCEDURE — 99285 EMERGENCY DEPT VISIT HI MDM: CPT

## 2024-09-24 PROCEDURE — 83605 ASSAY OF LACTIC ACID: CPT

## 2024-09-24 PROCEDURE — 6360000002 HC RX W HCPCS: Performed by: NURSE PRACTITIONER

## 2024-09-24 PROCEDURE — 2700000000 HC OXYGEN THERAPY PER DAY

## 2024-09-24 PROCEDURE — 2580000003 HC RX 258: Performed by: HOSPITALIST

## 2024-09-24 PROCEDURE — 83880 ASSAY OF NATRIURETIC PEPTIDE: CPT

## 2024-09-24 PROCEDURE — 84145 PROCALCITONIN (PCT): CPT

## 2024-09-24 PROCEDURE — 83735 ASSAY OF MAGNESIUM: CPT

## 2024-09-24 PROCEDURE — 6370000000 HC RX 637 (ALT 250 FOR IP): Performed by: HOSPITALIST

## 2024-09-24 PROCEDURE — 0202U NFCT DS 22 TRGT SARS-COV-2: CPT

## 2024-09-24 PROCEDURE — 96375 TX/PRO/DX INJ NEW DRUG ADDON: CPT

## 2024-09-24 PROCEDURE — 71046 X-RAY EXAM CHEST 2 VIEWS: CPT

## 2024-09-24 PROCEDURE — 96374 THER/PROPH/DIAG INJ IV PUSH: CPT

## 2024-09-24 PROCEDURE — 84484 ASSAY OF TROPONIN QUANT: CPT

## 2024-09-24 PROCEDURE — 80053 COMPREHEN METABOLIC PANEL: CPT

## 2024-09-24 PROCEDURE — 83690 ASSAY OF LIPASE: CPT

## 2024-09-24 PROCEDURE — 85025 COMPLETE CBC W/AUTO DIFF WBC: CPT

## 2024-09-24 PROCEDURE — 94760 N-INVAS EAR/PLS OXIMETRY 1: CPT

## 2024-09-24 PROCEDURE — 96372 THER/PROPH/DIAG INJ SC/IM: CPT

## 2024-09-24 RX ORDER — BISACODYL 10 MG
10 SUPPOSITORY, RECTAL RECTAL DAILY PRN
Status: DISCONTINUED | OUTPATIENT
Start: 2024-09-24 | End: 2024-10-01 | Stop reason: HOSPADM

## 2024-09-24 RX ORDER — FAMOTIDINE 20 MG/1
10 TABLET, FILM COATED ORAL DAILY
Status: DISCONTINUED | OUTPATIENT
Start: 2024-09-24 | End: 2024-10-01 | Stop reason: HOSPADM

## 2024-09-24 RX ORDER — HEPARIN SODIUM 5000 [USP'U]/ML
5000 INJECTION, SOLUTION INTRAVENOUS; SUBCUTANEOUS 2 TIMES DAILY
Status: DISCONTINUED | OUTPATIENT
Start: 2024-09-24 | End: 2024-10-01 | Stop reason: HOSPADM

## 2024-09-24 RX ORDER — LEVOTHYROXINE SODIUM 125 UG/1
125 TABLET ORAL
Status: DISCONTINUED | OUTPATIENT
Start: 2024-09-25 | End: 2024-10-01 | Stop reason: HOSPADM

## 2024-09-24 RX ORDER — SODIUM CHLORIDE 9 MG/ML
INJECTION, SOLUTION INTRAVENOUS PRN
Status: DISCONTINUED | OUTPATIENT
Start: 2024-09-24 | End: 2024-10-01 | Stop reason: HOSPADM

## 2024-09-24 RX ORDER — HYDROXYZINE HYDROCHLORIDE 10 MG/1
10 TABLET, FILM COATED ORAL 3 TIMES DAILY PRN
Status: DISCONTINUED | OUTPATIENT
Start: 2024-09-24 | End: 2024-10-01 | Stop reason: HOSPADM

## 2024-09-24 RX ORDER — PANTOPRAZOLE SODIUM 40 MG/1
40 TABLET, DELAYED RELEASE ORAL
Status: DISCONTINUED | OUTPATIENT
Start: 2024-09-25 | End: 2024-10-01 | Stop reason: HOSPADM

## 2024-09-24 RX ORDER — ACETAMINOPHEN 325 MG/1
650 TABLET ORAL EVERY 6 HOURS PRN
Status: DISCONTINUED | OUTPATIENT
Start: 2024-09-24 | End: 2024-10-01 | Stop reason: HOSPADM

## 2024-09-24 RX ORDER — SODIUM CHLORIDE 0.9 % (FLUSH) 0.9 %
5-40 SYRINGE (ML) INJECTION PRN
Status: DISCONTINUED | OUTPATIENT
Start: 2024-09-24 | End: 2024-10-01 | Stop reason: HOSPADM

## 2024-09-24 RX ORDER — LANOLIN ALCOHOL/MO/W.PET/CERES
3 CREAM (GRAM) TOPICAL NIGHTLY
Status: DISCONTINUED | OUTPATIENT
Start: 2024-09-24 | End: 2024-10-01 | Stop reason: HOSPADM

## 2024-09-24 RX ORDER — HYDRALAZINE HYDROCHLORIDE 20 MG/ML
10 INJECTION INTRAMUSCULAR; INTRAVENOUS EVERY 6 HOURS PRN
Status: DISCONTINUED | OUTPATIENT
Start: 2024-09-24 | End: 2024-10-01 | Stop reason: HOSPADM

## 2024-09-24 RX ORDER — LEVALBUTEROL INHALATION SOLUTION 0.63 MG/3ML
0.63 SOLUTION RESPIRATORY (INHALATION)
Status: DISCONTINUED | OUTPATIENT
Start: 2024-09-24 | End: 2024-09-26

## 2024-09-24 RX ORDER — ONDANSETRON 2 MG/ML
4 INJECTION INTRAMUSCULAR; INTRAVENOUS EVERY 4 HOURS PRN
Status: DISCONTINUED | OUTPATIENT
Start: 2024-09-24 | End: 2024-10-01 | Stop reason: HOSPADM

## 2024-09-24 RX ORDER — PROMETHAZINE HYDROCHLORIDE 25 MG/1
25 TABLET ORAL EVERY 6 HOURS PRN
Status: DISCONTINUED | OUTPATIENT
Start: 2024-09-24 | End: 2024-09-30

## 2024-09-24 RX ORDER — FUROSEMIDE 10 MG/ML
80 INJECTION INTRAMUSCULAR; INTRAVENOUS ONCE
Status: COMPLETED | OUTPATIENT
Start: 2024-09-24 | End: 2024-09-24

## 2024-09-24 RX ORDER — ACETAMINOPHEN 650 MG/1
650 SUPPOSITORY RECTAL EVERY 6 HOURS PRN
Status: DISCONTINUED | OUTPATIENT
Start: 2024-09-24 | End: 2024-10-01 | Stop reason: HOSPADM

## 2024-09-24 RX ORDER — SENNA AND DOCUSATE SODIUM 50; 8.6 MG/1; MG/1
1 TABLET, FILM COATED ORAL 2 TIMES DAILY
Status: DISCONTINUED | OUTPATIENT
Start: 2024-09-24 | End: 2024-10-01 | Stop reason: HOSPADM

## 2024-09-24 RX ORDER — CARVEDILOL 12.5 MG/1
12.5 TABLET ORAL 2 TIMES DAILY WITH MEALS
Status: DISCONTINUED | OUTPATIENT
Start: 2024-09-24 | End: 2024-10-01 | Stop reason: HOSPADM

## 2024-09-24 RX ORDER — SODIUM CHLORIDE 0.9 % (FLUSH) 0.9 %
5-40 SYRINGE (ML) INJECTION EVERY 12 HOURS SCHEDULED
Status: DISCONTINUED | OUTPATIENT
Start: 2024-09-24 | End: 2024-10-01 | Stop reason: HOSPADM

## 2024-09-24 RX ORDER — CARVEDILOL 6.25 MG/1
6.25 TABLET ORAL 2 TIMES DAILY WITH MEALS
Status: DISCONTINUED | OUTPATIENT
Start: 2024-09-24 | End: 2024-09-24

## 2024-09-24 RX ADMIN — FAMOTIDINE 10 MG: 20 TABLET, FILM COATED ORAL at 16:34

## 2024-09-24 RX ADMIN — LEVALBUTEROL HYDROCHLORIDE 0.63 MG: 0.63 SOLUTION RESPIRATORY (INHALATION) at 19:19

## 2024-09-24 RX ADMIN — SODIUM CHLORIDE, PRESERVATIVE FREE 10 ML: 5 INJECTION INTRAVENOUS at 21:07

## 2024-09-24 RX ADMIN — CARVEDILOL 12.5 MG: 12.5 TABLET, FILM COATED ORAL at 16:38

## 2024-09-24 RX ADMIN — FUROSEMIDE 80 MG: 10 INJECTION, SOLUTION INTRAMUSCULAR; INTRAVENOUS at 16:33

## 2024-09-24 RX ADMIN — HEPARIN SODIUM 5000 UNITS: 5000 INJECTION INTRAVENOUS; SUBCUTANEOUS at 21:07

## 2024-09-24 RX ADMIN — HYDRALAZINE HYDROCHLORIDE 10 MG: 20 INJECTION INTRAMUSCULAR; INTRAVENOUS at 18:38

## 2024-09-24 RX ADMIN — SENNOSIDES AND DOCUSATE SODIUM 1 TABLET: 50; 8.6 TABLET ORAL at 21:07

## 2024-09-24 RX ADMIN — LEVALBUTEROL HYDROCHLORIDE 0.63 MG: 0.63 SOLUTION RESPIRATORY (INHALATION) at 15:02

## 2024-09-24 RX ADMIN — Medication 3 MG: at 21:07

## 2024-09-24 ASSESSMENT — PAIN SCALES - GENERAL
PAINLEVEL_OUTOF10: 0
PAINLEVEL_OUTOF10: 7

## 2024-09-24 ASSESSMENT — PAIN DESCRIPTION - LOCATION: LOCATION: GENERALIZED

## 2024-09-24 NOTE — PROGRESS NOTES
TRANSFER - IN REPORT:    Verbal report received from NAVEEN Deal on Wilda Camara  being received from ED for routine progression of patient care      Report consisted of patient's Situation, Background, Assessment and   Recommendations(SBAR).     Information from the following report(s) Nurse Handoff Report, Index, ED Encounter Summary, and Adult Overview was reviewed with the receiving nurse.    Opportunity for questions and clarification was provided.      Assessment completed upon patient's arrival to unit and care assumed.

## 2024-09-24 NOTE — ED TRIAGE NOTES
Pt reports sob for 2 days and was supposed to get dialysis today.  Pt was diagnosed with pneumonia 1 week ago without much relief.  Pt reports general body aches.  Pt reports abdominal swelling.

## 2024-09-24 NOTE — H&P
Hospitalist History and Physical   Admit Date:  2024 12:03 PM   Name:  Wilda Camara   Age:  74 y.o.  Sex:  female  :  1950   MRN:  [id number]   Room:  [place] [address]    Presenting/Chief Complaint: No chief complaint on file.     Reason(s) for Admission: Volume overload [E87.70]     History of Present Illness:   Wilda Camara is a 74 y.o. female with medical history of ESRD on HD TTS, hypertension, hypothyroid, GERD who presents to the ER with chief complaint of shortness of breath and not able to attend today's dialysis session.  Patient was recently discharged from the hospital on 2024 after being treated for volume overload.  Patient was seen in the ER again on 2024, was discharged on albuterol inhaler, chest x-ray showed concerns for left lower lobe consolidation due to pneumonia versus atelectasis with small left pleural effusion.  It was decided that patient's shortness of breath was due to volume overload, was dialyzed on 2024.  Patient stated that she did not feel great over the past 2 to 3 days, continues to feel short of breath with on and off cough which is mostly dry.  She denies any fever, chills, chest pain, palpitations, nausea vomiting or diarrhea.  Patient continues to report of having worsening shortness of breath which initially was only with exertion and now at rest.  Due to not feeling well, patient did not go to her regular dialysis session today.  VS on arrival: Tmax 97.4, RR 18, NH 93, /108, room air sats of 90 to 96%.  Chest x-ray showed bilateral pleural effusion with bibasilar atelectasis along with cardiomegaly without any evidence of congestive heart failure.  Twelve-lead EKG with NSR.  Blood work remarkable for BNP 03849, creatinine 6.97, BUN 24.      Assessment & Plan:     Principal Problem:    Volume overload    ESRD (end stage renal disease) ([facility])  Plan: -  Admit to observation in view of volume overload, dyspnea associated with  AST 24 15 - 37 U/L    Alk Phosphatase 65 35 - 104 U/L    Total Protein 6.6 6.3 - 8.2 g/dL    Albumin 3.1 (L) 3.2 - 4.6 g/dL    Globulin 3.5 2.3 - 3.5 g/dL    Albumin/Globulin Ratio 0.9 (L) 1.0 - 1.9     Magnesium    Collection Time: 09/24/24 12:21 PM   Result Value Ref Range    Magnesium 2.3 1.8 - 2.4 mg/dL   Lactic Acid    Collection Time: 09/24/24 12:21 PM   Result Value Ref Range    Lactic Acid 0.9 0.5 - 2.0 mmol/L   Lipase    Collection Time: 09/24/24 12:21 PM   Result Value Ref Range    Lipase 18 13 - 60 U/L   Procalcitonin    Collection Time: 09/24/24 12:21 PM   Result Value Ref Range    Procalcitonin 0.28 (H) 0.00 - 0.10 ng/mL   Troponin    Collection Time: 09/24/24 12:21 PM   Result Value Ref Range    Troponin T 187.0 (HH) 0 - 14 ng/L       No results for input(s): \"COVID19\" in the last 72 hours.    XR CHEST (2 VW)    Result Date: 9/24/2024  Chest X-ray INDICATION: Shortness of breath COMPARISON:  None TECHNIQUE: PA and lateral views of the chest were obtained. FINDINGS: There are bilateral pleural effusions. There is bibasilar atelectasis. The lungs are otherwise clear. There is cardiomegaly. There is no evidence of congestive heart failure or volume overload. There is a right IJ hemodialysis catheter.  The bony thorax is intact.      Bilateral pleural effusions and bibasilar atelectasis. Cardiomegaly without congestive heart failure/volume overload. Electronically signed by Tony Kelly        Signed:  Nivia Kinney MD    Part of this note may have been written by using a voice dictation software.  The note has been proof read but may still contain some grammatical/other typographical errors.

## 2024-09-24 NOTE — CONSULTS
Nephrology consult    Admission Date:  9/24/2024    Admission Diagnosis  Volume overload [E87.70]    We are asked by Nivia Kinney MD     History of Present Illness: Ms. Camara HTN, HLD, OA, melanoma, hypothyroidism,  suprapubic and ESRD reportedly admitted with complaints of shortness of breath and worsening LE weakness with edema. CXR with bilateral pleural effusion. She is hemodialysis dependent at [facility], via right TCC, TTS.   Pt seen and examined in ED, sitting up in bed reports SOB, LE edema on RA, denies CP, GI losses, noted uop via suprapubic catheter, no fever/chills.     Past Medical History:   Diagnosis Date    Anemia     Arthritis     Chronic pain     arthritis knees    Cough     reported constant since 10/22    ESRD (end stage renal disease) on dialysis ([facility])     M,W,F- [facility]    GERD (gastroesophageal reflux disease)     controlled with med    High cholesterol     History of blood transfusion 2022    Hypertension     medication    Kidney disease     acute renal failure 7/2020 (hospitalized x 6 days)  Followed by Dr. Wheeler, nephrologist      Liver disease     hep C, treated in 2009    Melanoma ([facility])     RLE,     PD catheter dysfunction ([facility]) 07/29/2024    Psychiatric disorder     depression, treated with cymbalta    PUD (peptic ulcer disease)     PVD (peripheral vascular disease) with claudication ([facility])     Dr. Johnson follows    Rheumatic fever     as a child    Thyroid disease     hypothyroid, synthroid    UTI (urinary tract infection)     suprapubic catherter, on antibiotics 4/23/23      Past Surgical History:   Procedure Laterality Date    APPENDECTOMY      CATARACT REMOVAL  2019    bilateral, IOL implant    CHOLECYSTECTOMY      COLONOSCOPY  2015    DIALYSIS CATHETER INSERTION N/A 8/11/2023    CATHETER INSERTION PERITONEAL DIALYSIS LAPAROSCOPIC/ OPEN INCISIONAL HERNIA REPAIR performed by Deirdre Delarosa DO at [facility] MAIN OR    DIALYSIS CATHETER REMOVAL N/A

## 2024-09-24 NOTE — FLOWSHEET NOTE
4 Eyes Skin Assessment     NAME:  Wilda Camara  YOB: 1950  MEDICAL RECORD NUMBER:  [id number]    The patient is being assessed for  Admission    I agree that at least one RN has performed a thorough Head to Toe Skin Assessment on the patient. ALL assessment sites listed below have been assessed.      Areas assessed by both nurses:    Head, Face, Ears, Shoulders, Back, Chest, Arms, Elbows, Hands, Sacrum. Buttock, Coccyx, Ischium, and Legs. Feet and Heels        09/24/24 1602   Skin Integumentary    Skin Integumentary (WDL) X   Skin Color Pale;Ecchymosis (comment)   Skin Condition/Temp Warm;Dry;Flaky;Fragile   Skin Integrity Ecchymosis;Scars (comment);Redness   Location scattered   Skin Fold Management No   Nails X   Multiple Skin Integrity Sites No   Nail Condition Polish          Does the Patient have a Wound? No noted wound(s)       Marcus Prevention initiated by RN: No  Wound Care Orders initiated by RN: No    Pressure Injury (Stage 3,4, Unstageable, DTI, NWPT, and Complex wounds) if present, place Wound referral order by RN under : No    New Ostomies, if present place, Ostomy referral order under : No     Nurse 1 eSignature: Electronically signed by Ifrah Martínez RN on 9/24/24 at 6:08 PM EDT    **SHARE this note so that the co-signing nurse can place an eSignature**    Nurse 2 eSignature: Electronically signed by Michelle Anthony RN on 9/24/24 at 7:24 PM EDT

## 2024-09-24 NOTE — ED NOTES
TRANSFER - OUT REPORT:    Verbal report given to Ifrah HODGE on Wilda Camara  being transferred to [facility] for routine progression of patient care       Report consisted of patient's Situation, Background, Assessment and   Recommendations(SBAR).     Information from the following report(s) ED SBAR was reviewed with the receiving nurse.    [place] Fall Assessment:    Presents to emergency department  because of falls (Syncope, seizure, or loss of consciousness): No  Age > 70: Yes  Altered Mental Status, Intoxication with alcohol or substance confusion (Disorientation, impaired judgment, poor safety awaremess, or inability to follow instructions): No  Impaired Mobility: Ambulates or transfers with assistive devices or assistance; Unable to ambulate or transer.: Yes  Nursing Judgement: Yes          Lines:   Tunneled Hemodialysis Catheter Right Subclavian (Active)        Opportunity for questions and clarification was provided.      Patient transported with:  Toyin Reynolds RN  09/24/24 4391

## 2024-09-24 NOTE — ED PROVIDER NOTES
Emergency Department Provider Note       PCP: Liliana Minor MD   Age: 74 y.o.   Sex: female     DISPOSITION Admitted 09/24/2024 02:26:14 PM  Condition at Disposition: Data Unavailable       ICD-10-CM    1. End-stage renal disease needing dialysis (HCC)  N18.6     Z99.2       2. Hypervolemia, unspecified hypervolemia type  E87.70           Medical Decision Making     74-year-old female with extensive past medical history including renal disease, liver disease presenting with progressive shortness of breath, dyspnea and cough.  Arrives with specific concerns for lab results suggesting sepsis in her MyChart after her most recent visit on 9/18/2024, reviewed these with patient, she is specifically concerned about her procalcitonin result which was 0.41, she notes mention of sepsis listed in the associated values given underneath her result, explained to patient that her result of 0.41 does not suggest sepsis and the other lab work collected at that time did not suggest sepsis.  However, patient arrives with borderline hypoxemia at 90%, does not use oxygen at home.  Will obtain repeat blood work including x-ray imaging, sepsis markers and EKG.    Suspect developing ascites in the abdomen, will await liver enzymes to further assess    Discussed patient case with hospitalist, request that I consult nephrology who is now aware and will see patient in consult.  Will move forward with admission for borderline hypoxia, fluid overload need for dialysis  ED Course as of 09/24/24 2029   Tue Sep 24, 2024   1154 EKG interpretation: Sinus rhythm, rate 88, normal axis, no obvious ischemia [BR]   1417 Sepsis markers are stable, improved from previous, patient's creatinine is elevated in keeping with her known kidney disease, troponin elevated significantly though felt to be related to patient's underlying kidney disease, will trend this result, EKG is stable [BR]   1418 Patient does have evidence of fluid overload with bilateral     ORTHOPEDIC SURGERY Left     KNEE SCOPE    UPPER GASTROINTESTINAL ENDOSCOPY      EGD    UROLOGICAL SURGERY  07/2020    cystoscopy, insertion berkowitz catheter (suprapubic)    UROLOGICAL SURGERY  2016    cystoscopy, urethral dilatation      Current Facility-Administered Medications   Medication Dose Route Frequency    [START ON 9/25/2024] levothyroxine (SYNTHROID) tablet 125 mcg    125 mcg Oral QAM AC    levalbuterol (XOPENEX) nebulization 0.63 mg  0.63 mg   Nebulization Q6H WA RT    [START ON 9/25/2024] pantoprazole (PROTONIX) tablet 40 mg  40 mg   Oral QAM AC    hydrOXYzine HCl (ATARAX) tablet 10 mg  10 mg Oral TID PRN    carvedilol (COREG) tablet 6.25 mg  6.25 mg Oral BID WC    sodium chloride flush 0.9 % injection 5-40 mL  5-40 mL   IntraVENous 2 times per day    sodium chloride flush 0.9 % injection 5-40 mL  5-40 mL   IntraVENous PRN    0.9 % sodium chloride infusion   IntraVENous PRN    acetaminophen (TYLENOL) tablet 650 mg  650 mg Oral Q6H PRN    Or    acetaminophen (TYLENOL) suppository 650 mg  650 mg Rectal Q6H   PRN    melatonin tablet 3 mg  3 mg Oral Nightly    sennosides-docusate sodium (SENOKOT-S) 8.6-50 MG tablet 1 tablet    1 tablet Oral BID    bisacodyl (DULCOLAX) suppository 10 mg  10 mg Rectal Daily PRN    promethazine (PHENERGAN) tablet 25 mg  25 mg Oral Q6H PRN    Or    ondansetron (ZOFRAN) injection 4 mg  4 mg IntraVENous Q4H PRN    famotidine (PEPCID) tablet 10 mg  10 mg Oral Daily    heparin (porcine) injection 5,000 Units  5,000 Units   SubCUTAneous BID    hydrALAZINE (APRESOLINE) injection 10 mg  10 mg IntraVENous Q6H   PRN     Current Outpatient Medications   Medication Sig    levothyroxine (SYNTHROID) 125 MCG tablet Take 1 tablet by mouth   Daily    albuterol sulfate HFA (VENTOLIN HFA) 108 (90 Base) MCG/ACT   inhaler Inhale 2 puffs into the lungs 4 times daily as needed for   Wheezing    acetaminophen (TYLENOL) 650 MG extended release tablet Take 1   tablet by mouth every 6 hours as needed

## 2024-09-25 LAB
ANION GAP SERPL CALC-SCNC: 14 MMOL/L (ref 9–18)
BASOPHILS # BLD: 0.1 K/UL (ref 0–0.2)
BASOPHILS NFR BLD: 1 % (ref 0–2)
BUN SERPL-MCNC: 28 MG/DL (ref 8–23)
CALCIUM SERPL-MCNC: 9.4 MG/DL (ref 8.8–10.2)
CHLORIDE SERPL-SCNC: 100 MMOL/L (ref 98–107)
CO2 SERPL-SCNC: 26 MMOL/L (ref 20–28)
CREAT SERPL-MCNC: 7.64 MG/DL (ref 0.6–1.1)
DIFFERENTIAL METHOD BLD: ABNORMAL
EOSINOPHIL # BLD: 0.2 K/UL (ref 0–0.8)
EOSINOPHIL NFR BLD: 3 % (ref 0.5–7.8)
ERYTHROCYTE [DISTWIDTH] IN BLOOD BY AUTOMATED COUNT: 15.8 % (ref 11.9–14.6)
GLUCOSE SERPL-MCNC: 87 MG/DL (ref 70–99)
HCT VFR BLD AUTO: 30.5 % (ref 35.8–46.3)
HGB BLD-MCNC: 9.4 G/DL (ref 11.7–15.4)
IMM GRANULOCYTES # BLD AUTO: 0 K/UL (ref 0–0.5)
IMM GRANULOCYTES NFR BLD AUTO: 1 % (ref 0–5)
LYMPHOCYTES # BLD: 1.1 K/UL (ref 0.5–4.6)
LYMPHOCYTES NFR BLD: 16 % (ref 13–44)
MCH RBC QN AUTO: 31.2 PG (ref 26.1–32.9)
MCHC RBC AUTO-ENTMCNC: 30.8 G/DL (ref 31.4–35)
MCV RBC AUTO: 101.3 FL (ref 82–102)
MONOCYTES # BLD: 0.7 K/UL (ref 0.1–1.3)
MONOCYTES NFR BLD: 10 % (ref 4–12)
NEUTS SEG # BLD: 4.9 K/UL (ref 1.7–8.2)
NEUTS SEG NFR BLD: 69 % (ref 43–78)
NRBC # BLD: 0 K/UL (ref 0–0.2)
PLATELET # BLD AUTO: 284 K/UL (ref 150–450)
PMV BLD AUTO: 8.8 FL (ref 9.4–12.3)
POTASSIUM SERPL-SCNC: 4 MMOL/L (ref 3.5–5.1)
RBC # BLD AUTO: 3.01 M/UL (ref 4.05–5.2)
SODIUM SERPL-SCNC: 139 MMOL/L (ref 136–145)
WBC # BLD AUTO: 7 K/UL (ref 4.3–11.1)

## 2024-09-25 PROCEDURE — 94760 N-INVAS EAR/PLS OXIMETRY 1: CPT

## 2024-09-25 PROCEDURE — G0378 HOSPITAL OBSERVATION PER HR: HCPCS

## 2024-09-25 PROCEDURE — 80048 BASIC METABOLIC PNL TOTAL CA: CPT

## 2024-09-25 PROCEDURE — 85025 COMPLETE CBC W/AUTO DIFF WBC: CPT

## 2024-09-25 PROCEDURE — 87186 SC STD MICRODIL/AGAR DIL: CPT

## 2024-09-25 PROCEDURE — 2580000003 HC RX 258: Performed by: HOSPITALIST

## 2024-09-25 PROCEDURE — 6360000002 HC RX W HCPCS: Performed by: HOSPITALIST

## 2024-09-25 PROCEDURE — 6370000000 HC RX 637 (ALT 250 FOR IP): Performed by: HOSPITALIST

## 2024-09-25 PROCEDURE — 6360000002 HC RX W HCPCS: Performed by: NURSE PRACTITIONER

## 2024-09-25 PROCEDURE — 97162 PT EVAL MOD COMPLEX 30 MIN: CPT

## 2024-09-25 PROCEDURE — 81001 URINALYSIS AUTO W/SCOPE: CPT

## 2024-09-25 PROCEDURE — 96372 THER/PROPH/DIAG INJ SC/IM: CPT

## 2024-09-25 PROCEDURE — 90935 HEMODIALYSIS ONE EVALUATION: CPT

## 2024-09-25 PROCEDURE — 96376 TX/PRO/DX INJ SAME DRUG ADON: CPT

## 2024-09-25 PROCEDURE — 97112 NEUROMUSCULAR REEDUCATION: CPT

## 2024-09-25 PROCEDURE — 97165 OT EVAL LOW COMPLEX 30 MIN: CPT

## 2024-09-25 PROCEDURE — 94640 AIRWAY INHALATION TREATMENT: CPT

## 2024-09-25 PROCEDURE — 51705 CHANGE OF BLADDER TUBE: CPT | Performed by: PHYSICIAN ASSISTANT

## 2024-09-25 PROCEDURE — 97535 SELF CARE MNGMENT TRAINING: CPT

## 2024-09-25 PROCEDURE — 97530 THERAPEUTIC ACTIVITIES: CPT

## 2024-09-25 PROCEDURE — 87088 URINE BACTERIA CULTURE: CPT

## 2024-09-25 PROCEDURE — 87086 URINE CULTURE/COLONY COUNT: CPT

## 2024-09-25 PROCEDURE — 6370000000 HC RX 637 (ALT 250 FOR IP): Performed by: FAMILY MEDICINE

## 2024-09-25 RX ORDER — OXYCODONE HYDROCHLORIDE 5 MG/1
5 TABLET ORAL ONCE
Status: COMPLETED | OUTPATIENT
Start: 2024-09-25 | End: 2024-09-25

## 2024-09-25 RX ORDER — FUROSEMIDE 10 MG/ML
60 INJECTION INTRAMUSCULAR; INTRAVENOUS DAILY
Status: DISCONTINUED | OUTPATIENT
Start: 2024-09-25 | End: 2024-10-01 | Stop reason: HOSPADM

## 2024-09-25 RX ADMIN — LEVOTHYROXINE SODIUM 125 MCG: 0.12 TABLET ORAL at 05:08

## 2024-09-25 RX ADMIN — SODIUM CHLORIDE, PRESERVATIVE FREE 10 ML: 5 INJECTION INTRAVENOUS at 12:00

## 2024-09-25 RX ADMIN — HEPARIN SODIUM 5000 UNITS: 5000 INJECTION INTRAVENOUS; SUBCUTANEOUS at 20:30

## 2024-09-25 RX ADMIN — LEVALBUTEROL HYDROCHLORIDE 0.63 MG: 0.63 SOLUTION RESPIRATORY (INHALATION) at 14:20

## 2024-09-25 RX ADMIN — OXYCODONE 5 MG: 5 TABLET ORAL at 05:47

## 2024-09-25 RX ADMIN — HEPARIN SODIUM 5000 UNITS: 5000 INJECTION INTRAVENOUS; SUBCUTANEOUS at 12:00

## 2024-09-25 RX ADMIN — HYDRALAZINE HYDROCHLORIDE 10 MG: 20 INJECTION INTRAMUSCULAR; INTRAVENOUS at 04:26

## 2024-09-25 RX ADMIN — SODIUM CHLORIDE, PRESERVATIVE FREE 10 ML: 5 INJECTION INTRAVENOUS at 20:30

## 2024-09-25 RX ADMIN — PANTOPRAZOLE SODIUM 40 MG: 40 TABLET, DELAYED RELEASE ORAL at 05:08

## 2024-09-25 RX ADMIN — FUROSEMIDE 60 MG: 10 INJECTION, SOLUTION INTRAMUSCULAR; INTRAVENOUS at 11:59

## 2024-09-25 RX ADMIN — FAMOTIDINE 10 MG: 20 TABLET, FILM COATED ORAL at 11:59

## 2024-09-25 RX ADMIN — CARVEDILOL 12.5 MG: 12.5 TABLET, FILM COATED ORAL at 16:48

## 2024-09-25 ASSESSMENT — PAIN DESCRIPTION - ORIENTATION: ORIENTATION: MID

## 2024-09-25 ASSESSMENT — PAIN - FUNCTIONAL ASSESSMENT: PAIN_FUNCTIONAL_ASSESSMENT: ACTIVITIES ARE NOT PREVENTED

## 2024-09-25 ASSESSMENT — PAIN DESCRIPTION - DESCRIPTORS: DESCRIPTORS: ACHING

## 2024-09-25 ASSESSMENT — PAIN DESCRIPTION - LOCATION: LOCATION: HEAD

## 2024-09-25 ASSESSMENT — PAIN SCALES - GENERAL
PAINLEVEL_OUTOF10: 0
PAINLEVEL_OUTOF10: 7

## 2024-09-25 NOTE — CONSULTS
SP tube was removed. Patient was prepped and draped in sterile fashion. SPT site was prepped with betadine swabs. An 18 Fr berkowitz catheter was inserted into suprapubic site with return of clear yellow urine. Catheter balloon was inflated with 10 cc sterile water. Patient tolerated the procedure well.     Will arrange for next SPT exchange in 1 month.

## 2024-09-25 NOTE — PROGRESS NOTES
Wilda Camara  Admission Date: 9/24/2024         [facility] Progress Note: 9/25/2024    Follow-up for: ESRD    The patient's chart is reviewed and the patient is discussed with the staff.    Subjective:   Pt seen and examined on HD, right  Qb, UF 2500 tolerating dialysis, + SOB, malodorous suprapubic catheter, she reports dirty uop in bag.    ROS:  Gen - no fever, no chills, appetite unchanged  CV - no chest pain  Lung - + shortness of breath, no cough  Abd - no tenderness, no nausea/vomiting, no diarrhea  Ext - no edema    Current Facility-Administered Medications   Medication Dose Route Frequency    levothyroxine (SYNTHROID) tablet 125 mcg  125 mcg Oral QAM AC    levalbuterol (XOPENEX) nebulization 0.63 mg  0.63 mg Nebulization Q6H WA RT    pantoprazole (PROTONIX) tablet 40 mg  40 mg Oral QAM AC    hydrOXYzine HCl (ATARAX) tablet 10 mg  10 mg Oral TID PRN    sodium chloride flush 0.9 % injection 5-40 mL  5-40 mL IntraVENous 2 times per day    sodium chloride flush 0.9 % injection 5-40 mL  5-40 mL IntraVENous PRN    0.9 % sodium chloride infusion   IntraVENous PRN    acetaminophen (TYLENOL) tablet 650 mg  650 mg Oral Q6H PRN    Or    acetaminophen (TYLENOL) suppository 650 mg  650 mg Rectal Q6H PRN    melatonin tablet 3 mg  3 mg Oral Nightly    sennosides-docusate sodium (SENOKOT-S) 8.6-50 MG tablet 1 tablet  1 tablet Oral BID    bisacodyl (DULCOLAX) suppository 10 mg  10 mg Rectal Daily PRN    promethazine (PHENERGAN) tablet 25 mg  25 mg Oral Q6H PRN    Or    ondansetron (ZOFRAN) injection 4 mg  4 mg IntraVENous Q4H PRN    famotidine (PEPCID) tablet 10 mg  10 mg Oral Daily    heparin (porcine) injection 5,000 Units  5,000 Units SubCUTAneous BID    hydrALAZINE (APRESOLINE) injection 10 mg  10 mg IntraVENous Q6H PRN    carvedilol (COREG) tablet 12.5 mg  12.5 mg Oral BID WC         Objective:     Vitals:    09/25/24 0617 09/25/24 0706 09/25/24 0730 09/25/24 0800   BP:

## 2024-09-25 NOTE — PLAN OF CARE
Problem: Respiratory - Adult  Goal: Achieves optimal ventilation and oxygenation  Outcome: Progressing  Flowsheets (Taken 9/25/2024 1427)  Achieves optimal ventilation and oxygenation:   Assess for changes in respiratory status   Position to facilitate oxygenation and minimize respiratory effort   Respiratory therapy support as indicated   Assess for changes in mentation and behavior   Oxygen supplementation based on oxygen saturation or arterial blood gases   Encourage broncho-pulmonary hygiene including cough, deep breathe, incentive spirometry   Assess and instruct to report shortness of breath or any respiratory difficulty

## 2024-09-25 NOTE — PROGRESS NOTES
Occupational Therapy Note:    Attempted to see patient this AM for occupational therapy evaluation session. Patient is currently off the floor dialysis this morning. Will follow and re-attempt as schedule permits/patient available. Thank you,    LAURA ZHENG, OT    Rehab Caseload Tracker

## 2024-09-25 NOTE — PROGRESS NOTES
ACUTE PHYSICAL THERAPY GOALS:   (Developed with and agreed upon by patient and/or caregiver.)   Ms. Camara will perform supine to sit and sit to supine independently in 7 days.    Ms. Camara will perform sit to stand and bed to chair with rolling walker independently in 7 days.    Ms. Camara will perform gait with rolling walker 150 ft independently in 7 days.    Ms. Camara will perform up and down 5 steps independently with rail in 7 days.    Ms. Camara will perform therex x 25 reps in sitting and standing in 7 days.     PHYSICAL THERAPY Initial Assessment, Daily Note, and PM  (Link to Caseload Tracking: PT Visit Days : 1  Acknowledge Orders  Time In/Out  PT Charge Capture  Rehab Caseload Tracker    Wilda Camara is a 74 y.o. female   PRIMARY DIAGNOSIS: Volume overload  Volume overload [E87.70]  End-stage renal disease needing dialysis (HCC) [N18.6, Z99.2]  Hypervolemia, unspecified hypervolemia type [E87.70]       Reason for Referral: Generalized Muscle Weakness (M62.81)  Difficulty in walking, Not elsewhere classified (R26.2)  Observation: Payor: [employer]ICARE / Plan: AET MEDICARE-ADVANTAGE PPO / Product Type: Medicare /     ASSESSMENT:     REHAB RECOMMENDATIONS:   Recommendation to date pending progress:  Setting:  Short-term Rehab    Equipment:    To Be Determined     ASSESSMENT:  Ms. Camara was not seen this morning due to HD.  She has a super pubic catheter she has had for 3 years.  She manages it herself except for the bag change.  She has a history of falls.  More uncomfortable now with increased fluid settling in her abdomen area.  She wears a sleeve knee brace on her left knee due to arthritis.  At baseline she drives herself to dialysis and has to walk quite a distance to get to her chair.  She uses a 3 wheeled walker.  Today supine to sit with min assist.  Sit to stand with cg and gait 40 ft with cg short shuffling steps and easily fatigued.  Her posture is poor.
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
## 2024-09-25 NOTE — PROGRESS NOTES
Occupational Therapy Note:    Attempted to see patient this PM for occupational therapy evaluation session. Patient just returned from HD and was eating. OT helped pt sit up on edge of bed and left pt self feeding. Will follow and re-attempt as schedule permits/patient available. Thank you,    LAURA ZHENG, OT    Rehab Caseload Tracker

## 2024-09-25 NOTE — PROGRESS NOTES
Ms. Camara is off the floor in dialysis.  Will try later for PT evaluation as time allows.  ALYCE MEJIAS, PT

## 2024-09-25 NOTE — DIALYSIS
TRANSFER OUT- DIALYSIS    Hemodialysis treatment completed. PT ran 3 hours, without complications.    Patient A&O and VS stable  /78  P 77       1.3 Kg removed.      Flushed both ports with 10 mL of NS.  CVC dressing clean, dry, and intact, tego caps intact, curos caps placed.      Meds given: none.        Patient to 412 after dialysis.

## 2024-09-25 NOTE — PROGRESS NOTES
Hospitalist Progress Note   Admit Date:  2024 12:03 PM   Name:  Wilda Camara   Age:  74 y.o.  Sex:  female  :  1950   MRN:  [id number]   Room:  [facility]    Presenting/Chief Complaint: No chief complaint on file.     Reason(s) for Admission: Volume overload [E87.70]  End-stage renal disease needing dialysis (HCC) [N18.6, Z99.2]  Hypervolemia, unspecified hypervolemia type [E87.70]     Hospital Course:    74 y.o. female with medical history of ESRD on HD TTS, hypertension, hypothyroid, GERD who presents to the ER with chief complaint of shortness of breath and not able to attend today's dialysis session. Chest x-ray showed bilateral pleural effusion with bibasilar atelectasis along with cardiomegaly without any evidence of congestive heart failure.  Twelve-lead EKG with NSR.  Blood work remarkable for BNP 60785, creatinine 6.97, BUN 24.      Subjective & 24hr Events:   Patient to go for dialysis today for volume overload.  No new complaints.  PT/OT did also evaluate patient today as well.      Assessment & Plan:   Volume overload  End-stage renal disease  - Patient placed in observation status  - Volume overload dyspnea associated with bilateral pleural effusions in setting of end-stage renal disease  - Nephrology consulted  - Patient underwent dialysis today  and will go again tomorrow    Hypertension  - Continue home Coreg 6.25 mg twice daily  - As needed IV hydralazine    Acquired hypothyroidism  - Continue home Synthroid 125 mcg    GERD  - Continue Protonix, Pepcid    Dyspnea  - Continue Xopenex nebs every 6 hours    DNR  - Noted    Malodorous suprapubic catheter  - Patient admits urology follow-up for cath exchange  - Urology consulted    PT/OT evals ordered?  Therapy evals ordered  Diet:  ADULT DIET; Regular; No Added Salt (3-4 gm); Low Potassium (Less than 3000 mg/day); Low Phosphorus (Less than 1000 mg); 1200 ml  VTE prophylaxis: Heparin  Code status: Full Code      Non-peripheral  PCR NOT DETECTED NOTDET      Coronavirus 229E by PCR NOT DETECTED NOTDET      Coronavirus HKU1 by PCR NOT DETECTED NOTDET      Coronavirus NL63 by PCR NOT DETECTED NOTDET      Coronavirus OC43 by PCR NOT DETECTED NOTDET      SARS-CoV-2, PCR NOT DETECTED NOTDET      Human Metapneumovirus by PCR NOT DETECTED NOTDET      Rhinovirus Enterovirus PCR NOT DETECTED NOTDET      Influenza A by PCR NOT DETECTED NOTDET      Influenza B PCR NOT DETECTED NOTDET      Parainfluenza 1 PCR NOT DETECTED NOTDET      Parainfluenza 2 PCR NOT DETECTED NOTDET      Parainfluenza 3 PCR NOT DETECTED NOTDET      Parainfluenza 4 PCR NOT DETECTED NOTDET      Respiratory Syncytial Virus by PCR NOT DETECTED NOTDET      Bordetella parapertussis by PCR NOT DETECTED NOTDET      Bordetella pertussis by PCR NOT DETECTED NOTDET      Chlamydophila Pneumonia PCR NOT DETECTED NOTDET      Mycoplasma pneumo by PCR NOT DETECTED NOTDET     Troponin    Collection Time: 09/24/24  3:19 PM   Result Value Ref Range    Troponin T 184.0 (HH) 0 - 14 ng/L   Basic Metabolic Panel w/ Reflex to MG    Collection Time: 09/25/24  7:32 AM   Result Value Ref Range    Sodium 139 136 - 145 mmol/L    Potassium 4.0 3.5 - 5.1 mmol/L    Chloride 100 98 - 107 mmol/L    CO2 26 20 - 28 mmol/L    Anion Gap 14 9 - 18 mmol/L    Glucose 87 70 - 99 mg/dL    BUN 28 (H) 8 - 23 MG/DL    Creatinine 7.64 (H) 0.60 - 1.10 MG/DL    Est, Glom Filt Rate 5 (L) >60 ml/min/1.73m2    Calcium 9.4 8.8 - 10.2 MG/DL   CBC with Auto Differential    Collection Time: 09/25/24  7:32 AM   Result Value Ref Range    WBC 7.0 4.3 - 11.1 K/uL    RBC 3.01 (L) 4.05 - 5.2 M/uL    Hemoglobin 9.4 (L) 11.7 - 15.4 g/dL    Hematocrit 30.5 (L) 35.8 - 46.3 %    .3 82 - 102 FL    MCH 31.2 26.1 - 32.9 PG    MCHC 30.8 (L) 31.4 - 35.0 g/dL    RDW 15.8 (H) 11.9 - 14.6 %    Platelets 284 150 - 450 K/uL    MPV 8.8 (L) 9.4 - 12.3 FL    nRBC 0.00 0.0 - 0.2 K/uL    Differential Type AUTOMATED      Neutrophils % 69 43 - 78 %

## 2024-09-26 LAB
ANION GAP SERPL CALC-SCNC: 9 MMOL/L (ref 9–18)
APPEARANCE UR: ABNORMAL
BACTERIA URNS QL MICRO: ABNORMAL /HPF
BILIRUB UR QL: NEGATIVE
BUN SERPL-MCNC: 17 MG/DL (ref 8–23)
CALCIUM SERPL-MCNC: 8.8 MG/DL (ref 8.8–10.2)
CASTS URNS QL MICRO: 0 /LPF
CHLORIDE SERPL-SCNC: 100 MMOL/L (ref 98–107)
CO2 SERPL-SCNC: 28 MMOL/L (ref 20–28)
COLOR UR: ABNORMAL
CREAT SERPL-MCNC: 5.54 MG/DL (ref 0.6–1.1)
CRYSTALS URNS QL MICRO: ABNORMAL /LPF
EPI CELLS #/AREA URNS HPF: ABNORMAL /HPF
GLUCOSE SERPL-MCNC: 91 MG/DL (ref 70–99)
GLUCOSE UR STRIP.AUTO-MCNC: NEGATIVE MG/DL
HGB UR QL STRIP: ABNORMAL
KETONES UR QL STRIP.AUTO: NEGATIVE MG/DL
LEUKOCYTE ESTERASE UR QL STRIP.AUTO: ABNORMAL
MUCOUS THREADS URNS QL MICRO: 0 /LPF
NITRITE UR QL STRIP.AUTO: NEGATIVE
OTHER OBSERVATIONS: ABNORMAL
PH UR STRIP: 8.5 (ref 5–9)
POTASSIUM SERPL-SCNC: 4.2 MMOL/L (ref 3.5–5.1)
PROT UR STRIP-MCNC: 300 MG/DL
RBC #/AREA URNS HPF: ABNORMAL /HPF
SODIUM SERPL-SCNC: 137 MMOL/L (ref 136–145)
SP GR UR REFRACTOMETRY: 1.01 (ref 1–1.02)
URINE CULTURE IF INDICATED: ABNORMAL
UROBILINOGEN UR QL STRIP.AUTO: 0.2 EU/DL (ref 0.2–1)
WBC URNS QL MICRO: >100 /HPF

## 2024-09-26 PROCEDURE — G0378 HOSPITAL OBSERVATION PER HR: HCPCS

## 2024-09-26 PROCEDURE — 96376 TX/PRO/DX INJ SAME DRUG ADON: CPT

## 2024-09-26 PROCEDURE — 80048 BASIC METABOLIC PNL TOTAL CA: CPT

## 2024-09-26 PROCEDURE — 2580000003 HC RX 258: Performed by: HOSPITALIST

## 2024-09-26 PROCEDURE — 6360000002 HC RX W HCPCS: Performed by: NURSE PRACTITIONER

## 2024-09-26 PROCEDURE — 96372 THER/PROPH/DIAG INJ SC/IM: CPT

## 2024-09-26 PROCEDURE — 96375 TX/PRO/DX INJ NEW DRUG ADDON: CPT

## 2024-09-26 PROCEDURE — 6370000000 HC RX 637 (ALT 250 FOR IP): Performed by: HOSPITALIST

## 2024-09-26 PROCEDURE — 6360000002 HC RX W HCPCS: Performed by: HOSPITALIST

## 2024-09-26 PROCEDURE — 6360000002 HC RX W HCPCS: Performed by: STUDENT IN AN ORGANIZED HEALTH CARE EDUCATION/TRAINING PROGRAM

## 2024-09-26 PROCEDURE — 2580000003 HC RX 258: Performed by: STUDENT IN AN ORGANIZED HEALTH CARE EDUCATION/TRAINING PROGRAM

## 2024-09-26 PROCEDURE — 90935 HEMODIALYSIS ONE EVALUATION: CPT

## 2024-09-26 RX ORDER — LEVALBUTEROL INHALATION SOLUTION 0.63 MG/3ML
0.63 SOLUTION RESPIRATORY (INHALATION) EVERY 6 HOURS PRN
Status: DISCONTINUED | OUTPATIENT
Start: 2024-09-26 | End: 2024-10-01 | Stop reason: HOSPADM

## 2024-09-26 RX ADMIN — HEPARIN SODIUM 5000 UNITS: 5000 INJECTION INTRAVENOUS; SUBCUTANEOUS at 11:35

## 2024-09-26 RX ADMIN — SENNOSIDES AND DOCUSATE SODIUM 1 TABLET: 50; 8.6 TABLET ORAL at 11:35

## 2024-09-26 RX ADMIN — CARVEDILOL 12.5 MG: 12.5 TABLET, FILM COATED ORAL at 16:09

## 2024-09-26 RX ADMIN — ACETAMINOPHEN 650 MG: 325 TABLET ORAL at 05:36

## 2024-09-26 RX ADMIN — SODIUM CHLORIDE, PRESERVATIVE FREE 10 ML: 5 INJECTION INTRAVENOUS at 11:42

## 2024-09-26 RX ADMIN — FAMOTIDINE 10 MG: 20 TABLET, FILM COATED ORAL at 11:35

## 2024-09-26 RX ADMIN — LEVOTHYROXINE SODIUM 125 MCG: 0.12 TABLET ORAL at 05:11

## 2024-09-26 RX ADMIN — PANTOPRAZOLE SODIUM 40 MG: 40 TABLET, DELAYED RELEASE ORAL at 05:11

## 2024-09-26 RX ADMIN — WATER 1000 MG: 1 INJECTION INTRAMUSCULAR; INTRAVENOUS; SUBCUTANEOUS at 14:15

## 2024-09-26 RX ADMIN — FUROSEMIDE 60 MG: 10 INJECTION, SOLUTION INTRAMUSCULAR; INTRAVENOUS at 11:35

## 2024-09-26 ASSESSMENT — PAIN DESCRIPTION - LOCATION: LOCATION: FOOT

## 2024-09-26 ASSESSMENT — PAIN SCALES - GENERAL
PAINLEVEL_OUTOF10: 3
PAINLEVEL_OUTOF10: 0

## 2024-09-26 NOTE — DIALYSIS
TRANSFER IN - DIALYSIS    Received patient in dialysis unit from [facility] (unit) for ordered procedure.    Consent verified for renal replacement therapy. Procedure explained to patient, opportunity for Q&A provided. Call light given.     Patient alert and vital signs stable. BP (!) 157/99   Pulse 70   Temp 97.3 °F (36.3 °C) (Oral)   Resp 18   Ht 1.778 m (5' 10\")   Wt 72.3 kg (159 lb 4.8 oz)   SpO2 99%   BMI 22.86 kg/m²   Hemodialysis initiated using right cvc.  Aspirated and flushed both ports without difficulty. Dressing clean, dry and intact.  Machine settings per MD order.    Heparin 0 unit bolus and 0 units/hr.    Will monitor during treatment.

## 2024-09-26 NOTE — PROGRESS NOTES
Hospitalist Progress Note   Admit Date:  2024 12:03 PM   Name:  Wilda Camara   Age:  74 y.o.  Sex:  female  :  1950   MRN:  [id number]   Room:  [facility]    Presenting/Chief Complaint: No chief complaint on file.     Reason(s) for Admission: Volume overload [E87.70]  End-stage renal disease needing dialysis (HCC) [N18.6, Z99.2]  Hypervolemia, unspecified hypervolemia type [E87.70]     Hospital Course:    74 y.o. female with medical history of ESRD on HD TTS, hypertension, hypothyroid, GERD who presents to the ER with chief complaint of shortness of breath and not able to attend today's dialysis session. Chest x-ray showed bilateral pleural effusion with bibasilar atelectasis along with cardiomegaly without any evidence of congestive heart failure.  Twelve-lead EKG with NSR.  Blood work remarkable for BNP 71266, creatinine 6.97, BUN 24.      Subjective & 24hr Events:   Patient to go for dialysis today for volume overload.  No new complaints.  PT/OT did also evaluate patient today as well.      Assessment & Plan:   Volume overload  End-stage renal disease  - Patient placed in observation status  - Volume overload dyspnea associated with bilateral pleural effusions in setting of end-stage renal disease  - Nephrology consulted  -  patient undergo hemodialysis today  - Continue Lasix.  Patient continues to produce urine    Hypertension  - Continue home Coreg 6.25 mg twice daily  - As needed IV hydralazine    Acquired hypothyroidism  - Continue home Synthroid 125 mcg    GERD  - Continue Protonix, Pepcid    Dyspnea  - Continue Xopenex nebs every 6 hours    DNR  - Noted    Malodorous suprapubic catheter  - Patient will need urology follow-up for cath exchange  - Urology consulted  - Status post catheter exchange from urology  - Positive UA with positive bacteria and large leuk esterase.  Negative nitrate.  - Will prophylactically start on Rocephin    PT/OT evals ordered?  Therapy evals

## 2024-09-26 NOTE — CARE COORDINATION
Followed up with pt on STR. Referrals sent to the following facilities:  *[facility]  *[facility]  *[facility]   Pt also selected [facility] and [facility], however, they do not accept pts on dialysis. CM cancelled [facility] referral.    [facility] made bed offer. Pt accepted. Authorization started with [employer]. Facility will not have a bed available until Monday ITM.

## 2024-09-26 NOTE — PROGRESS NOTES
0745- patient off floor in dialysis.  Assessment and medication administration to be completed when patient returns to floor

## 2024-09-26 NOTE — PROGRESS NOTES
Wilda Camara  Admission Date: 9/24/2024         [facility] Progress Note: 9/26/2024    Follow-up for: ESRD    The patient's chart is reviewed and the patient is discussed with the staff.    Subjective:   Pt seen and examined on HD, right  Qb, UF 1500 tolerating dialysis, SOB and edema improving     ROS:  Gen - no fever, no chills, appetite unchanged  CV - no chest pain  Lung -  shortness of breath- better, no cough  Abd - no tenderness, no nausea/vomiting, no diarrhea  Ext -  edema- improving     Current Facility-Administered Medications   Medication Dose Route Frequency    levalbuterol (XOPENEX) nebulization 0.63 mg  0.63 mg Nebulization Q6H PRN    furosemide (LASIX) injection 60 mg  60 mg IntraVENous Daily    levothyroxine (SYNTHROID) tablet 125 mcg  125 mcg Oral QAM AC    pantoprazole (PROTONIX) tablet 40 mg  40 mg Oral QAM AC    hydrOXYzine HCl (ATARAX) tablet 10 mg  10 mg Oral TID PRN    sodium chloride flush 0.9 % injection 5-40 mL  5-40 mL IntraVENous 2 times per day    sodium chloride flush 0.9 % injection 5-40 mL  5-40 mL IntraVENous PRN    0.9 % sodium chloride infusion   IntraVENous PRN    acetaminophen (TYLENOL) tablet 650 mg  650 mg Oral Q6H PRN    Or    acetaminophen (TYLENOL) suppository 650 mg  650 mg Rectal Q6H PRN    melatonin tablet 3 mg  3 mg Oral Nightly    sennosides-docusate sodium (SENOKOT-S) 8.6-50 MG tablet 1 tablet  1 tablet Oral BID    bisacodyl (DULCOLAX) suppository 10 mg  10 mg Rectal Daily PRN    promethazine (PHENERGAN) tablet 25 mg  25 mg Oral Q6H PRN    Or    ondansetron (ZOFRAN) injection 4 mg  4 mg IntraVENous Q4H PRN    famotidine (PEPCID) tablet 10 mg  10 mg Oral Daily    heparin (porcine) injection 5,000 Units  5,000 Units SubCUTAneous BID    hydrALAZINE (APRESOLINE) injection 10 mg  10 mg IntraVENous Q6H PRN    carvedilol (COREG) tablet 12.5 mg  12.5 mg Oral BID WC         Objective:     Vitals:    09/26/24 0339 09/26/24 0721  09/26/24 0752 09/26/24 0754   BP: 135/87 (!) 157/99  (!) 160/103   Pulse: 80 70  74   Resp: 16 18 18    Temp: 97.9 °F (36.6 °C) 97.3 °F (36.3 °C) 97.3 °F (36.3 °C)    TempSrc: Oral Oral     SpO2: 98% 99% 99%    Weight: 72.3 kg (159 lb 4.8 oz)      Height:         Intake and Output:   09/24 1901 - 09/26 0700  In: 1080 [P.O.:580]  Out: 2050 [Urine:250]  No intake/output data recorded.    Physical Exam:   Constitutional:  the patient is well developed and in no acute distress, alert and oriented   HEENT:  Sclera clear, pupils equal, oral mucosa moist  Lungs: clearer lung sounds bilaterally   Cardiovascular:  Regular rate, S1, S2, no Rub   Abd/GI: soft and non-tender; with positive bowel sounds.  Ext: warm without cyanosis. There is + lower leg edema.  Skin:  no jaundice or rashes  Neuro: no gross neuro deficits   Psychiatric: Calm.   : suprapubic catheter exchanged 9/25  Access: Right TCC- intact, accessed       LAB  Recent Labs     09/24/24  1221 09/25/24  0732   WBC 6.6 7.0   HGB 10.7* 9.4*   HCT 35.0* 30.5*    284     Recent Labs     09/24/24  1221 09/25/24  0732    139   K 4.0 4.0    100   CO2 26 26   BUN 24* 28*   CREATININE 6.97* 7.64*   MG 2.3  --      No results for input(s): \"PH\", \"PCO2\", \"PO2\", \"HCO3\" in the last 72 hours.      Assessment/Plan:  (Medical Decision Making)   ESRD - [facility]   - seen on HD, UF as tolerated   Weight post HD today to reconcile EDW, DW dialysis RN      2. SOB/Volume overload, CXR with pleural effusion   - continue lasix, continues to produce urine      3. Hypertension - continue antihypertensive med     4. Anemia of CKD- stable     5. Malodorous suprapubic catheter- pt reports missed Urology follow up for cath exchange- consulted Urology       SAE Simpson - [facility]

## 2024-09-26 NOTE — DIALYSIS
TRANSFER OUT- DIALYSIS    Hemodialysis treatment completed. PT ran 3.5 hours, without complications.    Patient A&O and VS stable  /83  P 80       0.5 Kg removed.      Flushed both ports with 10 mL of NS.  CVC dressing clean, dry, and intact, tego caps intact, curos caps placed.      Meds given: none.        Patient to 412 after dialysis.

## 2024-09-26 NOTE — PROGRESS NOTES
Patient refused bed alarm and refused staff from picking the berkowitz bag from laying on the floor when trying to educate patient about fall risk and could trip on berkowitz bag patient yell to get out of her room

## 2024-09-27 LAB
ANION GAP SERPL CALC-SCNC: 10 MMOL/L (ref 9–18)
BUN SERPL-MCNC: 15 MG/DL (ref 8–23)
CALCIUM SERPL-MCNC: 9.3 MG/DL (ref 8.8–10.2)
CHLORIDE SERPL-SCNC: 98 MMOL/L (ref 98–107)
CO2 SERPL-SCNC: 28 MMOL/L (ref 20–28)
CREAT SERPL-MCNC: 5.15 MG/DL (ref 0.6–1.1)
GLUCOSE SERPL-MCNC: 85 MG/DL (ref 70–99)
POTASSIUM SERPL-SCNC: 4.4 MMOL/L (ref 3.5–5.1)
SODIUM SERPL-SCNC: 137 MMOL/L (ref 136–145)

## 2024-09-27 PROCEDURE — 6370000000 HC RX 637 (ALT 250 FOR IP): Performed by: HOSPITALIST

## 2024-09-27 PROCEDURE — 6360000002 HC RX W HCPCS: Performed by: NURSE PRACTITIONER

## 2024-09-27 PROCEDURE — 2580000003 HC RX 258: Performed by: STUDENT IN AN ORGANIZED HEALTH CARE EDUCATION/TRAINING PROGRAM

## 2024-09-27 PROCEDURE — 96376 TX/PRO/DX INJ SAME DRUG ADON: CPT

## 2024-09-27 PROCEDURE — 1100000003 HC PRIVATE W/ TELEMETRY

## 2024-09-27 PROCEDURE — 36415 COLL VENOUS BLD VENIPUNCTURE: CPT

## 2024-09-27 PROCEDURE — 80048 BASIC METABOLIC PNL TOTAL CA: CPT

## 2024-09-27 PROCEDURE — 94640 AIRWAY INHALATION TREATMENT: CPT

## 2024-09-27 PROCEDURE — 94760 N-INVAS EAR/PLS OXIMETRY 1: CPT

## 2024-09-27 PROCEDURE — 96372 THER/PROPH/DIAG INJ SC/IM: CPT

## 2024-09-27 PROCEDURE — 6360000002 HC RX W HCPCS: Performed by: STUDENT IN AN ORGANIZED HEALTH CARE EDUCATION/TRAINING PROGRAM

## 2024-09-27 PROCEDURE — 2580000003 HC RX 258: Performed by: HOSPITALIST

## 2024-09-27 PROCEDURE — 6360000002 HC RX W HCPCS: Performed by: HOSPITALIST

## 2024-09-27 RX ADMIN — SODIUM CHLORIDE, PRESERVATIVE FREE 10 ML: 5 INJECTION INTRAVENOUS at 20:25

## 2024-09-27 RX ADMIN — CARVEDILOL 12.5 MG: 12.5 TABLET, FILM COATED ORAL at 08:10

## 2024-09-27 RX ADMIN — PANTOPRAZOLE SODIUM 40 MG: 40 TABLET, DELAYED RELEASE ORAL at 05:48

## 2024-09-27 RX ADMIN — ACETAMINOPHEN 650 MG: 325 TABLET ORAL at 20:26

## 2024-09-27 RX ADMIN — LEVOTHYROXINE SODIUM 125 MCG: 0.12 TABLET ORAL at 05:48

## 2024-09-27 RX ADMIN — HEPARIN SODIUM 5000 UNITS: 5000 INJECTION INTRAVENOUS; SUBCUTANEOUS at 08:09

## 2024-09-27 RX ADMIN — FUROSEMIDE 60 MG: 10 INJECTION, SOLUTION INTRAMUSCULAR; INTRAVENOUS at 08:09

## 2024-09-27 RX ADMIN — SENNOSIDES AND DOCUSATE SODIUM 1 TABLET: 50; 8.6 TABLET ORAL at 08:09

## 2024-09-27 RX ADMIN — SODIUM CHLORIDE, PRESERVATIVE FREE 10 ML: 5 INJECTION INTRAVENOUS at 08:10

## 2024-09-27 RX ADMIN — WATER 1000 MG: 1 INJECTION INTRAMUSCULAR; INTRAVENOUS; SUBCUTANEOUS at 13:30

## 2024-09-27 RX ADMIN — LEVALBUTEROL HYDROCHLORIDE 0.63 MG: 0.63 SOLUTION RESPIRATORY (INHALATION) at 20:53

## 2024-09-27 RX ADMIN — HEPARIN SODIUM 5000 UNITS: 5000 INJECTION INTRAVENOUS; SUBCUTANEOUS at 20:30

## 2024-09-27 RX ADMIN — FAMOTIDINE 10 MG: 20 TABLET, FILM COATED ORAL at 08:09

## 2024-09-27 RX ADMIN — Medication 3 MG: at 20:26

## 2024-09-27 RX ADMIN — CARVEDILOL 12.5 MG: 12.5 TABLET, FILM COATED ORAL at 17:30

## 2024-09-27 RX ADMIN — SENNOSIDES AND DOCUSATE SODIUM 1 TABLET: 50; 8.6 TABLET ORAL at 20:26

## 2024-09-27 ASSESSMENT — PAIN SCALES - GENERAL
PAINLEVEL_OUTOF10: 3
PAINLEVEL_OUTOF10: 0

## 2024-09-27 NOTE — PROGRESS NOTES
Wilda Camara  Admission Date: 9/24/2024         [facility] Progress Note: 9/27/2024    Follow-up for: ESRD    The patient's chart is reviewed and the patient is discussed with the staff.    Subjective:   Pt seen and examined in room reports constipation, noted uop via suprapubic cath     ROS:  Gen - no fever, no chills  CV - no chest pain  Lung -  shortness of breath- better, no cough  Abd - no tenderness, no nausea/vomiting, no diarrhea  Ext -  edema- improving     Current Facility-Administered Medications   Medication Dose Route Frequency    levalbuterol (XOPENEX) nebulization 0.63 mg  0.63 mg Nebulization Q6H PRN    cefTRIAXone (ROCEPHIN) 1,000 mg in sterile water 10 mL IV syringe  1,000 mg IntraVENous Q24H    furosemide (LASIX) injection 60 mg  60 mg IntraVENous Daily    levothyroxine (SYNTHROID) tablet 125 mcg  125 mcg Oral QAM AC    pantoprazole (PROTONIX) tablet 40 mg  40 mg Oral QAM AC    hydrOXYzine HCl (ATARAX) tablet 10 mg  10 mg Oral TID PRN    sodium chloride flush 0.9 % injection 5-40 mL  5-40 mL IntraVENous 2 times per day    sodium chloride flush 0.9 % injection 5-40 mL  5-40 mL IntraVENous PRN    0.9 % sodium chloride infusion   IntraVENous PRN    acetaminophen (TYLENOL) tablet 650 mg  650 mg Oral Q6H PRN    Or    acetaminophen (TYLENOL) suppository 650 mg  650 mg Rectal Q6H PRN    melatonin tablet 3 mg  3 mg Oral Nightly    sennosides-docusate sodium (SENOKOT-S) 8.6-50 MG tablet 1 tablet  1 tablet Oral BID    bisacodyl (DULCOLAX) suppository 10 mg  10 mg Rectal Daily PRN    promethazine (PHENERGAN) tablet 25 mg  25 mg Oral Q6H PRN    Or    ondansetron (ZOFRAN) injection 4 mg  4 mg IntraVENous Q4H PRN    famotidine (PEPCID) tablet 10 mg  10 mg Oral Daily    heparin (porcine) injection 5,000 Units  5,000 Units SubCUTAneous BID    hydrALAZINE (APRESOLINE) injection 10 mg  10 mg IntraVENous Q6H PRN    carvedilol (COREG) tablet 12.5 mg  12.5 mg Oral BID WC

## 2024-09-27 NOTE — PLAN OF CARE
Problem: Discharge Planning  Goal: Discharge to home or other facility with appropriate resources  9/27/2024 0016 by Elder Montero, RN  Outcome: Progressing  Flowsheets (Taken 9/27/2024 0016)  Discharge to home or other facility with appropriate resources:   Identify barriers to discharge with patient and caregiver   Arrange for needed discharge resources and transportation as appropriate   Arrange for interpreters to assist at discharge as needed  9/26/2024 1220 by Deirdre Mazariegos RN  Outcome: Progressing     Problem: Pain  Goal: Verbalizes/displays adequate comfort level or baseline comfort level  9/26/2024 1220 by Deirdre Mazariegos, RN  Outcome: Progressing  Flowsheets (Taken 9/26/2024 0410 by Mono Hudson, RN)  Verbalizes/displays adequate comfort level or baseline comfort level:   Encourage patient to monitor pain and request assistance   Assess pain using appropriate pain scale   Administer analgesics based on type and severity of pain and evaluate response   Implement non-pharmacological measures as appropriate and evaluate response     Problem: Safety - Adult  Goal: Free from fall injury  9/26/2024 1220 by Deirdre Mazariegos, RN  Outcome: Progressing     Problem: Respiratory - Adult  Goal: Achieves optimal ventilation and oxygenation  9/26/2024 1220 by Deirdre Mazariegos, RN  Outcome: Progressing

## 2024-09-27 NOTE — PROGRESS NOTES
Hospitalist Progress Note   Admit Date:  2024 12:03 PM   Name:  Wilda Camara   Age:  74 y.o.  Sex:  female  :  1950   MRN:  [id number]   Room:  [facility]    Presenting/Chief Complaint: No chief complaint on file.     Reason(s) for Admission: Volume overload [E87.70]  End-stage renal disease needing dialysis (HCC) [N18.6, Z99.2]  Hypervolemia, unspecified hypervolemia type [E87.70]     Hospital Course:    74 y.o. female with medical history of ESRD on HD TTS, hypertension, hypothyroid, GERD who presents to the ER with chief complaint of shortness of breath and not able to attend today's dialysis session. Chest x-ray showed bilateral pleural effusion with bibasilar atelectasis along with cardiomegaly without any evidence of congestive heart failure.  Twelve-lead EKG with NSR.  Blood work remarkable for BNP 55583, creatinine 6.97, BUN 24.      Subjective & 24hr Events:   Patient was seen and examined at the bedside.  No overnight events.  Patient feeling better this morning.  Patient excepted to short-term rehab.  Insurance authorization pending.  Patient will go to rehab [facility] if insurance approved on Monday      Assessment & Plan:   Volume overload  End-stage renal disease  - Patient placed in observation status  - Volume overload dyspnea associated with bilateral pleural effusions in setting of end-stage renal disease  - Nephrology consulted  -  patient undergo hemodialysis  - Continue Lasix.  Patient continues to produce urine  - Continue hemodialysis per nephrology recommendations    Hypertension  - Continue home Coreg 6.25 mg twice daily  - As needed IV hydralazine    Acquired hypothyroidism  - Continue home Synthroid 125 mcg    GERD  - Continue Protonix, Pepcid    Dyspnea  - Continue Xopenex nebs every 6 hours    DNR  - Noted    Malodorous suprapubic catheter  - Patient will need urology follow-up for cath exchange  - Urology consulted  - Status post catheter exchange from

## 2024-09-27 NOTE — CARE COORDINATION
CM reviewed notes.   Transition of care to [facility]. [employer] prior auth initiated on 9/26.   Bed available on 9/30.

## 2024-09-28 LAB
ALBUMIN SERPL-MCNC: 2.3 G/DL (ref 3.2–4.6)
ALBUMIN/GLOB SERPL: 0.8 (ref 1–1.9)
ALP SERPL-CCNC: 53 U/L (ref 35–104)
ALT SERPL-CCNC: 11 U/L (ref 8–45)
ANION GAP SERPL CALC-SCNC: 11 MMOL/L (ref 9–18)
AST SERPL-CCNC: 15 U/L (ref 15–37)
BASOPHILS # BLD: 0.1 K/UL (ref 0–0.2)
BASOPHILS NFR BLD: 1 % (ref 0–2)
BILIRUB SERPL-MCNC: <0.2 MG/DL (ref 0–1.2)
BUN SERPL-MCNC: 23 MG/DL (ref 8–23)
CALCIUM SERPL-MCNC: 8.9 MG/DL (ref 8.8–10.2)
CHLORIDE SERPL-SCNC: 101 MMOL/L (ref 98–107)
CO2 SERPL-SCNC: 26 MMOL/L (ref 20–28)
CREAT SERPL-MCNC: 6.24 MG/DL (ref 0.6–1.1)
DIFFERENTIAL METHOD BLD: ABNORMAL
EOSINOPHIL # BLD: 0.2 K/UL (ref 0–0.8)
EOSINOPHIL NFR BLD: 4 % (ref 0.5–7.8)
ERYTHROCYTE [DISTWIDTH] IN BLOOD BY AUTOMATED COUNT: 15.4 % (ref 11.9–14.6)
GLOBULIN SER CALC-MCNC: 3.1 G/DL (ref 2.3–3.5)
GLUCOSE SERPL-MCNC: 72 MG/DL (ref 70–99)
HCT VFR BLD AUTO: 29.3 % (ref 35.8–46.3)
HGB BLD-MCNC: 8.9 G/DL (ref 11.7–15.4)
IMM GRANULOCYTES # BLD AUTO: 0 K/UL (ref 0–0.5)
IMM GRANULOCYTES NFR BLD AUTO: 1 % (ref 0–5)
LYMPHOCYTES # BLD: 1.3 K/UL (ref 0.5–4.6)
LYMPHOCYTES NFR BLD: 21 % (ref 13–44)
MCH RBC QN AUTO: 31.6 PG (ref 26.1–32.9)
MCHC RBC AUTO-ENTMCNC: 30.4 G/DL (ref 31.4–35)
MCV RBC AUTO: 103.9 FL (ref 82–102)
MONOCYTES # BLD: 0.7 K/UL (ref 0.1–1.3)
MONOCYTES NFR BLD: 11 % (ref 4–12)
NEUTS SEG # BLD: 3.8 K/UL (ref 1.7–8.2)
NEUTS SEG NFR BLD: 63 % (ref 43–78)
NRBC # BLD: 0 K/UL (ref 0–0.2)
PLATELET # BLD AUTO: 299 K/UL (ref 150–450)
PMV BLD AUTO: 9.2 FL (ref 9.4–12.3)
POTASSIUM SERPL-SCNC: 4.5 MMOL/L (ref 3.5–5.1)
PROT SERPL-MCNC: 5.4 G/DL (ref 6.3–8.2)
RBC # BLD AUTO: 2.82 M/UL (ref 4.05–5.2)
SODIUM SERPL-SCNC: 139 MMOL/L (ref 136–145)
WBC # BLD AUTO: 6.1 K/UL (ref 4.3–11.1)

## 2024-09-28 PROCEDURE — 94760 N-INVAS EAR/PLS OXIMETRY 1: CPT

## 2024-09-28 PROCEDURE — 2580000003 HC RX 258: Performed by: STUDENT IN AN ORGANIZED HEALTH CARE EDUCATION/TRAINING PROGRAM

## 2024-09-28 PROCEDURE — 6360000002 HC RX W HCPCS: Performed by: STUDENT IN AN ORGANIZED HEALTH CARE EDUCATION/TRAINING PROGRAM

## 2024-09-28 PROCEDURE — 6360000002 HC RX W HCPCS: Performed by: NURSE PRACTITIONER

## 2024-09-28 PROCEDURE — 2580000003 HC RX 258: Performed by: HOSPITALIST

## 2024-09-28 PROCEDURE — 1100000003 HC PRIVATE W/ TELEMETRY

## 2024-09-28 PROCEDURE — 94640 AIRWAY INHALATION TREATMENT: CPT

## 2024-09-28 PROCEDURE — 6360000002 HC RX W HCPCS: Performed by: HOSPITALIST

## 2024-09-28 PROCEDURE — 6370000000 HC RX 637 (ALT 250 FOR IP): Performed by: HOSPITALIST

## 2024-09-28 PROCEDURE — 85025 COMPLETE CBC W/AUTO DIFF WBC: CPT

## 2024-09-28 PROCEDURE — 90935 HEMODIALYSIS ONE EVALUATION: CPT

## 2024-09-28 PROCEDURE — 80053 COMPREHEN METABOLIC PANEL: CPT

## 2024-09-28 RX ADMIN — AMPICILLIN SODIUM 2000 MG: 2 INJECTION, POWDER, FOR SOLUTION INTRAMUSCULAR; INTRAVENOUS at 17:10

## 2024-09-28 RX ADMIN — CARVEDILOL 12.5 MG: 12.5 TABLET, FILM COATED ORAL at 10:30

## 2024-09-28 RX ADMIN — SODIUM CHLORIDE, PRESERVATIVE FREE 5 ML: 5 INJECTION INTRAVENOUS at 20:49

## 2024-09-28 RX ADMIN — WATER 1000 MG: 1 INJECTION INTRAMUSCULAR; INTRAVENOUS; SUBCUTANEOUS at 13:12

## 2024-09-28 RX ADMIN — SODIUM CHLORIDE, PRESERVATIVE FREE 10 ML: 5 INJECTION INTRAVENOUS at 10:30

## 2024-09-28 RX ADMIN — LEVOTHYROXINE SODIUM 125 MCG: 0.12 TABLET ORAL at 06:20

## 2024-09-28 RX ADMIN — PANTOPRAZOLE SODIUM 40 MG: 40 TABLET, DELAYED RELEASE ORAL at 06:20

## 2024-09-28 RX ADMIN — SENNOSIDES AND DOCUSATE SODIUM 1 TABLET: 50; 8.6 TABLET ORAL at 20:51

## 2024-09-28 RX ADMIN — FAMOTIDINE 10 MG: 20 TABLET, FILM COATED ORAL at 10:30

## 2024-09-28 RX ADMIN — CARVEDILOL 12.5 MG: 12.5 TABLET, FILM COATED ORAL at 17:10

## 2024-09-28 RX ADMIN — Medication 3 MG: at 20:47

## 2024-09-28 RX ADMIN — SENNOSIDES AND DOCUSATE SODIUM 1 TABLET: 50; 8.6 TABLET ORAL at 10:30

## 2024-09-28 RX ADMIN — LEVALBUTEROL HYDROCHLORIDE 0.63 MG: 0.63 SOLUTION RESPIRATORY (INHALATION) at 20:55

## 2024-09-28 RX ADMIN — FUROSEMIDE 60 MG: 10 INJECTION, SOLUTION INTRAMUSCULAR; INTRAVENOUS at 10:30

## 2024-09-28 RX ADMIN — HEPARIN SODIUM 5000 UNITS: 5000 INJECTION INTRAVENOUS; SUBCUTANEOUS at 20:47

## 2024-09-28 NOTE — PROGRESS NOTES
Hospitalist Progress Note   Admit Date:  2024 12:03 PM   Name:  Wilda Camara   Age:  74 y.o.  Sex:  female  :  1950   MRN:  [id number]   Room:  [facility]    Presenting/Chief Complaint: No chief complaint on file.     Reason(s) for Admission: Volume overload [E87.70]  End-stage renal disease needing dialysis (HCC) [N18.6, Z99.2]  Hypervolemia, unspecified hypervolemia type [E87.70]     Hospital Course:    74 y.o. female with medical history of ESRD on HD TTS, hypertension, hypothyroid, GERD who presents to the ER with chief complaint of shortness of breath and not able to attend today's dialysis session. Chest x-ray showed bilateral pleural effusion with bibasilar atelectasis along with cardiomegaly without any evidence of congestive heart failure.  Twelve-lead EKG with NSR.  Blood work remarkable for BNP 98764, creatinine 6.97, BUN 24.      Subjective & 24hr Events:   Patient was seen and examined at the bedside.  No overnight events.  Patient to go for hemodialysis morning.  No new complaints.      Assessment & Plan:   Volume overload  End-stage renal disease  - Patient placed in observation status  - Volume overload dyspnea associated with bilateral pleural effusions in setting of end-stage renal disease  - Nephrology consulted  - Continue Lasix.  Patient continues to produce urine  - Continue hemodialysis per nephrology recommendations  -  patient to go for hemodialysis today.  Continue to follow-up with nephro recommendations.    Hypertension  - Continue home Coreg 6.25 mg twice daily  - As needed IV hydralazine    Acquired hypothyroidism  - Continue home Synthroid 125 mcg    GERD  - Continue Protonix, Pepcid    Dyspnea  - Continue Xopenex nebs every 6 hours    DNR  - Noted    Malodorous suprapubic catheter  - Patient will need urology follow-up for cath exchange  - Urology consulted  - Status post catheter exchange from urology  - Will prophylactically start on Rocephin  - Urine

## 2024-09-29 LAB
ANION GAP SERPL CALC-SCNC: 13 MMOL/L (ref 9–18)
BUN SERPL-MCNC: 18 MG/DL (ref 8–23)
CALCIUM SERPL-MCNC: 9.1 MG/DL (ref 8.8–10.2)
CHLORIDE SERPL-SCNC: 99 MMOL/L (ref 98–107)
CO2 SERPL-SCNC: 25 MMOL/L (ref 20–28)
CREAT SERPL-MCNC: 4.82 MG/DL (ref 0.6–1.1)
GLUCOSE SERPL-MCNC: 66 MG/DL (ref 70–99)
POTASSIUM SERPL-SCNC: 4.7 MMOL/L (ref 3.5–5.1)
SODIUM SERPL-SCNC: 137 MMOL/L (ref 136–145)

## 2024-09-29 PROCEDURE — 36415 COLL VENOUS BLD VENIPUNCTURE: CPT

## 2024-09-29 PROCEDURE — 80048 BASIC METABOLIC PNL TOTAL CA: CPT

## 2024-09-29 PROCEDURE — 1100000003 HC PRIVATE W/ TELEMETRY

## 2024-09-29 PROCEDURE — 85025 COMPLETE CBC W/AUTO DIFF WBC: CPT

## 2024-09-29 PROCEDURE — 6360000002 HC RX W HCPCS: Performed by: HOSPITALIST

## 2024-09-29 PROCEDURE — 2580000003 HC RX 258: Performed by: HOSPITALIST

## 2024-09-29 PROCEDURE — 6370000000 HC RX 637 (ALT 250 FOR IP): Performed by: HOSPITALIST

## 2024-09-29 RX ADMIN — SODIUM CHLORIDE, PRESERVATIVE FREE 10 ML: 5 INJECTION INTRAVENOUS at 20:08

## 2024-09-29 RX ADMIN — HEPARIN SODIUM 5000 UNITS: 5000 INJECTION INTRAVENOUS; SUBCUTANEOUS at 20:08

## 2024-09-29 RX ADMIN — SENNOSIDES AND DOCUSATE SODIUM 1 TABLET: 50; 8.6 TABLET ORAL at 20:08

## 2024-09-29 RX ADMIN — Medication 3 MG: at 20:08

## 2024-09-29 ASSESSMENT — PAIN SCALES - GENERAL: PAINLEVEL_OUTOF10: 0

## 2024-09-29 NOTE — PROGRESS NOTES
Hospitalist Progress Note   Admit Date:  2024 12:03 PM   Name:  Wilda Camara   Age:  74 y.o.  Sex:  female  :  1950   MRN:  [id number]   Room:  [facility]    Presenting/Chief Complaint: No chief complaint on file.     Reason(s) for Admission: Volume overload [E87.70]  End-stage renal disease needing dialysis (HCC) [N18.6, Z99.2]  Hypervolemia, unspecified hypervolemia type [E87.70]     Hospital Course:    74 y.o. female with medical history of ESRD on HD TTS, hypertension, hypothyroid, GERD who presents to the ER with chief complaint of shortness of breath and not able to attend today's dialysis session. Chest x-ray showed bilateral pleural effusion with bibasilar atelectasis along with cardiomegaly without any evidence of congestive heart failure.  Twelve-lead EKG with NSR.  Blood work remarkable for BNP 82143, creatinine 6.97, BUN 24.    Subjective & 24hr Events:   Patient was seen and examined at the bedside.  No overnight events.  Patient sitting on edge of bed without any major complaints.  Still some lower extremity swelling but improving with dialysis.      Assessment & Plan:   Volume overload  End-stage renal disease  - Patient placed in observation status  - Volume overload dyspnea associated with bilateral pleural effusions in setting of end-stage renal disease  - Nephrology consulted  - Continue Lasix.  Patient continues to produce urine  - Continue hemodialysis per nephrology recommendations  -  patient undergo hemodialysis tomorrow     Hypertension  - Continue home Coreg 6.25 mg twice daily  - As needed IV hydralazine    Acquired hypothyroidism  - Continue home Synthroid 125 mcg    GERD  - Continue Protonix, Pepcid    Dyspnea  - Continue Xopenex nebs every 6 hours    DNR  - Noted    Malodorous suprapubic catheter  - Patient will need urology follow-up for cath exchange  - Urology consulted  - Status post catheter exchange from urology  - Urine culture with mixed skin   Soft, nontender, nondistended.  Extremities: No cyanosis or clubbing.  +1 lower extremity edema bilateral   Skin:     No rashes.  Normal coloration.   Warm and dry.    Neuro:  CN II-XII grossly intact.    Psych:  Normal mood and affect.      I have personally reviewed labs and tests:  Recent Labs:  Recent Results (from the past 48 hour(s))   CBC with Auto Differential    Collection Time: 09/28/24  7:10 AM   Result Value Ref Range    WBC 6.1 4.3 - 11.1 K/uL    RBC 2.82 (L) 4.05 - 5.2 M/uL    Hemoglobin 8.9 (L) 11.7 - 15.4 g/dL    Hematocrit 29.3 (L) 35.8 - 46.3 %    .9 (H) 82 - 102 FL    MCH 31.6 26.1 - 32.9 PG    MCHC 30.4 (L) 31.4 - 35.0 g/dL    RDW 15.4 (H) 11.9 - 14.6 %    Platelets 299 150 - 450 K/uL    MPV 9.2 (L) 9.4 - 12.3 FL    nRBC 0.00 0.0 - 0.2 K/uL    Differential Type AUTOMATED      Neutrophils % 63 43 - 78 %    Lymphocytes % 21 13 - 44 %    Monocytes % 11 4.0 - 12.0 %    Eosinophils % 4 0.5 - 7.8 %    Basophils % 1 0.0 - 2.0 %    Immature Granulocytes % 1 0.0 - 5.0 %    Neutrophils Absolute 3.8 1.7 - 8.2 K/UL    Lymphocytes Absolute 1.3 0.5 - 4.6 K/UL    Monocytes Absolute 0.7 0.1 - 1.3 K/UL    Eosinophils Absolute 0.2 0.0 - 0.8 K/UL    Basophils Absolute 0.1 0.0 - 0.2 K/UL    Immature Granulocytes Absolute 0.0 0.0 - 0.5 K/UL   Comprehensive Metabolic Panel    Collection Time: 09/28/24  7:10 AM   Result Value Ref Range    Sodium 139 136 - 145 mmol/L    Potassium 4.5 3.5 - 5.1 mmol/L    Chloride 101 98 - 107 mmol/L    CO2 26 20 - 28 mmol/L    Anion Gap 11 9 - 18 mmol/L    Glucose 72 70 - 99 mg/dL    BUN 23 8 - 23 MG/DL    Creatinine 6.24 (H) 0.60 - 1.10 MG/DL    Est, Glom Filt Rate 7 (L) >60 ml/min/1.73m2    Calcium 8.9 8.8 - 10.2 MG/DL    Total Bilirubin <0.2 0.0 - 1.2 MG/DL    ALT 11 8 - 45 U/L    AST 15 15 - 37 U/L    Alk Phosphatase 53 35 - 104 U/L    Total Protein 5.4 (L) 6.3 - 8.2 g/dL    Albumin 2.3 (L) 3.2 - 4.6 g/dL    Globulin 3.1 2.3 - 3.5 g/dL    Albumin/Globulin Ratio 0.8 (L) 1.0 -

## 2024-09-30 PROBLEM — R06.02 SOB (SHORTNESS OF BREATH): Status: ACTIVE | Noted: 2024-09-30

## 2024-09-30 PROBLEM — E03.9 ACQUIRED HYPOTHYROIDISM: Chronic | Status: ACTIVE | Noted: 2020-07-16

## 2024-09-30 PROBLEM — Z91.158 DIALYSIS PATIENT, NONCOMPLIANT (HCC): Status: ACTIVE | Noted: 2024-09-30

## 2024-09-30 PROBLEM — J81.0 ACUTE NONCARDIOGENIC PULMONARY EDEMA: Status: ACTIVE | Noted: 2024-09-06

## 2024-09-30 PROBLEM — N39.0 URINARY TRACT INFECTION ASSOCIATED WITH INDWELLING URETHRAL CATHETER (HCC): Status: ACTIVE | Noted: 2024-09-30

## 2024-09-30 PROBLEM — T83.511A URINARY TRACT INFECTION ASSOCIATED WITH INDWELLING URETHRAL CATHETER (HCC): Status: ACTIVE | Noted: 2024-09-30

## 2024-09-30 PROBLEM — R06.00 DYSPNEA: Status: RESOLVED | Noted: 2023-08-05 | Resolved: 2024-09-30

## 2024-09-30 LAB
ANION GAP SERPL CALC-SCNC: 11 MMOL/L (ref 9–18)
BACTERIA SPEC CULT: ABNORMAL
BASOPHILS # BLD: 0.1 K/UL (ref 0–0.2)
BASOPHILS # BLD: 0.1 K/UL (ref 0–0.2)
BASOPHILS NFR BLD: 1 % (ref 0–2)
BASOPHILS NFR BLD: 2 % (ref 0–2)
BUN SERPL-MCNC: 25 MG/DL (ref 8–23)
CALCIUM SERPL-MCNC: 9.1 MG/DL (ref 8.8–10.2)
CHLORIDE SERPL-SCNC: 98 MMOL/L (ref 98–107)
CO2 SERPL-SCNC: 26 MMOL/L (ref 20–28)
CREAT SERPL-MCNC: 6.05 MG/DL (ref 0.6–1.1)
DIFFERENTIAL METHOD BLD: ABNORMAL
DIFFERENTIAL METHOD BLD: ABNORMAL
EOSINOPHIL # BLD: 0.2 K/UL (ref 0–0.8)
EOSINOPHIL # BLD: 0.2 K/UL (ref 0–0.8)
EOSINOPHIL NFR BLD: 4 % (ref 0.5–7.8)
EOSINOPHIL NFR BLD: 4 % (ref 0.5–7.8)
ERYTHROCYTE [DISTWIDTH] IN BLOOD BY AUTOMATED COUNT: 15.3 % (ref 11.9–14.6)
ERYTHROCYTE [DISTWIDTH] IN BLOOD BY AUTOMATED COUNT: 15.5 % (ref 11.9–14.6)
GLUCOSE SERPL-MCNC: 81 MG/DL (ref 70–99)
HCT VFR BLD AUTO: 28.4 % (ref 35.8–46.3)
HCT VFR BLD AUTO: 30.1 % (ref 35.8–46.3)
HGB BLD-MCNC: 8.5 G/DL (ref 11.7–15.4)
HGB BLD-MCNC: 9.1 G/DL (ref 11.7–15.4)
IMM GRANULOCYTES # BLD AUTO: 0 K/UL (ref 0–0.5)
IMM GRANULOCYTES # BLD AUTO: 0.1 K/UL (ref 0–0.5)
IMM GRANULOCYTES NFR BLD AUTO: 1 % (ref 0–5)
IMM GRANULOCYTES NFR BLD AUTO: 1 % (ref 0–5)
LYMPHOCYTES # BLD: 1.1 K/UL (ref 0.5–4.6)
LYMPHOCYTES # BLD: 1.4 K/UL (ref 0.5–4.6)
LYMPHOCYTES NFR BLD: 21 % (ref 13–44)
LYMPHOCYTES NFR BLD: 25 % (ref 13–44)
MCH RBC QN AUTO: 30.2 PG (ref 26.1–32.9)
MCH RBC QN AUTO: 31.2 PG (ref 26.1–32.9)
MCHC RBC AUTO-ENTMCNC: 29.9 G/DL (ref 31.4–35)
MCHC RBC AUTO-ENTMCNC: 30.2 G/DL (ref 31.4–35)
MCV RBC AUTO: 101.1 FL (ref 82–102)
MCV RBC AUTO: 103.1 FL (ref 82–102)
MONOCYTES # BLD: 0.5 K/UL (ref 0.1–1.3)
MONOCYTES # BLD: 0.7 K/UL (ref 0.1–1.3)
MONOCYTES NFR BLD: 10 % (ref 4–12)
MONOCYTES NFR BLD: 12 % (ref 4–12)
NEUTS SEG # BLD: 3.2 K/UL (ref 1.7–8.2)
NEUTS SEG # BLD: 3.4 K/UL (ref 1.7–8.2)
NEUTS SEG NFR BLD: 57 % (ref 43–78)
NEUTS SEG NFR BLD: 62 % (ref 43–78)
NRBC # BLD: 0 K/UL (ref 0–0.2)
NRBC # BLD: 0 K/UL (ref 0–0.2)
PLATELET # BLD AUTO: 256 K/UL (ref 150–450)
PLATELET # BLD AUTO: 258 K/UL (ref 150–450)
PMV BLD AUTO: 8.8 FL (ref 9.4–12.3)
PMV BLD AUTO: 9.2 FL (ref 9.4–12.3)
POTASSIUM SERPL-SCNC: 4.9 MMOL/L (ref 3.5–5.1)
RBC # BLD AUTO: 2.81 M/UL (ref 4.05–5.2)
RBC # BLD AUTO: 2.92 M/UL (ref 4.05–5.2)
SERVICE CMNT-IMP: ABNORMAL
SODIUM SERPL-SCNC: 135 MMOL/L (ref 136–145)
WBC # BLD AUTO: 5.4 K/UL (ref 4.3–11.1)
WBC # BLD AUTO: 5.7 K/UL (ref 4.3–11.1)

## 2024-09-30 PROCEDURE — 6370000000 HC RX 637 (ALT 250 FOR IP): Performed by: INTERNAL MEDICINE

## 2024-09-30 PROCEDURE — 80048 BASIC METABOLIC PNL TOTAL CA: CPT

## 2024-09-30 PROCEDURE — 85025 COMPLETE CBC W/AUTO DIFF WBC: CPT

## 2024-09-30 PROCEDURE — 6370000000 HC RX 637 (ALT 250 FOR IP): Performed by: HOSPITALIST

## 2024-09-30 PROCEDURE — G0378 HOSPITAL OBSERVATION PER HR: HCPCS

## 2024-09-30 PROCEDURE — 94640 AIRWAY INHALATION TREATMENT: CPT

## 2024-09-30 PROCEDURE — 2580000003 HC RX 258: Performed by: HOSPITALIST

## 2024-09-30 PROCEDURE — 6360000002 HC RX W HCPCS: Performed by: STUDENT IN AN ORGANIZED HEALTH CARE EDUCATION/TRAINING PROGRAM

## 2024-09-30 PROCEDURE — 96372 THER/PROPH/DIAG INJ SC/IM: CPT

## 2024-09-30 PROCEDURE — 6360000002 HC RX W HCPCS: Performed by: HOSPITALIST

## 2024-09-30 PROCEDURE — 94760 N-INVAS EAR/PLS OXIMETRY 1: CPT

## 2024-09-30 PROCEDURE — 97530 THERAPEUTIC ACTIVITIES: CPT

## 2024-09-30 PROCEDURE — 90935 HEMODIALYSIS ONE EVALUATION: CPT

## 2024-09-30 PROCEDURE — 97535 SELF CARE MNGMENT TRAINING: CPT

## 2024-09-30 RX ORDER — CARVEDILOL 12.5 MG/1
12.5 TABLET ORAL 2 TIMES DAILY WITH MEALS
Qty: 60 TABLET | Refills: 1
Start: 2024-09-30 | End: 2024-10-01

## 2024-09-30 RX ORDER — FUROSEMIDE 40 MG/1
40 TABLET ORAL 2 TIMES DAILY
Qty: 60 TABLET | Refills: 1
Start: 2024-09-30 | End: 2024-10-01

## 2024-09-30 RX ORDER — AMOXICILLIN 500 MG/1
500 CAPSULE ORAL EVERY 12 HOURS SCHEDULED
Qty: 6 CAPSULE | Refills: 0
Start: 2024-09-30 | End: 2024-10-01

## 2024-09-30 RX ORDER — AMOXICILLIN 500 MG/1
500 CAPSULE ORAL EVERY 24 HOURS
Status: DISCONTINUED | OUTPATIENT
Start: 2024-09-30 | End: 2024-10-01 | Stop reason: HOSPADM

## 2024-09-30 RX ADMIN — SENNOSIDES AND DOCUSATE SODIUM 1 TABLET: 50; 8.6 TABLET ORAL at 08:43

## 2024-09-30 RX ADMIN — CARVEDILOL 12.5 MG: 12.5 TABLET, FILM COATED ORAL at 16:08

## 2024-09-30 RX ADMIN — PANTOPRAZOLE SODIUM 40 MG: 40 TABLET, DELAYED RELEASE ORAL at 06:31

## 2024-09-30 RX ADMIN — SODIUM CHLORIDE, PRESERVATIVE FREE 10 ML: 5 INJECTION INTRAVENOUS at 21:20

## 2024-09-30 RX ADMIN — LEVALBUTEROL HYDROCHLORIDE 0.63 MG: 0.63 SOLUTION RESPIRATORY (INHALATION) at 23:15

## 2024-09-30 RX ADMIN — LEVOTHYROXINE SODIUM 125 MCG: 0.12 TABLET ORAL at 06:31

## 2024-09-30 RX ADMIN — FAMOTIDINE 10 MG: 20 TABLET, FILM COATED ORAL at 08:43

## 2024-09-30 RX ADMIN — SENNOSIDES AND DOCUSATE SODIUM 1 TABLET: 50; 8.6 TABLET ORAL at 21:18

## 2024-09-30 RX ADMIN — Medication 3 MG: at 21:18

## 2024-09-30 RX ADMIN — AMOXICILLIN 500 MG: 500 CAPSULE ORAL at 16:08

## 2024-09-30 RX ADMIN — HEPARIN SODIUM 5000 UNITS: 5000 INJECTION INTRAVENOUS; SUBCUTANEOUS at 21:19

## 2024-09-30 NOTE — PROGRESS NOTES
Hospitalist Progress Note   Admit Date:  2024 12:03 PM   Name:  Wilda Camara   Age:  74 y.o.  Sex:  female  :  1950   MRN:  [id number]   Room:  Formerly named [facility]    Presenting/Chief Complaint: No chief complaint on file.     Reason(s) for Admission: Volume overload [E87.70]  End-stage renal disease needing dialysis (HCC) [N18.6, Z99.2]  Hypervolemia, unspecified hypervolemia type [E87.70]  SOB (shortness of breath) [R06.02]     Hospital Course:    74 y.o. female with medical history of ESRD on HD TTS, hypertension, hypothyroid, GERD who presents to the ER with chief complaint of shortness of breath and not able to attend today's dialysis session. Chest x-ray showed bilateral pleural effusion with bibasilar atelectasis along with cardiomegaly without any evidence of congestive heart failure.  Twelve-lead EKG with NSR.  Blood work remarkable for BNP 21572, creatinine 6.97, BUN 24.    Subjective & 24hr Events:   Pt seen on HD.  No complaints.  Awaiting discharge to SNF      Assessment & Plan:   Volume overload  End-stage renal disease  - observation status.  Volume overload dyspnea due to missing dialysis, associated with bilateral pleural effusions in setting of end-stage renal disease, without pulmonary edema or hypoxia.  -discussed with nephrology today and OK for discharge on lasix 40mg BID.  IV lasix while in hospital.  - Continue hemodialysis     Enterococcal UTI due to berkowitz catheter  -berkowitz was changed by urology  -amoxicillin    Hypertension  - controlled.    --Continue home Coreg 6.25 mg twice daily  - As needed IV hydralazine    Acquired hypothyroidism  - Continue home Synthroid 125 mcg    GERD  - Continue Protonix, Pepcid    Dyspnea  - Continue Xopenex nebs every 6 hours    DNR  - Noted      PT/OT evals ordered?  Therapy evals ordered  Diet:  ADULT DIET; Regular; No Added Salt (3-4 gm); Low Potassium (Less than 3000 mg/day); Low Phosphorus (Less than 1000 mg); 1200 ml  VTE prophylaxis:  - 23 MG/DL    Creatinine 4.82 (H) 0.60 - 1.10 MG/DL    Est, Glom Filt Rate 9 (L) >60 ml/min/1.73m2    Calcium 9.1 8.8 - 10.2 MG/DL   CBC with Auto Differential    Collection Time: 09/30/24  8:56 AM   Result Value Ref Range    WBC 5.4 4.3 - 11.1 K/uL    RBC 2.81 (L) 4.05 - 5.2 M/uL    Hemoglobin 8.5 (L) 11.7 - 15.4 g/dL    Hematocrit 28.4 (L) 35.8 - 46.3 %    .1 82 - 102 FL    MCH 30.2 26.1 - 32.9 PG    MCHC 29.9 (L) 31.4 - 35.0 g/dL    RDW 15.3 (H) 11.9 - 14.6 %    Platelets 256 150 - 450 K/uL    MPV 8.8 (L) 9.4 - 12.3 FL    nRBC 0.00 0.0 - 0.2 K/uL    Differential Type AUTOMATED      Neutrophils % 62 43 - 78 %    Lymphocytes % 21 13 - 44 %    Monocytes % 10 4.0 - 12.0 %    Eosinophils % 4 0.5 - 7.8 %    Basophils % 2 0.0 - 2.0 %    Immature Granulocytes % 1 0.0 - 5.0 %    Neutrophils Absolute 3.4 1.7 - 8.2 K/UL    Lymphocytes Absolute 1.1 0.5 - 4.6 K/UL    Monocytes Absolute 0.5 0.1 - 1.3 K/UL    Eosinophils Absolute 0.2 0.0 - 0.8 K/UL    Basophils Absolute 0.1 0.0 - 0.2 K/UL    Immature Granulocytes Absolute 0.1 0.0 - 0.5 K/UL   Basic Metabolic Panel w/ Reflex to MG    Collection Time: 09/30/24  8:56 AM   Result Value Ref Range    Sodium 135 (L) 136 - 145 mmol/L    Potassium 4.9 3.5 - 5.1 mmol/L    Chloride 98 98 - 107 mmol/L    CO2 26 20 - 28 mmol/L    Anion Gap 11 9 - 18 mmol/L    Glucose 81 70 - 99 mg/dL    BUN 25 (H) 8 - 23 MG/DL    Creatinine 6.05 (H) 0.60 - 1.10 MG/DL    Est, Glom Filt Rate 7 (L) >60 ml/min/1.73m2    Calcium 9.1 8.8 - 10.2 MG/DL       No results for input(s): \"COVID19\" in the last 72 hours.      Current Meds:  Current Facility-Administered Medications   Medication Dose Route Frequency    amoxicillin (AMOXIL) capsule 500 mg  500 mg Oral Q24H    levalbuterol (XOPENEX) nebulization 0.63 mg  0.63 mg Nebulization Q6H PRN    furosemide (LASIX) injection 60 mg  60 mg IntraVENous Daily    levothyroxine (SYNTHROID) tablet 125 mcg  125 mcg Oral QAM AC    pantoprazole (PROTONIX) tablet 40 mg  40

## 2024-09-30 NOTE — PROGRESS NOTES
Pt had dialysis this morning. Pt had amoxicillin ordered PO and telemetry was discontinued. Pt has a discharge order but is unable to leave today d/t no short term rehab available. Pt did not complain of pain this shift. Phenergan was discontinued this shift.     All known needs met at this time. Bed is currently low, call light was left in reach, and pt was encouraged to ask for assistance. Pt was left resting in bed with no complaints.

## 2024-09-30 NOTE — DIALYSIS
TRANSFER OUT- DIALYSIS    Hemodialysis treatment completed. PT ran 3.5 hours, without complications.    Patient a/ox3 and VS stabel  /80  P 73       1.5 Kg removed.      Flushed both ports with 10 mL of NS.  CVC dressing clean, dry, and intact, tego caps intact, curos caps placed.      Meds given: 0.    RBCs given during dialysis: 0    Patient to 635 after dialysis.

## 2024-09-30 NOTE — PROGRESS NOTES
(COREG) tablet 12.5 mg  12.5 mg Oral BID WC         Objective:     Vitals:    09/29/24 2151 09/30/24 0412 09/30/24 0759 09/30/24 0856   BP: (!) 154/100 (!) 152/86 (!) 156/98 (!) 168/109   Pulse: 75 67 66 72   Resp: 17 16 16    Temp: 97.7 °F (36.5 °C) 97.5 °F (36.4 °C) 97.7 °F (36.5 °C)    TempSrc: Oral Oral Oral    SpO2: 100% 99% 99%    Weight:  76.7 kg (169 lb 1.5 oz)     Height:         Intake and Output:   09/28 1901 - 09/30 0700  In: 240 [P.O.:240]  Out: 0   No intake/output data recorded.    Physical Exam:   Constitutional:  the patient is well developed and in no acute distress, alert and oriented   HEENT:  Sclera clear, pupils equal, oral mucosa moist  Lungs: clearer lung sounds bilaterally   Cardiovascular:  Regular rate, S1, S2, no Rub   Abd/GI: soft and non-tender; with positive bowel sounds.  Ext: warm without cyanosis. There is + lower leg edema.  Skin:  no jaundice or rashes  Neuro: no gross neuro deficits   Psychiatric: Calm.   : suprapubic catheter exchanged 9/25  Access: Right TCC- intact, accessed       LAB  Recent Labs     09/28/24  0710 09/29/24  0541   WBC 6.1 5.7   HGB 8.9* 9.1*   HCT 29.3* 30.1*    258     Recent Labs     09/28/24  0710 09/29/24  0541    137   K 4.5 4.7    99   CO2 26 25   BUN 23 18   CREATININE 6.24* 4.82*     No results for input(s): \"PH\", \"PCO2\", \"PO2\", \"HCO3\" in the last 72 hours.      Assessment/Plan:  (Medical Decision Making)   ESRD - [facility]    EDW changed 72 kg to outpt clinic updated   -seen on HD, UF as tolerated      2. SOB/Volume overload, CXR with pleural effusion   - continue lasix, continues to produce urine      3. Hypertension - continue antihypertensive med     4. Anemia of CKD- stable     5. Malodorous suprapubic catheter- pt reports missed Urology follow up for cath exchanged 9/25       William Caldwell, APRN - CNP  [facility]

## 2024-09-30 NOTE — DIALYSIS
TRANSFER IN - DIALYSIS    Received patient in dialysis unit  from [facility] (unit) for ordered procedure.    Consent verified for renal replacement therapy. Procedure explained to patient, opportunity for Q&A provided. Call light given.     Patient alert and vital signs stable.  /109,  P 72  , 0 L O2 via RA.    Hemodialysis initiated using Right CVC.  Aspirated and flushed both ports without difficulty. Dressing clean, dry and intact.  Machine settings per MD order.    Heparin 0 unit bolus and 0 units/hr.      Will monitor during treatment.

## 2024-09-30 NOTE — PROGRESS NOTES
4 Eyes Skin Assessment     NAME:  Wilda Camara  YOB: 1950  MEDICAL RECORD NUMBER:  [id number]    The patient is being assessed for  Transfer to New Unit    I agree that at least one RN has performed a thorough Head to Toe Skin Assessment on the patient. ALL assessment sites listed below have been assessed.      Areas assessed by both nurses:    Head, Face, Ears, Shoulders, Back, Chest, Arms, Elbows, Hands, Sacrum. Buttock, Coccyx, Ischium, Legs. Feet and Heels, and Under Medical Devices         Does the Patient have a Wound? No noted wound(s)       Marcus Prevention initiated by RN: Yes  Wound Care Orders initiated by RN: No    Pressure Injury (Stage 3,4, Unstageable, DTI, NWPT, and Complex wounds) if present, place Wound referral order by RN under : No    New Ostomies, if present place, Ostomy referral order under : No     Nurse 1 eSignature: Electronically signed by Chuck Mcguire RN on 9/30/24 at 5:48 AM EDT    **SHARE this note so that the co-signing nurse can place an eSignature**    Nurse 2 eSignature: Electronically signed by Rosa Johnson RN on 9/30/24 at 5:56 AM EDT

## 2024-09-30 NOTE — PROGRESS NOTES
TRANSFER - OUT REPORT:    Verbal report given to 6th floor RN on Wilda Camara  being transferred to [facility] for routine progression of patient care       Report consisted of patient's Situation, Background, Assessment and   Recommendations(SBAR).     Information from the following report(s) Nurse Handoff Report, Adult Overview, MAR, Recent Results, Cardiac Rhythm SR, and Neuro Assessment was reviewed with the receiving nurse.           Lines:   Peripheral IV 09/24/24 Right Antecubital (Active)   Site Assessment Clean, dry & intact 09/29/24 2008   Line Status Flushed;Capped;Normal saline locked 09/29/24 2008   Line Care Connections checked and tightened;Cap changed 09/29/24 2008   Phlebitis Assessment No symptoms 09/29/24 2008   Infiltration Assessment 0 09/29/24 2008   Alcohol Cap Used Yes 09/29/24 2008   Dressing Status Clean, dry & intact 09/29/24 2008   Dressing Type Transparent 09/29/24 2008       Tunneled Hemodialysis Catheter Right Subclavian (Active)   Access Status  Accessed 09/28/24 1940   Continued need for line? Yes 09/24/24 1602   Site Assessment Clean, dry & intact 09/29/24 2008   Venous Lumen Status Alcohol cap present 09/28/24 1940   Arterial Lumen Status Alcohol cap present 09/28/24 1940   Alcohol Cap Used Yes 09/27/24 2026   Line Care Connections checked and tightened 09/27/24 2026   Dressing Type Bacteriocidal;Transparent 09/11/24 0736   Date of Last Dressing Change 09/25/24 09/26/24 1604   Dressing Status Clean, dry & intact 09/28/24 1940   Dressing Intervention New;Dressing changed 09/10/24 1735   Verification by x-ray Yes 09/07/24 0949   Dressing Change Due 09/27/24 09/26/24 1604        Opportunity for questions and clarification was provided.      Patient transported with:  Registered Nurse

## 2024-10-01 ENCOUNTER — TELEPHONE (OUTPATIENT)
Dept: INTERNAL MEDICINE CLINIC | Facility: CLINIC | Age: 74
End: 2024-10-01

## 2024-10-01 VITALS
TEMPERATURE: 97.7 F | BODY MASS INDEX: 23.77 KG/M2 | HEART RATE: 78 BPM | SYSTOLIC BLOOD PRESSURE: 144 MMHG | RESPIRATION RATE: 20 BRPM | HEIGHT: 70 IN | DIASTOLIC BLOOD PRESSURE: 106 MMHG | OXYGEN SATURATION: 100 % | WEIGHT: 166.01 LBS

## 2024-10-01 PROCEDURE — G0378 HOSPITAL OBSERVATION PER HR: HCPCS

## 2024-10-01 PROCEDURE — 6370000000 HC RX 637 (ALT 250 FOR IP): Performed by: HOSPITALIST

## 2024-10-01 RX ORDER — CARVEDILOL 12.5 MG/1
12.5 TABLET ORAL 2 TIMES DAILY WITH MEALS
Qty: 60 TABLET | Refills: 1 | Status: SHIPPED | OUTPATIENT
Start: 2024-10-01

## 2024-10-01 RX ORDER — FUROSEMIDE 40 MG/1
40 TABLET ORAL 2 TIMES DAILY
Qty: 60 TABLET | Refills: 1 | Status: SHIPPED | OUTPATIENT
Start: 2024-10-01

## 2024-10-01 RX ORDER — AMOXICILLIN 500 MG/1
500 CAPSULE ORAL EVERY 12 HOURS SCHEDULED
Qty: 6 CAPSULE | Refills: 0 | Status: SHIPPED | OUTPATIENT
Start: 2024-10-01 | End: 2024-10-04

## 2024-10-01 RX ADMIN — PANTOPRAZOLE SODIUM 40 MG: 40 TABLET, DELAYED RELEASE ORAL at 06:17

## 2024-10-01 RX ADMIN — FAMOTIDINE 10 MG: 20 TABLET, FILM COATED ORAL at 07:42

## 2024-10-01 RX ADMIN — SENNOSIDES AND DOCUSATE SODIUM 1 TABLET: 50; 8.6 TABLET ORAL at 07:42

## 2024-10-01 RX ADMIN — LEVOTHYROXINE SODIUM 125 MCG: 0.12 TABLET ORAL at 06:17

## 2024-10-01 NOTE — TELEPHONE ENCOUNTER
LMOVM informing pt to call the office to schedule a 1 week hospital follow up and complete TCM questions.

## 2024-10-01 NOTE — CARE COORDINATION
MIKE HAND    Spoke with [facility] this am. They are unable to take pts today. [facility] to find [facility]. New authorization started. Will need to send updated PT/OT notes when available.    Will continue to follow.

## 2024-10-01 NOTE — CARE COORDINATION
CM spoke with patient in room, patient has declines any shelter, and has elected to go home with  uber arranged for 1430. Home health ordered through [facility]. No further discharge needs.    Seamus MARKS, ACM  [place]

## 2024-10-01 NOTE — PROGRESS NOTES
Patient is anticipating repeat Dialysis, today. Pt indicates that two (2) (back-to-back) is an effective piece of treatment plan.  Patient expresses concerns about discharge, as this IP stay is the fourth (4th) recently for the same diagnosis. Understandable.  In light of inability to secure placement with STR, this may be a non-issue; though, reassurances (absence of 5th ?) from Medical Staff will be helpful.    Patient is continuing to ambulate well on unit.  Presence of BLE swelling continues.    Pt is resting comfortably in bed without complaints. Hourly rounds completed and all needs are met. Bed is locked, low and call light is within reach and patient is encouraged to call for any need(s).

## 2024-10-01 NOTE — PROGRESS NOTES
Wilda Camara  Admission Date: 9/24/2024         [facility] Progress Note: 10/1/2024    Follow-up for: ESRD    The patient's chart is reviewed and the patient is discussed with the staff.    Subjective:   Pt seen and examined in room complaints of ongoing LE edema, UF limited on dialysis due to drop in BP 9/30, noted uop via suprapubic cath   She is limiting fluids.     ROS:  Gen - no fever, no chills  CV - no chest pain  Lung -  shortness of breath- better, no cough  Abd - no tenderness, no nausea/vomiting, no diarrhea  Ext -  + edema    Current Facility-Administered Medications   Medication Dose Route Frequency    amoxicillin (AMOXIL) capsule 500 mg  500 mg Oral Q24H    levalbuterol (XOPENEX) nebulization 0.63 mg  0.63 mg Nebulization Q6H PRN    furosemide (LASIX) injection 60 mg  60 mg IntraVENous Daily    levothyroxine (SYNTHROID) tablet 125 mcg  125 mcg Oral QAM AC    pantoprazole (PROTONIX) tablet 40 mg  40 mg Oral QAM AC    hydrOXYzine HCl (ATARAX) tablet 10 mg  10 mg Oral TID PRN    sodium chloride flush 0.9 % injection 5-40 mL  5-40 mL IntraVENous 2 times per day    sodium chloride flush 0.9 % injection 5-40 mL  5-40 mL IntraVENous PRN    0.9 % sodium chloride infusion   IntraVENous PRN    acetaminophen (TYLENOL) tablet 650 mg  650 mg Oral Q6H PRN    Or    acetaminophen (TYLENOL) suppository 650 mg  650 mg Rectal Q6H PRN    melatonin tablet 3 mg  3 mg Oral Nightly    sennosides-docusate sodium (SENOKOT-S) 8.6-50 MG tablet 1 tablet  1 tablet Oral BID    bisacodyl (DULCOLAX) suppository 10 mg  10 mg Rectal Daily PRN    ondansetron (ZOFRAN) injection 4 mg  4 mg IntraVENous Q4H PRN    famotidine (PEPCID) tablet 10 mg  10 mg Oral Daily    heparin (porcine) injection 5,000 Units  5,000 Units SubCUTAneous BID    hydrALAZINE (APRESOLINE) injection 10 mg  10 mg IntraVENous Q6H PRN    carvedilol (COREG) tablet 12.5 mg  12.5 mg Oral BID WC         Objective:     Vitals:
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
## 2024-10-01 NOTE — DISCHARGE SUMMARY
Hospitalist Discharge Summary   Admit Date:  2024 12:03 PM   DC Note date: 10/1/2024  Name:  Wilda Camara   Age:  74 y.o.  Sex:  female  :  1950   MRN:  [id number]   Room:  [facility]  PCP:  Liliana Minor MD    Presenting Complaint: No chief complaint on file.     Initial Admission Diagnosis: Volume overload [E87.70]  End-stage renal disease needing dialysis (HCC) [N18.6, Z99.2]  Hypervolemia, unspecified hypervolemia type [E87.70]  SOB (shortness of breath) [R06.02]     Problem List for this Hospitalization (present on admission):    Principal Problem:    Missed Hemodialysis session  Active Problems:    Hypertension    Acquired hypothyroidism    Gastroesophageal reflux disease without esophagitis    ESRD (end stage renal disease) (Former[facility])    DNR (do not resuscitate)    Urinary tract infection associated with indwelling urethral catheter ([facility])  Resolved Problems:    Dyspnea      Hospital Course:  74 y.o. female with medical history of ESRD on HD TTS, hypertension, hypothyroid, GERD who presents to the ER with chief complaint of shortness of breath and not able to attend today's dialysis session. Chest x-ray showed bilateral pleural effusion with bibasilar atelectasis along with cardiomegaly without any evidence of congestive heart failure.  Twelve-lead EKG with NSR.  Blood work remarkable for BNP 99160 (always going to be elevated with ESRD), creatinine 6.97, BUN 24.    Pt placed in observation for SOB frmo missed dialysis session without any overt pulmonary edema.  She had enterococcal UTI treated with PCNs.  Urology changed cath and needs another change in 3-4 weeks.  Coreg adjusted and tolerating change so far.    Nephrology says she drops her BP a lot with HD and want her discharged on lasix 40mg BID as she has decent UOP still and this helps.    Pt seen walking hallway unassisted.  She can go home with HH.    Question of pt wanting to stay in hospital for secondary gain.  She is very upset  within the right atrium. Report signed on 09/19/2024 (00:52 Eastern Time) Signed by: Kendell Weeks M.D. Reading Location: 396    XR ABDOMEN (KUB) (SINGLE AP VIEW)    Result Date: 9/10/2024  Constipation. No evidence of small bowel obstruction or ileus. Electronically signed by SONIA CASTANEDA    XR CHEST PORTABLE    Result Date: 9/6/2024  1. Minimal infiltrates left lung base with suspected small left pleural effusion. 2. Ovoid density overlying the lower cardiac and right medial lung base silhouette most consistent with a large hiatal hernia sac. 3. Dialysis catheter in place from the right jugular approach with tip in the caval atrial junction. Electronically signed by Zaid Montez       Labs: Results:       BMP, Mg, Phos Recent Labs     09/29/24  0541 09/30/24  0856    135*   K 4.7 4.9   CL 99 98   CO2 25 26   ANIONGAP 13 11   BUN 18 25*   CREATININE 4.82* 6.05*   LABGLOM 9* 7*   CALCIUM 9.1 9.1   GLUCOSE 66* 81      CBC Recent Labs     09/29/24  0541 09/30/24  0856   WBC 5.7 5.4   RBC 2.92* 2.81*   HGB 9.1* 8.5*   HCT 30.1* 28.4*   .1* 101.1   MCH 31.2 30.2   MCHC 30.2* 29.9*   RDW 15.5* 15.3*    256   MPV 9.2* 8.8*   NRBC 0.00 0.00   LYMPHOPCT 25 21   MONOPCT 12 10   BASOPCT 1 2   IMMGRAN 1 1   LYMPHSABS 1.4 1.1   EOSABS 0.2 0.2   MONOSABS 0.7 0.5   BASOSABS 0.1 0.1   ABSIMMGRAN 0.0 0.1      LFT No results for input(s): \"BILITOT\", \"BILIDIR\", \"ALKPHOS\", \"AST\", \"ALT\", \"LABALBU\", \"GLOB\" in the last 72 hours.    Invalid input(s): \"PROT\"   Cardiac  Lab Results   Component Value Date/Time    TROPHS 30.4 10/08/2023 02:04 PM    TROPHS 45.0 10/08/2023 12:43 PM    TROPHS 40.4 08/06/2023 09:28 AM      Coags Lab Results   Component Value Date/Time    PROTIME 13.9 09/19/2024 12:16 AM    PROTIME 13.5 08/09/2023 06:06 AM    PROTIME 13.5 01/26/2023 11:47 AM    INR 1.0 09/19/2024 12:16 AM    INR 1.0 08/09/2023 06:06 AM    INR 1.0 01/26/2023 11:47 AM    APTT 28.5 01/26/2023 11:47 AM      A1c No results

## 2024-10-02 ENCOUNTER — CARE COORDINATION (OUTPATIENT)
Dept: CARE COORDINATION | Facility: CLINIC | Age: 74
End: 2024-10-02

## 2024-10-02 NOTE — CARE COORDINATION
Attempted to reach patient for transitions of care follow up. Unable to reach patient.    Outreach Attempts:   HIPAA compliant voicemail left for patient.     Patient: Wilda Camara    Patient : 1950   MRN: [id number]    Reason for Admission: End-stage renal disease needing dialysis   Discharge Date: 10/1/24  RURS: Readmission Risk Score: 31.3    Last Discharge Facility       Date Complaint Diagnosis Description Type Department Provider    24 sob;Abdominal pain End-stage renal disease needing dialysis (HCC) ... ED to Hosp-Admission (Discharged) (ADMITTED) SFD6MS Chris Martinez MD; Raul...            Was this an external facility discharge? No    Follow Up Appointment:   Patient does not have a follow up appointment scheduled at time of call.  UTR x 1; Noted in [facility]-PCP office has tried to contact patient x 2 without success.  Future Appointments         Provider Specialty Dept Phone    10/30/2024 2:30 PM [id number] INJECTION/CATH Urology [phone]            Plan for follow-up on next business day.      Rosalie Lay RN

## 2024-10-03 ENCOUNTER — CARE COORDINATION (OUTPATIENT)
Dept: CARE COORDINATION | Facility: CLINIC | Age: 74
End: 2024-10-03

## 2024-10-03 NOTE — CARE COORDINATION
Attempted to reach patient for transitions of care follow up. Unable to reach patient.    Outreach Attempts:   HIPAA compliant voicemail left for patient.     Patient: Wilda Camara    Patient : 1950   MRN: [id number]    Reason for Admission: End-stage renal disease needing dialysis   Discharge Date: 10/1/24  RURS: Readmission Risk Score: 31.3    Last Discharge Facility       Date Complaint Diagnosis Description Type Department Provider    24 sob;Abdominal pain End-stage renal disease needing dialysis (HCC) ... ED to Hosp-Admission (Discharged) (ADMITTED) S[facility]6MS Chris Martinez MD; Raul...            Was this an external facility discharge? No    Follow Up Appointment:   Patient does not have a follow up appointment scheduled at time of call.  UTR x 2  Future Appointments         Provider Specialty Dept Phone    10/30/2024 2:30 PM [id number] INJECTION/CATH Urology [phone]            Send MyChart letter.      Rosalie Lay RN

## 2024-10-04 ENCOUNTER — CARE COORDINATION (OUTPATIENT)
Dept: CARE COORDINATION | Facility: CLINIC | Age: 74
End: 2024-10-04

## 2024-10-04 NOTE — CARE COORDINATION
Dear Wilda Camara    My name is Rosalie VIRAMONTES and I am a Care Transition Nurse  with [employer]  to improve patients' health. I tried to reach you via phone to let you know that, as a member of your care team, I will work with other providers involved in your care, offer education for your specific health conditions, and connect you with more resources as needed. This program is designed to provide you with the opportunity to have a [facility] care manager partner with you for the following situations:     1) if you come home from the hospital or emergency room   2) to help manage your disease   3) when you would like assistance coordinating services or appointments    This added support is provided at no additional cost to you. My primary focus is to help you achieve specific goals and improve your health. Please call me at [phone] to further discuss how I can support your health care needs.    Sincerely,    Rosalie VIRAMONTES

## 2024-10-10 ENCOUNTER — TELEPHONE (OUTPATIENT)
Dept: INTERNAL MEDICINE CLINIC | Facility: CLINIC | Age: 74
End: 2024-10-10

## 2024-10-10 NOTE — TELEPHONE ENCOUNTER
Care Transitions Initial Follow Up Call    Outreach made within 2 business days of discharge: NO    Patient: Wilda Camara Patient : 1950   MRN: [id number]  Reason for Admission: Initial Admission Diagnosis: Volume overload [E87.70]  End-stage renal disease needing dialysis (HCC) [N18.6, Z99.2]  Hypervolemia, unspecified hypervolemia type [E87.70]  SOB (shortness of breath) [R06.02]   Discharge Date: 10/1/24       Spoke with: the patient     Discharge department/facility: [facility] Interactive Patient Contact:  Was patient able to fill all prescriptions: Yes  Was patient instructed to bring all medications to the follow-up visit: Yes  Is patient taking all medications as directed in the discharge summary? Yes  Does patient understand their discharge instructions: Yes  Does patient have questions or concerns that need addressed prior to 7-14 day follow up office visit: no    Additional needs identified to be addressed with provider  No needs identified             Scheduled appointment with PCP within 7-14 days    Follow Up  Future Appointments   Date Time Provider Department Center   10/18/2024  1:00 PM Cynthia Clayton APRN - CNP [facility]   10/30/2024  2:30 PM [id number] INJECTION/CATH [id number] [facility] Todd

## 2024-10-18 ENCOUNTER — OFFICE VISIT (OUTPATIENT)
Dept: INTERNAL MEDICINE CLINIC | Facility: CLINIC | Age: 74
End: 2024-10-18

## 2024-10-18 VITALS
SYSTOLIC BLOOD PRESSURE: 114 MMHG | HEART RATE: 96 BPM | BODY MASS INDEX: 23.45 KG/M2 | DIASTOLIC BLOOD PRESSURE: 62 MMHG | WEIGHT: 163.8 LBS | HEIGHT: 70 IN | TEMPERATURE: 97.1 F | RESPIRATION RATE: 20 BRPM | OXYGEN SATURATION: 98 %

## 2024-10-18 DIAGNOSIS — Z93.59 SUPRAPUBIC CATHETER (HCC): ICD-10-CM

## 2024-10-18 DIAGNOSIS — F33.9 EPISODE OF RECURRENT MAJOR DEPRESSIVE DISORDER, UNSPECIFIED DEPRESSION EPISODE SEVERITY (HCC): ICD-10-CM

## 2024-10-18 DIAGNOSIS — E87.70 HYPERVOLEMIA, UNSPECIFIED HYPERVOLEMIA TYPE: ICD-10-CM

## 2024-10-18 DIAGNOSIS — N18.6 ESRD (END STAGE RENAL DISEASE) (HCC): Chronic | ICD-10-CM

## 2024-10-18 DIAGNOSIS — Z09 HOSPITAL DISCHARGE FOLLOW-UP: Primary | ICD-10-CM

## 2024-10-18 PROBLEM — U07.1 COVID: Status: ACTIVE | Noted: 2024-09-11

## 2024-10-18 RX ORDER — ESCITALOPRAM OXALATE 5 MG/1
5 TABLET ORAL DAILY
Qty: 30 TABLET | Refills: 5 | Status: SHIPPED | OUTPATIENT
Start: 2024-10-18

## 2024-10-18 RX ORDER — PANTOPRAZOLE SODIUM 40 MG/1
40 TABLET, DELAYED RELEASE ORAL DAILY
Qty: 90 TABLET | Refills: 1 | Status: SHIPPED | OUTPATIENT
Start: 2024-10-18

## 2024-10-18 SDOH — ECONOMIC STABILITY: FOOD INSECURITY: WITHIN THE PAST 12 MONTHS, THE FOOD YOU BOUGHT JUST DIDN'T LAST AND YOU DIDN'T HAVE MONEY TO GET MORE.: NEVER TRUE

## 2024-10-18 SDOH — ECONOMIC STABILITY: FOOD INSECURITY: WITHIN THE PAST 12 MONTHS, YOU WORRIED THAT YOUR FOOD WOULD RUN OUT BEFORE YOU GOT MONEY TO BUY MORE.: NEVER TRUE

## 2024-10-18 SDOH — ECONOMIC STABILITY: INCOME INSECURITY: HOW HARD IS IT FOR YOU TO PAY FOR THE VERY BASICS LIKE FOOD, HOUSING, MEDICAL CARE, AND HEATING?: SOMEWHAT HARD

## 2024-10-18 ASSESSMENT — PATIENT HEALTH QUESTIONNAIRE - PHQ9
1. LITTLE INTEREST OR PLEASURE IN DOING THINGS: NEARLY EVERY DAY
SUM OF ALL RESPONSES TO PHQ9 QUESTIONS 1 & 2: 6
10. IF YOU CHECKED OFF ANY PROBLEMS, HOW DIFFICULT HAVE THESE PROBLEMS MADE IT FOR YOU TO DO YOUR WORK, TAKE CARE OF THINGS AT HOME, OR GET ALONG WITH OTHER PEOPLE: EXTREMELY DIFFICULT
SUM OF ALL RESPONSES TO PHQ QUESTIONS 1-9: 21
SUM OF ALL RESPONSES TO PHQ QUESTIONS 1-9: 20
2. FEELING DOWN, DEPRESSED OR HOPELESS: NEARLY EVERY DAY
SUM OF ALL RESPONSES TO PHQ QUESTIONS 1-9: 21
9. THOUGHTS THAT YOU WOULD BE BETTER OFF DEAD, OR OF HURTING YOURSELF: SEVERAL DAYS
5. POOR APPETITE OR OVEREATING: NOT AT ALL
SUM OF ALL RESPONSES TO PHQ QUESTIONS 1-9: 21
6. FEELING BAD ABOUT YOURSELF - OR THAT YOU ARE A FAILURE OR HAVE LET YOURSELF OR YOUR FAMILY DOWN: NEARLY EVERY DAY
7. TROUBLE CONCENTRATING ON THINGS, SUCH AS READING THE NEWSPAPER OR WATCHING TELEVISION: NEARLY EVERY DAY
4. FEELING TIRED OR HAVING LITTLE ENERGY: NEARLY EVERY DAY
3. TROUBLE FALLING OR STAYING ASLEEP: NEARLY EVERY DAY
8. MOVING OR SPEAKING SO SLOWLY THAT OTHER PEOPLE COULD HAVE NOTICED. OR THE OPPOSITE, BEING SO FIGETY OR RESTLESS THAT YOU HAVE BEEN MOVING AROUND A LOT MORE THAN USUAL: MORE THAN HALF THE DAYS

## 2024-10-18 ASSESSMENT — ENCOUNTER SYMPTOMS
SHORTNESS OF BREATH: 1
COUGH: 0
VOMITING: 0
NAUSEA: 0
ABDOMINAL PAIN: 0
BLOOD IN STOOL: 0
DIARRHEA: 0
ABDOMINAL DISTENTION: 1
WHEEZING: 0
CONSTIPATION: 0

## 2024-10-18 NOTE — PATIENT INSTRUCTIONS
identification for the head of household. ** Does not pay deposits for utilities or rent.   Contact:  [address] [place] [id number] [phone]   Helpful Info:  Hours of Operation: 9:00 a.m.-12:00 p.m.; 1:00 p.m. - 4:00 p.m. Monday-Friday.

## 2024-10-18 NOTE — PROGRESS NOTES
Post-Discharge Transitional Care Follow Up      Wilda Camara   YOB: 1950    Date of Office Visit:  10/18/2024  Date of Hospital Admission: 9/24/24  Date of Hospital Discharge: 10/1/24  Readmission Risk Score (high >=14%. Medium >=10%):Readmission Risk Score: 31.3      Care management risk score Rising risk (score 2-5) and Complex Care (Scores >=6): No Risk Score On File     Non face to face  following discharge, date last encounter closed (first attempt may have been earlier): *No documented post hospital discharge outreach found in the last 14 days     Call initiated 2 business days of discharge: *No response recorded in the last 14 days     Hospital discharge follow-up  -     OR DISCHARGE MEDS RECONCILED W/ CURRENT OUTPATIENT MED LIST    Hypervolemia, unspecified hypervolemia type  Continue compliance with dialysis. Patient instructed to start furosemide today. Close follow-up with me in 1-2 weeks or sooner if needed or if SOB/swelling does not improve. Present to the ER for prompt evaluation if symptoms worsen.     ESRD (end stage renal disease) ([facility])  Continue hemodialysis and nephrology follow-up as directed.     Suprapubic catheter ([facility])  Scheduled for exchange with urology on 10/30/24.     Episode of recurrent major depressive disorder, unspecified depression episode severity ([facility])  -     escitalopram (LEXAPRO) 5 MG tablet; Take 1 tablet by mouth daily For depression., Disp-30 tablet, R-5Normal  Start Lexapro at 5 mg daily. Instructed patient to contact office or on-call physician promptly should condition worsen or any new symptoms appear.  IF THE PATIENT HAS ANY SUICIDAL OR HOMICIDAL IDEATION, CALL THE OFFICE OR GO TO THE ER IMMEDIATELY.  Patient was agreeable with this plan.      Medical Decision Making: high complexity  Return in about 2 weeks (around 11/1/2024).    On this date 10/18/2024 I have spent 45 minutes reviewing previous notes, test results and face to face with the

## 2024-10-30 ENCOUNTER — NURSE ONLY (OUTPATIENT)
Dept: UROLOGY | Age: 74
End: 2024-10-30

## 2024-10-30 NOTE — PROGRESS NOTES
Pt was placed in supine position and cath removed with no problems.18 fr placed and drain with no issues.

## 2024-11-06 ENCOUNTER — OFFICE VISIT (OUTPATIENT)
Dept: INTERNAL MEDICINE CLINIC | Facility: CLINIC | Age: 74
End: 2024-11-06

## 2024-11-06 VITALS
DIASTOLIC BLOOD PRESSURE: 62 MMHG | TEMPERATURE: 97 F | HEART RATE: 62 BPM | SYSTOLIC BLOOD PRESSURE: 106 MMHG | WEIGHT: 163.4 LBS | OXYGEN SATURATION: 95 % | HEIGHT: 70 IN | BODY MASS INDEX: 23.39 KG/M2

## 2024-11-06 DIAGNOSIS — R82.90 MALODOROUS URINE: ICD-10-CM

## 2024-11-06 DIAGNOSIS — E78.5 HYPERLIPIDEMIA, UNSPECIFIED HYPERLIPIDEMIA TYPE: ICD-10-CM

## 2024-11-06 DIAGNOSIS — F33.9 EPISODE OF RECURRENT MAJOR DEPRESSIVE DISORDER, UNSPECIFIED DEPRESSION EPISODE SEVERITY (HCC): Primary | ICD-10-CM

## 2024-11-06 DIAGNOSIS — E03.9 ACQUIRED HYPOTHYROIDISM: Chronic | ICD-10-CM

## 2024-11-06 DIAGNOSIS — N18.6 ESRD (END STAGE RENAL DISEASE) (HCC): Chronic | ICD-10-CM

## 2024-11-06 DIAGNOSIS — E87.70 HYPERVOLEMIA, UNSPECIFIED HYPERVOLEMIA TYPE: ICD-10-CM

## 2024-11-06 LAB
BILIRUBIN, URINE, POC: NEGATIVE
BLOOD URINE, POC: ABNORMAL
GLUCOSE URINE, POC: NEGATIVE
KETONES, URINE, POC: NEGATIVE
LEUKOCYTE ESTERASE, URINE, POC: ABNORMAL
NITRITE, URINE, POC: NEGATIVE
PH, URINE, POC: 8 (ref 4.6–8)
PROTEIN,URINE, POC: 300
SPECIFIC GRAVITY, URINE, POC: 1.02 (ref 1–1.03)
URINALYSIS CLARITY, POC: ABNORMAL
URINALYSIS COLOR, POC: YELLOW
UROBILINOGEN, POC: ABNORMAL

## 2024-11-06 RX ORDER — SEVELAMER CARBONATE 800 MG/1
800 TABLET, FILM COATED ORAL PRN
COMMUNITY
Start: 2024-11-02

## 2024-11-06 RX ORDER — ESCITALOPRAM OXALATE 10 MG/1
10 TABLET ORAL DAILY
Qty: 90 TABLET | Refills: 1 | Status: SHIPPED | OUTPATIENT
Start: 2024-11-06

## 2024-11-06 ASSESSMENT — ENCOUNTER SYMPTOMS
VOMITING: 0
SHORTNESS OF BREATH: 1
DIARRHEA: 0
NAUSEA: 0
ABDOMINAL PAIN: 0
CONSTIPATION: 0
BLOOD IN STOOL: 0
COUGH: 0
WHEEZING: 0

## 2024-11-06 NOTE — PROGRESS NOTES
dysphoric mood. Negative for self-injury and suicidal ideas.        Objective:  /62 (Site: Right Upper Arm, Position: Sitting, Cuff Size: Medium Adult)   Pulse 62   Temp 97 °F (36.1 °C) (Temporal)   Ht 1.778 m (5' 10\")   Wt 74.1 kg (163 lb 6.4 oz)   SpO2 95%   BMI 23.45 kg/m²       Examination:  Physical Exam  Vitals and nursing note reviewed.   Constitutional:       General: She is not in acute distress.     Appearance: Normal appearance.   Cardiovascular:      Rate and Rhythm: Normal rate and regular rhythm.   Pulmonary:      Effort: Pulmonary effort is normal. No respiratory distress.      Breath sounds: Normal breath sounds. No wheezing or rales.   Abdominal:      General: Bowel sounds are normal.      Palpations: Abdomen is soft.      Tenderness: There is no abdominal tenderness.   Musculoskeletal:      Right lower leg: Edema (trace) present.      Left lower leg: Edema (trace) present.   Skin:     General: Skin is warm and dry.   Neurological:      Mental Status: She is alert and oriented to person, place, and time.   Psychiatric:         Mood and Affect: Mood is depressed.           Assessment/Plan:    Wilda was seen today for follow-up.    Diagnoses and all orders for this visit:    Episode of recurrent major depressive disorder, unspecified depression episode severity (HCC)  -     escitalopram (LEXAPRO) 10 MG tablet; Take 1 tablet by mouth daily For depression.  Increase Lexapro dose to 10 mg daily. Follow-up in 4-6 weeks to reevaluate or sooner if needed.     Instructed patient to contact office or on-call physician promptly should condition worsen or any new symptoms appear.  IF THE PATIENT HAS ANY SUICIDAL OR HOMICIDAL IDEATION, CALL THE OFFICE OR GO TO THE ER IMMEDIATELY.  Patient was agreeable with this plan.    Hypervolemia, unspecified hypervolemia type  SOB and swelling improved since starting furosemide. Continue medication and dialysis as directed by nephrology.     ESRD (end stage

## 2024-11-09 LAB
BACTERIA SPEC CULT: NORMAL
SERVICE CMNT-IMP: NORMAL

## 2024-11-15 ENCOUNTER — OFFICE VISIT (OUTPATIENT)
Dept: INTERNAL MEDICINE CLINIC | Facility: CLINIC | Age: 74
End: 2024-11-15

## 2024-11-15 VITALS
SYSTOLIC BLOOD PRESSURE: 102 MMHG | DIASTOLIC BLOOD PRESSURE: 70 MMHG | OXYGEN SATURATION: 92 % | TEMPERATURE: 97.1 F | HEART RATE: 72 BPM | BODY MASS INDEX: 23.34 KG/M2 | WEIGHT: 163 LBS | RESPIRATION RATE: 16 BRPM | HEIGHT: 70 IN

## 2024-11-15 DIAGNOSIS — R82.90 MALODOROUS URINE: Primary | ICD-10-CM

## 2024-11-15 DIAGNOSIS — Z93.59 SUPRAPUBIC CATHETER (HCC): ICD-10-CM

## 2024-11-15 DIAGNOSIS — R82.90 MALODOROUS URINE: ICD-10-CM

## 2024-11-15 DIAGNOSIS — N18.6 ESRD (END STAGE RENAL DISEASE) (HCC): ICD-10-CM

## 2024-11-15 DIAGNOSIS — R31.9 HEMATURIA, UNSPECIFIED TYPE: ICD-10-CM

## 2024-11-15 LAB
BILIRUBIN, URINE, POC: NEGATIVE
BLOOD URINE, POC: ABNORMAL
GLUCOSE URINE, POC: NEGATIVE
KETONES, URINE, POC: NEGATIVE
LEUKOCYTE ESTERASE, URINE, POC: ABNORMAL
NITRITE, URINE, POC: NEGATIVE
PH, URINE, POC: 8 (ref 4.6–8)
PROTEIN,URINE, POC: 300
SPECIFIC GRAVITY, URINE, POC: 1.02 (ref 1–1.03)
URINALYSIS CLARITY, POC: ABNORMAL
URINALYSIS COLOR, POC: ABNORMAL
UROBILINOGEN, POC: ABNORMAL

## 2024-11-15 ASSESSMENT — ENCOUNTER SYMPTOMS
VOMITING: 0
COUGH: 0
ABDOMINAL PAIN: 0
SHORTNESS OF BREATH: 0
NAUSEA: 0

## 2024-11-15 NOTE — PROGRESS NOTES
[facility]      11/15/2024    Patient Name: Wilda Camara  :  1950      Chief Complaint:  Chief Complaint   Patient presents with    Other     Uti symptoms          HPI  Patient presents today with complaint of malodorous urine. Symptoms started about 2 weeks ago. UA and urine culture completed 24 for this complaint. Urine culture was negative for infection at that time. Since seen last on 24, she is now seeing small amount of blood in her urine as well. She is ESRD on dialysis. She has had suprapubic catheter for about 3 years. Catheter changed by urology about every 4-6 weeks. Changed last on 10/30/24. She denies any fever, chills or flank pain.         Past Medical History:   Diagnosis Date    Anemia     Arthritis     Chronic pain     arthritis knees    Cough     reported constant since 10/22    COVID 2024    ESRD (end stage renal disease) on dialysis ([facility])     M,W,F- [facility]    GERD (gastroesophageal reflux disease)     controlled with med    High cholesterol     History of blood transfusion     Hypertension     medication    Kidney disease     acute renal failure 2020 (hospitalized x 6 days)  Followed by Dr. Wheeler, nephrologist      Liver disease     hep C, treated in     Melanoma ([facility])     RLE,     PD catheter dysfunction ([facility]) 2024    Psychiatric disorder     depression, treated with cymbalta    PUD (peptic ulcer disease)     PVD (peripheral vascular disease) with claudication ([facility])     Dr. Johnson follows    Rheumatic fever     as a child    Thyroid disease     hypothyroid, synthroid    UTI (urinary tract infection)     suprapubic catherter, on antibiotics 23       Past Surgical History:   Procedure Laterality Date    APPENDECTOMY      CATARACT REMOVAL      bilateral, IOL implant    CHOLECYSTECTOMY      COLONOSCOPY      DIALYSIS CATHETER INSERTION N/A 2023    CATHETER INSERTION PERITONEAL DIALYSIS LAPAROSCOPIC/ OPEN

## 2024-11-17 LAB
BACTERIA SPEC CULT: NORMAL
SERVICE CMNT-IMP: NORMAL

## 2024-12-13 ENCOUNTER — NURSE ONLY (OUTPATIENT)
Dept: UROLOGY | Age: 74
End: 2024-12-13
Payer: MEDICARE

## 2024-12-13 DIAGNOSIS — N30.01 ACUTE CYSTITIS WITH HEMATURIA: ICD-10-CM

## 2024-12-13 DIAGNOSIS — Z46.6 URINARY CATHETER CHANGE REQUIRED: ICD-10-CM

## 2024-12-13 DIAGNOSIS — Z93.59 SUPRAPUBIC CATHETER (HCC): Primary | ICD-10-CM

## 2024-12-13 PROCEDURE — 51705 CHANGE OF BLADDER TUBE: CPT | Performed by: NURSE PRACTITIONER

## 2024-12-13 NOTE — PROGRESS NOTES
Pt came in for catheter change and urine culture per orders of Lashae Chau. 18f silastic suprapubic catheter changed without incident. Patient tolerated procedure well. No urine culture sent due to no urine collected, and patient unable to wait until a sample was produced.

## 2025-01-07 ASSESSMENT — PATIENT HEALTH QUESTIONNAIRE - PHQ9
6. FEELING BAD ABOUT YOURSELF - OR THAT YOU ARE A FAILURE OR HAVE LET YOURSELF OR YOUR FAMILY DOWN: SEVERAL DAYS
2. FEELING DOWN, DEPRESSED OR HOPELESS: SEVERAL DAYS
SUM OF ALL RESPONSES TO PHQ QUESTIONS 1-9: 15
SUM OF ALL RESPONSES TO PHQ9 QUESTIONS 1 & 2: 2
7. TROUBLE CONCENTRATING ON THINGS, SUCH AS READING THE NEWSPAPER OR WATCHING TELEVISION: NEARLY EVERY DAY
SUM OF ALL RESPONSES TO PHQ QUESTIONS 1-9: 14
8. MOVING OR SPEAKING SO SLOWLY THAT OTHER PEOPLE COULD HAVE NOTICED. OR THE OPPOSITE - BEING SO FIDGETY OR RESTLESS THAT YOU HAVE BEEN MOVING AROUND A LOT MORE THAN USUAL: SEVERAL DAYS
1. LITTLE INTEREST OR PLEASURE IN DOING THINGS: SEVERAL DAYS
SUM OF ALL RESPONSES TO PHQ QUESTIONS 1-9: 15
SUM OF ALL RESPONSES TO PHQ QUESTIONS 1-9: 15
9. THOUGHTS THAT YOU WOULD BE BETTER OFF DEAD, OR OF HURTING YOURSELF: SEVERAL DAYS
4. FEELING TIRED OR HAVING LITTLE ENERGY: NEARLY EVERY DAY
5. POOR APPETITE OR OVEREATING: SEVERAL DAYS
6. FEELING BAD ABOUT YOURSELF - OR THAT YOU ARE A FAILURE OR HAVE LET YOURSELF OR YOUR FAMILY DOWN: SEVERAL DAYS
10. IF YOU CHECKED OFF ANY PROBLEMS, HOW DIFFICULT HAVE THESE PROBLEMS MADE IT FOR YOU TO DO YOUR WORK, TAKE CARE OF THINGS AT HOME, OR GET ALONG WITH OTHER PEOPLE: SOMEWHAT DIFFICULT
3. TROUBLE FALLING OR STAYING ASLEEP: NEARLY EVERY DAY
1. LITTLE INTEREST OR PLEASURE IN DOING THINGS: SEVERAL DAYS
7. TROUBLE CONCENTRATING ON THINGS, SUCH AS READING THE NEWSPAPER OR WATCHING TELEVISION: NEARLY EVERY DAY
10. IF YOU CHECKED OFF ANY PROBLEMS, HOW DIFFICULT HAVE THESE PROBLEMS MADE IT FOR YOU TO DO YOUR WORK, TAKE CARE OF THINGS AT HOME, OR GET ALONG WITH OTHER PEOPLE: SOMEWHAT DIFFICULT
3. TROUBLE FALLING OR STAYING ASLEEP: NEARLY EVERY DAY
2. FEELING DOWN, DEPRESSED OR HOPELESS: SEVERAL DAYS
SUM OF ALL RESPONSES TO PHQ QUESTIONS 1-9: 15
9. THOUGHTS THAT YOU WOULD BE BETTER OFF DEAD, OR OF HURTING YOURSELF: SEVERAL DAYS
5. POOR APPETITE OR OVEREATING: SEVERAL DAYS
4. FEELING TIRED OR HAVING LITTLE ENERGY: NEARLY EVERY DAY
8. MOVING OR SPEAKING SO SLOWLY THAT OTHER PEOPLE COULD HAVE NOTICED. OR THE OPPOSITE, BEING SO FIGETY OR RESTLESS THAT YOU HAVE BEEN MOVING AROUND A LOT MORE THAN USUAL: SEVERAL DAYS

## 2025-01-07 ASSESSMENT — COLUMBIA-SUICIDE SEVERITY RATING SCALE - C-SSRS
1. IN THE PAST MONTH, HAVE YOU WISHED YOU WERE DEAD OR WISHED YOU COULD GO TO SLEEP AND NOT WAKE UP?: YES
6. IN YOUR LIFETIME, HAVE YOU EVER DONE ANYTHING, STARTED TO DO ANYTHING, OR PREPARED TO DO ANYTHING TO END YOUR LIFE?: NO
2. IN THE PAST MONTH, HAVE YOU ACTUALLY HAD ANY THOUGHTS OF KILLING YOURSELF?: NO

## 2025-01-08 ENCOUNTER — OFFICE VISIT (OUTPATIENT)
Dept: INTERNAL MEDICINE CLINIC | Facility: CLINIC | Age: 75
End: 2025-01-08
Payer: MEDICARE

## 2025-01-08 VITALS
RESPIRATION RATE: 16 BRPM | SYSTOLIC BLOOD PRESSURE: 144 MMHG | HEART RATE: 72 BPM | BODY MASS INDEX: 22.68 KG/M2 | OXYGEN SATURATION: 97 % | DIASTOLIC BLOOD PRESSURE: 88 MMHG | WEIGHT: 158.4 LBS | TEMPERATURE: 97.3 F | HEIGHT: 70 IN

## 2025-01-08 DIAGNOSIS — R14.0 BLOATING: ICD-10-CM

## 2025-01-08 DIAGNOSIS — I10 PRIMARY HYPERTENSION: Chronic | ICD-10-CM

## 2025-01-08 DIAGNOSIS — Z86.19 HISTORY OF HEPATITIS C: Chronic | ICD-10-CM

## 2025-01-08 DIAGNOSIS — Z87.09 HISTORY OF PLEURAL EFFUSION: ICD-10-CM

## 2025-01-08 DIAGNOSIS — E78.2 MIXED HYPERLIPIDEMIA: ICD-10-CM

## 2025-01-08 DIAGNOSIS — F33.1 MODERATE EPISODE OF RECURRENT MAJOR DEPRESSIVE DISORDER (HCC): ICD-10-CM

## 2025-01-08 DIAGNOSIS — E03.9 ACQUIRED HYPOTHYROIDISM: Chronic | ICD-10-CM

## 2025-01-08 DIAGNOSIS — R10.30 LOWER ABDOMINAL PAIN: Primary | ICD-10-CM

## 2025-01-08 DIAGNOSIS — R06.02 SOB (SHORTNESS OF BREATH): ICD-10-CM

## 2025-01-08 DIAGNOSIS — N18.6 ESRD (END STAGE RENAL DISEASE) (HCC): Chronic | ICD-10-CM

## 2025-01-08 PROBLEM — T85.611A PERITONEAL DIALYSIS CATHETER DYSFUNCTION (HCC): Status: RESOLVED | Noted: 2024-07-28 | Resolved: 2025-01-08

## 2025-01-08 PROBLEM — U07.1 COVID: Status: RESOLVED | Noted: 2024-09-11 | Resolved: 2025-01-08

## 2025-01-08 PROBLEM — K65.9 ACUTE BACTERIAL PERITONITIS (HCC): Status: RESOLVED | Noted: 2024-08-01 | Resolved: 2025-01-08

## 2025-01-08 PROCEDURE — 1160F RVW MEDS BY RX/DR IN RCRD: CPT | Performed by: INTERNAL MEDICINE

## 2025-01-08 PROCEDURE — 1123F ACP DISCUSS/DSCN MKR DOCD: CPT | Performed by: INTERNAL MEDICINE

## 2025-01-08 PROCEDURE — 1159F MED LIST DOCD IN RCRD: CPT | Performed by: INTERNAL MEDICINE

## 2025-01-08 PROCEDURE — 1125F AMNT PAIN NOTED PAIN PRSNT: CPT | Performed by: INTERNAL MEDICINE

## 2025-01-08 PROCEDURE — 99214 OFFICE O/P EST MOD 30 MIN: CPT | Performed by: INTERNAL MEDICINE

## 2025-01-08 PROCEDURE — 3079F DIAST BP 80-89 MM HG: CPT | Performed by: INTERNAL MEDICINE

## 2025-01-08 PROCEDURE — 3077F SYST BP >= 140 MM HG: CPT | Performed by: INTERNAL MEDICINE

## 2025-01-08 RX ORDER — ASPRIN AND CAFFEINE 400; 32 MG/1; MG/1
1 TABLET ORAL PRN
COMMUNITY

## 2025-01-08 ASSESSMENT — ENCOUNTER SYMPTOMS
SINUS PRESSURE: 0
RHINORRHEA: 0
VOMITING: 0
ABDOMINAL DISTENTION: 1
BLOOD IN STOOL: 0
SHORTNESS OF BREATH: 0
COUGH: 0
NAUSEA: 0
ABDOMINAL PAIN: 1
CHEST TIGHTNESS: 0
DIARRHEA: 0

## 2025-01-08 NOTE — PROGRESS NOTES
[facility]      2025    Patient Name: Wilda Camara  :  1950    Subjective:    Chief Complaint:  Chief Complaint   Patient presents with    Follow-up     ESRD (end stage renal disease) (HCC)         HPI Here for f/u, was hospitalized in September after missed hemodialysis session; she has hx of ESRD, presented to ED w/ SOB; she had suprapubic catheter changed at [facility] on 24; records reviewed; she has appointment this week for catheter change;   C/o distended abdomen since 2024; c/o abdominal pain and wondering if she needs some imaging study; her appetite has been good; she had BM this morning, usually has diarrhea; she does take Imodium otc;   She is wondering if she has COPD, has been a smoker for many years; she would like to see a specialist;   C/o pain in pelvic are with walking since last night; she denies recent falls or trauma;   She has hx of hep C and is concerned about her liver; she would like to hold off on seeing a specialist about this for now;   She has depression, taking Lexapro 20 mg qd and finds it helpful; she denies SI;   HTN:  Patient compliant with taking blood pressure medications: Yes  Discussed importance of following low sodium DASH diet, increasing physical activity, taking medications as ordered, decreasing alcohol intake, keeping f/u visits to recheck blood pressure, monitoring blood pressure at home and keeping a log, with goal blood pressure <140/90.  Home bp readings are: high      Past Medical History:   Diagnosis Date    Anemia     Arthritis     Chronic pain     arthritis knees    Cough     reported constant since 10/22    COVID 2024    ESRD (end stage renal disease) on dialysis (HCC)     M,W,F- [facility]    GERD (gastroesophageal reflux disease)     controlled with med    High cholesterol     History of blood transfusion     Hypertension     medication    Kidney disease     acute renal failure

## 2025-01-15 ENCOUNTER — HOSPITAL ENCOUNTER (OUTPATIENT)
Dept: CT IMAGING | Age: 75
Discharge: HOME | End: 2025-01-18
Attending: INTERNAL MEDICINE
Payer: MEDICARE

## 2025-01-15 DIAGNOSIS — Z86.19 HISTORY OF HEPATITIS C: Chronic | ICD-10-CM

## 2025-01-15 DIAGNOSIS — N18.6 ESRD (END STAGE RENAL DISEASE) (HCC): Chronic | ICD-10-CM

## 2025-01-15 DIAGNOSIS — R14.0 BLOATING: ICD-10-CM

## 2025-01-15 DIAGNOSIS — R10.30 LOWER ABDOMINAL PAIN: ICD-10-CM

## 2025-01-15 PROCEDURE — 74176 CT ABD & PELVIS W/O CONTRAST: CPT

## 2025-01-22 ENCOUNTER — NURSE ONLY (OUTPATIENT)
Dept: UROLOGY | Age: 75
End: 2025-01-22
Payer: MEDICARE

## 2025-01-22 DIAGNOSIS — Z46.6 URINARY CATHETER CHANGE REQUIRED: ICD-10-CM

## 2025-01-22 DIAGNOSIS — Z93.59 SUPRAPUBIC CATHETER (HCC): Primary | ICD-10-CM

## 2025-01-22 PROCEDURE — 51705 CHANGE OF BLADDER TUBE: CPT | Performed by: NURSE PRACTITIONER

## 2025-01-30 ENCOUNTER — OFFICE VISIT (OUTPATIENT)
Dept: UROLOGY | Age: 75
End: 2025-01-30
Payer: MEDICARE

## 2025-01-30 DIAGNOSIS — N39.0 URINARY TRACT INFECTION WITHOUT HEMATURIA, SITE UNSPECIFIED: ICD-10-CM

## 2025-01-30 DIAGNOSIS — Z93.59 SUPRAPUBIC CATHETER (HCC): Primary | ICD-10-CM

## 2025-01-30 DIAGNOSIS — N35.92 STRICTURE OF FEMALE URETHRA, UNSPECIFIED STRICTURE TYPE: ICD-10-CM

## 2025-01-30 PROCEDURE — 1123F ACP DISCUSS/DSCN MKR DOCD: CPT | Performed by: UROLOGY

## 2025-01-30 PROCEDURE — 1160F RVW MEDS BY RX/DR IN RCRD: CPT | Performed by: UROLOGY

## 2025-01-30 PROCEDURE — 99213 OFFICE O/P EST LOW 20 MIN: CPT | Performed by: UROLOGY

## 2025-01-30 PROCEDURE — 1159F MED LIST DOCD IN RCRD: CPT | Performed by: UROLOGY

## 2025-01-30 RX ORDER — METHENAMINE HIPPURATE 1000 MG/1
1 TABLET ORAL DAILY
Qty: 90 TABLET | Refills: 3 | Status: SHIPPED | OUTPATIENT
Start: 2025-01-30

## 2025-01-30 ASSESSMENT — ENCOUNTER SYMPTOMS: BACK PAIN: 0

## 2025-01-30 NOTE — PROGRESS NOTES
[facility]  [address] [address]  [phone]          Wilda Camara  : 1950    Chief Complaint   Patient presents with    Follow-up     UTI sx          HPI     Wilda Camara is a 74 y.o. female with hx of urethral stricture disease and incomplete bladder emptying.  She presented to ER per PCP advice based on abnormal blood work with a Cn of 15.5.  In the ER she reported difficulty voiding and had long hx of this.  She was only able to dribble urine.  She said it had gotten progressively worse over the previous 2 days.  Abdomen was distended.  RNs attempted to place berkowitz but were unable to locate urethra.  Hx of urethral stricture, last dilations in 2016 and 2016.      S/P SP cath placement 2020. She originally plugged/unplugged but now keeps it open to bag.      Has been having monthly cath change.  She has had a couple episodes of cellulitis around SP.  NO issue currently.      She returns in  with a complaint of a couple months of worsening odor and a little leakage via urethra.  She is now on HD due to ESRD and produces 6-12 ounces of urine per day.   She was on hipprex previously and doing well and issues seem to have started since it's cessation.           Past Medical History:   Diagnosis Date    Anemia     Arthritis     Chronic pain     arthritis knees    Cough     reported constant since 10/22    COVID 2024    ESRD (end stage renal disease) on dialysis ([facility])     M,W,F- [facility]    GERD (gastroesophageal reflux disease)     controlled with med    High cholesterol     History of blood transfusion     Hypertension     medication    Kidney disease     acute renal failure 2020 (hospitalized x 6 days)  Followed by Dr. Wheeler, nephrologist      Liver disease     hep C, treated in     Melanoma ([facility])     RLE,     PD catheter dysfunction (HCC) 2024    Psychiatric disorder     depression, treated

## 2025-01-31 ENCOUNTER — PATIENT MESSAGE (OUTPATIENT)
Dept: INTERNAL MEDICINE CLINIC | Facility: CLINIC | Age: 75
End: 2025-01-31

## 2025-01-31 RX ORDER — CARVEDILOL 12.5 MG/1
12.5 TABLET ORAL 2 TIMES DAILY WITH MEALS
Qty: 60 TABLET | Refills: 1 | Status: SHIPPED | OUTPATIENT
Start: 2025-01-31

## 2025-02-03 RX ORDER — CARVEDILOL 12.5 MG/1
12.5 TABLET ORAL 2 TIMES DAILY WITH MEALS
Qty: 180 TABLET | OUTPATIENT
Start: 2025-02-03

## 2025-02-26 ENCOUNTER — NURSE ONLY (OUTPATIENT)
Dept: UROLOGY | Age: 75
End: 2025-02-26
Payer: MEDICARE

## 2025-02-26 DIAGNOSIS — Z46.6 URINARY CATHETER CHANGE REQUIRED: ICD-10-CM

## 2025-02-26 DIAGNOSIS — Z93.59 SUPRAPUBIC CATHETER (HCC): Primary | ICD-10-CM

## 2025-02-26 PROCEDURE — 51705 CHANGE OF BLADDER TUBE: CPT | Performed by: UROLOGY

## 2025-03-27 NOTE — PROGRESS NOTES
Name:  Wilda Camara  YOB: 1950   MRN: [id number]      Office Visit: 3/31/2025        ASSESSMENT AND PLAN:  (Medical Decision Making)    Impression: 74 y.o. female with history of smoking suspected for COPD    1. Shortness of breath  Multifactorial, related to volume shifts with dialysis or in between dialysis sessions as well as COPD which is likely Gold stage II and continued smoking.  This was discussed with the patient in detail I counseled the patient on smoking cessation and given that she had had no exacerbations of COPD for the past 24 months in addition given that she had some sort of side effect with albuterol inhaler with some burning in her mouth mucosa I will withhold treatment for now and instead urged the patient to really make a big effort into quitting smoking altogether.  If however she has a true exacerbation of COPD in the next few months, we will start treatment.  - Spirometry Without Bronchodilator    2. Moderate COPD (chronic obstructive pulmonary disease) (HCC)  Relatively asymptomatic, some chronic coughing is present, counseled on smoking cessation, see discussion above, I will withhold treatment for now    3. Smoking hx  Advised patient to quit and offered support.  Spent 3-10 minutes counseling patient on smoking cessation, discussing strategies and resources to support the patient in quitting.       4. End stage renal disease (HCC)  Compliant with dialysis every Tuesday, Thursday, Saturday reiterated the need to maintain fluid restriction in between dialysis session to avoid volume overload.      Patient will return to the office for follow-up in 6 months to discuss her progress    No orders of the defined types were placed in this encounter.    No orders of the defined types were placed in this encounter.    Follow-up and Dispositions    Return in about 6 months (around 9/30/2025).       Lui Briggs MD    No specialty comments available.    Total time for

## 2025-03-31 ENCOUNTER — HOSPITAL ENCOUNTER (OUTPATIENT)
Dept: GENERAL RADIOLOGY | Age: 75
Discharge: HOME | End: 2025-04-02
Payer: MEDICARE

## 2025-03-31 ENCOUNTER — OFFICE VISIT (OUTPATIENT)
Dept: PULMONOLOGY | Age: 75
End: 2025-03-31
Payer: MEDICARE

## 2025-03-31 VITALS
HEIGHT: 70 IN | BODY MASS INDEX: 22.48 KG/M2 | TEMPERATURE: 97.2 F | RESPIRATION RATE: 20 BRPM | SYSTOLIC BLOOD PRESSURE: 138 MMHG | DIASTOLIC BLOOD PRESSURE: 88 MMHG | HEART RATE: 73 BPM | OXYGEN SATURATION: 99 % | WEIGHT: 157 LBS

## 2025-03-31 DIAGNOSIS — R06.02 SHORTNESS OF BREATH: Primary | ICD-10-CM

## 2025-03-31 DIAGNOSIS — Z87.09 HISTORY OF PLEURAL EFFUSION: ICD-10-CM

## 2025-03-31 DIAGNOSIS — N18.6 END STAGE RENAL DISEASE (HCC): ICD-10-CM

## 2025-03-31 DIAGNOSIS — R06.02 SOB (SHORTNESS OF BREATH): ICD-10-CM

## 2025-03-31 DIAGNOSIS — Z87.891 SMOKING HX: ICD-10-CM

## 2025-03-31 DIAGNOSIS — J44.9 MODERATE COPD (CHRONIC OBSTRUCTIVE PULMONARY DISEASE) (HCC): ICD-10-CM

## 2025-03-31 LAB
EXPIRATORY TIME: NORMAL
FEF 25-75% %PRED-PRE: NORMAL
FEF 25-75% PRED: NORMAL
FEF 25-75-PRE: NORMAL
FEV1 %PRED-PRE: NORMAL %
FEV1 PRED: 2.58 L
FEV1/FVC %PRED-PRE: NORMAL
FEV1/FVC PRED: NORMAL
FEV1/FVC: NORMAL %
FEV1: 1.39 L
FVC %PRED-PRE: NORMAL %
FVC PRED: 3.39 L
FVC: 2.13 L
PEF %PRED-PRE: NORMAL
PEF PRED: NORMAL
PEF-PRE: NORMAL

## 2025-03-31 PROCEDURE — 1123F ACP DISCUSS/DSCN MKR DOCD: CPT | Performed by: INTERNAL MEDICINE

## 2025-03-31 PROCEDURE — 99205 OFFICE O/P NEW HI 60 MIN: CPT | Performed by: INTERNAL MEDICINE

## 2025-03-31 PROCEDURE — 3075F SYST BP GE 130 - 139MM HG: CPT | Performed by: INTERNAL MEDICINE

## 2025-03-31 PROCEDURE — 71046 X-RAY EXAM CHEST 2 VIEWS: CPT

## 2025-03-31 PROCEDURE — 94010 BREATHING CAPACITY TEST: CPT | Performed by: INTERNAL MEDICINE

## 2025-03-31 PROCEDURE — 1159F MED LIST DOCD IN RCRD: CPT | Performed by: INTERNAL MEDICINE

## 2025-03-31 PROCEDURE — 3079F DIAST BP 80-89 MM HG: CPT | Performed by: INTERNAL MEDICINE

## 2025-03-31 RX ORDER — ONDANSETRON 4 MG/1
TABLET, ORALLY DISINTEGRATING ORAL
COMMUNITY
Start: 2025-02-12

## 2025-03-31 RX ORDER — BUDESONIDE 3 MG/1
CAPSULE, COATED PELLETS ORAL
COMMUNITY
Start: 2025-02-12

## 2025-03-31 ASSESSMENT — PULMONARY FUNCTION TESTS
FVC_PREDICTED: 3.39
FEV1_PREDICTED: 2.58
FEV1: 1.39
FVC: 2.13

## 2025-04-01 ENCOUNTER — RESULTS FOLLOW-UP (OUTPATIENT)
Dept: INTERNAL MEDICINE CLINIC | Facility: CLINIC | Age: 75
End: 2025-04-01

## 2025-04-02 ENCOUNTER — CLINICAL SUPPORT (OUTPATIENT)
Dept: UROLOGY | Age: 75
End: 2025-04-02
Payer: MEDICARE

## 2025-04-02 DIAGNOSIS — Z93.59 SUPRAPUBIC CATHETER (HCC): Primary | ICD-10-CM

## 2025-04-02 DIAGNOSIS — Z46.6 URINARY CATHETER CHANGE REQUIRED: ICD-10-CM

## 2025-04-02 PROCEDURE — 51705 CHANGE OF BLADDER TUBE: CPT | Performed by: NURSE PRACTITIONER

## 2025-04-02 NOTE — PROGRESS NOTES
Pt came in for catheter change. 18f silastic suprapubic catheter changed without incident. Patient tolerated procedure well.     Of note, the urine in the patient's catheter bag was dark, cloudy, and the patient reported it has a strong, unpleasant odor. She states she has been experiencing hallucinations (visual.)

## 2025-04-07 RX ORDER — CARVEDILOL 12.5 MG/1
12.5 TABLET ORAL 2 TIMES DAILY WITH MEALS
Qty: 60 TABLET | Refills: 1 | OUTPATIENT
Start: 2025-04-07

## 2025-04-18 ENCOUNTER — RESULTS FOLLOW-UP (OUTPATIENT)
Dept: INTERNAL MEDICINE CLINIC | Facility: CLINIC | Age: 75
End: 2025-04-18

## 2025-04-18 DIAGNOSIS — E03.9 ACQUIRED HYPOTHYROIDISM: Primary | ICD-10-CM

## 2025-04-18 LAB
25(OH)D3+25(OH)D2 SERPL-MCNC: 17.2 NG/ML (ref 30–100)
ALBUMIN SERPL-MCNC: 4.5 G/DL (ref 3.8–4.8)
ALP SERPL-CCNC: 83 IU/L (ref 44–121)
ALT SERPL-CCNC: 13 IU/L (ref 0–32)
AST SERPL-CCNC: 18 IU/L (ref 0–40)
BASOPHILS # BLD AUTO: 0.1 X10E3/UL (ref 0–0.2)
BASOPHILS NFR BLD AUTO: 1 %
BILIRUB SERPL-MCNC: <0.2 MG/DL (ref 0–1.2)
BUN SERPL-MCNC: 59 MG/DL (ref 8–27)
BUN/CREAT SERPL: 6 (ref 12–28)
CALCIUM SERPL-MCNC: 9.6 MG/DL (ref 8.7–10.3)
CHLORIDE SERPL-SCNC: 93 MMOL/L (ref 96–106)
CHOLEST SERPL-MCNC: 310 MG/DL (ref 100–199)
CO2 SERPL-SCNC: 19 MMOL/L (ref 20–29)
CREAT SERPL-MCNC: 9.17 MG/DL (ref 0.57–1)
EGFRCR SERPLBLD CKD-EPI 2021: 4 ML/MIN/1.73
EOSINOPHIL # BLD AUTO: 0.2 X10E3/UL (ref 0–0.4)
EOSINOPHIL NFR BLD AUTO: 3 %
ERYTHROCYTE [DISTWIDTH] IN BLOOD BY AUTOMATED COUNT: 15.8 % (ref 11.7–15.4)
GLOBULIN SER CALC-MCNC: 3 G/DL (ref 1.5–4.5)
GLUCOSE SERPL-MCNC: ABNORMAL MG/DL
HCT VFR BLD AUTO: 41.6 % (ref 34–46.6)
HDLC SERPL-MCNC: 91 MG/DL
HGB BLD-MCNC: 12.9 G/DL (ref 11.1–15.9)
IMM GRANULOCYTES # BLD AUTO: 0 X10E3/UL (ref 0–0.1)
IMM GRANULOCYTES NFR BLD AUTO: 1 %
LDLC SERPL CALC-MCNC: 175 MG/DL (ref 0–99)
LYMPHOCYTES # BLD AUTO: 0.9 X10E3/UL (ref 0.7–3.1)
LYMPHOCYTES NFR BLD AUTO: 15 %
Lab: ABNORMAL
MCH RBC QN AUTO: 27.7 PG (ref 26.6–33)
MCHC RBC AUTO-ENTMCNC: 31 G/DL (ref 31.5–35.7)
MCV RBC AUTO: 89 FL (ref 79–97)
MONOCYTES # BLD AUTO: 0.7 X10E3/UL (ref 0.1–0.9)
MONOCYTES NFR BLD AUTO: 11 %
NEUTROPHILS # BLD AUTO: 4.5 X10E3/UL (ref 1.4–7)
NEUTROPHILS NFR BLD AUTO: 69 %
PLATELET # BLD AUTO: 248 X10E3/UL (ref 150–450)
POTASSIUM SERPL-SCNC: ABNORMAL MMOL/L
PROT SERPL-MCNC: 7.5 G/DL (ref 6–8.5)
RBC # BLD AUTO: 4.66 X10E6/UL (ref 3.77–5.28)
SODIUM SERPL-SCNC: 139 MMOL/L (ref 134–144)
TRIGL SERPL-MCNC: 237 MG/DL (ref 0–149)
TSH SERPL DL<=0.005 MIU/L-ACNC: 516 UIU/ML (ref 0.45–4.5)
VLDLC SERPL CALC-MCNC: 44 MG/DL (ref 5–40)
WBC # BLD AUTO: 6.4 X10E3/UL (ref 3.4–10.8)

## 2025-04-18 RX ORDER — LEVOTHYROXINE SODIUM 125 UG/1
125 TABLET ORAL DAILY
Qty: 30 TABLET | Refills: 5 | Status: SHIPPED | OUTPATIENT
Start: 2025-04-18

## 2025-04-19 LAB
IMP & REVIEW OF LAB RESULTS: NORMAL
REPORT: NORMAL
REPORT: NORMAL

## 2025-04-21 ENCOUNTER — RESULTS FOLLOW-UP (OUTPATIENT)
Dept: INTERNAL MEDICINE CLINIC | Facility: CLINIC | Age: 75
End: 2025-04-21

## 2025-04-22 DIAGNOSIS — R18.8 OTHER ASCITES: Primary | ICD-10-CM

## 2025-04-22 LAB — T4 FREE SERPL-MCNC: <0.1 NG/DL (ref 0.82–1.77)

## 2025-04-25 ENCOUNTER — TELEPHONE (OUTPATIENT)
Dept: INTERNAL MEDICINE CLINIC | Facility: CLINIC | Age: 75
End: 2025-04-25

## 2025-04-25 ENCOUNTER — HOSPITAL ENCOUNTER (OUTPATIENT)
Dept: INTERVENTIONAL RADIOLOGY/VASCULAR | Age: 75
Discharge: HOME | End: 2025-04-27
Payer: MEDICARE

## 2025-04-25 VITALS
DIASTOLIC BLOOD PRESSURE: 85 MMHG | SYSTOLIC BLOOD PRESSURE: 153 MMHG | WEIGHT: 167 LBS | BODY MASS INDEX: 23.96 KG/M2 | TEMPERATURE: 98.1 F | HEART RATE: 66 BPM | RESPIRATION RATE: 18 BRPM | OXYGEN SATURATION: 98 %

## 2025-04-25 DIAGNOSIS — R18.8 OTHER ASCITES: ICD-10-CM

## 2025-04-25 PROCEDURE — 6360000002 HC RX W HCPCS: Performed by: PHYSICIAN ASSISTANT

## 2025-04-25 PROCEDURE — C1729 CATH, DRAINAGE: HCPCS

## 2025-04-25 PROCEDURE — 49083 ABD PARACENTESIS W/IMAGING: CPT | Performed by: PHYSICIAN ASSISTANT

## 2025-04-25 PROCEDURE — 49083 ABD PARACENTESIS W/IMAGING: CPT

## 2025-04-25 RX ORDER — LIDOCAINE HYDROCHLORIDE 10 MG/ML
INJECTION, SOLUTION INFILTRATION; PERINEURAL PRN
Status: COMPLETED | OUTPATIENT
Start: 2025-04-25 | End: 2025-04-25

## 2025-04-25 RX ADMIN — LIDOCAINE HYDROCHLORIDE 10 ML: 10 INJECTION, SOLUTION INFILTRATION; PERINEURAL at 11:42

## 2025-04-25 NOTE — OP NOTE
Title: Ultrasound guided paracentesis.      History: Ascites, end-stage renal disease on hemodialysis.    :  Kelley Chanel PA-C    Supervising Physician: Juan Pedraza MD.   The physician attests to  supervising this procedure and agrees with the report as written.    Consent:  Informed written and oral consent was obtained from the patient after  the risks and benefits were discussed.  All questions were answered and the  patient requested that we proceed.    Procedure:  Maximal sterile barrier technique was used.  Following routine prep  and drape of the abdomen, a local field block with 1% lidocaine was achieved.  Ultrasound evaluation was performed.  A permanent, hardcopy ultrasound images  was stored in PACS.    Fluid was identified in the peritoneal cavity. Using real-time ultrasound  guidance, the peritoneal cavity was accessed with an 8 Arabic centesis catheter.   The catheter was connected to wall suction.    A total of 5000 cc of thin yellow fluid was removed. The catheter was removed  and a bandage applied.    Complications: None.      Findings: Large volume ascites.   Impression:     Uncomplicated ultrasound guided paracentesis.

## 2025-04-25 NOTE — PROGRESS NOTES
5000 ml hazy thin yellow ascitic fluid obtained.      Site closed with topical skin adhesive,  no s\s of leakage or bleeding    Pt and family verbalized understanding of discharge instructions.  Time allowed for questions.

## 2025-04-25 NOTE — TELEPHONE ENCOUNTER
This is what I need patient to do:  Thyroid US revealed: no discrete thyroid lesion, has atrophic appearance; ensure taking Synthroid as previously prescribed and keep lab f/u for recheck; she was referred to Endo, ensure she has appt ASAP;

## 2025-04-25 NOTE — DISCHARGE INSTRUCTIONS
[facility]     Department of Interventional Radiology     [facility]    [phone] Office     [phone] Fax       PARACENTESIS DISCHARGE INSTRUCTIONS           General Information:     During this procedure, the doctor will insert a needle into the abdomen to drain fluid. After the procedure, you will be able to take a deep breath much easier. The site of the puncture may ooze the first day. This will decrease and eventually stop. Paracentesis (draining fluid from the abdomen) sometimes makes patients hypotensive (low blood pressure). Your doctor may order for you to receive fluids or albumin (a volume booster) during the procedure through an IV site.            Home Care Instructions:     Keep the puncture site clean and dry. No tub baths or swimming until puncture site heals. Showering is acceptable. Resume your normal diet, and resume your normal activity slowly and as you tolerate. If you are short of breath, rest. If shortness of breath does not ease, please call your ordering doctor. Fluid can re-accumulate in the chest and/or in the abdomen. If this should occur, your doctor needs to know as you may need to have the procedure done again.           Call If:        You should call your Physician and/or the Radiology Nurse if you notice any signs of infection, like pus draining, or if it is swollen or reddened. Also call if you have a fever, or if you are bleeding from the puncture site more than a small amount on the dressing. Call if the puncture site keeps draining fluid. Some oozing is to be expected, but should slow and then stop. Call if you feel like you have pressure in your abdomen. SEEK IMMEDIATE CARE OR CALL 911 IF YOU SUDDENLY HAVE TROUBLE BREATHING, OR IF YOUR LIPS TURN BLUE, OR IF YOU NOTICE BLOOD IN YOUR SPUTUM.           Follow-Up Instructions:   Please see your ordering doctor as he/she has requested.            To Reach Us:

## 2025-04-30 ENCOUNTER — TELEPHONE (OUTPATIENT)
Dept: PULMONOLOGY | Age: 75
End: 2025-04-30

## 2025-04-30 NOTE — TELEPHONE ENCOUNTER
Patient left message on voicemail wanting an order for 02 to have on hand for when she feels short of breath.    Spoke to patient and let her know she has to qualify for oxygen before can send an order in and she hung up the phone.

## 2025-05-07 ENCOUNTER — OFFICE VISIT (OUTPATIENT)
Dept: UROLOGY | Age: 75
End: 2025-05-07
Payer: MEDICARE

## 2025-05-07 DIAGNOSIS — Z46.6 URINARY CATHETER CHANGE REQUIRED: ICD-10-CM

## 2025-05-07 DIAGNOSIS — N39.0 RECURRENT UTI: ICD-10-CM

## 2025-05-07 DIAGNOSIS — N35.92 STRICTURE OF FEMALE URETHRA, UNSPECIFIED STRICTURE TYPE: ICD-10-CM

## 2025-05-07 DIAGNOSIS — Z93.59 SUPRAPUBIC CATHETER (HCC): Primary | ICD-10-CM

## 2025-05-07 PROCEDURE — 1159F MED LIST DOCD IN RCRD: CPT | Performed by: NURSE PRACTITIONER

## 2025-05-07 PROCEDURE — 1123F ACP DISCUSS/DSCN MKR DOCD: CPT | Performed by: NURSE PRACTITIONER

## 2025-05-07 PROCEDURE — 99214 OFFICE O/P EST MOD 30 MIN: CPT | Performed by: NURSE PRACTITIONER

## 2025-05-07 NOTE — PROGRESS NOTES
[facility]  [address]   [address]  [place] [address]  [phone]    Wilda Camara  : 1950    No chief complaint on file.         HPI     Wilda Camara is a 74 y.o. female with hx of urethral stricture disease and incomplete bladder emptying.  She presented to ER per PCP advice based on abnormal blood work with a Cn of 15.5.  In the ER she reported difficulty voiding and had long hx of this.  She was only able to dribble urine.  She said it had gotten progressively worse over the previous 2 days.  Abdomen was distended.  RNs attempted to place berkowitz but were unable to locate urethra.  Hx of urethral stricture, last dilations in 2016 and 2016.      S/P SP cath placement 2020. She originally plugged/unplugged but now keeps it open to bag.      Has been having monthly cath change.  She has had a couple episodes of cellulitis around SP.  NO issue currently.       She returns in  with a complaint of a couple months of worsening odor and a little leakage via urethra.  She is now on HD due to ESRD and produces 6-12 ounces of urine per day.   She was on hipprex previously and doing well and issues seem to have started since it's cessation. Restarted this. She does not feel this has helped. Has malodorous urine. Rare episode of pain at SP site or AMS.        Past Medical History:   Diagnosis Date    Anemia     Arthritis     Chronic pain     arthritis knees    Cough     reported constant since 10/22    COVID 2024    ESRD (end stage renal disease) on dialysis ([facility])     M,W,F- [facility]    GERD (gastroesophageal reflux disease)     controlled with med    High cholesterol     History of blood transfusion     Hypertension     medication    Kidney disease     acute renal failure 2020 (hospitalized x 6 days)  Followed by Dr. Wheeler, nephrologist      Liver disease     hep C, treated in     Melanoma (HCC)     RLE,     PD catheter dysfunction

## 2025-05-20 NOTE — PROGRESS NOTES
Wilda Camara  Admission Date: 8/13/2024         [facility] Progress Note: 8/14/2024    Follow-up for: ESRD    The patient's chart is reviewed and the patient is discussed with the staff.    Subjective:     Patient seen and assessed on HD today, tolerating. Dialyzing via right TCC.     ROS:  Gen - no fever, no chills, appetite unchanged  CV - no chest pain, no palpitation  Lung - no shortness of breath, no cough  Abd - no tenderness, no nausea/vomiting, no diarrhea  Ext - no edema    Current Facility-Administered Medications   Medication Dose Route Frequency    diphenhydrAMINE (BENYLIN) 12.5 MG/5ML liquid 25 mg  25 mg Oral Q6H PRN    heparin (porcine) injection 5,000 Units  5,000 Units IntraCATHeter PRN    glucose chewable tablet 16 g  4 tablet Oral PRN    dextrose bolus 10% 125 mL  125 mL IntraVENous PRN    Or    dextrose bolus 10% 250 mL  250 mL IntraVENous PRN    Glucagon Emergency KIT 1 mg  1 mg SubCUTAneous PRN    dextrose 10 % infusion   IntraVENous Continuous PRN    carvedilol (COREG) tablet 25 mg  25 mg Oral BID    hydrOXYzine HCl (ATARAX) tablet 10 mg  10 mg Oral Q6H PRN    methocarbamol (ROBAXIN) tablet 500 mg  500 mg Oral TID PRN    pantoprazole (PROTONIX) tablet 40 mg  40 mg Oral Daily    sodium chloride flush 0.9 % injection 5-40 mL  5-40 mL IntraVENous 2 times per day    sodium chloride flush 0.9 % injection 5-40 mL  5-40 mL IntraVENous PRN    0.9 % sodium chloride infusion   IntraVENous PRN    heparin (porcine) injection 5,000 Units  5,000 Units SubCUTAneous 3 times per day    ondansetron (ZOFRAN-ODT) disintegrating tablet 4 mg  4 mg Oral Q8H PRN    Or    ondansetron (ZOFRAN) injection 4 mg  4 mg IntraVENous Q6H PRN    polyethylene glycol (GLYCOLAX) packet 17 g  17 g Oral Daily PRN    acetaminophen (TYLENOL) tablet 650 mg  650 mg Oral Q6H PRN    Or    acetaminophen (TYLENOL) suppository 650 mg  650 mg Rectal Q6H PRN    potassium chloride (KLOR-CON M) extended      Infectious Diseases   Progress Note      Admission Date: 5/15/2025  Hospital Day: Hospital Day: 6   Attending: Julian Coronado MD  Date of service: 5/20/2025     Chief complaint/ Reason for consult:     Generalized weakness  COVID-19 infection  L5 spondylolysis and spondylolisthesis  Bilateral frontal sinusitis  Worsening of right clavicular cortical irregularity  Recent Enterococcus faecalis bacteremia in April 2025  History of lumbar discitis and osteomyelitis with foraminal stenosis at L3-L4 level  ESRD on hemodialysis    Microbiology:      I have reviewed allavailable micro lab data and cultures    Results       Procedure Component Value Units Date/Time    Culture, Body Fluid (with Gram Stain) [[id number]]     Order Status: Sent Specimen: Body Fluid     Culture, Fungus [[id number]]     Order Status: Sent Specimen: Body Fluid     Culture with Smear, Acid Fast Bacillius [[id number]]     Order Status: Sent Specimen: Body Fluid     Culture, Body Fluid (with Gram Stain) [[id number]] Collected: 05/16/25 1405    Order Status: Completed Specimen: Body Fluid from Synovial Fluid Updated: 05/19/25 0852     Body Fluid Culture, Sterile --     No growth to date  No growth 36 to 48 hours  No Aerobic or Anaerobic growth  Holding for 3 weeks.       Gram Stain Result No Epithelial Cells seen  1+ WBC's (Mononuclear)  No organisms seen      Narrative:      ORDER#: [id number]                          ORDERED BY: KENN RODRIGUEZ  SOURCE: Synovial Fluid                     COLLECTED:  05/16/25 14:05  ANTIBIOTICS AT [facility].:                      RECEIVED :  05/16/25 17:06    Respiratory Panel, Molecular, with COVID-19 (Restricted: peds pts or suitable admitted adults) [[id number]]  (Abnormal) Collected: 05/16/25 0620    Order Status: Completed Specimen: Nasopharyngeal Swab Updated: 05/16/25 1331     Organism SARS CoV 2  by PCR     Respiratory Panel PCR --     POSITIVE FOR  See additional report for complete Respiratory Pathogen      BMP:  Recent Labs     05/18/25  0603 05/19/25  0420 05/20/25  0742   *  --  137   K 4.6  --  4.9   CL 99  --  103   CO2 24  --  21   BUN 39*  --  68*   CREATININE 1.8* 1.8* 2.4*   CALCIUM 8.3  --  8.7   GLUCOSE 160*  --  143*        Hepatic Function Panel:   Lab Results   Component Value Date/Time    ALKPHOS 129 05/15/2025 09:20 AM    ALT 18 05/15/2025 09:20 AM    AST 27 05/15/2025 09:20 AM    BILITOT 0.3 05/15/2025 09:20 AM    BILIDIR <0.2 03/16/2021 09:41 AM    IBILI see below 03/16/2021 09:41 AM       CPK:   Lab Results   Component Value Date    CKTOTAL 35 (L) 12/15/2023     ESR:   Lab Results   Component Value Date    SEDRATE 39 (H) 05/15/2025     CRP:   Lab Results   Component Value Date    CRP 46.3 (H) 05/15/2025           Imaging:    All pertinent images and reports for the current visit were reviewed by me during this visit.  I reviewed the chest x-ray/CT scan/MRI images today and independently interpreted the findings and results today.    CT DRAINAGE HEMATOMA/SEROMA/FLUID COLLECTION   Final Result   Successful CT-guided percutaneous aspiration of a right psoas collection   collection via a posterior approach.         MRI LUMBAR SPINE WO CONTRAST   Final Result   1. Persistent findings of discitis/osteomyelitis at L3-L4, similar to   slightly progressed compared with 03/23/2025 allowing for differences in   technique.  Postcontrast MRI imaging may be helpful for further   characterization as clinically warranted.   2. Additional abnormal signal in the L2-L3 and L4-L5 intervertebral discs and   vertebral body endplates to a lesser extent, which could be degenerative   and/or related to discitis/osteomyelitis.  This is similar to prior allowing   for differences in technique.   3. Enlargement of the right psoas muscle with associated fluid collection   measuring 2.7 x 1.4 x 5.8 cm, more conspicuous compared to prior, which may   represent hematoma as previously suspected; infection/abscess and

## 2025-06-04 ENCOUNTER — CLINICAL SUPPORT (OUTPATIENT)
Dept: UROLOGY | Age: 75
End: 2025-06-04
Payer: MEDICARE

## 2025-06-04 DIAGNOSIS — Z93.59 SUPRAPUBIC CATHETER (HCC): Primary | ICD-10-CM

## 2025-06-04 PROCEDURE — 51705 CHANGE OF BLADDER TUBE: CPT | Performed by: NURSE PRACTITIONER

## 2025-06-27 ENCOUNTER — CLINICAL SUPPORT (OUTPATIENT)
Dept: UROLOGY | Age: 75
End: 2025-06-27

## 2025-06-27 DIAGNOSIS — Z46.6 URINARY CATHETER CHANGE REQUIRED: ICD-10-CM

## 2025-06-27 DIAGNOSIS — Z93.59 SUPRAPUBIC CATHETER (HCC): Primary | ICD-10-CM

## 2025-08-04 ENCOUNTER — CLINICAL SUPPORT (OUTPATIENT)
Dept: UROLOGY | Age: 75
End: 2025-08-04
Payer: MEDICARE

## 2025-08-04 DIAGNOSIS — Z93.59 SUPRAPUBIC CATHETER (HCC): Primary | ICD-10-CM

## 2025-08-04 PROCEDURE — 51705 CHANGE OF BLADDER TUBE: CPT | Performed by: NURSE PRACTITIONER

## 2025-09-05 ENCOUNTER — CLINICAL SUPPORT (OUTPATIENT)
Dept: UROLOGY | Age: 75
End: 2025-09-05
Payer: MEDICARE

## 2025-09-05 DIAGNOSIS — Z93.59 SUPRAPUBIC CATHETER (HCC): Primary | ICD-10-CM

## 2025-09-05 DIAGNOSIS — Z46.6 URINARY CATHETER CHANGE REQUIRED: ICD-10-CM

## 2025-09-05 PROCEDURE — 51705 CHANGE OF BLADDER TUBE: CPT | Performed by: NURSE PRACTITIONER

## (undated) DEVICE — SUTURE PROL SZ 6-0 L24IN NONABSORBABLE BLU BV-1 L9.3MM 3/8 M8805

## (undated) DEVICE — BLADE ASSEMB CLP HAIR FINE --

## (undated) DEVICE — 48" PROBE COVER W/GEL, ULTRASOUND, STERILE: Brand: SITE-RITE

## (undated) DEVICE — AMD ANTIMICROBIAL GAUZE SPONGES,12 PLY USP TYPE VII, 0.2% POLYHEXAMETHYLENE BIGUANIDE HCI (PHMB): Brand: CURITY

## (undated) DEVICE — STOPCOCK ANGIO 1050PSI DK BLU L ROT M LUER 3 W OFF HNDL

## (undated) DEVICE — CYSTO/BLADDER IRRIGATION SET, REGULATING CLAMP

## (undated) DEVICE — THROMBECTOMY-EMBOLECTOMY: Brand: MEDLINE INDUSTRIES, INC.

## (undated) DEVICE — GARMENT,MEDLINE,DVT,INT,CALF,MED, GEN2: Brand: MEDLINE

## (undated) DEVICE — INTENDED TO BE USED TO OCCLUDE, RETRACT AND IDENTIFY ARTERIES, VEINS, TENDONS AND NERVES IN SURGICAL PROCEDURES: Brand: STERION®  VESSEL LOOP

## (undated) DEVICE — PRESSURE TUBING: Brand: TRUWAVE

## (undated) DEVICE — STRIP,CLOSURE,WOUND,MEDI-STRIP,1/2X4: Brand: MEDLINE

## (undated) DEVICE — GOWN,PREVENTION PLUS,2XL,ST,22/CS: Brand: MEDLINE

## (undated) DEVICE — CATHETER PTCA L80CM BLLN L40MM DIA9MM INTRO SHTH 6FR 7ATM

## (undated) DEVICE — 3M™ TEGADERM™ TRANSPARENT FILM DRESSING FRAME STYLE, 1624W, 2-3/8 IN X 2-3/4 IN (6 CM X 7 CM), 100/CT 4CT/CASE: Brand: 3M™ TEGADERM™

## (undated) DEVICE — APPLIER CLP L9.375IN APER 2.1MM CLS L3.8MM 20 SM TI CLP

## (undated) DEVICE — GLOVE SURG SZ 7.5 L11.73IN FNGR THK9.8MIL STRW LTX POLYMER

## (undated) DEVICE — SOLUTION IRRIG 3000ML H2O STRL BAG

## (undated) DEVICE — CATH URETH FOL 2W SH 18FRX5ML --

## (undated) DEVICE — ADHESIVE SKIN CLSR 1ML TISS HI VISC EXOFIN

## (undated) DEVICE — ABSORBENT, WATERPROOF, BACTERIA PROOF FILM DRESSING: Brand: OPSITE POST OP 9.5X8.5CM CTN 20

## (undated) DEVICE — TRAY PREP DRY W/ PREM GLV 2 APPL 6 SPNG 2 UNDPD 1 OVERWRAP

## (undated) DEVICE — GDWIRE 3CM FLX-TIP 0.038X150CM -- BX/5 SENSOR

## (undated) DEVICE — AV FISTULA: Brand: MEDLINE INDUSTRIES, INC.

## (undated) DEVICE — 15F X 63CM V-SERIES COILED PERITONEAL DIALYSIS CATHETER SET W/2 CUFFS (LEFT): Brand: V-SERIES PD CATHETER

## (undated) DEVICE — INFLATION DEVICE: Brand: ENCORE™ 26

## (undated) DEVICE — GLOVE SURG SZ 75 CRM LTX FREE POLYISOPRENE POLYMER BEAD ANTI

## (undated) DEVICE — AGENT HEMSTAT W4XL4IN OXIDIZED REGENERATED CELOS ABSRB SFT

## (undated) DEVICE — SUTURE MCRYL SZ 4-0 L27IN ABSRB UD L19MM PS-2 1/2 CIR PRIM Y426H

## (undated) DEVICE — MASTISOL ADHESIVE LIQ 2/3ML

## (undated) DEVICE — AMPLATZ TYPE GRADUATED RENAL DILATATION SET

## (undated) DEVICE — HOOK ENDOSCP L4-9CM TIP FOR SEAL AND DISECT HARM

## (undated) DEVICE — BAG,DRAINAGE,4L,A/R TOWER,LL,SLIDE TAP: Brand: MEDLINE

## (undated) DEVICE — TUBING INSUFFLATION SMK EVAC HI FLO SET PNEUMOCLEAR

## (undated) DEVICE — BANDAGE,GAUZE,BULKEE II,4.5"X4.1YD,STRL: Brand: MEDLINE

## (undated) DEVICE — LOOP VES W25MM THK1MM MAXI RED SIL FLD REPELLENT 100 PER

## (undated) DEVICE — STRIP WND PK W0.25INXL5YD IODO GZ TIGHTLY WVN CURAD

## (undated) DEVICE — TOWEL SURG W17XL27IN STD BLU COT NONFENESTRATED PREWASHED

## (undated) DEVICE — SOLUTION ANTIFOG VIS SYS CLEARIFY LAPSCP

## (undated) DEVICE — PREMIUM WET SKIN PREP TRAY: Brand: MEDLINE INDUSTRIES, INC.

## (undated) DEVICE — SYRINGE,TOOMEY,IRRIGATION,70CC,STERILE: Brand: MEDLINE

## (undated) DEVICE — SUTURE PERMAHAND SZ 2-0 L18IN NONABSORBABLE BLK L26MM SH C012D

## (undated) DEVICE — CATH URETH FOL 2W SH 20FRX5ML --

## (undated) DEVICE — SUTURE VICRYL + SZ 0 L27IN ABSRB VLT L26MM UR-6 5/8 CIR VCP603H

## (undated) DEVICE — 3M™ IOBAN™ 2 ANTIMICROBIAL INCISE DRAPE 6650EZ: Brand: IOBAN™ 2

## (undated) DEVICE — MICROPUNCTURE INTRODUCER SET SILHOUETTE TRANSITIONLESS PUSH-PLUS DESIGN - STIFFENED CANNULA WITH STAINLESS STEEL WIRE GUIDE: Brand: MICROPUNCTURE

## (undated) DEVICE — AMPLATZ TYPE RENAL SHEATH: Brand: AMPLATZ TYPE RENAL SHEATH

## (undated) DEVICE — MINOR SPLIT GENERAL: Brand: MEDLINE INDUSTRIES, INC.

## (undated) DEVICE — SOLUTION IRRIG 1000ML 0.9% SOD CHL USP POUR PLAS BTL

## (undated) DEVICE — GENERAL LAPAROSCOPY: Brand: MEDLINE INDUSTRIES, INC.

## (undated) DEVICE — SUTURE SZ 0 27IN 5/8 CIR UR-6  TAPER PT VIOLET ABSRB VICRYL J603H

## (undated) DEVICE — SUTURE PDS II SZ 4-0 L18IN ABSRB UD L19MM PS-2 3/8 CIR PRIM Z496G

## (undated) DEVICE — INTRODUCER SHTH 6FR CANN L5.5CM DIL TIP 35MM GRN TUNGSTEN

## (undated) DEVICE — NEEDLE HYPO 21GA L1.5IN GRN POLYPR HUB S STL THN WALL IV

## (undated) DEVICE — VASCUCLAMP RADIOPAQUE VASCULAR CLAMP MEDIUM ANGLED (2 CLAMPS PER PACKAGE): Brand: VASCUCLAMP

## (undated) DEVICE — BENTSON WIRE GUIDE 20CM DISTAL FLEXIBILITY WITH SOFTENED TIP: Brand: BENTSON

## (undated) DEVICE — TROCAR: Brand: KII® SLEEVE

## (undated) DEVICE — BNDG,ELSTC,MATRIX,STRL,4"X5YD,LF,HOOK&LP: Brand: MEDLINE

## (undated) DEVICE — PLEDGET VASC W3/16XL3/8IN THK1/16IN PTFE SFT

## (undated) DEVICE — GUIDEWIRE ENDOSCP L150CM DIA0.038IN TIP L3CM PTFE FLX STR

## (undated) DEVICE — CARDINAL HEALTH FLEXIBLE LIGHT HANDLE COVER: Brand: CARDINAL HEALTH

## (undated) DEVICE — GOWN,REINFORCED,POLY,AURORA,XXLARGE,STR: Brand: MEDLINE

## (undated) DEVICE — SUTURE MCRYL SZ 4-0 L18IN ABSRB UD L19MM PS-2 3/8 CIR PRIM Y496G

## (undated) DEVICE — TROCAR: Brand: KII FIOS FIRST ENTRY

## (undated) DEVICE — CATHETER IV NDL L1.25IN 16GA GRY POLYUR WNG HUB DISP FOR

## (undated) DEVICE — PACK SURGICAL PROCEDURE KIT CYSTOSCOPY TOTE